# Patient Record
Sex: MALE | Race: WHITE | NOT HISPANIC OR LATINO | Employment: OTHER | URBAN - METROPOLITAN AREA
[De-identification: names, ages, dates, MRNs, and addresses within clinical notes are randomized per-mention and may not be internally consistent; named-entity substitution may affect disease eponyms.]

---

## 2023-04-06 PROBLEM — M54.16 LUMBAR RADICULOPATHY: Status: ACTIVE | Noted: 2023-04-06

## 2023-04-06 PROBLEM — M86.9 OSTEOMYELITIS (MULTI): Status: ACTIVE | Noted: 2023-04-06

## 2023-04-06 PROBLEM — I10 HYPERTENSION: Status: ACTIVE | Noted: 2023-04-06

## 2023-04-06 PROBLEM — G89.29 ABDOMINAL PAIN, CHRONIC, RIGHT LOWER QUADRANT: Status: ACTIVE | Noted: 2023-04-06

## 2023-04-06 PROBLEM — H61.20 CERUMEN IMPACTION: Status: ACTIVE | Noted: 2023-04-06

## 2023-04-06 PROBLEM — N18.9 CKD (CHRONIC KIDNEY DISEASE): Status: ACTIVE | Noted: 2023-04-06

## 2023-04-06 PROBLEM — E11.9 DIABETES (MULTI): Status: ACTIVE | Noted: 2023-04-06

## 2023-04-06 PROBLEM — R10.31 ABDOMINAL PAIN, CHRONIC, RIGHT LOWER QUADRANT: Status: ACTIVE | Noted: 2023-04-06

## 2023-04-06 PROBLEM — G95.9 MYELOPATHY (MULTI): Status: ACTIVE | Noted: 2023-04-06

## 2023-04-06 PROBLEM — N28.9 RENAL INSUFFICIENCY: Status: ACTIVE | Noted: 2023-04-06

## 2023-04-06 PROBLEM — N28.89 RENAL MASS, RIGHT: Status: ACTIVE | Noted: 2023-04-06

## 2023-04-06 RX ORDER — CYCLOBENZAPRINE HCL 5 MG
5 TABLET ORAL EVERY 8 HOURS
COMMUNITY

## 2023-04-06 RX ORDER — AMLODIPINE BESYLATE 10 MG/1
1 TABLET ORAL DAILY
COMMUNITY
Start: 2020-04-16 | End: 2023-07-11 | Stop reason: SDUPTHER

## 2023-04-06 RX ORDER — LISINOPRIL 10 MG/1
1 TABLET ORAL DAILY
COMMUNITY
Start: 2022-07-12 | End: 2023-07-11 | Stop reason: SDUPTHER

## 2023-04-06 RX ORDER — BLOOD SUGAR DIAGNOSTIC
STRIP MISCELLANEOUS
COMMUNITY
Start: 2019-09-19

## 2023-04-06 RX ORDER — METFORMIN HYDROCHLORIDE 1000 MG/1
1 TABLET ORAL
COMMUNITY
Start: 2020-06-19 | End: 2023-07-11 | Stop reason: SDUPTHER

## 2023-04-06 RX ORDER — GLIMEPIRIDE 2 MG/1
1 TABLET ORAL 2 TIMES DAILY
COMMUNITY
Start: 2022-07-12 | End: 2023-07-11 | Stop reason: SDUPTHER

## 2023-04-07 ENCOUNTER — TELEPHONE (OUTPATIENT)
Dept: PRIMARY CARE | Facility: CLINIC | Age: 58
End: 2023-04-07

## 2023-04-07 ENCOUNTER — OFFICE VISIT (OUTPATIENT)
Dept: PRIMARY CARE | Facility: CLINIC | Age: 58
End: 2023-04-07
Payer: COMMERCIAL

## 2023-04-07 VITALS
HEART RATE: 70 BPM | OXYGEN SATURATION: 98 % | DIASTOLIC BLOOD PRESSURE: 66 MMHG | SYSTOLIC BLOOD PRESSURE: 130 MMHG | BODY MASS INDEX: 34.6 KG/M2 | TEMPERATURE: 96.8 F | HEIGHT: 69 IN | WEIGHT: 233.6 LBS

## 2023-04-07 DIAGNOSIS — M79.641 PAIN OF RIGHT HAND: Primary | ICD-10-CM

## 2023-04-07 PROCEDURE — 3078F DIAST BP <80 MM HG: CPT | Performed by: STUDENT IN AN ORGANIZED HEALTH CARE EDUCATION/TRAINING PROGRAM

## 2023-04-07 PROCEDURE — 1036F TOBACCO NON-USER: CPT | Performed by: STUDENT IN AN ORGANIZED HEALTH CARE EDUCATION/TRAINING PROGRAM

## 2023-04-07 PROCEDURE — 3075F SYST BP GE 130 - 139MM HG: CPT | Performed by: STUDENT IN AN ORGANIZED HEALTH CARE EDUCATION/TRAINING PROGRAM

## 2023-04-07 PROCEDURE — 99213 OFFICE O/P EST LOW 20 MIN: CPT | Performed by: STUDENT IN AN ORGANIZED HEALTH CARE EDUCATION/TRAINING PROGRAM

## 2023-04-07 PROCEDURE — 4010F ACE/ARB THERAPY RXD/TAKEN: CPT | Performed by: STUDENT IN AN ORGANIZED HEALTH CARE EDUCATION/TRAINING PROGRAM

## 2023-04-07 RX ORDER — AMOXICILLIN AND CLAVULANATE POTASSIUM 875; 125 MG/1; MG/1
875 TABLET, FILM COATED ORAL 2 TIMES DAILY
Qty: 20 TABLET | Refills: 0 | Status: SHIPPED | OUTPATIENT
Start: 2023-04-07 | End: 2023-04-10 | Stop reason: SDUPTHER

## 2023-04-07 RX ORDER — AMOXICILLIN AND CLAVULANATE POTASSIUM 875; 125 MG/1; MG/1
875 TABLET, FILM COATED ORAL 2 TIMES DAILY
Qty: 20 TABLET | Refills: 0 | Status: SHIPPED | OUTPATIENT
Start: 2023-04-07 | End: 2023-04-07 | Stop reason: SDUPTHER

## 2023-04-07 RX ORDER — METHYLPREDNISOLONE 4 MG/1
TABLET ORAL
Qty: 21 TABLET | Refills: 0 | Status: SHIPPED | OUTPATIENT
Start: 2023-04-07 | End: 2023-04-10 | Stop reason: SDUPTHER

## 2023-04-07 RX ORDER — AMOXICILLIN AND CLAVULANATE POTASSIUM 500; 125 MG/1; MG/1
500 TABLET, FILM COATED ORAL 2 TIMES DAILY
Qty: 20 TABLET | Refills: 10 | Status: SHIPPED | OUTPATIENT
Start: 2023-04-07

## 2023-04-07 ASSESSMENT — ENCOUNTER SYMPTOMS
EYE REDNESS: 0
CHEST TIGHTNESS: 0
ARTHRALGIAS: 1
MYALGIAS: 0
WEAKNESS: 0
PALPITATIONS: 0
COUGH: 0
WHEEZING: 0
NAUSEA: 0
SLEEP DISTURBANCE: 0
DYSURIA: 0
FATIGUE: 0
BLOOD IN STOOL: 0
JOINT SWELLING: 1
SINUS PAIN: 0
FEVER: 0
PHOTOPHOBIA: 0
ABDOMINAL PAIN: 0
EYE DISCHARGE: 0
SHORTNESS OF BREATH: 0
ABDOMINAL DISTENTION: 0
APPETITE CHANGE: 0
DIFFICULTY URINATING: 0
CONSTIPATION: 0
VOMITING: 0
CHILLS: 0
COLOR CHANGE: 0
DIARRHEA: 0
CONFUSION: 0

## 2023-04-07 NOTE — TELEPHONE ENCOUNTER
Patient's medication was sent to the wrong pharmacy.  It should have gone to the local [facility] listed.  Pharmacy will not fill this type of medication.

## 2023-04-07 NOTE — PROGRESS NOTES
Subjective   Patient ID: Nader Zazueta is a 58 y.o. male who presents for Vomiting (Had a recent stomach bug) and Hand Pain (Right hand swelling and pain x2 days).    HPI     Sick visit    Pt reported a GI bug from 4/1-5 in which he had stomach cramping, nausea w/ vomiting exacerbated by PO intake. Denies fevers, chills, loose stools, constipation, melena, stools, or hematochezia. He does note; however, he did had several days of non-bloody loose stools several weeks prior. Pt also reports on 4/4, he awoke with sudden onset right hand edema and discomfort, with mild erythema on his knuckles. Denies any trauma to the area, although speculates he may have hit it during his sleep. Pain is worst in his MCPs of his right middle and index fingers. He cannot completely close his fist. Has dogs and cats at home, denies any bites/scratches. He denies attempting any palliative measures such as taking NSAIDs.    Has an appointment for general surgery next week for hernia evaluation.      Review of Systems   Constitutional:  Negative for appetite change, chills, fatigue and fever.   HENT:  Negative for congestion and sinus pain.    Eyes:  Negative for photophobia, discharge and redness.   Respiratory:  Negative for cough, chest tightness, shortness of breath and wheezing.    Cardiovascular:  Negative for chest pain, palpitations and leg swelling.   Gastrointestinal:  Negative for abdominal distention, abdominal pain, blood in stool, constipation, diarrhea, nausea and vomiting.   Genitourinary:  Negative for difficulty urinating, dysuria and enuresis.   Musculoskeletal:  Positive for arthralgias and joint swelling. Negative for myalgias.   Skin:  Negative for color change.   Neurological:  Negative for syncope and weakness.   Psychiatric/Behavioral:  Negative for confusion and sleep disturbance.        Objective   /66 (BP Location: Right arm, Patient Position: Sitting)   Pulse 70   Temp 36 °C (96.8 °F) (Temporal)    "Ht 1.753 m (5' 9\")   Wt 106 kg (233 lb 9.6 oz)   SpO2 98%   BMI 34.50 kg/m²     Physical Exam  Constitutional:       General: He is not in acute distress.     Appearance: He is not toxic-appearing.   HENT:      Head: Normocephalic and atraumatic.   Eyes:      General: No scleral icterus.     Conjunctiva/sclera: Conjunctivae normal.      Pupils: Pupils are equal, round, and reactive to light.   Cardiovascular:      Rate and Rhythm: Normal rate and regular rhythm.      Pulses: Normal pulses.   Pulmonary:      Effort: Pulmonary effort is normal.      Breath sounds: Normal breath sounds.   Abdominal:      General: There is distension.      Tenderness: There is no abdominal tenderness. There is no rebound.      Comments: Abdominal hernia is reducible.    Musculoskeletal:         General: Swelling and tenderness present.      Cervical back: No rigidity.      Right lower leg: No edema.      Left lower leg: No edema.   Skin:     General: Skin is warm and dry.      Findings: No bruising.      Comments: Scattered redness on his right hands, appears to be chronic and non-cellulitic    Neurological:      General: No focal deficit present.      Mental Status: He is alert.   Psychiatric:         Mood and Affect: Mood normal.         Behavior: Behavior normal.         Assessment/Plan     Right hand edema: Sudden onset, denies trauma to the area, although patient performs a lot of repetitive hand movement. Some erythema is appreciated on exam, though low suspicion for cellulitis. Low suspicion for reactive arthritis given focal findings. Will obtain a medrol dose pack (pt to avoid NSAIDs d/t his solitary kidney) and Xray imaging.     Reducible abdominal hernia: Pt to follow-up with General Surgery next week. Easily reducible. No evidence of incarceration.    DM; Type 2 currently maintained on metformin, was started on glimepiride at his last visit. Last A1c was 7.9 . Microalbumin was elevated.  Patient gets yearly eye exams. " Patient does not take home BS. Average BS are mid 100s. Denies any lows BS.      HTN: denies headaches, blurry vision, or lightheadedness. He is currently on amlodipine and lisinopril.     HLD: stable, denies any muscle aches     CKD: solitary kidney 2/2 to RCC. CKD III baseline 1.5ish, avoids NSAIDs, does not follow with nephrology.     Follow-up in 3 months.        Agree with above residents note

## 2023-04-10 ENCOUNTER — APPOINTMENT (OUTPATIENT)
Dept: LAB | Facility: LAB | Age: 58
End: 2023-04-10
Payer: COMMERCIAL

## 2023-04-10 DIAGNOSIS — M79.641 PAIN OF RIGHT HAND: ICD-10-CM

## 2023-04-10 LAB
ALANINE AMINOTRANSFERASE (SGPT) (U/L) IN SER/PLAS: 47 U/L (ref 10–52)
ALBUMIN (G/DL) IN SER/PLAS: 4.3 G/DL (ref 3.4–5)
ALKALINE PHOSPHATASE (U/L) IN SER/PLAS: 45 U/L (ref 33–120)
ANION GAP IN SER/PLAS: 13 MMOL/L (ref 10–20)
ASPARTATE AMINOTRANSFERASE (SGOT) (U/L) IN SER/PLAS: 22 U/L (ref 9–39)
BASOPHILS (10*3/UL) IN BLOOD BY AUTOMATED COUNT: 0.06 X10E9/L (ref 0–0.1)
BASOPHILS/100 LEUKOCYTES IN BLOOD BY AUTOMATED COUNT: 0.7 % (ref 0–2)
BILIRUBIN TOTAL (MG/DL) IN SER/PLAS: 0.4 MG/DL (ref 0–1.2)
CALCIUM (MG/DL) IN SER/PLAS: 9.3 MG/DL (ref 8.6–10.3)
CARBON DIOXIDE, TOTAL (MMOL/L) IN SER/PLAS: 21 MMOL/L (ref 21–32)
CHLORIDE (MMOL/L) IN SER/PLAS: 105 MMOL/L (ref 98–107)
CREATININE (MG/DL) IN SER/PLAS: 1.36 MG/DL (ref 0.5–1.3)
EOSINOPHILS (10*3/UL) IN BLOOD BY AUTOMATED COUNT: 0.24 X10E9/L (ref 0–0.7)
EOSINOPHILS/100 LEUKOCYTES IN BLOOD BY AUTOMATED COUNT: 2.8 % (ref 0–6)
ERYTHROCYTE DISTRIBUTION WIDTH (RATIO) BY AUTOMATED COUNT: 12.6 % (ref 11.5–14.5)
ERYTHROCYTE MEAN CORPUSCULAR HEMOGLOBIN CONCENTRATION (G/DL) BY AUTOMATED: 33.4 G/DL (ref 32–36)
ERYTHROCYTE MEAN CORPUSCULAR VOLUME (FL) BY AUTOMATED COUNT: 92 FL (ref 80–100)
ERYTHROCYTES (10*6/UL) IN BLOOD BY AUTOMATED COUNT: 4.48 X10E12/L (ref 4.5–5.9)
GFR MALE: 61 ML/MIN/1.73M2
GLUCOSE (MG/DL) IN SER/PLAS: 188 MG/DL (ref 74–99)
HEMATOCRIT (%) IN BLOOD BY AUTOMATED COUNT: 41.3 % (ref 41–52)
HEMOGLOBIN (G/DL) IN BLOOD: 13.8 G/DL (ref 13.5–17.5)
IMMATURE GRANULOCYTES/100 LEUKOCYTES IN BLOOD BY AUTOMATED COUNT: 1.7 % (ref 0–0.9)
LEUKOCYTES (10*3/UL) IN BLOOD BY AUTOMATED COUNT: 8.6 X10E9/L (ref 4.4–11.3)
LYMPHOCYTES (10*3/UL) IN BLOOD BY AUTOMATED COUNT: 2.34 X10E9/L (ref 1.2–4.8)
LYMPHOCYTES/100 LEUKOCYTES IN BLOOD BY AUTOMATED COUNT: 27.1 % (ref 13–44)
MONOCYTES (10*3/UL) IN BLOOD BY AUTOMATED COUNT: 0.52 X10E9/L (ref 0.1–1)
MONOCYTES/100 LEUKOCYTES IN BLOOD BY AUTOMATED COUNT: 6 % (ref 2–10)
NEUTROPHILS (10*3/UL) IN BLOOD BY AUTOMATED COUNT: 5.31 X10E9/L (ref 1.2–7.7)
NEUTROPHILS/100 LEUKOCYTES IN BLOOD BY AUTOMATED COUNT: 61.7 % (ref 40–80)
NRBC (PER 100 WBCS) BY AUTOMATED COUNT: 0 /100 WBC (ref 0–0)
PLATELETS (10*3/UL) IN BLOOD AUTOMATED COUNT: 254 X10E9/L (ref 150–450)
POTASSIUM (MMOL/L) IN SER/PLAS: 4.6 MMOL/L (ref 3.5–5.3)
PROTEIN TOTAL: 7 G/DL (ref 6.4–8.2)
SODIUM (MMOL/L) IN SER/PLAS: 134 MMOL/L (ref 136–145)
URATE (MG/DL) IN SER/PLAS: 6.9 MG/DL (ref 4–7.5)
UREA NITROGEN (MG/DL) IN SER/PLAS: 24 MG/DL (ref 6–23)

## 2023-04-10 PROCEDURE — 85025 COMPLETE CBC W/AUTO DIFF WBC: CPT

## 2023-04-10 PROCEDURE — 80053 COMPREHEN METABOLIC PANEL: CPT

## 2023-04-10 PROCEDURE — 36415 COLL VENOUS BLD VENIPUNCTURE: CPT

## 2023-04-10 PROCEDURE — 84550 ASSAY OF BLOOD/URIC ACID: CPT

## 2023-04-11 RX ORDER — METHYLPREDNISOLONE 4 MG/1
TABLET ORAL
Qty: 21 TABLET | Refills: 0 | Status: SHIPPED | OUTPATIENT
Start: 2023-04-11 | End: 2023-04-17

## 2023-04-11 RX ORDER — AMOXICILLIN AND CLAVULANATE POTASSIUM 875; 125 MG/1; MG/1
875 TABLET, FILM COATED ORAL 2 TIMES DAILY
Qty: 20 TABLET | Refills: 0 | Status: SHIPPED | OUTPATIENT
Start: 2023-04-11 | End: 2023-04-21

## 2023-04-12 ENCOUNTER — TELEPHONE (OUTPATIENT)
Dept: PRIMARY CARE | Facility: CLINIC | Age: 58
End: 2023-04-12
Payer: COMMERCIAL

## 2023-04-12 NOTE — TELEPHONE ENCOUNTER
----- Message from Mustapha Singer DO sent at 4/12/2023  9:55 AM EDT -----  X rays shows mild arthritis, recommend voltaren cream

## 2023-07-11 DIAGNOSIS — I10 HYPERTENSION, UNSPECIFIED TYPE: Primary | ICD-10-CM

## 2023-07-11 DIAGNOSIS — E11.9 TYPE 2 DIABETES MELLITUS WITHOUT COMPLICATION, WITHOUT LONG-TERM CURRENT USE OF INSULIN (MULTI): ICD-10-CM

## 2023-07-11 RX ORDER — METFORMIN HYDROCHLORIDE 1000 MG/1
1000 TABLET ORAL
Qty: 30 TABLET | Refills: 3 | Status: SHIPPED | OUTPATIENT
Start: 2023-07-11 | End: 2023-09-07 | Stop reason: SDUPTHER

## 2023-07-11 RX ORDER — LISINOPRIL 10 MG/1
10 TABLET ORAL DAILY
Qty: 30 TABLET | Refills: 3 | Status: SHIPPED | OUTPATIENT
Start: 2023-07-11 | End: 2023-07-11

## 2023-07-11 RX ORDER — AMLODIPINE BESYLATE 10 MG/1
10 TABLET ORAL DAILY
Qty: 30 TABLET | Refills: 3 | Status: SHIPPED | OUTPATIENT
Start: 2023-07-11 | End: 2023-07-11

## 2023-07-11 RX ORDER — GLIMEPIRIDE 2 MG/1
2 TABLET ORAL 2 TIMES DAILY
Qty: 30 TABLET | Refills: 3 | Status: SHIPPED | OUTPATIENT
Start: 2023-07-11 | End: 2023-09-07 | Stop reason: SDUPTHER

## 2023-07-24 ENCOUNTER — OFFICE VISIT (OUTPATIENT)
Dept: PRIMARY CARE | Facility: CLINIC | Age: 58
End: 2023-07-24
Payer: COMMERCIAL

## 2023-07-24 VITALS
DIASTOLIC BLOOD PRESSURE: 82 MMHG | SYSTOLIC BLOOD PRESSURE: 122 MMHG | HEART RATE: 90 BPM | HEIGHT: 69 IN | TEMPERATURE: 96.8 F | OXYGEN SATURATION: 98 % | BODY MASS INDEX: 34.54 KG/M2 | WEIGHT: 233.2 LBS

## 2023-07-24 DIAGNOSIS — T63.441A BEE STING, ACCIDENTAL OR UNINTENTIONAL, INITIAL ENCOUNTER: Primary | ICD-10-CM

## 2023-07-24 PROCEDURE — 4010F ACE/ARB THERAPY RXD/TAKEN: CPT | Performed by: STUDENT IN AN ORGANIZED HEALTH CARE EDUCATION/TRAINING PROGRAM

## 2023-07-24 PROCEDURE — 99213 OFFICE O/P EST LOW 20 MIN: CPT | Performed by: STUDENT IN AN ORGANIZED HEALTH CARE EDUCATION/TRAINING PROGRAM

## 2023-07-24 PROCEDURE — 3079F DIAST BP 80-89 MM HG: CPT | Performed by: STUDENT IN AN ORGANIZED HEALTH CARE EDUCATION/TRAINING PROGRAM

## 2023-07-24 PROCEDURE — 1036F TOBACCO NON-USER: CPT | Performed by: STUDENT IN AN ORGANIZED HEALTH CARE EDUCATION/TRAINING PROGRAM

## 2023-07-24 PROCEDURE — 3074F SYST BP LT 130 MM HG: CPT | Performed by: STUDENT IN AN ORGANIZED HEALTH CARE EDUCATION/TRAINING PROGRAM

## 2023-07-24 RX ORDER — METHYLPREDNISOLONE 4 MG/1
TABLET ORAL
Qty: 21 TABLET | Refills: 0 | Status: SHIPPED | OUTPATIENT
Start: 2023-07-24 | End: 2023-07-24

## 2023-07-24 NOTE — PROGRESS NOTES
"Subjective   Patient ID: Nader Zazueta is a 58 y.o. male who presents for Follow-up (In the urgent care for infected cat scratch ) and Insect Bite (Multiple yellow jacket stings 2 days ago).    HPI     Patient is multiple injuries.  He states 2 years ago he was scratched by a cat.  He only infection in his left upper arm.  He is getting better from this.  States he has been intact biology axis, Q days.  He had multiple stings over his body.  He has been itching take meds which has helped.  No swelling or anaphylaxis noted.    Review of Systems   All other systems reviewed and are negative.      Objective   /82 (BP Location: Right arm, Patient Position: Sitting)   Pulse 90   Temp 36 °C (96.8 °F) (Temporal)   Ht 1.753 m (5' 9\")   Wt 106 kg (233 lb 3.2 oz)   SpO2 98%   BMI 34.44 kg/m²     Physical Exam  Constitutional:       Appearance: Normal appearance.   HENT:      Head: Normocephalic and atraumatic.      Right Ear: Tympanic membrane and ear canal normal.      Left Ear: Tympanic membrane and ear canal normal.      Mouth/Throat:      Mouth: Mucous membranes are moist.      Pharynx: Oropharynx is clear.   Eyes:      Extraocular Movements: Extraocular movements intact.      Conjunctiva/sclera: Conjunctivae normal.      Pupils: Pupils are equal, round, and reactive to light.   Cardiovascular:      Rate and Rhythm: Normal rate and regular rhythm.      Pulses: Normal pulses.      Heart sounds: Normal heart sounds.   Pulmonary:      Effort: Pulmonary effort is normal.      Breath sounds: Normal breath sounds.   Abdominal:      General: Abdomen is flat. Bowel sounds are normal.      Palpations: Abdomen is soft.   Musculoskeletal:         General: Normal range of motion.      Cervical back: Normal range of motion and neck supple.   Skin:     General: Skin is warm and dry.      Capillary Refill: Capillary refill takes 2 to 3 seconds.   Neurological:      General: No focal deficit present.      Mental Status: He " is alert and oriented to person, place, and time. Mental status is at baseline.   Psychiatric:         Mood and Affect: Mood normal.         Behavior: Behavior normal.         Thought Content: Thought content normal.         Judgment: Judgment normal.         Assessment/Plan   1. Bee sting, accidental or unintentional, initial encounter    - methylPREDNISolone (Medrol Dospak) 4 mg tablets; Take as directed on package.  Dispense: 21 tablet; Refill: 0

## 2023-08-07 ENCOUNTER — TELEPHONE (OUTPATIENT)
Dept: PRIMARY CARE | Facility: CLINIC | Age: 58
End: 2023-08-07
Payer: COMMERCIAL

## 2023-08-07 ENCOUNTER — LAB (OUTPATIENT)
Dept: LAB | Facility: LAB | Age: 58
End: 2023-08-07
Payer: COMMERCIAL

## 2023-08-07 DIAGNOSIS — M79.641 PAIN OF RIGHT HAND: ICD-10-CM

## 2023-08-07 DIAGNOSIS — R42 LIGHTHEADED: ICD-10-CM

## 2023-08-07 DIAGNOSIS — R42 LIGHTHEADED: Primary | ICD-10-CM

## 2023-08-07 LAB
ALANINE AMINOTRANSFERASE (SGPT) (U/L) IN SER/PLAS: 34 U/L (ref 10–52)
ALBUMIN (G/DL) IN SER/PLAS: 4.6 G/DL (ref 3.4–5)
ALKALINE PHOSPHATASE (U/L) IN SER/PLAS: 43 U/L (ref 33–120)
ANION GAP IN SER/PLAS: 11 MMOL/L (ref 10–20)
ASPARTATE AMINOTRANSFERASE (SGOT) (U/L) IN SER/PLAS: 18 U/L (ref 9–39)
BASOPHILS (10*3/UL) IN BLOOD BY AUTOMATED COUNT: 0.08 X10E9/L (ref 0–0.1)
BASOPHILS/100 LEUKOCYTES IN BLOOD BY AUTOMATED COUNT: 1.1 % (ref 0–2)
BILIRUBIN TOTAL (MG/DL) IN SER/PLAS: 0.3 MG/DL (ref 0–1.2)
CALCIUM (MG/DL) IN SER/PLAS: 9.7 MG/DL (ref 8.6–10.3)
CARBON DIOXIDE, TOTAL (MMOL/L) IN SER/PLAS: 28 MMOL/L (ref 21–32)
CHLORIDE (MMOL/L) IN SER/PLAS: 105 MMOL/L (ref 98–107)
CREATININE (MG/DL) IN SER/PLAS: 1.53 MG/DL (ref 0.5–1.3)
EOSINOPHILS (10*3/UL) IN BLOOD BY AUTOMATED COUNT: 0.31 X10E9/L (ref 0–0.7)
EOSINOPHILS/100 LEUKOCYTES IN BLOOD BY AUTOMATED COUNT: 4.1 % (ref 0–6)
ERYTHROCYTE DISTRIBUTION WIDTH (RATIO) BY AUTOMATED COUNT: 12.5 % (ref 11.5–14.5)
ERYTHROCYTE MEAN CORPUSCULAR HEMOGLOBIN CONCENTRATION (G/DL) BY AUTOMATED: 33.3 G/DL (ref 32–36)
ERYTHROCYTE MEAN CORPUSCULAR VOLUME (FL) BY AUTOMATED COUNT: 94 FL (ref 80–100)
ERYTHROCYTES (10*6/UL) IN BLOOD BY AUTOMATED COUNT: 4.47 X10E12/L (ref 4.5–5.9)
GFR MALE: 53 ML/MIN/1.73M2
GLUCOSE (MG/DL) IN SER/PLAS: 156 MG/DL (ref 74–99)
HEMATOCRIT (%) IN BLOOD BY AUTOMATED COUNT: 42 % (ref 41–52)
HEMOGLOBIN (G/DL) IN BLOOD: 14 G/DL (ref 13.5–17.5)
IMMATURE GRANULOCYTES/100 LEUKOCYTES IN BLOOD BY AUTOMATED COUNT: 0.7 % (ref 0–0.9)
LEUKOCYTES (10*3/UL) IN BLOOD BY AUTOMATED COUNT: 7.6 X10E9/L (ref 4.4–11.3)
LYMPHOCYTES (10*3/UL) IN BLOOD BY AUTOMATED COUNT: 2.16 X10E9/L (ref 1.2–4.8)
LYMPHOCYTES/100 LEUKOCYTES IN BLOOD BY AUTOMATED COUNT: 28.6 % (ref 13–44)
MONOCYTES (10*3/UL) IN BLOOD BY AUTOMATED COUNT: 0.51 X10E9/L (ref 0.1–1)
MONOCYTES/100 LEUKOCYTES IN BLOOD BY AUTOMATED COUNT: 6.7 % (ref 2–10)
NEUTROPHILS (10*3/UL) IN BLOOD BY AUTOMATED COUNT: 4.45 X10E9/L (ref 1.2–7.7)
NEUTROPHILS/100 LEUKOCYTES IN BLOOD BY AUTOMATED COUNT: 58.8 % (ref 40–80)
NRBC (PER 100 WBCS) BY AUTOMATED COUNT: 0 /100 WBC (ref 0–0)
PLATELETS (10*3/UL) IN BLOOD AUTOMATED COUNT: 235 X10E9/L (ref 150–450)
POTASSIUM (MMOL/L) IN SER/PLAS: 5.1 MMOL/L (ref 3.5–5.3)
PROTEIN TOTAL: 7.1 G/DL (ref 6.4–8.2)
SODIUM (MMOL/L) IN SER/PLAS: 139 MMOL/L (ref 136–145)
URATE (MG/DL) IN SER/PLAS: 7.2 MG/DL (ref 4–7.5)
UREA NITROGEN (MG/DL) IN SER/PLAS: 25 MG/DL (ref 6–23)

## 2023-08-07 PROCEDURE — 36415 COLL VENOUS BLD VENIPUNCTURE: CPT

## 2023-08-07 PROCEDURE — 80053 COMPREHEN METABOLIC PANEL: CPT

## 2023-08-07 PROCEDURE — 85025 COMPLETE CBC W/AUTO DIFF WBC: CPT

## 2023-08-07 PROCEDURE — 84550 ASSAY OF BLOOD/URIC ACID: CPT

## 2023-08-07 NOTE — TELEPHONE ENCOUNTER
Patient called he stated a year and a half ago  there was episode where he was lightheaded then a week later he passed out his white blood cell count was high and he was put on an iv antibiotic to put in his arm for 3 times a day for 6 weeks. He is now feeling the same is lightheaded and is worried it will happen again . What should he do and he also wants to know if you can place orders for blood work so he can go get that done right away

## 2023-08-07 NOTE — PROGRESS NOTES
Subjective   Reason for Visit: Nader Zazueta is an 58 y.o. male here for a Medicare Wellness visit.               HPI    Patient Care Team:  Mustapha Singer DO as PCP - General  Mustapha Singer DO as PCP - Employee ACO PCP     Review of Systems    Objective   Vitals:  There were no vitals taken for this visit.      Physical Exam    Assessment/Plan   Problem List Items Addressed This Visit    None

## 2023-09-07 DIAGNOSIS — E11.9 TYPE 2 DIABETES MELLITUS WITHOUT COMPLICATION, WITHOUT LONG-TERM CURRENT USE OF INSULIN (MULTI): ICD-10-CM

## 2023-09-07 RX ORDER — GLIMEPIRIDE 2 MG/1
2 TABLET ORAL 2 TIMES DAILY
Qty: 180 TABLET | Refills: 1 | Status: SHIPPED | OUTPATIENT
Start: 2023-09-07 | End: 2023-09-07

## 2023-09-07 RX ORDER — METFORMIN HYDROCHLORIDE 1000 MG/1
1000 TABLET ORAL
Qty: 180 TABLET | Refills: 1 | Status: SHIPPED | OUTPATIENT
Start: 2023-09-07 | End: 2023-09-07

## 2023-10-07 ENCOUNTER — OFFICE VISIT (OUTPATIENT)
Dept: FAMILY MEDICINE CLINIC | Age: 58
End: 2023-10-07

## 2023-10-07 VITALS
WEIGHT: 227.8 LBS | HEIGHT: 69 IN | BODY MASS INDEX: 33.74 KG/M2 | OXYGEN SATURATION: 99 % | HEART RATE: 75 BPM | TEMPERATURE: 97.7 F | RESPIRATION RATE: 16 BRPM | SYSTOLIC BLOOD PRESSURE: 120 MMHG | DIASTOLIC BLOOD PRESSURE: 76 MMHG

## 2023-10-07 DIAGNOSIS — L03.114 CELLULITIS OF LEFT ELBOW: Primary | ICD-10-CM

## 2023-10-07 PROCEDURE — 99212 OFFICE O/P EST SF 10 MIN: CPT | Performed by: PHYSICIAN ASSISTANT

## 2023-10-07 RX ORDER — LISINOPRIL 10 MG/1
TABLET ORAL
COMMUNITY
Start: 2023-07-11

## 2023-10-07 RX ORDER — GLIMEPIRIDE 2 MG/1
TABLET ORAL
COMMUNITY
Start: 2023-09-07

## 2023-10-07 RX ORDER — CEPHALEXIN 500 MG/1
500 CAPSULE ORAL 4 TIMES DAILY
Qty: 40 CAPSULE | Refills: 0 | Status: SHIPPED | OUTPATIENT
Start: 2023-10-07 | End: 2023-10-17

## 2023-10-07 RX ORDER — SULFAMETHOXAZOLE AND TRIMETHOPRIM 800; 160 MG/1; MG/1
1 TABLET ORAL 2 TIMES DAILY
Qty: 20 TABLET | Refills: 0 | Status: SHIPPED | OUTPATIENT
Start: 2023-10-07 | End: 2023-10-17

## 2023-10-07 RX ORDER — AMLODIPINE BESYLATE 10 MG/1
TABLET ORAL
COMMUNITY
Start: 2023-07-11

## 2023-10-07 SDOH — ECONOMIC STABILITY: HOUSING INSECURITY
IN THE LAST 12 MONTHS, WAS THERE A TIME WHEN YOU DID NOT HAVE A STEADY PLACE TO SLEEP OR SLEPT IN A SHELTER (INCLUDING NOW)?: NO

## 2023-10-07 SDOH — ECONOMIC STABILITY: FOOD INSECURITY: WITHIN THE PAST 12 MONTHS, YOU WORRIED THAT YOUR FOOD WOULD RUN OUT BEFORE YOU GOT MONEY TO BUY MORE.: NEVER TRUE

## 2023-10-07 SDOH — ECONOMIC STABILITY: FOOD INSECURITY: WITHIN THE PAST 12 MONTHS, THE FOOD YOU BOUGHT JUST DIDN'T LAST AND YOU DIDN'T HAVE MONEY TO GET MORE.: NEVER TRUE

## 2023-10-07 SDOH — ECONOMIC STABILITY: INCOME INSECURITY: HOW HARD IS IT FOR YOU TO PAY FOR THE VERY BASICS LIKE FOOD, HOUSING, MEDICAL CARE, AND HEATING?: NOT HARD AT ALL

## 2023-10-07 ASSESSMENT — ENCOUNTER SYMPTOMS
COUGH: 0
SHORTNESS OF BREATH: 0
TROUBLE SWALLOWING: 0
VOMITING: 0

## 2023-10-07 ASSESSMENT — PATIENT HEALTH QUESTIONNAIRE - PHQ9
SUM OF ALL RESPONSES TO PHQ QUESTIONS 1-9: 0
SUM OF ALL RESPONSES TO PHQ9 QUESTIONS 1 & 2: 0
SUM OF ALL RESPONSES TO PHQ QUESTIONS 1-9: 0
SUM OF ALL RESPONSES TO PHQ QUESTIONS 1-9: 0
2. FEELING DOWN, DEPRESSED OR HOPELESS: 0
1. LITTLE INTEREST OR PLEASURE IN DOING THINGS: 0
SUM OF ALL RESPONSES TO PHQ QUESTIONS 1-9: 0

## 2023-10-07 NOTE — PROGRESS NOTES
movements intact. Pupils: Pupils are equal, round, and reactive to light. Musculoskeletal:         General: Normal range of motion. Comments: 3 cm area of erythema with a central scab at left elbow olecranon region. No discharge or fluctuance currently. No lymphangitis. Full range of motion 2+ distal pulses neurovascular distally intact   Skin:     General: Skin is warm and dry. Capillary Refill: Capillary refill takes less than 2 seconds. Neurological:      General: No focal deficit present. Mental Status: He is alert and oriented to person, place, and time. Psychiatric:         Mood and Affect: Mood normal.         Behavior: Behavior normal.       READY CARE COURSE   Labs:  No results found for this visit on 10/07/23. IMAGING:  No orders to display     Scheduled Meds:  Continuous Infusions:  PRN Meds:. PROCEDURES:  FINAL IMPRESSION      1. Cellulitis of left elbow      DISPOSITION/PLAN   MDM  71-year-old male with 4-day history of reddened area at left elbow thinks he was bit by something as he was working under a deck. At this time has cellulitis but no abscess to I and D. He did have some white drainage yesterday. Treated with 2 antibiotics 6 weeks ago for cellulitis of the left forearm that resolved. May have been Bactrim and Keflex. He also has had right kidney removed secondary to infection. States normal kidney functions. He is diabetic. I advised him that this will need very close follow-up. Especially given his history. Also he is to watch for any type of necrosis, I explained potential recluse bite symptoms to him as well. Otherwise he is well-appearing and afebrile. He will be covered with Bactrim and Keflex. Recheck with PCP early this coming week. Red flag symptoms discussed which would prompt ER evaluation. PATIENT REFERRED TO:  Return in about 5 days (around 10/12/2023).     DISCHARGE MEDICATIONS:  New Prescriptions    CEPHALEXIN (KEFLEX) 500 MG CAPSULE

## 2023-10-12 ENCOUNTER — OFFICE VISIT (OUTPATIENT)
Dept: PRIMARY CARE | Facility: CLINIC | Age: 58
End: 2023-10-12
Payer: COMMERCIAL

## 2023-10-12 VITALS
WEIGHT: 226.6 LBS | DIASTOLIC BLOOD PRESSURE: 62 MMHG | SYSTOLIC BLOOD PRESSURE: 124 MMHG | OXYGEN SATURATION: 98 % | BODY MASS INDEX: 33.56 KG/M2 | HEART RATE: 70 BPM | HEIGHT: 69 IN

## 2023-10-12 DIAGNOSIS — L03.90 CELLULITIS, UNSPECIFIED CELLULITIS SITE: Primary | ICD-10-CM

## 2023-10-12 PROCEDURE — 3074F SYST BP LT 130 MM HG: CPT | Performed by: STUDENT IN AN ORGANIZED HEALTH CARE EDUCATION/TRAINING PROGRAM

## 2023-10-12 PROCEDURE — 99213 OFFICE O/P EST LOW 20 MIN: CPT | Performed by: STUDENT IN AN ORGANIZED HEALTH CARE EDUCATION/TRAINING PROGRAM

## 2023-10-12 PROCEDURE — 4010F ACE/ARB THERAPY RXD/TAKEN: CPT | Performed by: STUDENT IN AN ORGANIZED HEALTH CARE EDUCATION/TRAINING PROGRAM

## 2023-10-12 PROCEDURE — 3078F DIAST BP <80 MM HG: CPT | Performed by: STUDENT IN AN ORGANIZED HEALTH CARE EDUCATION/TRAINING PROGRAM

## 2023-10-12 PROCEDURE — 1036F TOBACCO NON-USER: CPT | Performed by: STUDENT IN AN ORGANIZED HEALTH CARE EDUCATION/TRAINING PROGRAM

## 2023-10-12 RX ORDER — SULFAMETHOXAZOLE AND TRIMETHOPRIM 800; 160 MG/1; MG/1
1 TABLET ORAL 2 TIMES DAILY
COMMUNITY
Start: 2023-10-07 | End: 2023-10-17

## 2023-10-12 RX ORDER — CEPHALEXIN 500 MG/1
1 CAPSULE ORAL 4 TIMES DAILY
COMMUNITY
Start: 2023-10-07 | End: 2023-10-17

## 2023-10-12 NOTE — PROGRESS NOTES
"Subjective   Patient ID: Nader Zazueta is a 58 y.o. male who presents for Bite (Brown recluse spider bite) and Med Refill (Glimepiride).    HPI     Bitten by a brown recluse bite on her left elbow.  This was by a vehicle.  Was very red swollen and painful.  It is all up to her arm.  Went to the urgent care.  He was given Keflex and Bactrim.  Skin much better from this.    Review of Systems   All other systems reviewed and are negative.      Objective   /62 (BP Location: Right arm, Patient Position: Sitting)   Pulse 70   Ht 1.753 m (5' 9\")   Wt 103 kg (226 lb 9.6 oz)   SpO2 98%   BMI 33.46 kg/m²     Physical Exam  Constitutional:       Appearance: Normal appearance.   HENT:      Head: Normocephalic and atraumatic.      Right Ear: Tympanic membrane and ear canal normal.      Left Ear: Tympanic membrane and ear canal normal.      Mouth/Throat:      Mouth: Mucous membranes are moist.      Pharynx: Oropharynx is clear.   Eyes:      Extraocular Movements: Extraocular movements intact.      Conjunctiva/sclera: Conjunctivae normal.      Pupils: Pupils are equal, round, and reactive to light.   Cardiovascular:      Rate and Rhythm: Normal rate and regular rhythm.      Pulses: Normal pulses.      Heart sounds: Normal heart sounds.   Pulmonary:      Effort: Pulmonary effort is normal.      Breath sounds: Normal breath sounds.   Abdominal:      General: Abdomen is flat. Bowel sounds are normal.      Palpations: Abdomen is soft.   Musculoskeletal:         General: Normal range of motion.      Cervical back: Normal range of motion and neck supple.   Skin:     General: Skin is warm and dry.      Capillary Refill: Capillary refill takes 2 to 3 seconds.   Neurological:      General: No focal deficit present.      Mental Status: He is alert and oriented to person, place, and time. Mental status is at baseline.   Psychiatric:         Mood and Affect: Mood normal.         Behavior: Behavior normal.         Thought " SUBJECTIVE:  Mr. Carlos Roemro is here for his med refills for cholesterol BPH and blood pressure  Patient ID: Sweta Storm is a 81 y.o. male. HPI:   HPI   BPA Mr. Lia Everett was placed on 2 type of medications for enlarged prostate Flomax which she recently was taken twice a day but he is back down to once a day and also the Proscar he has no symptoms of bladder incontinence or difficulty urinating    HTN At home running ok running in the 120's no problems with high BP and not having dizziness or lightheadedness. Oxygen Mr. 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 and they are going to do an additional test to see if he still needs it at night the albuterol inhaler was causing some difficulty with his glaucoma and his heart rate he was in atrial fib for about 2 weeks which normally he states last for just a few minutes or an hour and then will settle back down again however there recommended that he only use that as an as-needed basis    He does follow-up with vascular over his carotid stenosis disease and cardiology they these are yearly visits  In the past he did have some blood in his urine he states that we rechecked it and it was clear but I do not have a record of that were on check it again today    Afib had a problem with elevated heart rate 130 His inhaler was causing the heart rate and affecting heart rhythm and glucoma     Carotids    Cervical stenosis no pain   Past Medical History:   Diagnosis Date    Asthma     [facility] ([facility]     COPD [facility]chronic obstructive pulmonary disease) ([facility] [address] [facility]     Hyperlipidemia     Hypertension     Osteoarthritis     Pneumonia 11/2016    in hospital x 1 month    Retention of urine     post op colonoscopy had to go to ER     Thyroid disease       Prior to Visit Medications    Medication Sig Taking?  Authorizing Provider   sotalol (BETAPACE) 80 MG Content: Thought content normal.         Judgment: Judgment normal.         Assessment/Plan   1. Cellulitis, unspecified cellulitis site          tablet Take 1 tablet by mouth 2 times daily Yes Betha Mon, APRN   losartan (COZAAR) 100 MG tablet TAKE ONE TABLET EVERY DAY Yes Betha Mon, APRN   simvastatin (ZOCOR) 20 MG tablet Take 1 tablet by mouth nightly Yes Betha Mon, APRN   amLODIPine (NORVASC) 5 MG tablet TAKE 1 TABLET BY MOUTH TWICE A DAY Yes Betha Mon, APRN   finasteride (PROSCAR) 5 MG tablet Take 1 tablet by mouth daily Yes Betha Mon, APRN   tamsulosin (FLOMAX) 0.4 MG capsule TAKE ONE CAPSULES BY MOUTH EVERY DAY Yes Betha Mon, APRN   Zinc 25 MG TABS Take by mouth Yes Historical Provider, MD   albuterol sulfate HFA (PROVENTIL HFA) 108 (90 Base) MCG/ACT inhaler Inhale 2 puffs into the lungs every 6 hours as needed for Wheezing Yes Ismael Mon, APRN   valACYclovir (VALTREX) 1 g tablet Take 2 tablets by mouth 2 times daily Yes Ismael Mon, APRN   azelastine (ASTELIN) 0.1 % nasal spray 2 sprays by Nasal route 2 times daily Use in each nostril as directed Yes Elizabeth Simmons MD   levocetirizine (XYZAL) 5 MG tablet Take 1 tablet by mouth nightly Yes Elizabeth Simmons MD   aspirin 81 MG EC tablet Take 81 mg by mouth daily Yes Historical Provider, MD   fluticasone (FLONASE) 50 MCG/ACT nasal spray 2 sprays by Nasal route daily Yes Elizabeth Simmons MD   XARELTO 20 MG TABS tablet Take 1 tablet by mouth daily Yes Historical Provider, MD   LOTEMAX 0.5 % ophthalmic suspension daily Yes Historical Provider, MD   furosemide (LASIX) 40 MG tablet Take 40 mg by mouth daily Yes Historical Provider, MD   ascorbic acid (VITAMIN C) 500 MG tablet Take 500 mg by mouth daily Yes Historical Provider, MD   latanoprost (XALATAN) 0.005 % ophthalmic solution Place 1 drop into both eyes daily  Yes Historical Provider, MD   timolol (TIMOPTIC) 0.5 % ophthalmic solution Place 1 drop into both eyes 2 times daily Yes Historical Provider, MD   Multiple Vitamins-Minerals (CENTRUM SILVER PO) Take 1 tablet by mouth daily.  Yes Historical Provider, MD   [facility] Natural Products (OSTEO BI-FLEX JOINT SHIELD PO) Take 1 tablet by mouth daily. Yes Historical Provider, MD     Allergies   Allergen Reactions    Pcn [Penicillins]     Benicar [Olmesartan] Other (See Comments)     Cough       Review of Systems   Constitutional: Positive for fatigue. Negative for unexpected weight change. HENT: Positive for hearing loss (hearing aids ). Negative for nosebleeds and trouble swallowing. Eyes: Negative for visual disturbance. Respiratory: Negative for cough and shortness of breath. Cardiovascular: Positive for palpitations. Negative for chest pain. Gastrointestinal: Negative for abdominal pain and blood in stool. Genitourinary: Negative for difficulty urinating and hematuria. Musculoskeletal: Negative for arthralgias and myalgias. Neurological: Negative for dizziness and headaches. Psychiatric/Behavioral: Negative for dysphoric mood and sleep disturbance. OBJECTIVE:    Physical Exam  Vitals reviewed. Constitutional:       Appearance: Normal appearance. He is well-developed. HENT:      Head: Normocephalic and atraumatic. Right Ear: Tympanic membrane, ear canal and external ear normal. There is no impacted cerumen. Left Ear: Tympanic membrane, ear canal and external ear normal. There is no impacted cerumen. Nose: Nose normal.      Mouth/Throat:      Lips: Pink. Mouth: Mucous membranes are moist.      Dentition: Normal dentition. Tongue: No lesions. Pharynx: Oropharynx is clear. Uvula midline. Tonsils: No tonsillar exudate or tonsillar abscesses. Eyes:      General: Lids are normal.         Right eye: No discharge. Left eye: No discharge. Extraocular Movements:      Right eye: Normal extraocular motion. Left eye: Normal extraocular motion. Conjunctiva/sclera: Conjunctivae normal.      Right eye: Right conjunctiva is not injected. Left eye: Left conjunctiva is not injected.       Pupils: Pupils are equal, round, and reactive to light. Neck:      Thyroid: No thyromegaly. Vascular: No carotid bruit or JVD. Cardiovascular:      Rate and Rhythm: Normal rate and regular rhythm. Pulses:           Carotid pulses are 2+ on the right side and 2+ on the left side. Radial pulses are 2+ on the right side and 2+ on the left side. Heart sounds: Normal heart sounds, S1 normal and S2 normal. No murmur heard. Pulmonary:      Effort: Pulmonary effort is normal. No accessory muscle usage. Breath sounds: Normal breath sounds. Abdominal:      General: Bowel sounds are normal. There is no distension or abdominal bruit. Palpations: Abdomen is soft. There is no mass. Tenderness: There is no abdominal tenderness. Hernia: No hernia is present. Musculoskeletal:         General: Normal range of motion. Cervical back: Normal range of motion and neck supple. Right lower leg: No edema. Left lower leg: No edema. Lymphadenopathy:      Cervical:      Right cervical: No superficial cervical adenopathy. Left cervical: No superficial cervical adenopathy. Skin:     General: Skin is warm and dry. Coloration: Skin is not jaundiced or pale. Findings: No lesion or rash. Nails: There is no clubbing. Neurological:      Mental Status: He is alert and oriented to person, place, and time. Cranial Nerves: No facial asymmetry. Motor: No weakness or tremor. Coordination: Coordination normal.      Gait: Gait normal.      Deep Tendon Reflexes: Reflexes are normal and symmetric. Psychiatric:         Attention and Perception: Attention normal.         Mood and Affect: Mood normal.         Speech: Speech normal.         Behavior: Behavior normal.         Thought Content:  Thought content normal.         Cognition and Memory: Memory normal.         Judgment: Judgment normal.        /64 (Site: Left Upper Arm, Position: Sitting, Cuff Size: Medium Adult) Pulse 68   Temp 96.9 °F (36.1 °C) (Temporal)   Resp 16   Ht 5' 8\" (1.727 m)   Wt 193 lb (87.5 kg)   SpO2 97%   BMI 29.35 kg/m²      ASSESSMENT:      ICD-10-CM    1. Hematuria, unspecified type  R31.9 Urinalysis Reflex to Culture   2. Essential hypertension  I10 sotalol (BETAPACE) 80 MG tablet     losartan (COZAAR) 100 MG tablet     amLODIPine (NORVASC) 5 MG tablet     CBC     Comprehensive Metabolic Panel   3. BPH without obstruction/lower urinary tract symptoms  N40.0 finasteride (PROSCAR) 5 MG tablet     tamsulosin (FLOMAX) 0.4 MG capsule   4. Paroxysmal atrial fibrillation (HCC)  I48.0    5. Mixed hyperlipidemia  E78.2 simvastatin (ZOCOR) 20 MG tablet     Lipid, Fasting       PLAN:    Mark Valencia Guess: Medication Refill (medication refills, no other concerns )  review labs  RTC in 6 months     Diagnosis and orders for thisvisit are above. All patient questions answered. Pt voiced understanding. Reviewedhealth maintenance. Instructed to continue current medications, diet and exercise. Patient agreed with treatment plan. Follow up as directed.

## 2023-10-20 ENCOUNTER — PHARMACY VISIT (OUTPATIENT)
Dept: PHARMACY | Facility: CLINIC | Age: 58
End: 2023-10-20
Payer: COMMERCIAL

## 2023-10-25 ENCOUNTER — PHARMACY VISIT (OUTPATIENT)
Dept: PHARMACY | Facility: CLINIC | Age: 58
End: 2023-10-25
Payer: COMMERCIAL

## 2023-10-25 PROCEDURE — RXMED WILLOW AMBULATORY MEDICATION CHARGE

## 2024-03-18 DIAGNOSIS — I10 HYPERTENSION, UNSPECIFIED TYPE: ICD-10-CM

## 2024-03-18 DIAGNOSIS — E11.9 TYPE 2 DIABETES MELLITUS WITHOUT COMPLICATION, WITHOUT LONG-TERM CURRENT USE OF INSULIN (MULTI): ICD-10-CM

## 2024-03-18 PROCEDURE — RXMED WILLOW AMBULATORY MEDICATION CHARGE

## 2024-03-18 RX ORDER — METFORMIN HYDROCHLORIDE 1000 MG/1
1000 TABLET ORAL
Qty: 180 TABLET | Refills: 1 | Status: SHIPPED | OUTPATIENT
Start: 2024-03-18 | End: 2025-03-18

## 2024-03-18 RX ORDER — GLIMEPIRIDE 2 MG/1
2 TABLET ORAL 2 TIMES DAILY
Qty: 180 TABLET | Refills: 1 | Status: SHIPPED | OUTPATIENT
Start: 2024-03-18 | End: 2025-03-18

## 2024-03-18 RX ORDER — AMLODIPINE BESYLATE 10 MG/1
10 TABLET ORAL
Qty: 30 TABLET | Refills: 3 | Status: SHIPPED | OUTPATIENT
Start: 2024-03-18 | End: 2025-03-18

## 2024-03-18 RX ORDER — LISINOPRIL 10 MG/1
10 TABLET ORAL
Qty: 30 TABLET | Refills: 3 | Status: SHIPPED | OUTPATIENT
Start: 2024-03-18 | End: 2025-03-18

## 2024-03-19 ENCOUNTER — TELEPHONE (OUTPATIENT)
Dept: PRIMARY CARE | Facility: CLINIC | Age: 59
End: 2024-03-19
Payer: COMMERCIAL

## 2024-03-19 ENCOUNTER — PHARMACY VISIT (OUTPATIENT)
Dept: PHARMACY | Facility: CLINIC | Age: 59
End: 2024-03-19
Payer: COMMERCIAL

## 2024-03-19 DIAGNOSIS — E11.9 TYPE 2 DIABETES MELLITUS WITHOUT COMPLICATION, WITHOUT LONG-TERM CURRENT USE OF INSULIN (MULTI): Primary | ICD-10-CM

## 2024-03-19 RX ORDER — BLOOD-GLUCOSE SENSOR
EACH MISCELLANEOUS
Qty: 1 EACH | Refills: 3 | Status: SHIPPED | OUTPATIENT
Start: 2024-03-19 | End: 2024-03-20 | Stop reason: SDUPTHER

## 2024-03-19 RX ORDER — BLOOD-GLUCOSE,RECEIVER,CONT
EACH MISCELLANEOUS
Qty: 1 EACH | Refills: 0 | Status: SHIPPED | OUTPATIENT
Start: 2024-03-19 | End: 2024-03-20 | Stop reason: SDUPTHER

## 2024-03-19 NOTE — TELEPHONE ENCOUNTER
Lvm to schedule a DM follow up has not had one of those  for a while     Pended dexcom supplies-not sure how many times he tests a day

## 2024-03-20 DIAGNOSIS — E11.9 TYPE 2 DIABETES MELLITUS WITHOUT COMPLICATION, WITHOUT LONG-TERM CURRENT USE OF INSULIN (MULTI): ICD-10-CM

## 2024-03-20 RX ORDER — BLOOD-GLUCOSE,RECEIVER,CONT
EACH MISCELLANEOUS
Qty: 1 EACH | Refills: 0 | Status: SHIPPED | OUTPATIENT
Start: 2024-03-20

## 2024-03-20 RX ORDER — BLOOD-GLUCOSE SENSOR
EACH MISCELLANEOUS
Qty: 5 EACH | Refills: 3 | Status: SHIPPED | OUTPATIENT
Start: 2024-03-20

## 2024-03-20 RX ORDER — BLOOD-GLUCOSE SENSOR
EACH MISCELLANEOUS
Qty: 3 EACH | Refills: 3 | Status: CANCELLED | OUTPATIENT
Start: 2024-03-20

## 2024-03-20 NOTE — PROGRESS NOTES
Subjective   Reason for Visit: Nader Zazueta is an 59 y.o. male here for a Medicare Wellness visit.               HPI    Patient Care Team:  Mustapha Singer DO as PCP - General  Mustapha Singer DO as PCP - Employee ACO PCP     Review of Systems    Objective   Vitals:  There were no vitals taken for this visit.      Physical Exam    Assessment/Plan   Problem List Items Addressed This Visit    None

## 2024-03-22 ENCOUNTER — TELEPHONE (OUTPATIENT)
Dept: PRIMARY CARE | Facility: CLINIC | Age: 59
End: 2024-03-22
Payer: COMMERCIAL

## 2024-05-23 PROCEDURE — RXMED WILLOW AMBULATORY MEDICATION CHARGE

## 2024-05-24 ENCOUNTER — PHARMACY VISIT (OUTPATIENT)
Dept: PHARMACY | Facility: CLINIC | Age: 59
End: 2024-05-24
Payer: COMMERCIAL

## 2024-06-10 PROCEDURE — RXMED WILLOW AMBULATORY MEDICATION CHARGE

## 2024-06-27 ENCOUNTER — PHARMACY VISIT (OUTPATIENT)
Dept: PHARMACY | Facility: CLINIC | Age: 59
End: 2024-06-27
Payer: COMMERCIAL

## 2024-08-15 DIAGNOSIS — I10 HYPERTENSION, UNSPECIFIED TYPE: ICD-10-CM

## 2024-08-15 PROCEDURE — RXMED WILLOW AMBULATORY MEDICATION CHARGE

## 2024-08-15 RX ORDER — AMLODIPINE BESYLATE 10 MG/1
10 TABLET ORAL
Qty: 30 TABLET | Refills: 3 | Status: SHIPPED | OUTPATIENT
Start: 2024-08-15 | End: 2025-08-15

## 2024-08-15 RX ORDER — LISINOPRIL 10 MG/1
10 TABLET ORAL
Qty: 30 TABLET | Refills: 3 | Status: SHIPPED | OUTPATIENT
Start: 2024-08-15 | End: 2025-08-15

## 2024-08-19 ENCOUNTER — PHARMACY VISIT (OUTPATIENT)
Dept: PHARMACY | Facility: CLINIC | Age: 59
End: 2024-08-19
Payer: COMMERCIAL

## 2024-09-16 PROCEDURE — RXMED WILLOW AMBULATORY MEDICATION CHARGE

## 2024-09-18 ENCOUNTER — PHARMACY VISIT (OUTPATIENT)
Dept: PHARMACY | Facility: CLINIC | Age: 59
End: 2024-09-18
Payer: COMMERCIAL

## 2024-09-18 DIAGNOSIS — E11.9 TYPE 2 DIABETES MELLITUS WITHOUT COMPLICATION, WITHOUT LONG-TERM CURRENT USE OF INSULIN (MULTI): ICD-10-CM

## 2024-09-18 PROCEDURE — RXMED WILLOW AMBULATORY MEDICATION CHARGE

## 2024-09-18 RX ORDER — GLIMEPIRIDE 2 MG/1
2 TABLET ORAL 2 TIMES DAILY
Qty: 180 TABLET | Refills: 1 | Status: SHIPPED | OUTPATIENT
Start: 2024-09-18 | End: 2025-09-18

## 2024-09-18 RX ORDER — METFORMIN HYDROCHLORIDE 1000 MG/1
1000 TABLET ORAL
Qty: 180 TABLET | Refills: 1 | Status: SHIPPED | OUTPATIENT
Start: 2024-09-18 | End: 2025-09-18

## 2024-09-26 ENCOUNTER — PHARMACY VISIT (OUTPATIENT)
Dept: PHARMACY | Facility: CLINIC | Age: 59
End: 2024-09-26
Payer: COMMERCIAL

## 2024-12-10 ENCOUNTER — APPOINTMENT (OUTPATIENT)
Dept: PRIMARY CARE | Facility: CLINIC | Age: 59
End: 2024-12-10
Payer: COMMERCIAL

## 2024-12-12 ENCOUNTER — APPOINTMENT (OUTPATIENT)
Dept: CARDIOLOGY | Facility: HOSPITAL | Age: 59
DRG: 233 | End: 2024-12-12
Payer: COMMERCIAL

## 2024-12-12 ENCOUNTER — APPOINTMENT (OUTPATIENT)
Dept: RADIOLOGY | Facility: HOSPITAL | Age: 59
DRG: 233 | End: 2024-12-12
Payer: COMMERCIAL

## 2024-12-12 ENCOUNTER — HOSPITAL ENCOUNTER (INPATIENT)
Facility: HOSPITAL | Age: 59
DRG: 233 | End: 2024-12-12
Attending: EMERGENCY MEDICINE | Admitting: STUDENT IN AN ORGANIZED HEALTH CARE EDUCATION/TRAINING PROGRAM
Payer: COMMERCIAL

## 2024-12-12 DIAGNOSIS — Z95.1 S/P CABG X 4: ICD-10-CM

## 2024-12-12 DIAGNOSIS — I25.85 CHRONIC CORONARY MICROVASCULAR DYSFUNCTION: ICD-10-CM

## 2024-12-12 DIAGNOSIS — N28.89 RENAL MASS, RIGHT: ICD-10-CM

## 2024-12-12 DIAGNOSIS — I15.9 SECONDARY HYPERTENSION: ICD-10-CM

## 2024-12-12 DIAGNOSIS — I21.4 NSTEMI (NON-ST ELEVATED MYOCARDIAL INFARCTION) (MULTI): ICD-10-CM

## 2024-12-12 DIAGNOSIS — I25.700 CORONARY ARTERY DISEASE INVOLVING CORONARY BYPASS GRAFT OF NATIVE HEART WITH UNSTABLE ANGINA PECTORIS: Primary | ICD-10-CM

## 2024-12-12 DIAGNOSIS — I10 HYPERTENSION, UNSPECIFIED TYPE: ICD-10-CM

## 2024-12-12 DIAGNOSIS — R10.31 ABDOMINAL PAIN, CHRONIC, RIGHT LOWER QUADRANT: ICD-10-CM

## 2024-12-12 DIAGNOSIS — I74.2 EMBOLISM AND THROMBOSIS OF ARTERIES OF THE UPPER EXTREMITIES (MULTI): ICD-10-CM

## 2024-12-12 DIAGNOSIS — N18.9 CHRONIC KIDNEY DISEASE, UNSPECIFIED CKD STAGE: ICD-10-CM

## 2024-12-12 DIAGNOSIS — N28.9 RENAL INSUFFICIENCY: ICD-10-CM

## 2024-12-12 DIAGNOSIS — G89.29 ABDOMINAL PAIN, CHRONIC, RIGHT LOWER QUADRANT: ICD-10-CM

## 2024-12-12 DIAGNOSIS — M54.16 LUMBAR RADICULOPATHY: ICD-10-CM

## 2024-12-12 DIAGNOSIS — G95.9 MYELOPATHY (MULTI): ICD-10-CM

## 2024-12-12 DIAGNOSIS — E11.9 TYPE 2 DIABETES MELLITUS WITHOUT COMPLICATION, WITH LONG-TERM CURRENT USE OF INSULIN (MULTI): ICD-10-CM

## 2024-12-12 DIAGNOSIS — R09.89 OTHER SPECIFIED SYMPTOMS AND SIGNS INVOLVING THE CIRCULATORY AND RESPIRATORY SYSTEMS: ICD-10-CM

## 2024-12-12 DIAGNOSIS — Z79.4 TYPE 2 DIABETES MELLITUS WITHOUT COMPLICATION, WITH LONG-TERM CURRENT USE OF INSULIN (MULTI): ICD-10-CM

## 2024-12-12 DIAGNOSIS — Z01.810 ENCOUNTER FOR PREPROCEDURAL CARDIOVASCULAR EXAMINATION: ICD-10-CM

## 2024-12-12 LAB
ALBUMIN SERPL BCP-MCNC: 4.5 G/DL (ref 3.4–5)
ALP SERPL-CCNC: 40 U/L (ref 33–120)
ALT SERPL W P-5'-P-CCNC: 38 U/L (ref 10–52)
ANION GAP SERPL CALC-SCNC: 16 MMOL/L (ref 10–20)
AORTIC VALVE MEAN GRADIENT: 4 MMHG
AORTIC VALVE PEAK VELOCITY: 1.67 M/S
APPEARANCE UR: CLEAR
AST SERPL W P-5'-P-CCNC: 22 U/L (ref 9–39)
AV PEAK GRADIENT: 11 MMHG
AVA (PEAK VEL): 3.08 CM2
AVA (VTI): 3.54 CM2
BASOPHILS # BLD AUTO: 0.08 X10*3/UL (ref 0–0.1)
BASOPHILS NFR BLD AUTO: 0.9 %
BILIRUB SERPL-MCNC: 0.6 MG/DL (ref 0–1.2)
BILIRUB UR STRIP.AUTO-MCNC: NEGATIVE MG/DL
BUN SERPL-MCNC: 25 MG/DL (ref 6–23)
CALCIUM SERPL-MCNC: 9.7 MG/DL (ref 8.6–10.3)
CARDIAC TROPONIN I PNL SERPL HS: 27 NG/L (ref 0–20)
CARDIAC TROPONIN I PNL SERPL HS: 4446 NG/L (ref 0–20)
CARDIAC TROPONIN I PNL SERPL HS: 77 NG/L (ref 0–20)
CHLORIDE SERPL-SCNC: 103 MMOL/L (ref 98–107)
CHOLEST SERPL-MCNC: 192 MG/DL (ref 0–199)
CHOLESTEROL/HDL RATIO: 5.1
CO2 SERPL-SCNC: 20 MMOL/L (ref 21–32)
COLOR UR: ABNORMAL
CREAT SERPL-MCNC: 1.41 MG/DL (ref 0.5–1.3)
EGFRCR SERPLBLD CKD-EPI 2021: 58 ML/MIN/1.73M*2
EJECTION FRACTION APICAL 4 CHAMBER: 59.9
EJECTION FRACTION: 53 %
EOSINOPHIL # BLD AUTO: 0.3 X10*3/UL (ref 0–0.7)
EOSINOPHIL NFR BLD AUTO: 3.4 %
ERYTHROCYTE [DISTWIDTH] IN BLOOD BY AUTOMATED COUNT: 12.2 % (ref 11.5–14.5)
FLUAV RNA RESP QL NAA+PROBE: NOT DETECTED
FLUBV RNA RESP QL NAA+PROBE: NOT DETECTED
GLUCOSE BLD MANUAL STRIP-MCNC: 169 MG/DL (ref 74–99)
GLUCOSE BLD MANUAL STRIP-MCNC: 189 MG/DL (ref 74–99)
GLUCOSE SERPL-MCNC: 210 MG/DL (ref 74–99)
GLUCOSE UR STRIP.AUTO-MCNC: ABNORMAL MG/DL
HCT VFR BLD AUTO: 42.2 % (ref 41–52)
HDLC SERPL-MCNC: 37.5 MG/DL
HGB BLD-MCNC: 14.9 G/DL (ref 13.5–17.5)
IMM GRANULOCYTES # BLD AUTO: 0.06 X10*3/UL (ref 0–0.7)
IMM GRANULOCYTES NFR BLD AUTO: 0.7 % (ref 0–0.9)
KETONES UR STRIP.AUTO-MCNC: NEGATIVE MG/DL
LDLC SERPL CALC-MCNC: 117 MG/DL
LEFT ATRIUM VOLUME AREA LENGTH INDEX BSA: 16.1 ML/M2
LEFT VENTRICLE INTERNAL DIMENSION DIASTOLE: 4.1 CM (ref 3.5–6)
LEFT VENTRICULAR OUTFLOW TRACT DIAMETER: 2.3 CM
LEUKOCYTE ESTERASE UR QL STRIP.AUTO: NEGATIVE
LYMPHOCYTES # BLD AUTO: 2.56 X10*3/UL (ref 1.2–4.8)
LYMPHOCYTES NFR BLD AUTO: 29.2 %
MAGNESIUM SERPL-MCNC: 1.73 MG/DL (ref 1.6–2.4)
MCH RBC QN AUTO: 31.8 PG (ref 26–34)
MCHC RBC AUTO-ENTMCNC: 35.3 G/DL (ref 32–36)
MCV RBC AUTO: 90 FL (ref 80–100)
MITRAL VALVE E/A RATIO: 0.85
MONOCYTES # BLD AUTO: 0.64 X10*3/UL (ref 0.1–1)
MONOCYTES NFR BLD AUTO: 7.3 %
NEUTROPHILS # BLD AUTO: 5.12 X10*3/UL (ref 1.2–7.7)
NEUTROPHILS NFR BLD AUTO: 58.5 %
NITRITE UR QL STRIP.AUTO: NEGATIVE
NON HDL CHOLESTEROL: 155 MG/DL (ref 0–149)
NRBC BLD-RTO: 0 /100 WBCS (ref 0–0)
PH UR STRIP.AUTO: 7 [PH]
PLATELET # BLD AUTO: 209 X10*3/UL (ref 150–450)
POTASSIUM SERPL-SCNC: 4.1 MMOL/L (ref 3.5–5.3)
PROT SERPL-MCNC: 7.4 G/DL (ref 6.4–8.2)
PROT UR STRIP.AUTO-MCNC: ABNORMAL MG/DL
RBC # BLD AUTO: 4.68 X10*6/UL (ref 4.5–5.9)
RBC # UR STRIP.AUTO: NEGATIVE /UL
RBC #/AREA URNS AUTO: NORMAL /HPF
RIGHT VENTRICLE FREE WALL PEAK S': 16.5 CM/S
SARS-COV-2 RNA RESP QL NAA+PROBE: NOT DETECTED
SODIUM SERPL-SCNC: 135 MMOL/L (ref 136–145)
SP GR UR STRIP.AUTO: 1.02
TRICUSPID ANNULAR PLANE SYSTOLIC EXCURSION: 3 CM
TRIGL SERPL-MCNC: 188 MG/DL (ref 0–149)
TSH SERPL-ACNC: 1.43 MIU/L (ref 0.44–3.98)
UFH PPP CHRO-ACNC: 0.1 IU/ML
UFH PPP CHRO-ACNC: 0.2 IU/ML
UROBILINOGEN UR STRIP.AUTO-MCNC: NORMAL MG/DL
VLDL: 38 MG/DL (ref 0–40)
WBC # BLD AUTO: 8.8 X10*3/UL (ref 4.4–11.3)
WBC #/AREA URNS AUTO: NORMAL /HPF

## 2024-12-12 PROCEDURE — 83735 ASSAY OF MAGNESIUM: CPT | Performed by: EMERGENCY MEDICINE

## 2024-12-12 PROCEDURE — 80053 COMPREHEN METABOLIC PANEL: CPT | Performed by: EMERGENCY MEDICINE

## 2024-12-12 PROCEDURE — 87636 SARSCOV2 & INF A&B AMP PRB: CPT | Performed by: EMERGENCY MEDICINE

## 2024-12-12 PROCEDURE — 2500000002 HC RX 250 W HCPCS SELF ADMINISTERED DRUGS (ALT 637 FOR MEDICARE OP, ALT 636 FOR OP/ED): Performed by: NURSE PRACTITIONER

## 2024-12-12 PROCEDURE — 87081 CULTURE SCREEN ONLY: CPT | Mod: PARLAB | Performed by: NURSE PRACTITIONER

## 2024-12-12 PROCEDURE — C1894 INTRO/SHEATH, NON-LASER: HCPCS | Performed by: INTERNAL MEDICINE

## 2024-12-12 PROCEDURE — 85025 COMPLETE CBC W/AUTO DIFF WBC: CPT | Performed by: EMERGENCY MEDICINE

## 2024-12-12 PROCEDURE — 2500000001 HC RX 250 WO HCPCS SELF ADMINISTERED DRUGS (ALT 637 FOR MEDICARE OP): Performed by: EMERGENCY MEDICINE

## 2024-12-12 PROCEDURE — 93005 ELECTROCARDIOGRAM TRACING: CPT

## 2024-12-12 PROCEDURE — 84484 ASSAY OF TROPONIN QUANT: CPT | Performed by: NURSE PRACTITIONER

## 2024-12-12 PROCEDURE — 2500000001 HC RX 250 WO HCPCS SELF ADMINISTERED DRUGS (ALT 637 FOR MEDICARE OP)

## 2024-12-12 PROCEDURE — 99153 MOD SED SAME PHYS/QHP EA: CPT | Performed by: INTERNAL MEDICINE

## 2024-12-12 PROCEDURE — 93306 TTE W/DOPPLER COMPLETE: CPT

## 2024-12-12 PROCEDURE — 2500000004 HC RX 250 GENERAL PHARMACY W/ HCPCS (ALT 636 FOR OP/ED): Performed by: EMERGENCY MEDICINE

## 2024-12-12 PROCEDURE — 84484 ASSAY OF TROPONIN QUANT: CPT | Performed by: EMERGENCY MEDICINE

## 2024-12-12 PROCEDURE — 2500000004 HC RX 250 GENERAL PHARMACY W/ HCPCS (ALT 636 FOR OP/ED): Performed by: NURSE PRACTITIONER

## 2024-12-12 PROCEDURE — 82947 ASSAY GLUCOSE BLOOD QUANT: CPT

## 2024-12-12 PROCEDURE — 93458 L HRT ARTERY/VENTRICLE ANGIO: CPT | Performed by: INTERNAL MEDICINE

## 2024-12-12 PROCEDURE — 36415 COLL VENOUS BLD VENIPUNCTURE: CPT | Performed by: EMERGENCY MEDICINE

## 2024-12-12 PROCEDURE — 96366 THER/PROPH/DIAG IV INF ADDON: CPT | Mod: 59

## 2024-12-12 PROCEDURE — 99254 IP/OBS CNSLTJ NEW/EST MOD 60: CPT | Performed by: NURSE PRACTITIONER

## 2024-12-12 PROCEDURE — 4A023N7 MEASUREMENT OF CARDIAC SAMPLING AND PRESSURE, LEFT HEART, PERCUTANEOUS APPROACH: ICD-10-PCS | Performed by: INTERNAL MEDICINE

## 2024-12-12 PROCEDURE — 85520 HEPARIN ASSAY: CPT | Performed by: NURSE PRACTITIONER

## 2024-12-12 PROCEDURE — 81001 URINALYSIS AUTO W/SCOPE: CPT | Performed by: NURSE PRACTITIONER

## 2024-12-12 PROCEDURE — 2500000005 HC RX 250 GENERAL PHARMACY W/O HCPCS: Performed by: NURSE PRACTITIONER

## 2024-12-12 PROCEDURE — 96365 THER/PROPH/DIAG IV INF INIT: CPT | Mod: 59

## 2024-12-12 PROCEDURE — 71045 X-RAY EXAM CHEST 1 VIEW: CPT | Performed by: RADIOLOGY

## 2024-12-12 PROCEDURE — 2500000001 HC RX 250 WO HCPCS SELF ADMINISTERED DRUGS (ALT 637 FOR MEDICARE OP): Performed by: NURSE PRACTITIONER

## 2024-12-12 PROCEDURE — 2500000005 HC RX 250 GENERAL PHARMACY W/O HCPCS: Performed by: EMERGENCY MEDICINE

## 2024-12-12 PROCEDURE — C1760 CLOSURE DEV, VASC: HCPCS | Performed by: INTERNAL MEDICINE

## 2024-12-12 PROCEDURE — 2720000007 HC OR 272 NO HCPCS: Performed by: INTERNAL MEDICINE

## 2024-12-12 PROCEDURE — 99285 EMERGENCY DEPT VISIT HI MDM: CPT | Mod: 25 | Performed by: EMERGENCY MEDICINE

## 2024-12-12 PROCEDURE — 99152 MOD SED SAME PHYS/QHP 5/>YRS: CPT | Performed by: INTERNAL MEDICINE

## 2024-12-12 PROCEDURE — 83036 HEMOGLOBIN GLYCOSYLATED A1C: CPT | Performed by: NURSE PRACTITIONER

## 2024-12-12 PROCEDURE — 2500000004 HC RX 250 GENERAL PHARMACY W/ HCPCS (ALT 636 FOR OP/ED): Performed by: INTERNAL MEDICINE

## 2024-12-12 PROCEDURE — 71045 X-RAY EXAM CHEST 1 VIEW: CPT

## 2024-12-12 PROCEDURE — 84443 ASSAY THYROID STIM HORMONE: CPT

## 2024-12-12 PROCEDURE — 7100000010 HC PHASE TWO TIME - EACH INCREMENTAL 1 MINUTE: Performed by: INTERNAL MEDICINE

## 2024-12-12 PROCEDURE — 2500000005 HC RX 250 GENERAL PHARMACY W/O HCPCS: Performed by: INTERNAL MEDICINE

## 2024-12-12 PROCEDURE — 80061 LIPID PANEL: CPT | Performed by: NURSE PRACTITIONER

## 2024-12-12 PROCEDURE — B2151ZZ FLUOROSCOPY OF LEFT HEART USING LOW OSMOLAR CONTRAST: ICD-10-PCS | Performed by: INTERNAL MEDICINE

## 2024-12-12 PROCEDURE — 2020000001 HC ICU ROOM DAILY

## 2024-12-12 PROCEDURE — 2550000001 HC RX 255 CONTRASTS: Performed by: INTERNAL MEDICINE

## 2024-12-12 PROCEDURE — 7100000009 HC PHASE TWO TIME - INITIAL BASE CHARGE: Performed by: INTERNAL MEDICINE

## 2024-12-12 PROCEDURE — 99223 1ST HOSP IP/OBS HIGH 75: CPT | Performed by: STUDENT IN AN ORGANIZED HEALTH CARE EDUCATION/TRAINING PROGRAM

## 2024-12-12 RX ORDER — HEPARIN SODIUM 1000 [USP'U]/ML
INJECTION, SOLUTION INTRAVENOUS; SUBCUTANEOUS AS NEEDED
Status: DISCONTINUED | OUTPATIENT
Start: 2024-12-12 | End: 2024-12-12 | Stop reason: HOSPADM

## 2024-12-12 RX ORDER — DEXTROSE 50 % IN WATER (D50W) INTRAVENOUS SYRINGE
12.5
Status: DISCONTINUED | OUTPATIENT
Start: 2024-12-12 | End: 2024-12-16

## 2024-12-12 RX ORDER — HEPARIN SODIUM 5000 [USP'U]/ML
2000-4000 INJECTION, SOLUTION INTRAVENOUS; SUBCUTANEOUS EVERY 4 HOURS PRN
Status: DISCONTINUED | OUTPATIENT
Start: 2024-12-12 | End: 2024-12-12

## 2024-12-12 RX ORDER — SODIUM CHLORIDE 9 MG/ML
100 INJECTION, SOLUTION INTRAVENOUS CONTINUOUS
OUTPATIENT
Start: 2024-12-12 | End: 2024-12-22

## 2024-12-12 RX ORDER — NITROGLYCERIN 0.4 MG/1
0.4 TABLET SUBLINGUAL ONCE
Status: COMPLETED | OUTPATIENT
Start: 2024-12-12 | End: 2024-12-12

## 2024-12-12 RX ORDER — LISINOPRIL 10 MG/1
10 TABLET ORAL DAILY
Status: DISCONTINUED | OUTPATIENT
Start: 2024-12-12 | End: 2024-12-12

## 2024-12-12 RX ORDER — INSULIN LISPRO 100 [IU]/ML
0-10 INJECTION, SOLUTION INTRAVENOUS; SUBCUTANEOUS
Status: DISCONTINUED | OUTPATIENT
Start: 2024-12-12 | End: 2024-12-13

## 2024-12-12 RX ORDER — NITROGLYCERIN 20 MG/100ML
5-200 INJECTION INTRAVENOUS CONTINUOUS
Status: DISCONTINUED | OUTPATIENT
Start: 2024-12-12 | End: 2024-12-13

## 2024-12-12 RX ORDER — ONDANSETRON HYDROCHLORIDE 2 MG/ML
4 INJECTION, SOLUTION INTRAVENOUS EVERY 6 HOURS PRN
Status: DISCONTINUED | OUTPATIENT
Start: 2024-12-12 | End: 2024-12-12

## 2024-12-12 RX ORDER — NITROGLYCERIN 40 MG/100ML
INJECTION INTRAVENOUS AS NEEDED
Status: DISCONTINUED | OUTPATIENT
Start: 2024-12-12 | End: 2024-12-12 | Stop reason: HOSPADM

## 2024-12-12 RX ORDER — VERAPAMIL HYDROCHLORIDE 2.5 MG/ML
INJECTION, SOLUTION INTRAVENOUS AS NEEDED
Status: DISCONTINUED | OUTPATIENT
Start: 2024-12-12 | End: 2024-12-12 | Stop reason: HOSPADM

## 2024-12-12 RX ORDER — AMLODIPINE BESYLATE 5 MG/1
5 TABLET ORAL DAILY
Status: DISCONTINUED | OUTPATIENT
Start: 2024-12-12 | End: 2024-12-13

## 2024-12-12 RX ORDER — ACETAMINOPHEN 160 MG/5ML
650 SOLUTION ORAL EVERY 4 HOURS PRN
Status: DISCONTINUED | OUTPATIENT
Start: 2024-12-12 | End: 2024-12-16

## 2024-12-12 RX ORDER — POLYETHYLENE GLYCOL 3350 17 G/17G
17 POWDER, FOR SOLUTION ORAL DAILY PRN
Status: DISCONTINUED | OUTPATIENT
Start: 2024-12-12 | End: 2024-12-16

## 2024-12-12 RX ORDER — HEPARIN SODIUM 10000 [USP'U]/100ML
0-4000 INJECTION, SOLUTION INTRAVENOUS CONTINUOUS
Status: DISCONTINUED | OUTPATIENT
Start: 2024-12-12 | End: 2024-12-16

## 2024-12-12 RX ORDER — ATORVASTATIN CALCIUM 40 MG/1
40 TABLET, FILM COATED ORAL NIGHTLY
Status: DISCONTINUED | OUTPATIENT
Start: 2024-12-12 | End: 2024-12-16

## 2024-12-12 RX ORDER — ONDANSETRON HYDROCHLORIDE 2 MG/ML
4 INJECTION, SOLUTION INTRAVENOUS EVERY 6 HOURS PRN
Status: DISCONTINUED | OUTPATIENT
Start: 2024-12-12 | End: 2024-12-16

## 2024-12-12 RX ORDER — MIDAZOLAM HYDROCHLORIDE 5 MG/ML
INJECTION, SOLUTION INTRAMUSCULAR; INTRAVENOUS AS NEEDED
Status: DISCONTINUED | OUTPATIENT
Start: 2024-12-12 | End: 2024-12-12 | Stop reason: HOSPADM

## 2024-12-12 RX ORDER — LIDOCAINE HYDROCHLORIDE 20 MG/ML
INJECTION, SOLUTION INFILTRATION; PERINEURAL AS NEEDED
Status: DISCONTINUED | OUTPATIENT
Start: 2024-12-12 | End: 2024-12-12 | Stop reason: HOSPADM

## 2024-12-12 RX ORDER — METOPROLOL SUCCINATE 25 MG/1
25 TABLET, EXTENDED RELEASE ORAL DAILY
Status: DISCONTINUED | OUTPATIENT
Start: 2024-12-12 | End: 2024-12-16

## 2024-12-12 RX ORDER — ONDANSETRON 4 MG/1
4 TABLET, FILM COATED ORAL EVERY 6 HOURS PRN
Status: DISCONTINUED | OUTPATIENT
Start: 2024-12-12 | End: 2024-12-16

## 2024-12-12 RX ORDER — DEXTROSE 50 % IN WATER (D50W) INTRAVENOUS SYRINGE
25
Status: DISCONTINUED | OUTPATIENT
Start: 2024-12-12 | End: 2024-12-16

## 2024-12-12 RX ORDER — IODIXANOL 320 MG/ML
INJECTION, SOLUTION INTRAVASCULAR AS NEEDED
Status: DISCONTINUED | OUTPATIENT
Start: 2024-12-12 | End: 2024-12-12 | Stop reason: HOSPADM

## 2024-12-12 RX ORDER — ACETAMINOPHEN 325 MG/1
650 TABLET ORAL EVERY 4 HOURS PRN
Status: DISCONTINUED | OUTPATIENT
Start: 2024-12-12 | End: 2024-12-16

## 2024-12-12 RX ORDER — ONDANSETRON 4 MG/1
4 TABLET, FILM COATED ORAL EVERY 8 HOURS PRN
Status: DISCONTINUED | OUTPATIENT
Start: 2024-12-12 | End: 2024-12-12

## 2024-12-12 RX ORDER — HYDRALAZINE HYDROCHLORIDE 20 MG/ML
10 INJECTION INTRAMUSCULAR; INTRAVENOUS ONCE
Status: COMPLETED | OUTPATIENT
Start: 2024-12-12 | End: 2024-12-12

## 2024-12-12 RX ORDER — ACETAMINOPHEN 650 MG/1
650 SUPPOSITORY RECTAL EVERY 4 HOURS PRN
Status: DISCONTINUED | OUTPATIENT
Start: 2024-12-12 | End: 2024-12-16

## 2024-12-12 RX ORDER — FENTANYL CITRATE 50 UG/ML
INJECTION, SOLUTION INTRAMUSCULAR; INTRAVENOUS AS NEEDED
Status: DISCONTINUED | OUTPATIENT
Start: 2024-12-12 | End: 2024-12-12 | Stop reason: HOSPADM

## 2024-12-12 RX ORDER — NAPROXEN SODIUM 220 MG/1
81 TABLET, FILM COATED ORAL DAILY
Status: DISCONTINUED | OUTPATIENT
Start: 2024-12-13 | End: 2024-12-20 | Stop reason: HOSPADM

## 2024-12-12 RX ORDER — METHOCARBAMOL 500 MG/1
500 TABLET, FILM COATED ORAL EVERY 6 HOURS PRN
Status: DISCONTINUED | OUTPATIENT
Start: 2024-12-12 | End: 2024-12-16

## 2024-12-12 RX ORDER — HEPARIN SODIUM 5000 [USP'U]/ML
4000 INJECTION, SOLUTION INTRAVENOUS; SUBCUTANEOUS ONCE
Status: COMPLETED | OUTPATIENT
Start: 2024-12-12 | End: 2024-12-12

## 2024-12-12 RX ORDER — HEPARIN SODIUM 10000 [USP'U]/100ML
0-4000 INJECTION, SOLUTION INTRAVENOUS CONTINUOUS
Status: DISCONTINUED | OUTPATIENT
Start: 2024-12-12 | End: 2024-12-12

## 2024-12-12 RX ORDER — HEPARIN SODIUM 5000 [USP'U]/ML
2000-4000 INJECTION, SOLUTION INTRAVENOUS; SUBCUTANEOUS EVERY 4 HOURS PRN
Status: DISCONTINUED | OUTPATIENT
Start: 2024-12-12 | End: 2024-12-16

## 2024-12-12 SDOH — ECONOMIC STABILITY: FOOD INSECURITY: WITHIN THE PAST 12 MONTHS, YOU WORRIED THAT YOUR FOOD WOULD RUN OUT BEFORE YOU GOT THE MONEY TO BUY MORE.: NEVER TRUE

## 2024-12-12 SDOH — ECONOMIC STABILITY: FOOD INSECURITY: WITHIN THE PAST 12 MONTHS, THE FOOD YOU BOUGHT JUST DIDN'T LAST AND YOU DIDN'T HAVE MONEY TO GET MORE.: NEVER TRUE

## 2024-12-12 SDOH — SOCIAL STABILITY: SOCIAL INSECURITY: WITHIN THE LAST YEAR, HAVE YOU BEEN HUMILIATED OR EMOTIONALLY ABUSED IN OTHER WAYS BY YOUR PARTNER OR EX-PARTNER?: NO

## 2024-12-12 SDOH — SOCIAL STABILITY: SOCIAL INSECURITY: WITHIN THE LAST YEAR, HAVE YOU BEEN AFRAID OF YOUR PARTNER OR EX-PARTNER?: NO

## 2024-12-12 SDOH — SOCIAL STABILITY: SOCIAL INSECURITY
WITHIN THE LAST YEAR, HAVE YOU BEEN KICKED, HIT, SLAPPED, OR OTHERWISE PHYSICALLY HURT BY YOUR PARTNER OR EX-PARTNER?: NO

## 2024-12-12 SDOH — SOCIAL STABILITY: SOCIAL INSECURITY: ABUSE: ADULT

## 2024-12-12 SDOH — SOCIAL STABILITY: SOCIAL INSECURITY: HAVE YOU HAD ANY THOUGHTS OF HARMING ANYONE ELSE?: NO

## 2024-12-12 SDOH — SOCIAL STABILITY: SOCIAL INSECURITY: DOES ANYONE TRY TO KEEP YOU FROM HAVING/CONTACTING OTHER FRIENDS OR DOING THINGS OUTSIDE YOUR HOME?: NO

## 2024-12-12 SDOH — SOCIAL STABILITY: SOCIAL INSECURITY: ARE THERE ANY APPARENT SIGNS OF INJURIES/BEHAVIORS THAT COULD BE RELATED TO ABUSE/NEGLECT?: NO

## 2024-12-12 SDOH — SOCIAL STABILITY: SOCIAL INSECURITY
WITHIN THE LAST YEAR, HAVE YOU BEEN RAPED OR FORCED TO HAVE ANY KIND OF SEXUAL ACTIVITY BY YOUR PARTNER OR EX-PARTNER?: NO

## 2024-12-12 SDOH — ECONOMIC STABILITY: INCOME INSECURITY: IN THE PAST 12 MONTHS HAS THE ELECTRIC, GAS, OIL, OR WATER COMPANY THREATENED TO SHUT OFF SERVICES IN YOUR HOME?: NO

## 2024-12-12 SDOH — SOCIAL STABILITY: SOCIAL INSECURITY: DO YOU FEEL ANYONE HAS EXPLOITED OR TAKEN ADVANTAGE OF YOU FINANCIALLY OR OF YOUR PERSONAL PROPERTY?: NO

## 2024-12-12 SDOH — SOCIAL STABILITY: SOCIAL INSECURITY: WERE YOU ABLE TO COMPLETE ALL THE BEHAVIORAL HEALTH SCREENINGS?: YES

## 2024-12-12 SDOH — SOCIAL STABILITY: SOCIAL INSECURITY: ARE YOU OR HAVE YOU BEEN THREATENED OR ABUSED PHYSICALLY, EMOTIONALLY, OR SEXUALLY BY ANYONE?: NO

## 2024-12-12 SDOH — SOCIAL STABILITY: SOCIAL INSECURITY: DO YOU FEEL UNSAFE GOING BACK TO THE PLACE WHERE YOU ARE LIVING?: NO

## 2024-12-12 SDOH — SOCIAL STABILITY: SOCIAL INSECURITY: HAVE YOU HAD THOUGHTS OF HARMING ANYONE ELSE?: NO

## 2024-12-12 SDOH — SOCIAL STABILITY: SOCIAL INSECURITY: HAS ANYONE EVER THREATENED TO HURT YOUR FAMILY OR YOUR PETS?: NO

## 2024-12-12 ASSESSMENT — COLUMBIA-SUICIDE SEVERITY RATING SCALE - C-SSRS
1. IN THE PAST MONTH, HAVE YOU WISHED YOU WERE DEAD OR WISHED YOU COULD GO TO SLEEP AND NOT WAKE UP?: NO
6. HAVE YOU EVER DONE ANYTHING, STARTED TO DO ANYTHING, OR PREPARED TO DO ANYTHING TO END YOUR LIFE?: NO
2. HAVE YOU ACTUALLY HAD ANY THOUGHTS OF KILLING YOURSELF?: NO

## 2024-12-12 ASSESSMENT — COGNITIVE AND FUNCTIONAL STATUS - GENERAL
PATIENT BASELINE BEDBOUND: NO
DRESSING REGULAR LOWER BODY CLOTHING: A LITTLE
DRESSING REGULAR UPPER BODY CLOTHING: A LITTLE
CLIMB 3 TO 5 STEPS WITH RAILING: A LITTLE
WALKING IN HOSPITAL ROOM: A LITTLE
DAILY ACTIVITIY SCORE: 21
HELP NEEDED FOR BATHING: A LITTLE
MOBILITY SCORE: 22

## 2024-12-12 ASSESSMENT — PAIN - FUNCTIONAL ASSESSMENT
PAIN_FUNCTIONAL_ASSESSMENT: 0-10

## 2024-12-12 ASSESSMENT — PAIN SCALES - GENERAL
PAINLEVEL_OUTOF10: 4
PAINLEVEL_OUTOF10: 3
PAINLEVEL_OUTOF10: 8
PAINLEVEL_OUTOF10: 0 - NO PAIN
PAINLEVEL_OUTOF10: 5 - MODERATE PAIN
PAINLEVEL_OUTOF10: 0 - NO PAIN
PAINLEVEL_OUTOF10: 7
PAINLEVEL_OUTOF10: 5 - MODERATE PAIN
PAINLEVEL_OUTOF10: 3

## 2024-12-12 ASSESSMENT — ENCOUNTER SYMPTOMS
COUGH: 0
MYALGIAS: 0
STIFFNESS: 0
BLOATING: 0
ALTERED MENTAL STATUS: 0
FOCAL WEAKNESS: 0
SHORTNESS OF BREATH: 0
DIZZINESS: 0
DYSPNEA ON EXERTION: 0
DOUBLE VISION: 0
ORTHOPNEA: 0
PALPITATIONS: 0
LIGHT-HEADEDNESS: 0
WEAKNESS: 0
MUSCLE CRAMPS: 0
NAUSEA: 0
DECREASED APPETITE: 0
POOR WOUND HEALING: 0
VOMITING: 0
FREQUENCY: 0
IRREGULAR HEARTBEAT: 0
NAIL CHANGES: 0
BLURRED VISION: 0

## 2024-12-12 ASSESSMENT — ACTIVITIES OF DAILY LIVING (ADL)
DRESSING YOURSELF: INDEPENDENT
WALKS IN HOME: INDEPENDENT
GROOMING: INDEPENDENT
FEEDING YOURSELF: INDEPENDENT
ADEQUATE_TO_COMPLETE_ADL: YES
BATHING: INDEPENDENT
PATIENT'S MEMORY ADEQUATE TO SAFELY COMPLETE DAILY ACTIVITIES?: YES
HEARING - LEFT EAR: FUNCTIONAL
TOILETING: INDEPENDENT
HEARING - RIGHT EAR: FUNCTIONAL
JUDGMENT_ADEQUATE_SAFELY_COMPLETE_DAILY_ACTIVITIES: YES
LACK_OF_TRANSPORTATION: NO

## 2024-12-12 ASSESSMENT — PAIN DESCRIPTION - DESCRIPTORS
DESCRIPTORS: ACHING
DESCRIPTORS: PRESSURE
DESCRIPTORS: ACHING

## 2024-12-12 ASSESSMENT — PATIENT HEALTH QUESTIONNAIRE - PHQ9
2. FEELING DOWN, DEPRESSED OR HOPELESS: NOT AT ALL
SUM OF ALL RESPONSES TO PHQ9 QUESTIONS 1 & 2: 0
1. LITTLE INTEREST OR PLEASURE IN DOING THINGS: NOT AT ALL

## 2024-12-12 ASSESSMENT — LIFESTYLE VARIABLES
AUDIT-C TOTAL SCORE: 1
HOW OFTEN DO YOU HAVE A DRINK CONTAINING ALCOHOL: MONTHLY OR LESS
AUDIT-C TOTAL SCORE: 1
HOW MANY STANDARD DRINKS CONTAINING ALCOHOL DO YOU HAVE ON A TYPICAL DAY: 1 OR 2
HOW OFTEN DO YOU HAVE 6 OR MORE DRINKS ON ONE OCCASION: NEVER
SKIP TO QUESTIONS 9-10: 1

## 2024-12-12 ASSESSMENT — HEART SCORE
RISK FACTORS: 1-2 RISK FACTORS
ECG: NORMAL
AGE: 45-64
HEART SCORE: 4
HISTORY: HIGHLY SUSPICIOUS
TROPONIN: LESS THAN OR EQUAL TO NORMAL LIMIT

## 2024-12-12 ASSESSMENT — PAIN DESCRIPTION - LOCATION
LOCATION: HEAD
LOCATION: HEAD

## 2024-12-12 NOTE — NURSING NOTE
1050: pt bp 212/112. NP Jacey notified and 10mg of hydralazine ordered and given. Pt also complaining of headache. Tylenol given, see MAR.     1115: report called to Jason NICHOLE in CICU. Right radial site stable with no signs of bleeding or hematoma and no complaints of new pain. Jason notified to let air out of TR band once bp is lowered/controlled for lower risk of bleeding.       1120: pt transferred to CICU in stable condition by cath lab rn with all belongings and patient chart.

## 2024-12-12 NOTE — CONSULTS
Reason For Consult  RLQ incisional hernia    Pt currently admitted with NSTEMI, underwent Lt heart cath today with recommendation for CABG. Not appropriate for hernia evaluation at this time. Pt has had hernia repaired by Dr. Covarrubias in the past. Pt should follow-up outpatient with Dr. Covarrubias once he is recovered from this acute episode and has clearance form his cardiologist. I discussed this with the patient and he acknowledged.     General surgery will sign off.    Joselyn Pham PA-C     Modified Advancement Flap Text: The defect edges were debeveled with a #15 scalpel blade.  Given the location of the defect, shape of the defect and the proximity to free margins a modified advancement flap was deemed most appropriate.  Using a sterile surgical marker, an appropriate advancement flap was drawn incorporating the defect and placing the expected incisions within the relaxed skin tension lines where possible.    The area thus outlined was incised deep to adipose tissue with a #15 scalpel blade.  The skin margins were undermined to an appropriate distance in all directions utilizing iris scissors.

## 2024-12-12 NOTE — CONSULTS
Cardiology Consult    Impression:  Non-STEMI  Hypertension  Hyperlipidemia  Diabetes  CKD.  Status post nephrectomy for renal cell carcinoma.  Plan:  Aspirin, statin, beta-blocker.  Echo for EF  Cardiac cath.  Discussed the procedure at length.  We reviewed risk, benefits and alternatives.  All questions answered.  Heart cath will be scheduled for later today.  CC  Chest pain  HPI:  59-year-old man presenting to ER for evaluation of discomfort in his chest.  He woke up very early this morning with pressure in the center of his chest rating to his neck.  Symptoms persisted so he came to ER.  First EKG showed sinus rhythm with a incomplete right bundle branch block.  He received nitrates, morphine and IV heparin.  Chest discomfort resolved.  First troponin 27.  Second troponin 77.  No prior history of similar chest discomfort.  Breathing okay.  No palpitations, presyncope or syncope.  At baseline he is quite active.  He works construction.  Meds:  Scheduled medications  [START ON 12/13/2024] aspirin, 81 mg, oral, Daily  atorvastatin, 40 mg, oral, Nightly  insulin lispro, 0-10 Units, subcutaneous, TID AC  lisinopril, 10 mg, oral, Daily  metoprolol succinate XL, 25 mg, oral, Daily      Continuous medications     PRN medications  PRN medications: acetaminophen **OR** acetaminophen **OR** acetaminophen, dextrose, dextrose, glucagon, glucagon, oxygen, polyethylene glycol    PMHx:  Hypertension  Hyperlipidemia  Diabetes  CKD  Social history:  Works construction.  Lives with his wife.  No cigarettes.  Rare alcohol.  Family history:  Noncontributory  Review of systems:  See HPI  Physical exam:  Vitals:    12/12/24 0800   BP: (!) 179/92   Pulse: 63   Resp: 16   Temp:    SpO2: 97%      JVP not elevated. Carotid upstroke normal. No carotid bruits. Heart sounds normal. No extra sounds or murmurs. Chest clear. Abdomen soft, nontender. No masses. Peripheral pulses palpable. No edema.  EKG:  Sinus rhythm, incomplete right bundle  branch block.  Echo:  No results found for this or any previous visit.   Labs:  Lab Results   Component Value Date    WBC 8.8 12/12/2024    HGB 14.9 12/12/2024    HCT 42.2 12/12/2024     12/12/2024    CHOL 193 07/12/2022    TRIG 359 (H) 07/12/2022    HDL 36.5 (A) 07/12/2022    ALT 38 12/12/2024    AST 22 12/12/2024     (L) 12/12/2024    K 4.1 12/12/2024     12/12/2024    CREATININE 1.41 (H) 12/12/2024    BUN 25 (H) 12/12/2024    CO2 20 (L) 12/12/2024    TSH 1.12 09/01/2022    PSA 0.30 07/12/2022    INR 1.3 (H) 06/14/2021    HGBA1C 7.9 (H) 07/12/2022     par

## 2024-12-12 NOTE — ED TRIAGE NOTES
Pt BIBA for c/o chest pain that is generalized with radiation to back and whole jaw. Pt was awoken at approx 0300 with sudden onset; has never experienced this before. Pt given 324 ASA by EMS in route without symptom relief. Pt is alert and oriented x4; denies SOB.

## 2024-12-12 NOTE — ED PROVIDER NOTES
HPI   Chief Complaint   Patient presents with    Chest Pain       This is a 59 years old male patient presented to the emergency department with a chief complaint of midsternal chest pain.  Stated that the pain woke him up from sleep around 3 AM.  Describes the pain as heaviness/pressure/weights in the middle of his chest radiating to the neck and to the jaw.  Associated with vomiting x 1 and sweating.  Received aspirin and route to the hospital by squad with no relief of pain.  Denies headache, lightheadedness, dizziness, shortness of breath, abdominal pain, diarrhea, constipation, weakness, focal numbness.  Stated that his daughter was sick with some sort of viral illness recently.  Patient has history of diabetes mellitus and hypertension.    Review of system: As above in the HPI section.            Patient History   Past Medical History:   Diagnosis Date    Incisional hernia without obstruction or gangrene 06/10/2020    Incisional hernia    Other specified disorders of kidney and ureter 01/13/2020    Renal mass, right    Personal history of other diseases of the circulatory system     History of hypertension    Personal history of other diseases of the musculoskeletal system and connective tissue     History of osteomyelitis    Personal history of other endocrine, nutritional and metabolic disease     History of diabetes mellitus    Personal history of other specified conditions     History of abdominal pain    Personal history of other specified conditions 05/15/2020    History of postoperative nausea and vomiting    Right lower quadrant pain 02/07/2020    RLQ cramping     Past Surgical History:   Procedure Laterality Date    CT GUIDED ABSCESS FLUID COLLECTION DRAINAGE  7/21/2021    CT GUIDED ABSCESS FLUID COLLECTION DRAINAGE 7/21/2021 [facility]    CT GUIDED PERCUTANEOUS BIOPSY MEDIASTINUM  8/16/2019    CT GUIDED PERCUTANEOUS BIOPSY MEDIASTINUM 8/16/2019 [facility]    OTHER SURGICAL HISTORY   08/22/2019    Back surgery     Family History   Problem Relation Name Age of Onset    Diabetes Mother      COPD Father       Social History     Tobacco Use    Smoking status: Never    Smokeless tobacco: Never   Vaping Use    Vaping status: Never Used   Substance Use Topics    Alcohol use: Not Currently    Drug use: Never       Physical Exam   ED Triage Vitals [12/12/24 0430]   Temperature Heart Rate Respirations BP   36.1 °C (97 °F) 55 18 (!) 184/102      Pulse Ox Temp Source Heart Rate Source Patient Position   98 % Temporal -- Sitting      BP Location FiO2 (%)     Right arm --       Physical Exam  Vitals and nursing note reviewed.   Constitutional:       General: He is not in acute distress.     Appearance: He is well-developed.   HENT:      Head: Normocephalic and atraumatic.   Eyes:      Conjunctiva/sclera: Conjunctivae normal.   Cardiovascular:      Rate and Rhythm: Normal rate and regular rhythm.      Heart sounds: No murmur heard.  Pulmonary:      Effort: Pulmonary effort is normal. No respiratory distress.      Breath sounds: Normal breath sounds.   Abdominal:      Palpations: Abdomen is soft.      Tenderness: There is no abdominal tenderness.   Musculoskeletal:         General: No swelling.      Cervical back: Neck supple.   Skin:     General: Skin is warm and dry.      Capillary Refill: Capillary refill takes less than 2 seconds.   Neurological:      Mental Status: He is alert.   Psychiatric:         Mood and Affect: Mood normal.         ED Course & MDM   ED Course as of 12/12/24 2109   Thu Dec 12, 2024   0724 Sinus bradycardia with a rate of 55, slightly prolonged NE interval otherwise normal ST segments and T waves throughout, no T wave inversions [RG]      ED Course User Index  [RG] Christopher Navas MD         Diagnoses as of 12/12/24 2109   NSTEMI (non-ST elevated myocardial infarction) (Multi)                 No data recorded     [place] Coma Scale Score: 15 (12/12/24 0433 : Krystle Timmons RN)                            Medical Decision Making  Patient seen and examined.    EKG Interpreted by me and revealed sinus bradycardia with a rate of 59 bpm, normal NC, QRS, normal QTc duration.  No ST segment or T wave pathology.  Good R wave progression throughout the precordial leads.    Will obtain cardiac workup.Will administer nitroglycerin.  Patient require admission given his risk factors, presentation and the onset of the pain.    Patient has a significantly elevated delta troponin, stated that his chest pain improved, will start the patient on heparin for a concern of NSTEMI.  We will apply Nitropaste.  Will admit the patient to medicine team for NSTEMI.  Chest x-ray was unremarkable.  Initial EKG was unremarkable.  At this point will signout the patient care to the morning staff pending admission to medicine team given the concern of the chest pain and NSTEMI.    I  spoke to Dr. Singer who accepted the admission.    I spent 35 minutes of critical care time not including billable procedures given the patient NSTEMI.  This patient is a great risk of cardiopulmonary decompensation.        Procedure  Procedures     Rick Cottrell, DO  12/12/24 2110       Rick Cottrell,   01/06/25 7503

## 2024-12-12 NOTE — POST-PROCEDURE NOTE
Physician Transition of Care Summary  Invasive Cardiovascular Lab    Procedure Date: 12/12/2024  Attending:    * Thor Keyes - Primary  Resident/Fellow/Other Assistant: Surgeons and Role:  * No surgeons found with a matching role *    Indications:   Pre-op Diagnosis      * NSTEMI (non-ST elevated myocardial infarction) (Multi) [I21.4]    Post-procedure diagnosis:   Post-op Diagnosis     * NSTEMI (non-ST elevated myocardial infarction) (Multi) [I21.4]    Procedure(s):     * Left Heart Cath, With LV      Procedure Findings:   Left main: No significant disease.  LAD: Mid LAD .  Fills via left to left collaterals.  Large first diagonal, proximal 75% stenosis.  Large second diagonal long 80% stenosis.  LCx: Mid 99% stenosis.  RCA: Moderate diffuse disease.  LVEDP 15.  EF 45 to 50%.    Description of the Procedure:   6 Greek, JL 3.5, JR4.    Complications:   None    Stents/Implants:   Implants       No implant documentation for this case.            Anticoagulation/Antiplatelet Plan:   Aspirin    Estimated Blood Loss:   10 mL    Anesthesia: Moderate Sedation Anesthesia Staff: No anesthesia staff entered.    Any Specimen(s) Removed:   No specimens collected during this procedure.    Disposition:   Referral for CABG.      Electronically signed by: Thor Keyes MD, 12/12/2024 9:43 AM

## 2024-12-12 NOTE — CONSULTS
Inpatient consult to Cardiothoracic Surgery  Consult performed by: LILLIAN Estrada-CNP  Consult ordered by: ZO Mata  Reason for consult: Multivessel CAD    History Of Present Illness  Nader Zazueta is a 59 y.o. 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    ED course: On arrival, patient's /102 (now 158/86), heart rate 55, respirations 18, afebrile, saturating 98% on room air. Labs and imaging form, revealing glucose 210, sodium 135, Cr 1.41 and BUN 25 (1.53 and 25 on 8/7/23), initial troponin 27, delta troponin 77. No white count or anemia. Platelets WNL. Flu A/B/COVID-negative. Chest x-ray shows no acute cardiopulmonary process. EKG, per ED physician, sinus bradycardia with a rate of 59 bpm, normal NJ, QRS, normal QTc duration. No ST segment or T wave pathology. Good R wave progression throughout the precordial leads. Patient was administered 324 mg aspirin in the ambulance. Patient started on heparin drip, given Nitropaste and nitro tablet in the ED.     He was seen in consult by cardiologist, Dr. Thor Keyes and went for a [facility]. His coronary anatomy revealed diffuse MV-CAD with a  of the mid LAD (with L to L collateralizations). Cardiac surgery was consulted for bypass grafting evaluation.      Subjective   Past Medical History  He has a past medical history of Incisional hernia without obstruction or gangrene (06/10/2020), Other specified disorders of kidney and ureter (01/13/2020), Personal history of other diseases of the circulatory system, Personal history of other diseases of the musculoskeletal system and connective tissue, Personal history of other endocrine, nutritional and metabolic disease, Personal history of other specified conditions, Personal history of other specified conditions (05/15/2020), and Right  lower quadrant pain (02/07/2020).    Surgical History  He has a past surgical history that includes Other surgical history (08/22/2019); CT guided percutaneous biopsy mediastinum (8/16/2019); and CT guided abscess fluid collection drainage visceral Perq (7/21/2021).     Social History  He reports that he has never smoked. He has never used smokeless tobacco. He reports that he does not currently use alcohol. He reports that he does not use drugs.    Family History  Family History   Problem Relation Name Age of Onset    Diabetes Mother      COPD Father          Allergies  Patient has no known allergies.    Review of Systems  Review of Systems   Constitutional: Negative for decreased appetite and malaise/fatigue.   HENT:  Negative for congestion.    Eyes:  Negative for blurred vision and double vision.   Cardiovascular:  Negative for chest pain, dyspnea on exertion, irregular heartbeat, leg swelling, orthopnea and palpitations.   Respiratory:  Negative for cough and shortness of breath.    Endocrine: Negative for cold intolerance and heat intolerance.   Skin:  Negative for nail changes, poor wound healing and rash.   Musculoskeletal:  Negative for arthritis, muscle cramps, muscle weakness, myalgias and stiffness.   Gastrointestinal:  Negative for bloating, nausea and vomiting.   Genitourinary:  Negative for frequency, hesitancy and urgency.   Neurological:  Negative for dizziness, focal weakness, light-headedness and weakness.   Psychiatric/Behavioral:  Negative for altered mental status.    Allergic/Immunologic: Negative for environmental allergies.           Objective   Physical Exam  Physical Exam  Vitals and nursing note reviewed.   Constitutional:       General: He is not in acute distress.     Appearance: Normal appearance. He is not ill-appearing.   HENT:      Head: Normocephalic and atraumatic.      Nose: Nose normal.      Mouth/Throat:      Mouth: Mucous membranes are moist.      Pharynx: Oropharynx is clear.    Eyes:      Extraocular Movements: Extraocular movements intact.      Conjunctiva/sclera: Conjunctivae normal.      Pupils: Pupils are equal, round, and reactive to light.   Cardiovascular:      Rate and Rhythm: Normal rate and regular rhythm.      Pulses: Normal pulses.      Heart sounds: Normal heart sounds, S1 normal and S2 normal. No murmur heard.     No systolic murmur is present.      No friction rub. No gallop.   Pulmonary:      Effort: Pulmonary effort is normal.      Breath sounds: Normal breath sounds.   Abdominal:      General: Abdomen is flat. Bowel sounds are normal. There is no distension.      Palpations: Abdomen is soft.   Musculoskeletal:         General: Normal range of motion.      Cervical back: Normal range of motion and neck supple.      Right lower leg: No edema.      Left lower leg: No edema.   Skin:     General: Skin is warm and dry.      Capillary Refill: Capillary refill takes less than 2 seconds.      Findings: No lesion.      Nails: There is no clubbing.   Neurological:      General: No focal deficit present.      Mental Status: He is alert and oriented to person, place, and time. Mental status is at baseline.      Cranial Nerves: Cranial nerves 2-12 are intact.      Sensory: Sensation is intact.      Motor: Motor function is intact.   Psychiatric:         Attention and Perception: Attention and perception normal.         Mood and Affect: Mood normal.         Speech: Speech normal.         Behavior: Behavior normal.         Thought Content: Thought content normal.         Cognition and Memory: Cognition and memory normal.         Judgment: Judgment normal.        Last Recorded Vitals  BP (!) 179/92   Pulse 63   Temp 36.1 °C (97 °F) (Temporal)   Resp 16   Wt 104 kg (230 lb)   SpO2 97%     Relevant Results  Last Labs:  CBC - 12/12/2024:  4:53 AM  8.8 14.9 209    42.2      CMP - 12/12/2024:  4:53 AM  9.7 7.4 22 --- 0.6   _ 4.5 38 40      PTT - No results in last year.  _   _ _      Troponin I, High Sensitivity   Date/Time Value Ref Range Status   12/12/2024 05:29 AM 77 (HH) 0 - 20 ng/L Final   12/12/2024 04:53 AM 27 (H) 0 - 20 ng/L Final     TR HGBA1C (Data Conversion)   Date/Time Value Ref Range Status   07/12/2022 08:25 AM 7.9 (H) 4.2 - 6.5 % Final     Hemoglobin A1C   Date/Time Value Ref Range Status   08/19/2019 06:25 AM 9.4 % Final     Comment:          Diagnosis of Diabetes-Adults   Non-Diabetic: < or = 5.6%   Increased risk for developing diabetes: 5.7-6.4%   Diagnostic of diabetes: > or = 6.5%  .       Monitoring of Diabetes                Age (y)     Therapeutic Goal (%)   Adults:          >18           <7.0   Pediatrics:    13-18           <7.5                   7-12           <8.0                   0- 6            7.5-8.5   American Diabetes Association. Diabetes Care 33(S1), Jan 2010.       VLDL   Date/Time Value Ref Range Status   07/12/2022 01:09 PM 72 (H) 0 - 40 mg/dL Final   03/09/2022 07:42 AM 36 0 - 40 mg/dL Final   02/06/2020 07:48 AM 25 0 - 40 mg/dL Final      Last I/O:  No intake/output data recorded.    Cardiology Tests    Echo:  Transthoracic echo (TTE) complete 12/12/2024  PHYSICIAN INTERPRETATION:  Left Ventricle: Left ventricular ejection fraction is low normal, by visual estimate at 50-55%. There are no regional left ventricular wall motion abnormalities. The left ventricular cavity size is normal. There is mildly increased septal and normal posterior left ventricular wall thickness. There is left ventricular concentric remodeling. Spectral Doppler shows a normal pattern of left ventricular diastolic filling.  Left Atrium: The left atrium is normal in size.  Right Ventricle: The right ventricle is normal in size. There is normal right ventricular global systolic function.  Right Atrium: The right atrium is normal in size.  Aortic Valve: The aortic valve is trileaflet. There is mild aortic valve cusp calcification. The aortic valve dimensionless index is 0.85.  There is no evidence of aortic valve regurgitation. The peak instantaneous gradient of the aortic valve is 11 mmHg. The mean gradient of the aortic valve is 4 mmHg.  Mitral Valve: The mitral valve is normal in structure. There is trace mitral valve regurgitation.  Tricuspid Valve: The tricuspid valve is structurally normal. There is trace tricuspid regurgitation. The right ventricular systolic pressure is unable to be estimated.  Pulmonic Valve: The pulmonic valve is not well visualized. The pulmonic valve regurgitation was not well visualized.  Pericardium: There is no pericardial effusion noted.  Aorta: The aortic root is normal.        CONCLUSIONS:   1. Left ventricular ejection fraction is low normal, by visual estimate at 50-55%.    Ejection Fractions:  EF   Date/Time Value Ref Range Status   12/12/2024 11:07 AM 53 %      Cath:  Cardiac Catheterization Procedure 12/12/2024  Coronary Angiography:  The coronary circulation is right dominant.     Coronary Angiography Comments:  60-year-old man with history of hypertension, hyperlipidemia, diabetes presents with non-STEMI. Urgent heart catheter recommended.     Fluoroscopy demonstrated mild calcification of the coronary tree.     Left main: Large vessel. No significant disease.     LAD: Moderate-sized vessel. LAD is occluded mid vessel. The distal half of the LAD fills via left to left collaterals. There is a large first diagonal which has a proximal 75% stenosis. There is a large bifurcating second diagonal which is a 80% stenosis in it.     LCx: Moderate-sized vessel. Mid vessel 95% stenosis. The circumflex then gives rise to a moderate-sized first obtuse marginal which has mild disease. The ongoing circumflex is a very small, rudimentary vessel.     RCA: Large dominant vessel. Mild to moderate diffuse disease. Mid RCA there is a focal 50% stenosis.     Left heart catheterization demonstrates an LVEDP of 15 mmHg. Left ventricular angiography shows hypokinesis of  the anterior wall. EF 45 to 50%. No mitral regurgitation. No gradient across aortic valve upon pullback.    CONCLUSIONS:   1. Critical three-vessel disease including LAD  with left to left collaterals, severe disease in D1 and D2, subtotal occlusion of moderate-sized circumflex.   2. Mild LV dysfunction, EF estimated 45 to 50%.   3. Patient will be referred for surgical revascularization.         Assessment & Plan       MV-CAD  - Patient [facility] on 12[facility]12/24 with anginal complaints   --> HS Troponin I: 27 -> 77 -> 4446  - [facility] on 12/12 revealed diffuse L system CAD with coronary anatomy favoring surgical revascularization   - Plan to proceed with pre-op workup and staging for inpatient revascularization   - Continue on Heparin & nitroglycerin gtt  - Operative date pending        Essential HTN  - Goal for SBP < 140mmHg   - Continue on metoprolol succinate 25mg every day  - Addition of amlodipine 5mg today     Above patient and plan discussed with cardiac surgeon, Dr. Kb Fermin & Dr. Mendenhall. Plan as above. Will proceed with workup for inpatient staging.     Jovanni Arriaga, APRN-CNP

## 2024-12-12 NOTE — H&P
History and Physical   Pre Surgical Review (< 30 days)      History & Physical Reviewed    I have reviewed the History and Physical dated:  12/12/24   History and Physical reviewed and relevant findings noted. Patient examined to review pertinent physical  findings.: No significant changes   Home Medications Reviewed: see EMR   Allergies Reviewed: no changes noted      Home Medications  Current Outpatient Medications   Medication Instructions    amLODIPine (Norvasc) 10 mg tablet TAKE 1 TABLET (10 MG) BY MOUTH ONCE DAILY.    amoxicillin-pot clavulanate (Augmentin) 500-125 mg tablet 500 mg, oral, 2 times daily    blood sugar diagnostic (Accu-Chek Yamini Plus test strp) strip TEST bid AND PRN-    cyclobenzaprine (FLEXERIL) 5 mg, oral, Every 8 hours    Dexcom G7  misc Use as instructed    Dexcom G7 Sensor device Place on arm replace every 10 days    glimepiride (Amaryl) 2 mg tablet TAKE 1 TABLET BY MOUTH TWO TIMES A DAY    lisinopril 10 mg tablet TAKE 1 TABLET (10 MG) BY MOUTH ONCE DAILY.    metFORMIN (Glucophage) 1,000 mg tablet TAKE 1 TABLET BY MOUTH TWO TIMES A DAY WITH MEALS        Allergies  No Known Allergies    Physical Exam  Physical Exam  Constitutional:       General: He is not in acute distress.  Cardiovascular:      Rate and Rhythm: Normal rate and regular rhythm.      Comments: + pulses all extremities.  Pulmonary:      Effort: Pulmonary effort is normal. No respiratory distress.      Comments: Clear bilaterally.  Abdominal:      General: Bowel sounds are normal.   Skin:     General: Skin is warm and dry.      Comments: Farhan complexion.   Neurological:      General: No focal deficit present.      Mental Status: He is alert and oriented to person, place, and time.   Psychiatric:         Mood and Affect: Mood normal.         Behavior: Behavior normal.        Airway/Sedation Assessment     Assessment by anesthesia N/A     ·  Mouth Opening OK yes      Neck Flexibility OK yes      Loose Teeth  No   ·  Oropharyngeal Classification Grade III   ·  ASA PS Classification ASA III - Patient with severe systemic disease       Sedation Plan Moderate     Risks, benefits, and alternatives discussed with patient.     ERAS (Enhanced Recovery After Surgery):  ·  ERAS Patient: No      Consent:   COVID-19 Consent:  ·  COVID-19 Risk Consent Surgeon has reviewed key risks related to the risk of nakia COVID-19 and if they contract COVID-19 what the risks are.     Jacey Prieto, APRN-CNP

## 2024-12-12 NOTE — CARE PLAN
Problem: Pain - Adult  Goal: Verbalizes/displays adequate comfort level or baseline comfort level  Outcome: Progressing     Problem: Safety - Adult  Goal: Free from fall injury  Outcome: Progressing     Problem: Discharge Planning  Goal: Discharge to home or other facility with appropriate resources  Outcome: Progressing     Problem: Chronic Conditions and Co-morbidities  Goal: Patient's chronic conditions and co-morbidity symptoms are monitored and maintained or improved  Outcome: Progressing     Problem: Fall/Injury  Goal: Not fall by end of shift  Outcome: Progressing  Goal: Be free from injury by end of the shift  Outcome: Progressing  Goal: Verbalize understanding of personal risk factors for fall in the hospital  Outcome: Progressing  Goal: Verbalize understanding of risk factor reduction measures to prevent injury from fall in the home  Outcome: Progressing  Goal: Pace activities to prevent fatigue by end of the shift  Outcome: Progressing     Problem: Diabetes  Goal: Achieve decreasing blood glucose levels by end of shift  Outcome: Progressing  Goal: Increase stability of blood glucose readings by end of shift  Outcome: Progressing  Goal: Decrease in ketones present in urine by end of shift  Outcome: Progressing  Goal: Maintain electrolyte levels within acceptable range throughout shift  Outcome: Progressing  Goal: Maintain glucose levels >70mg/dl to <250mg/dl throughout shift  Outcome: Progressing  Goal: No changes in neurological exam by end of shift  Outcome: Progressing  Goal: Learn about and adhere to nutrition recommendations by end of shift  Outcome: Progressing  Goal: Vital signs within normal range for age by end of shift  Outcome: Progressing  Goal: Increase self care and/or family involovement by end of shift  Outcome: Progressing  Goal: Receive DSME education by end of shift  Outcome: Progressing     Problem: Pain  Goal: Takes deep breaths with improved pain control throughout the  shift  Outcome: Progressing  Goal: Turns in bed with improved pain control throughout the shift  Outcome: Progressing  Goal: Walks with improved pain control throughout the shift  Outcome: Progressing  Goal: Performs ADL's with improved pain control throughout shift  Outcome: Progressing  Goal: Participates in PT with improved pain control throughout the shift  Outcome: Progressing  Goal: Free from opioid side effects throughout the shift  Outcome: Progressing  Goal: Free from acute confusion related to pain meds throughout the shift  Outcome: Progressing     Problem: Nutrition  Goal: Less than 5 days NPO/clear liquids  Outcome: Progressing  Goal: Oral intake greater than 50%  Outcome: Progressing  Goal: Oral intake greater 75%  Outcome: Progressing  Goal: Consume prescribed supplement  Outcome: Progressing  Goal: Adequate PO fluid intake  Outcome: Progressing  Goal: Nutrition support goals are met within 48 hrs  Outcome: Progressing  Goal: Nutrition support is meeting 75% of nutrient needs  Outcome: Progressing  Goal: BG  mg/dL  Outcome: Progressing  Goal: Lab values WNL  Outcome: Progressing  Goal: Electrolytes WNL  Outcome: Progressing  Goal: Promote healing  Outcome: Progressing  Goal: Maintain stable weight  Outcome: Progressing     Problem: ACS/CP/NSTEMI/STEMI  Goal: Chest pain managed (free from pain or at acceptable level)  Outcome: Progressing  Goal: Lab values return to normal range  Outcome: Progressing  Goal: Promote self management  Outcome: Progressing  Goal: Serial ECG will return to baseline  Outcome: Progressing  Goal: Verbalize understanding of procedures/devices  Outcome: Progressing     Problem: Cardiac catheterization  Goal: Free from dysrhythmias  Outcome: Progressing  Goal: Free from pain  Outcome: Progressing  Goal: No evidence of post procedure complications  Outcome: Progressing  Goal: Promote self management  Outcome: Progressing  Goal: Verbalize understanding of procedure  Outcome:  Progressing   The patient's goals for the shift include      The clinical goals for the shift include pt to remain hemodynamically stable

## 2024-12-12 NOTE — H&P
History Of Present Illness  Nader Zazueta is a 59 y.o. male with a past medical history of hypertension, hyperlipidemia type 2 diabetes mellitus, and CKD (solitary kidney 2/2 RCC) who presents to the emergency department by ambulance today with complaint of midsternal chest pain.  The patient states that the pain woke him up from sleep around 3 AM, and he describes it as a pressure sensation.  He states the pain spreads all across his chest, and radiates into his back and jaw.  He denies a tearing/ripping sensation.  He states the pain lasted until he got to the emergency department and got better with administration of nitro.  He states he has never experienced this pain before, and rates it an 8 out of 10 pain at its peak.  He states he is at a 0 out of 10 pain currently.  He is also endorsing chills and shortness of breath with the episode, and does state he vomited twice this morning (nonbloody, nonbilious).  He also states he has had a cough and runny nose for about 1 week.  He states he has a 7-year-old daughter who brings home germs all the time.  He otherwise denies headache, dizziness, syncope, palpitations, leg swelling, abdominal pain, diarrhea, numbness/tingling, urinary symptoms.  Patient also states he had a hernia repair with Dr. Covarrubias in 2020, and would like it again evaluated by him.    ED course: On arrival, patient's /102 (now 158/86), heart rate 55, respirations 18, afebrile, saturating 98% on room air.  Labs and imaging form, revealing glucose 210, sodium 135, Cr 1.41 and BUN 25 (1.53 and 25 on 8/7/23), initial troponin 27, delta troponin 77.  No white count or anemia.  Platelets WNL.  Flu A/B/COVID-negative.  Chest x-ray shows no acute cardiopulmonary process.  EKG, per ED physician, sinus bradycardia with a rate of 59 bpm, normal KS, QRS, normal QTc duration.  No ST segment or T wave pathology.  Good R wave progression throughout the precordial leads.  Patient was administered 324 mg  aspirin in the ambulance.  Patient started on heparin drip, given Nitropaste and nitro tablet in the ED.  Patient to be admitted inpatient under Dr. Singer.     Past Medical History  Past Medical History:   Diagnosis Date    Incisional hernia without obstruction or gangrene 06/10/2020    Incisional hernia    Other specified disorders of kidney and ureter 01/13/2020    Renal mass, right    Personal history of other diseases of the circulatory system     History of hypertension    Personal history of other diseases of the musculoskeletal system and connective tissue     History of osteomyelitis    Personal history of other endocrine, nutritional and metabolic disease     History of diabetes mellitus    Personal history of other specified conditions     History of abdominal pain    Personal history of other specified conditions 05/15/2020    History of postoperative nausea and vomiting    Right lower quadrant pain 02/07/2020    RLQ cramping       Surgical History  Past Surgical History:   Procedure Laterality Date    CT GUIDED ABSCESS FLUID COLLECTION DRAINAGE  7/21/2021    CT GUIDED ABSCESS FLUID COLLECTION DRAINAGE 7/21/2021 [facility]    CT GUIDED PERCUTANEOUS BIOPSY MEDIASTINUM  8/16/2019    CT GUIDED PERCUTANEOUS BIOPSY MEDIASTINUM 8/16/2019 [facility]    OTHER SURGICAL HISTORY  08/22/2019    Back surgery        Social History  He reports that he has never smoked. He has never used smokeless tobacco. He reports that he does not currently use alcohol. He reports that he does not use drugs.    Family History  Family History   Problem Relation Name Age of Onset    Diabetes Mother      COPD Father          Allergies  Patient has no known allergies.    Review of Systems  Negative except as stated above in HPI.     Physical Exam  Constitutional:       Appearance: Normal appearance. He is not ill-appearing, toxic-appearing or diaphoretic.      Comments: Patient alert and oriented x 3, pleasant and conversant.   "Does not appear to be in any apparent distress.   HENT:      Head: Normocephalic and atraumatic.      Nose: Nose normal.      Mouth/Throat:      Mouth: Mucous membranes are moist.      Pharynx: Oropharynx is clear.   Eyes:      Extraocular Movements: Extraocular movements intact.      Conjunctiva/sclera: Conjunctivae normal.      Pupils: Pupils are equal, round, and reactive to light.   Cardiovascular:      Rate and Rhythm: Normal rate and regular rhythm.      Pulses: Normal pulses.   Pulmonary:      Effort: Pulmonary effort is normal.      Breath sounds: Normal breath sounds. No wheezing or rales.   Abdominal:      General: Bowel sounds are normal.      Palpations: Abdomen is soft.      Tenderness: There is abdominal tenderness (Mild tenderness over right lower quadrant, patient states this is where his hernia surgery was).      Hernia: No hernia is present.   Musculoskeletal:         General: No tenderness. Normal range of motion.      Cervical back: Normal range of motion.      Right lower leg: No edema.      Left lower leg: No edema.   Skin:     General: Skin is warm and dry.   Neurological:      General: No focal deficit present.      Mental Status: He is alert and oriented to person, place, and time.   Psychiatric:         Mood and Affect: Mood normal.         Behavior: Behavior normal.         Thought Content: Thought content normal.          Last Recorded Vitals  Blood pressure (!) 179/92, pulse 63, temperature 36.1 °C (97 °F), temperature source Temporal, resp. rate 16, height 1.753 m (5' 9\"), weight 104 kg (230 lb), SpO2 97%.    Relevant Results    Scheduled medications  [START ON 12/13/2024] aspirin, 81 mg, oral, Daily  atorvastatin, 40 mg, oral, Nightly  insulin lispro, 0-10 Units, subcutaneous, TID AC  metoprolol succinate XL, 25 mg, oral, Daily      Continuous medications       PRN medications  PRN medications: acetaminophen **OR** acetaminophen **OR** acetaminophen, dextrose, dextrose, glucagon, " glucagon, oxygen, oxygen, polyethylene glycol    Results for orders placed or performed during the hospital encounter of 12/12/24 (from the past 24 hours)   CBC and Auto Differential   Result Value Ref Range    WBC 8.8 4.4 - 11.3 x10*3/uL    nRBC 0.0 0.0 - 0.0 /100 WBCs    RBC 4.68 4.50 - 5.90 x10*6/uL    Hemoglobin 14.9 13.5 - 17.5 g/dL    Hematocrit 42.2 41.0 - 52.0 %    MCV 90 80 - 100 fL    MCH 31.8 26.0 - 34.0 pg    MCHC 35.3 32.0 - 36.0 g/dL    RDW 12.2 11.5 - 14.5 %    Platelets 209 150 - 450 x10*3/uL    Neutrophils % 58.5 40.0 - 80.0 %    Immature Granulocytes %, Automated 0.7 0.0 - 0.9 %    Lymphocytes % 29.2 13.0 - 44.0 %    Monocytes % 7.3 2.0 - 10.0 %    Eosinophils % 3.4 0.0 - 6.0 %    Basophils % 0.9 0.0 - 2.0 %    Neutrophils Absolute 5.12 1.20 - 7.70 x10*3/uL    Immature Granulocytes Absolute, Automated 0.06 0.00 - 0.70 x10*3/uL    Lymphocytes Absolute 2.56 1.20 - 4.80 x10*3/uL    Monocytes Absolute 0.64 0.10 - 1.00 x10*3/uL    Eosinophils Absolute 0.30 0.00 - 0.70 x10*3/uL    Basophils Absolute 0.08 0.00 - 0.10 x10*3/uL   Magnesium   Result Value Ref Range    Magnesium 1.73 1.60 - 2.40 mg/dL   Comprehensive metabolic panel   Result Value Ref Range    Glucose 210 (H) 74 - 99 mg/dL    Sodium 135 (L) 136 - 145 mmol/L    Potassium 4.1 3.5 - 5.3 mmol/L    Chloride 103 98 - 107 mmol/L    Bicarbonate 20 (L) 21 - 32 mmol/L    Anion Gap 16 10 - 20 mmol/L    Urea Nitrogen 25 (H) 6 - 23 mg/dL    Creatinine 1.41 (H) 0.50 - 1.30 mg/dL    eGFR 58 (L) >60 mL/min/1.73m*2    Calcium 9.7 8.6 - 10.3 mg/dL    Albumin 4.5 3.4 - 5.0 g/dL    Alkaline Phosphatase 40 33 - 120 U/L    Total Protein 7.4 6.4 - 8.2 g/dL    AST 22 9 - 39 U/L    Bilirubin, Total 0.6 0.0 - 1.2 mg/dL    ALT 38 10 - 52 U/L   Influenza A, and B PCR   Result Value Ref Range    Flu A Result Not Detected Not Detected    Flu B Result Not Detected Not Detected   Sars-CoV-2 PCR   Result Value Ref Range    Coronavirus 2019, PCR Not Detected Not Detected    Troponin I, High Sensitivity, Initial   Result Value Ref Range    Troponin I, High Sensitivity 27 (H) 0 - 20 ng/L   Troponin, High Sensitivity, 1 Hour   Result Value Ref Range    Troponin I, High Sensitivity 77 (HH) 0 - 20 ng/L   TSH   Result Value Ref Range    Thyroid Stimulating Hormone 1.43 0.44 - 3.98 mIU/L     Cardiac Catheterization Procedure    Result Date: 12/12/2024   [facility], Cath Lab, [facility] [place], [place] [address] Cardiovascular Catheterization Report Patient Name:      AMBER PACK     Performing Physician:  [id number] Thor Keyes MD Study Date:        12/12/2024           Verifying Physician:   [id number] Keyes MD MRN/PID:           [id number]             Cardiologist/Co-Scrub: Accession#:        [id number]         Ordering Provider:     [id number] BRAIN PERRIN Date of Birth/Age: 1965 / 59 years Cardiologist: Gender:            M                    Fellow: Encounter#:        [id number]           Surgeon:  Study: Left Heart Cath  Indications: AMBER PACK is a 60 year old male who presents with diabetes, dyslipidemia, hypertension, obesity and a chest pain assessment of typical angina. Acute coronary syndrome - STEMI. Medical History: Stress test performed: No. CTA performed: No. [place]atston accessed: No. LVEF Assessed: Yes. LVEF = 45-50%. Cardiac arrest: No. Cardiac surgical consult: Completed - cardiac surgery recommended. Cardiovascular instability: No. Frailty status of patient entering lab: 3 = Managing well.  Procedure Description: After infiltration with 2% Lidocaine, the right radial artery was cannulated with a modified Seldinger technique. Subsequently a 6 Congolese sheath was placed retrograde in the right radial artery. Selective coronary catheterization was performed using a 6 Fr  catheter(s) exchanged over a guide wire to cannulate the coronary arteries. A JL 3.5 tip catheter was used for left coronary injections. A JR 4 tip catheter was used for right coronary injections. Multiple injections of contrast were made into the left and right coronary arteries with angiograms recorded in multiple projections. After completion of the procedure, the arterial sheath was pulled and a TR Band Radial Compression Device was utilized to obtain patent hemostasis.  Coronary Angiography: The coronary circulation is right dominant.  Coronary Angiography Comments: 60-year-old man with history of hypertension, hyperlipidemia, diabetes presents with non-STEMI. Urgent heart catheter recommended. Fluoroscopy demonstrated mild calcification of the coronary tree. Left main: Large vessel. No significant disease. LAD: Moderate-sized vessel. LAD is occluded mid vessel. The distal half of the LAD fills via left to left collaterals. There is a large first diagonal which has a proximal 75% stenosis. There is a large bifurcating second diagonal which is a 80% stenosis in it.  LCx: Moderate-sized vessel. Mid vessel 95% stenosis. The circumflex then gives rise to a moderate-sized first obtuse marginal which has mild disease. The ongoing circumflex is a very small, rudimentary vessel. RCA: Large dominant vessel. Mild to moderate diffuse disease. Mid RCA there is a focal 50% stenosis. Left heart catheterization demonstrates an LVEDP of 15 mmHg. Left ventricular angiography shows hypokinesis of the anterior wall. EF 45 to 50%. No mitral regurgitation. No gradient across aortic valve upon pullback.  Hemo Personnel: +---------------+---------+ Name           Duty      +---------------+---------+ Thor Keyes MD 1 +---------------+---------+  Hemodynamic Pressures:  +----+---------------+----------+-------------+--------------+-------+---------+ Site   Date Time   Phase NameSystolic mmHgDiastolic mmHgED  mmHgMean mmHg +----+---------------+----------+-------------+--------------+-------+---------+   AO     12/12/2024   O2 REST          154            93             118          9:29:04 AM                                                      +----+---------------+----------+-------------+--------------+-------+---------+   LV     12/12/2024   O2 REST          174            13     19                   9:35:04 AM                                                      +----+---------------+----------+-------------+--------------+-------+---------+   LV     12/12/2024   O2 REST          159            13     20                   9:35:11 AM                                                      +----+---------------+----------+-------------+--------------+-------+---------+  LVp     12/12/2024   O2 REST          162            12     20                   9:36:05 AM                                                      +----+---------------+----------+-------------+--------------+-------+---------+  AOp     12/12/2024   O2 REST          160            90             119          9:36:10 AM                                                      +----+---------------+----------+-------------+--------------+-------+---------+  Cardiac Cath Post Procedure Notes: Post Procedure Diagnosis: MI. Blood Loss:               Estimated blood loss during the procedure was 3 mls. Specimens Removed:        Number of specimen(s) removed: none. ____________________________________________________________________________________ CONCLUSIONS:  1. Critical three-vessel disease including LAD  with left to left collaterals, severe disease in D1 and D2, subtotal occlusion of moderate-sized circumflex.  2. Mild LV dysfunction, EF estimated 45 to 50%.  3. Patient will be referred for surgical revascularization. ICD 10 Codes: Non ST elevation (NSTEMI) myocardial  infarction-I21.4  CPT Codes: Angiography, Each 1st additional vessel studied after basic exam-73519  [id number] Thor Keyes MD Performing Physician Electronically signed by [id number] Thor Keyes MD on 12/12/2024 at 9:52:24 AM  ** Final **     XR chest 1 view    Result Date: 12/12/2024  Interpreted By:  Bebe Mckinney, STUDY: XR CHEST 1 VIEW;  12/12/2024 5:01 am   INDICATION: Signs/Symptoms:chest pain.   COMPARISON: 06/10/2021   ACCESSION NUMBER(S): [id number]   ORDERING CLINICIAN: ELYSSA ALFONSO   FINDINGS:     CARDIOMEDIASTINAL SILHOUETTE: Cardiomediastinal silhouette is normal in size and configuration.   LUNGS: No pulmonary consolidation, pleural effusion or pneumothorax.   ABDOMEN: No remarkable upper abdominal findings.   BONES: No acute osseous abnormality.       No acute cardiopulmonary process.   MACRO: None   Signed by: Bebe Mckinney 12/12/2024 5:06 AM Dictation workstation:   [id number]        Assessment/Plan   Assessment & Plan  NSTEMI (non-ST elevated myocardial infarction) (Multi)    NSTEMI  Hypertension  Hyperlipidemia  -Cardiology consult, recommendations appreciated  -EKG, per ED physician, sinus bradycardia with a rate of 59 bpm, normal NE, QRS, normal QTc duration.  No ST segment or T wave pathology.  Good R wave progression throughout the precordial leads  -Chest x-ray shows no acute cardiopulmonary process  -Patient initiated on heparin drip in the ED, continue  -Patient administered 324 mg aspirin by EMS, continue 81 mg aspirin daily  -Will keep patient n.p.o. for now until evaluated by cardio  -Hemoglobin A1c, lipid panel, TSH, echo added on  -Continue home medications when home med rec complete, n.p.o. for now    Hx of incisional hernia  -Patient tender to palpation over site of his incisional hernia repair (RLQ), he states he would like to be evaluated by Dr. Covarrubias  -CT A/P added on (w/out contrast due to pt's solitary kidney)    Type 2 diabetes mellitus  -Sliding scale insulin and diabetic  diet  -Hypoglycemic protocol    CKD  Hyponatremia, 135  -Creatinine 1.41 and BUN 25, 1.53 and 25 point 8/7/2023  -Monitor with BMP, avoid nephrotoxic medications    DVT prophylaxis  -Patient currently on heparin drip  -SCDs  \12/12: spoke with cardioloyg cath shows multivessel disease will need CABG, CT surgery consutled    I spent 60 minutes in the professional and overall care of this patient.      Mustapha Singer, DO

## 2024-12-12 NOTE — H&P
History Of Present Illness  Nader Zazueta is a 59 y.o. male with a past medical history of hypertension, hyperlipidemia type 2 diabetes mellitus, and CKD (solitary kidney 2/2 RCC) who presents to the emergency department by ambulance today with complaint of midsternal chest pain.  The patient states that the pain woke him up from sleep around 3 AM, and he describes it as a pressure sensation.  He states the pain spreads all across his chest, and radiates into his back and jaw.  He denies a tearing/ripping sensation.  He states the pain lasted until he got to the emergency department and got better with administration of nitro.  He states he has never experienced this pain before, and rates it an 8 out of 10 pain at its peak.  He states he is at a 0 out of 10 pain currently.  He is also endorsing chills and shortness of breath with the episode, and does state he vomited twice this morning (nonbloody, nonbilious).  He also states he has had a cough and runny nose for about 1 week.  He states he has a 7-year-old daughter who brings home germs all the time.  He otherwise denies headache, dizziness, syncope, palpitations, leg swelling, abdominal pain, diarrhea, numbness/tingling, urinary symptoms.  Patient also states he had a hernia repair with Dr. Covarrubias in 2020, and would like it again evaluated by him.    ED course: On arrival, patient's /102 (now 158/86), heart rate 55, respirations 18, afebrile, saturating 98% on room air.  Labs and imaging form, revealing glucose 210, sodium 135, Cr 1.41 and BUN 25 (1.53 and 25 on 8/7/23), initial troponin 27, delta troponin 77.  No white count or anemia.  Platelets WNL.  Flu A/B/COVID-negative.  Chest x-ray shows no acute cardiopulmonary process.  EKG, per ED physician, sinus bradycardia with a rate of 59 bpm, normal KS, QRS, normal QTc duration.  No ST segment or T wave pathology.  Good R wave progression throughout the precordial leads.  Patient was administered 324 mg  aspirin in the ambulance.  Patient started on heparin drip, given Nitropaste and nitro tablet in the ED.  Patient to be admitted inpatient under Dr. Singer.     Past Medical History  Past Medical History:   Diagnosis Date    Incisional hernia without obstruction or gangrene 06/10/2020    Incisional hernia    Other specified disorders of kidney and ureter 01/13/2020    Renal mass, right    Personal history of other diseases of the circulatory system     History of hypertension    Personal history of other diseases of the musculoskeletal system and connective tissue     History of osteomyelitis    Personal history of other endocrine, nutritional and metabolic disease     History of diabetes mellitus    Personal history of other specified conditions     History of abdominal pain    Personal history of other specified conditions 05/15/2020    History of postoperative nausea and vomiting    Right lower quadrant pain 02/07/2020    RLQ cramping       Surgical History  Past Surgical History:   Procedure Laterality Date    CT GUIDED ABSCESS FLUID COLLECTION DRAINAGE  7/21/2021    CT GUIDED ABSCESS FLUID COLLECTION DRAINAGE 7/21/2021 [facility]    CT GUIDED PERCUTANEOUS BIOPSY MEDIASTINUM  8/16/2019    CT GUIDED PERCUTANEOUS BIOPSY MEDIASTINUM 8/16/2019 [facility] [place] CLIN[facility]    OTHER SURGICAL HISTORY  08/22/2019    Back surgery        Social History  He reports that he has never smoked. He has never used smokeless tobacco. He reports that he does not currently use alcohol. He reports that he does not use drugs.    Family History  Family History   Problem Relation Name Age of Onset    Diabetes Mother      COPD Father          Allergies  Patient has no known allergies.    Review of Systems  Negative except as stated above in HPI.     Physical Exam  Constitutional:       Appearance: Normal appearance. He is not ill-appearing, toxic-appearing or diaphoretic.      Comments: Patient alert and oriented x 3, pleasant and conversant.   "Does not appear to be in any apparent distress.   HENT:      Head: Normocephalic and atraumatic.      Nose: Nose normal.      Mouth/Throat:      Mouth: Mucous membranes are moist.      Pharynx: Oropharynx is clear.   Eyes:      Extraocular Movements: Extraocular movements intact.      Conjunctiva/sclera: Conjunctivae normal.      Pupils: Pupils are equal, round, and reactive to light.   Cardiovascular:      Rate and Rhythm: Normal rate and regular rhythm.      Pulses: Normal pulses.   Pulmonary:      Effort: Pulmonary effort is normal.      Breath sounds: Normal breath sounds. No wheezing or rales.   Abdominal:      General: Bowel sounds are normal.      Palpations: Abdomen is soft.      Tenderness: There is abdominal tenderness (Mild tenderness over right lower quadrant, patient states this is where his hernia surgery was).      Hernia: No hernia is present.   Musculoskeletal:         General: No tenderness. Normal range of motion.      Cervical back: Normal range of motion.      Right lower leg: No edema.      Left lower leg: No edema.   Skin:     General: Skin is warm and dry.   Neurological:      General: No focal deficit present.      Mental Status: He is alert and oriented to person, place, and time.   Psychiatric:         Mood and Affect: Mood normal.         Behavior: Behavior normal.         Thought Content: Thought content normal.          Last Recorded Vitals  Blood pressure (!) 179/92, pulse 63, temperature 36.1 °C (97 °F), temperature source Temporal, resp. rate 16, height 1.753 m (5' 9\"), weight 104 kg (230 lb), SpO2 97%.    Relevant Results    Scheduled medications  [START ON 12/13/2024] aspirin, 81 mg, oral, Daily  insulin lispro, 0-10 Units, subcutaneous, TID AC      Continuous medications  heparin, 0-4,000 Units/hr, Last Rate: 1,000 Units/hr (12/12/24 0646)      PRN medications  PRN medications: acetaminophen **OR** acetaminophen **OR** acetaminophen, dextrose, dextrose, glucagon, glucagon, " heparin, oxygen, polyethylene glycol    Results for orders placed or performed during the hospital encounter of 12/12/24 (from the past 24 hours)   CBC and Auto Differential   Result Value Ref Range    WBC 8.8 4.4 - 11.3 x10*3/uL    nRBC 0.0 0.0 - 0.0 /100 WBCs    RBC 4.68 4.50 - 5.90 x10*6/uL    Hemoglobin 14.9 13.5 - 17.5 g/dL    Hematocrit 42.2 41.0 - 52.0 %    MCV 90 80 - 100 fL    MCH 31.8 26.0 - 34.0 pg    MCHC 35.3 32.0 - 36.0 g/dL    RDW 12.2 11.5 - 14.5 %    Platelets 209 150 - 450 x10*3/uL    Neutrophils % 58.5 40.0 - 80.0 %    Immature Granulocytes %, Automated 0.7 0.0 - 0.9 %    Lymphocytes % 29.2 13.0 - 44.0 %    Monocytes % 7.3 2.0 - 10.0 %    Eosinophils % 3.4 0.0 - 6.0 %    Basophils % 0.9 0.0 - 2.0 %    Neutrophils Absolute 5.12 1.20 - 7.70 x10*3/uL    Immature Granulocytes Absolute, Automated 0.06 0.00 - 0.70 x10*3/uL    Lymphocytes Absolute 2.56 1.20 - 4.80 x10*3/uL    Monocytes Absolute 0.64 0.10 - 1.00 x10*3/uL    Eosinophils Absolute 0.30 0.00 - 0.70 x10*3/uL    Basophils Absolute 0.08 0.00 - 0.10 x10*3/uL   Magnesium   Result Value Ref Range    Magnesium 1.73 1.60 - 2.40 mg/dL   Comprehensive metabolic panel   Result Value Ref Range    Glucose 210 (H) 74 - 99 mg/dL    Sodium 135 (L) 136 - 145 mmol/L    Potassium 4.1 3.5 - 5.3 mmol/L    Chloride 103 98 - 107 mmol/L    Bicarbonate 20 (L) 21 - 32 mmol/L    Anion Gap 16 10 - 20 mmol/L    Urea Nitrogen 25 (H) 6 - 23 mg/dL    Creatinine 1.41 (H) 0.50 - 1.30 mg/dL    eGFR 58 (L) >60 mL/min/1.73m*2    Calcium 9.7 8.6 - 10.3 mg/dL    Albumin 4.5 3.4 - 5.0 g/dL    Alkaline Phosphatase 40 33 - 120 U/L    Total Protein 7.4 6.4 - 8.2 g/dL    AST 22 9 - 39 U/L    Bilirubin, Total 0.6 0.0 - 1.2 mg/dL    ALT 38 10 - 52 U/L   Influenza A, and B PCR   Result Value Ref Range    Flu A Result Not Detected Not Detected    Flu B Result Not Detected Not Detected   Sars-CoV-2 PCR   Result Value Ref Range    Coronavirus 2019, PCR Not Detected Not Detected   Troponin I,  High Sensitivity, Initial   Result Value Ref Range    Troponin I, High Sensitivity 27 (H) 0 - 20 ng/L   Troponin, High Sensitivity, 1 Hour   Result Value Ref Range    Troponin I, High Sensitivity 77 (HH) 0 - 20 ng/L     XR chest 1 view    Result Date: 12/12/2024  Interpreted By:  Bebe Mckinney, STUDY: XR CHEST 1 VIEW;  12/12/2024 5:01 am   INDICATION: Signs/Symptoms:chest pain.   COMPARISON: 06/10/2021   ACCESSION NUMBER(S): [id number]   ORDERING CLINICIAN: ELYSSA ALFONSO   FINDINGS:     CARDIOMEDIASTINAL SILHOUETTE: Cardiomediastinal silhouette is normal in size and configuration.   LUNGS: No pulmonary consolidation, pleural effusion or pneumothorax.   ABDOMEN: No remarkable upper abdominal findings.   BONES: No acute osseous abnormality.       No acute cardiopulmonary process.   MACRO: None   Signed by: Bebe Mckinney 12/12/2024 5:06 AM Dictation workstation:   [id number]        Assessment/Plan   Assessment & Plan  NSTEMI (non-ST elevated myocardial infarction) (Multi)    NSTEMI  Hypertension  Hyperlipidemia  -Cardiology consult, recommendations appreciated  -EKG, per ED physician, sinus bradycardia with a rate of 59 bpm, normal NJ, QRS, normal QTc duration.  No ST segment or T wave pathology.  Good R wave progression throughout the precordial leads  -Chest x-ray shows no acute cardiopulmonary process  -Patient initiated on heparin drip in the ED, continue  -Patient administered 324 mg aspirin by EMS, continue 81 mg aspirin daily  -Will keep patient n.p.o. for now until evaluated by cardio  -Hemoglobin A1c, lipid panel, TSH, echo added on  -Continue home medications when home med rec complete, n.p.o. for now    Hx of incisional hernia  -Patient tender to palpation over site of his incisional hernia repair (RLQ), he states he would like to be evaluated by Dr. Covarrubias  -CT A/P added on (w/out contrast due to pt's solitary kidney)    Type 2 diabetes mellitus  -Sliding scale insulin and diabetic diet  -Hypoglycemic  protocol    CKD  Hyponatremia, 135  -Creatinine 1.41 and BUN 25, 1.53 and 25 point 8/7/2023  -Monitor with BMP, avoid nephrotoxic medications    DVT prophylaxis  -Patient currently on heparin drip  -SCDs    I spent 60 minutes in the professional and overall care of this patient.      Margaret Perez PA-C

## 2024-12-13 ENCOUNTER — APPOINTMENT (OUTPATIENT)
Dept: VASCULAR MEDICINE | Facility: HOSPITAL | Age: 59
DRG: 233 | End: 2024-12-13
Payer: COMMERCIAL

## 2024-12-13 ENCOUNTER — APPOINTMENT (OUTPATIENT)
Dept: RADIOLOGY | Facility: HOSPITAL | Age: 59
DRG: 233 | End: 2024-12-13
Payer: COMMERCIAL

## 2024-12-13 PROBLEM — I25.85 CHRONIC CORONARY MICROVASCULAR DYSFUNCTION: Status: ACTIVE | Noted: 2024-12-12

## 2024-12-13 LAB
ANION GAP SERPL CALC-SCNC: 11 MMOL/L (ref 10–20)
APTT PPP: 52 SECONDS (ref 27–38)
ATRIAL RATE: 60 BPM
BUN SERPL-MCNC: 21 MG/DL (ref 6–23)
CALCIUM SERPL-MCNC: 9.3 MG/DL (ref 8.6–10.3)
CHLORIDE SERPL-SCNC: 102 MMOL/L (ref 98–107)
CHOLEST SERPL-MCNC: 174 MG/DL (ref 0–199)
CHOLESTEROL/HDL RATIO: 5.9
CO2 SERPL-SCNC: 23 MMOL/L (ref 21–32)
CREAT SERPL-MCNC: 1.48 MG/DL (ref 0.5–1.3)
EGFRCR SERPLBLD CKD-EPI 2021: 55 ML/MIN/1.73M*2
ERYTHROCYTE [DISTWIDTH] IN BLOOD BY AUTOMATED COUNT: 12.3 % (ref 11.5–14.5)
EST. AVERAGE GLUCOSE BLD GHB EST-MCNC: 203 MG/DL
FERRITIN SERPL-MCNC: 244 NG/ML (ref 20–300)
GLUCOSE BLD MANUAL STRIP-MCNC: 171 MG/DL (ref 74–99)
GLUCOSE BLD MANUAL STRIP-MCNC: 200 MG/DL (ref 74–99)
GLUCOSE BLD MANUAL STRIP-MCNC: 206 MG/DL (ref 74–99)
GLUCOSE BLD MANUAL STRIP-MCNC: 214 MG/DL (ref 74–99)
GLUCOSE SERPL-MCNC: 212 MG/DL (ref 74–99)
HBA1C MFR BLD: 8.7 %
HCT VFR BLD AUTO: 40.5 % (ref 41–52)
HDLC SERPL-MCNC: 29.3 MG/DL
HGB BLD-MCNC: 14 G/DL (ref 13.5–17.5)
HOLD SPECIMEN: NORMAL
HOLD SPECIMEN: NORMAL
INR PPP: 1 (ref 0.9–1.1)
LDLC SERPL CALC-MCNC: 106 MG/DL
MCH RBC QN AUTO: 31.5 PG (ref 26–34)
MCHC RBC AUTO-ENTMCNC: 34.6 G/DL (ref 32–36)
MCV RBC AUTO: 91 FL (ref 80–100)
NON HDL CHOLESTEROL: 145 MG/DL (ref 0–149)
NRBC BLD-RTO: 0 /100 WBCS (ref 0–0)
P AXIS: 81 DEGREES
PLATELET # BLD AUTO: 237 X10*3/UL (ref 150–450)
POTASSIUM SERPL-SCNC: 4.1 MMOL/L (ref 3.5–5.3)
PR INTERVAL: 206 MS
PROTHROMBIN TIME: 11.7 SECONDS (ref 9.8–12.8)
Q ONSET: 253 MS
QRS COUNT: 9 BEATS
QRS DURATION: 116 MS
QT INTERVAL: 428 MS
QTC CALCULATION(BAZETT): 424 MS
QTC FREDERICIA: 425 MS
R AXIS: 60 DEGREES
RBC # BLD AUTO: 4.44 X10*6/UL (ref 4.5–5.9)
SODIUM SERPL-SCNC: 132 MMOL/L (ref 136–145)
T AXIS: 55 DEGREES
T OFFSET: 467 MS
TRANSFERRIN SERPL-MCNC: 226 MG/DL (ref 200–360)
TRIGL SERPL-MCNC: 195 MG/DL (ref 0–149)
TSH SERPL-ACNC: 1.96 MIU/L (ref 0.44–3.98)
UFH PPP CHRO-ACNC: 0.2 IU/ML
UFH PPP CHRO-ACNC: 0.2 IU/ML
UFH PPP CHRO-ACNC: 0.3 IU/ML
UFH PPP CHRO-ACNC: 0.3 IU/ML
UFH PPP CHRO-ACNC: 0.4 IU/ML
VENTRICULAR RATE: 59 BPM
VLDL: 39 MG/DL (ref 0–40)
WBC # BLD AUTO: 13 X10*3/UL (ref 4.4–11.3)

## 2024-12-13 PROCEDURE — 85520 HEPARIN ASSAY: CPT | Performed by: NURSE PRACTITIONER

## 2024-12-13 PROCEDURE — 71250 CT THORAX DX C-: CPT

## 2024-12-13 PROCEDURE — 99232 SBSQ HOSP IP/OBS MODERATE 35: CPT | Performed by: STUDENT IN AN ORGANIZED HEALTH CARE EDUCATION/TRAINING PROGRAM

## 2024-12-13 PROCEDURE — 99233 SBSQ HOSP IP/OBS HIGH 50: CPT | Performed by: NURSE PRACTITIONER

## 2024-12-13 PROCEDURE — 80048 BASIC METABOLIC PNL TOTAL CA: CPT | Performed by: NURSE PRACTITIONER

## 2024-12-13 PROCEDURE — 2500000004 HC RX 250 GENERAL PHARMACY W/ HCPCS (ALT 636 FOR OP/ED): Performed by: NURSE PRACTITIONER

## 2024-12-13 PROCEDURE — 83718 ASSAY OF LIPOPROTEIN: CPT | Performed by: NURSE PRACTITIONER

## 2024-12-13 PROCEDURE — 93922 UPR/L XTREMITY ART 2 LEVELS: CPT | Performed by: INTERNAL MEDICINE

## 2024-12-13 PROCEDURE — 84443 ASSAY THYROID STIM HORMONE: CPT | Performed by: NURSE PRACTITIONER

## 2024-12-13 PROCEDURE — 2500000005 HC RX 250 GENERAL PHARMACY W/O HCPCS: Performed by: NURSE PRACTITIONER

## 2024-12-13 PROCEDURE — 85520 HEPARIN ASSAY: CPT | Performed by: THORACIC SURGERY (CARDIOTHORACIC VASCULAR SURGERY)

## 2024-12-13 PROCEDURE — 36415 COLL VENOUS BLD VENIPUNCTURE: CPT | Performed by: STUDENT IN AN ORGANIZED HEALTH CARE EDUCATION/TRAINING PROGRAM

## 2024-12-13 PROCEDURE — 82947 ASSAY GLUCOSE BLOOD QUANT: CPT

## 2024-12-13 PROCEDURE — 82728 ASSAY OF FERRITIN: CPT | Mod: PARLAB | Performed by: NURSE PRACTITIONER

## 2024-12-13 PROCEDURE — 93880 EXTRACRANIAL BILAT STUDY: CPT

## 2024-12-13 PROCEDURE — 93970 EXTREMITY STUDY: CPT

## 2024-12-13 PROCEDURE — 84466 ASSAY OF TRANSFERRIN: CPT | Mod: PARLAB | Performed by: NURSE PRACTITIONER

## 2024-12-13 PROCEDURE — 85520 HEPARIN ASSAY: CPT | Performed by: STUDENT IN AN ORGANIZED HEALTH CARE EDUCATION/TRAINING PROGRAM

## 2024-12-13 PROCEDURE — 2020000001 HC ICU ROOM DAILY

## 2024-12-13 PROCEDURE — 83036 HEMOGLOBIN GLYCOSYLATED A1C: CPT | Performed by: NURSE PRACTITIONER

## 2024-12-13 PROCEDURE — 2500000001 HC RX 250 WO HCPCS SELF ADMINISTERED DRUGS (ALT 637 FOR MEDICARE OP): Performed by: NURSE PRACTITIONER

## 2024-12-13 PROCEDURE — 36415 COLL VENOUS BLD VENIPUNCTURE: CPT | Performed by: NURSE PRACTITIONER

## 2024-12-13 PROCEDURE — 93922 UPR/L XTREMITY ART 2 LEVELS: CPT

## 2024-12-13 PROCEDURE — 93880 EXTRACRANIAL BILAT STUDY: CPT | Performed by: INTERNAL MEDICINE

## 2024-12-13 PROCEDURE — 93930 UPPER EXTREMITY STUDY: CPT

## 2024-12-13 PROCEDURE — 93970 EXTREMITY STUDY: CPT | Performed by: INTERNAL MEDICINE

## 2024-12-13 PROCEDURE — 85027 COMPLETE CBC AUTOMATED: CPT | Performed by: NURSE PRACTITIONER

## 2024-12-13 PROCEDURE — 93930 UPPER EXTREMITY STUDY: CPT | Performed by: INTERNAL MEDICINE

## 2024-12-13 PROCEDURE — 85610 PROTHROMBIN TIME: CPT | Performed by: NURSE PRACTITIONER

## 2024-12-13 PROCEDURE — 2500000002 HC RX 250 W HCPCS SELF ADMINISTERED DRUGS (ALT 637 FOR MEDICARE OP, ALT 636 FOR OP/ED): Performed by: NURSE PRACTITIONER

## 2024-12-13 PROCEDURE — 2500000001 HC RX 250 WO HCPCS SELF ADMINISTERED DRUGS (ALT 637 FOR MEDICARE OP): Performed by: THORACIC SURGERY (CARDIOTHORACIC VASCULAR SURGERY)

## 2024-12-13 RX ORDER — ISOSORBIDE MONONITRATE 30 MG/1
30 TABLET, EXTENDED RELEASE ORAL DAILY
Status: DISCONTINUED | OUTPATIENT
Start: 2024-12-13 | End: 2024-12-16

## 2024-12-13 RX ORDER — AMLODIPINE BESYLATE 10 MG/1
10 TABLET ORAL DAILY
Status: DISCONTINUED | OUTPATIENT
Start: 2024-12-14 | End: 2024-12-16

## 2024-12-13 RX ORDER — INSULIN LISPRO 100 [IU]/ML
0-10 INJECTION, SOLUTION INTRAVENOUS; SUBCUTANEOUS
Status: DISCONTINUED | OUTPATIENT
Start: 2024-12-13 | End: 2024-12-16

## 2024-12-13 RX ORDER — NITROGLYCERIN 20 MG/100ML
5-200 INJECTION INTRAVENOUS CONTINUOUS
Status: DISCONTINUED | OUTPATIENT
Start: 2024-12-13 | End: 2024-12-15

## 2024-12-13 ASSESSMENT — PAIN - FUNCTIONAL ASSESSMENT
PAIN_FUNCTIONAL_ASSESSMENT: 0-10

## 2024-12-13 ASSESSMENT — ENCOUNTER SYMPTOMS
MUSCLE CRAMPS: 0
DIZZINESS: 0
NAUSEA: 0
PALPITATIONS: 0
IRREGULAR HEARTBEAT: 0
DECREASED APPETITE: 0
NAIL CHANGES: 0
SHORTNESS OF BREATH: 0
DOUBLE VISION: 0
FOCAL WEAKNESS: 0
MYALGIAS: 0
DYSPNEA ON EXERTION: 0
BLOATING: 0
ALTERED MENTAL STATUS: 0
BLURRED VISION: 0
ORTHOPNEA: 0
COUGH: 0
WEAKNESS: 0
LIGHT-HEADEDNESS: 0
FREQUENCY: 0
VOMITING: 0
STIFFNESS: 0
POOR WOUND HEALING: 0

## 2024-12-13 ASSESSMENT — PAIN SCALES - GENERAL
PAINLEVEL_OUTOF10: 2
PAINLEVEL_OUTOF10: 3
PAINLEVEL_OUTOF10: 3
PAINLEVEL_OUTOF10: 2
PAINLEVEL_OUTOF10: 0 - NO PAIN
PAINLEVEL_OUTOF10: 5 - MODERATE PAIN
PAINLEVEL_OUTOF10: 2
PAINLEVEL_OUTOF10: 1
PAINLEVEL_OUTOF10: 3
PAINLEVEL_OUTOF10: 3

## 2024-12-13 ASSESSMENT — PAIN DESCRIPTION - DESCRIPTORS
DESCRIPTORS: ACHING

## 2024-12-13 NOTE — PROGRESS NOTES
"Cardiology Progress    Impression:  Non-STEMI  CAD: triple vessel disease MI LAD   ICM Ef 50%  Hypertension  Hyperlipidemia  Diabetes aic 7.9  CKD.  Status post nephrectomy for renal cell carcinoma.  Plan:  Cont asa statin. BB  CTS consult and eval     HPI:  No events. No chest pain on heparin gtt and nitro gtt  Meds:  Scheduled medications  amLODIPine, 5 mg, oral, Daily  aspirin, 81 mg, oral, Daily  atorvastatin, 40 mg, oral, Nightly  insulin lispro, 0-10 Units, subcutaneous, TID AC  metoprolol succinate XL, 25 mg, oral, Daily      Continuous medications  heparin, 0-4,000 Units/hr, Last Rate: 1,600 Units/hr (12/13/24 0508)  nitroglycerin in 5 % dextrose, 5-200 mcg/min, Last Rate: 100 mcg/min (12/13/24 0757)      PRN medications    Physical exam:  Vitals:    12/13/24 0500   BP: 147/67   Pulse: 65   Resp: 15   Temp:    SpO2: 94%      AAO NAD  RRR \"GBAT CTA Ssoft BS_ NTND  PP2+   No JVD  EKG:  NSr  Echo:  No results found for this or any previous visit.   Labs:  Lab Results   Component Value Date    WBC 13.0 (H) 12/13/2024    HGB 14.0 12/13/2024    HCT 40.5 (L) 12/13/2024     12/13/2024    CHOL 174 12/13/2024    TRIG 195 (H) 12/13/2024    HDL 29.3 12/13/2024    ALT 38 12/12/2024    AST 22 12/12/2024     (L) 12/13/2024    K 4.1 12/13/2024     12/13/2024    CREATININE 1.48 (H) 12/13/2024    BUN 21 12/13/2024    CO2 23 12/13/2024    TSH 1.96 12/13/2024    PSA 0.30 07/12/2022    INR 1.0 12/13/2024    HGBA1C 7.9 (H) 07/12/2022     par    "

## 2024-12-13 NOTE — PROGRESS NOTES
12/13/24 1150   Discharge Planning   Living Arrangements Spouse/significant other;Children   Support Systems Spouse/significant other   Type of Residence Private residence     TCC to room for care transitions assessment,  in room currently, TCC will return later.  1300pm: to pt room, pt sleeping, Per cardiology notes potentially a CABG on Monday. Care transitions will continue to follow.

## 2024-12-13 NOTE — CONSULTS
"Nutrition Initial Assessment:   Nutrition Assessment    Reason for Assessment: Admission nursing screening    Patient is a 59 y.o. male presenting with NSTEMI. Pt states he is to have CABG next Thursday.       Nutrition History:  Energy Intake: Fair 50-75 %  Food and Nutrient History: Pt reports decreased appetite and intake x ~8 months following a flu-like illness that caused N/V. After this illness resolved, pt feels he was still eating less than usual. Also does not eat at set times and mostly grazes/eats when hungry. Had N/V on admission. Reports normal BM. Denies issues with chewing/swallowing. Has been eating mostly carbohydrates like pancakes or saltines since admission. Does not drink any ONS at home. Does not follow a cardiac diet at home.  Vitamin/Herbal Supplement Use: none listed in home med list  Food Allergies/Intolerances:  None  GI Symptoms: Nausea and Vomiting  Oral Problems: None       Anthropometrics:  Height: 175.3 cm (5' 9\")   Weight: 99.6 kg (219 lb 9.6 oz) (standing)   BMI (Calculated): 32.41  IBW/kg (Dietitian Calculated): 72.7 kg          Weight History:   Wt Readings from Last 10 Encounters:   12/13/24 99.6 kg (219 lb 9.6 oz)   10/12/23 103 kg (226 lb 9.6 oz)   07/24/23 106 kg (233 lb 3.2 oz)   07/10/23 104 kg (230 lb)   04/07/23 106 kg (233 lb 9.6 oz)   02/09/23 107 kg (236 lb 9.6 oz)   09/08/22 99.8 kg (220 lb)   07/12/22 100 kg (221 lb)   12/07/21 98 kg (216 lb)   06/29/21 98.9 kg (218 lb)      Weight Change %:  Weight History / % Weight Change: Pt denies recent wt changes, states UBW is 220-230#. No evidence of significant wt loss at this time.  Significant Weight Loss: No    Nutrition Focused Physical Exam Findings:    Subcutaneous Fat Loss:   Orbital Fat Pads: Well nourished (slightly bulging fat pads)  Buccal Fat Pads: Well nourished (full, rounded cheeks)  Triceps: Well nourished (ample fat tissue)  Muscle Wasting:  Temporalis: Well nourished (well-defined muscle)  Pectoralis " (Clavicular Region): Well nourished (clavicle not visible)  Deltoid/Trapezius: Well nourished (rounded appearance at arm, shoulder, neck)  Interosseous: Well nourished (muscle bulges)  Edema:  Edema: none  Physical Findings:  Nails: Negative  Skin: Negative    Nutrition Significant Labs:    Reviewed   Nutrition Specific Medications:  Reviewed     I/O:   Last BM Date: 12/12/24;      Dietary Orders (From admission, onward)       Start     Ordered    12/13/24 1131  Oral nutritional supplements  Until discontinued        Question Answer Comment   Deliver with Lunch    Select supplement: Ensure High Protein        12/13/24 1130    12/12/24 1152  May Participate in Room Service  ( ROOM SERVICE MAY PARTICIPATE)  Once        Question:  .  Answer:  Yes    12/12/24 1151    12/12/24 0940  Adult diet Cardiac, Consistent Carb; CCD 75 gm/meal; 70 gm fat; 2 - 3 grams Sodium  Diet effective now        Question Answer Comment   Diet type Cardiac    Diet type Consistent Carb    Carb diet selection: CCD 75 gm/meal    Fat restriction: 70 gm fat    Sodium restriction: 2 - 3 grams Sodium        12/12/24 0942                     Estimated Needs:   Total Energy Estimated Needs (kCal): 1817 kCal  Method for Estimating Needs: 25 kcal/kg IBW  Total Protein Estimated Needs (g): 58 g  Method for Estimating Needs: 0.8 g/kg IBW  Total Fluid Estimated Needs (mL): 1817 mL  Method for Estimating Needs: 1 ml/kcal or per MD        Nutrition Diagnosis   Malnutrition Diagnosis  Patient has Malnutrition Diagnosis: No    Nutrition Diagnosis  Patient has Nutrition Diagnosis: Yes  Diagnosis Status (1): New  Nutrition Diagnosis 1: Inadequate oral intake  Related to (1): N/V, decreased appetite  As Evidenced by (1): reports of eating lightly, mostly carbohydrate foods during this admission       Nutrition Interventions/Recommendations         Nutrition Prescription:  Individualized Nutrition Prescription Provided for : 1817 kcal, 58 g protein, and 1817 ml  fluid via 75 g CHO/meal consistent carbohydrate, cardiac diet with ONS        Nutrition Interventions:   Interventions: Meals and snacks, Medical food supplement  Meals and Snacks: Carbohydrate-modified diet, Fat-modified diet, Mineral-modified diet  Goal: Consumes 3 meals per day  Medical Food Supplement: Commercial beverage  Goal: Ensure High Protein daily (160 kcal and 16 g protein per serving)         Nutrition Education:   Discussed role of nutrition in overall health, recovery from surgery, etc. Discussed use of ONS. Educated on Rik supplement typically ordered post-op and plan for future diet education on cardiac diet.        Nutrition Monitoring and Evaluation   Food/Nutrient Related History Monitoring  Monitoring and Evaluation Plan: Energy intake, Amount of food, Fluid intake  Energy Intake: Estimated energy intake  Criteria: Pt meets >75% of estimated energy needs  Fluid Intake: Estimated fluid intake  Criteria: Meets >75% of estimated fluid needs  Amount of Food: Estimated amout of food, Medical food intake  Criteria: Pt consumes >75% of meals and supplements    Body Composition/Growth/Weight History  Monitoring and Evaluation Plan: Weight  Weight: Measured weight  Criteria: Maintains stable weight    Biochemical Data, Medical Tests and Procedures  Monitoring and Evaluation Plan: Glucose/endocrine profile, Electrolyte/renal panel  Electrolyte and Renal Panel: Sodium, Potassium, Phosphorus  Criteria: Electrolytes WNL  Glucose/Endocrine Profile: Glucose, casual  Criteria: BG within desirable range    Nutrition Focused Physical Findings  Monitoring and Evaluation Plan: Digestive System  Digestive System: Nausea, Vomiting  Criteria: Improvement in GI function/symptoms         Time Spent (min): 60 minutes

## 2024-12-13 NOTE — PROGRESS NOTES
Cardiac Surgery  Progress Note     Nader Zazueta is a 59 y.o. male on day 1 of admission presenting with NSTEMI (non-ST elevated myocardial infarction) (Multi).    Subjective     Interval HPI: Seen and assessed patient this AM while they were laying in bed; endorsing headache, however angina seems to have subsided.     Review of Systems   Constitutional: Negative for decreased appetite and malaise/fatigue.   HENT:  Negative for congestion.    Eyes:  Negative for blurred vision and double vision.   Cardiovascular:  Negative for chest pain, dyspnea on exertion, irregular heartbeat, leg swelling, orthopnea and palpitations.   Respiratory:  Negative for cough and shortness of breath.    Endocrine: Negative for cold intolerance and heat intolerance.   Skin:  Negative for nail changes, poor wound healing and rash.   Musculoskeletal:  Negative for arthritis, muscle cramps, muscle weakness, myalgias and stiffness.   Gastrointestinal:  Negative for bloating, nausea and vomiting.   Genitourinary:  Negative for frequency, hesitancy and urgency.   Neurological:  Negative for dizziness, focal weakness, light-headedness and weakness.   Psychiatric/Behavioral:  Negative for altered mental status.    Allergic/Immunologic: Negative for environmental allergies.         Objective   Physical Exam  Physical Exam  Vitals and nursing note reviewed.   Constitutional:       General: He is not in acute distress.     Appearance: Normal appearance. He is not ill-appearing.   HENT:      Head: Normocephalic and atraumatic.      Nose: Nose normal.      Mouth/Throat:      Mouth: Mucous membranes are moist.      Pharynx: Oropharynx is clear.   Eyes:      Extraocular Movements: Extraocular movements intact.      Conjunctiva/sclera: Conjunctivae normal.      Pupils: Pupils are equal, round, and reactive to light.   Cardiovascular:      Rate and Rhythm: Normal rate and regular rhythm.      Pulses: Normal pulses.      Heart sounds: Normal heart  sounds, S1 normal and S2 normal. No murmur heard.     No systolic murmur is present.      No friction rub. No gallop.   Pulmonary:      Effort: Pulmonary effort is normal.      Breath sounds: Normal breath sounds.   Abdominal:      General: Abdomen is flat. Bowel sounds are normal. There is no distension.      Palpations: Abdomen is soft.   Musculoskeletal:         General: Normal range of motion.      Cervical back: Normal range of motion and neck supple.      Right lower leg: No edema.      Left lower leg: No edema.   Skin:     General: Skin is warm and dry.      Capillary Refill: Capillary refill takes less than 2 seconds.      Findings: No lesion.      Nails: There is no clubbing.   Neurological:      General: No focal deficit present.      Mental Status: He is alert and oriented to person, place, and time. Mental status is at baseline.      Cranial Nerves: Cranial nerves 2-12 are intact.      Sensory: Sensation is intact.      Motor: Motor function is intact.   Psychiatric:         Attention and Perception: Attention and perception normal.         Mood and Affect: Mood normal.         Speech: Speech normal.         Behavior: Behavior normal.         Thought Content: Thought content normal.         Cognition and Memory: Cognition and memory normal.         Judgment: Judgment normal.         Last Recorded Vitals  Vitals:    12/13/24 1530 12/13/24 1545 12/13/24 1600 12/13/24 1615   BP: 151/76 150/80 138/77 142/80   Pulse: 63 63 59 59   Resp: 16 21 15 18   Temp:       TempSrc:       SpO2: 96% 96% 97% 95%   Weight:       Height:            Intake/Output last 3 Shifts:  I/O last 3 completed shifts:  In: 106.7 (1.1 mL/kg) [I.V.:106.7 (1.1 mL/kg)]  Out: 10 (0.1 mL/kg) [Blood:10]  Weight: 100.3 kg     Inpatient Medications  amLODIPine, 5 mg, oral, Daily  aspirin, 81 mg, oral, Daily  atorvastatin, 40 mg, oral, Nightly  insulin lispro, 0-10 Units, subcutaneous, TID AC  isosorbide mononitrate ER, 30 mg, oral,  Daily  metoprolol succinate XL, 25 mg, oral, Daily      Continuous medications  heparin, 0-4,000 Units/hr, Last Rate: 1,800 Units/hr (12/13/24 1447)  nitroglycerin in 5 % dextrose, 5-200 mcg/min, Last Rate: 100 mcg/min (12/13/24 4737)      PRN medications  PRN medications: acetaminophen **OR** acetaminophen **OR** acetaminophen, dextrose, dextrose, glucagon, glucagon, heparin, methocarbamol, ondansetron **OR** ondansetron, oxygen, oxygen, polyethylene glycol     LDA:       Relevant Results  Lab Review  Results from last 7 days   Lab Units 12/13/24  0349   WBC AUTO x10*3/uL 13.0*   HEMOGLOBIN g/dL 14.0   HEMATOCRIT % 40.5*   PLATELETS AUTO x10*3/uL 237     Results from last 7 days   Lab Units 12/13/24  0349 12/12/24  0453   SODIUM mmol/L 132* 135*   POTASSIUM mmol/L 4.1 4.1   CHLORIDE mmol/L 102 103   CO2 mmol/L 23 20*   BUN mg/dL 21 25*   CREATININE mg/dL 1.48* 1.41*   CALCIUM mg/dL 9.3 9.7   PROTEIN TOTAL g/dL  --  7.4   BILIRUBIN TOTAL mg/dL  --  0.6   ALK PHOS U/L  --  40   ALT U/L  --  38   AST U/L  --  22   GLUCOSE mg/dL 212* 210*     Results from last 7 days   Lab Units 12/12/24  0453   MAGNESIUM mg/dL 1.73             Cardiology Tests     Echo:  Transthoracic echo (TTE) complete 12/12/2024  PHYSICIAN INTERPRETATION:  Left Ventricle: Left ventricular ejection fraction is low normal, by visual estimate at 50-55%. There are no regional left ventricular wall motion abnormalities. The left ventricular cavity size is normal. There is mildly increased septal and normal posterior left ventricular wall thickness. There is left ventricular concentric remodeling. Spectral Doppler shows a normal pattern of left ventricular diastolic filling.  Left Atrium: The left atrium is normal in size.  Right Ventricle: The right ventricle is normal in size. There is normal right ventricular global systolic function.  Right Atrium: The right atrium is normal in size.  Aortic Valve: The aortic valve is trileaflet. There is mild aortic  valve cusp calcification. The aortic valve dimensionless index is 0.85. There is no evidence of aortic valve regurgitation. The peak instantaneous gradient of the aortic valve is 11 mmHg. The mean gradient of the aortic valve is 4 mmHg.  Mitral Valve: The mitral valve is normal in structure. There is trace mitral valve regurgitation.  Tricuspid Valve: The tricuspid valve is structurally normal. There is trace tricuspid regurgitation. The right ventricular systolic pressure is unable to be estimated.  Pulmonic Valve: The pulmonic valve is not well visualized. The pulmonic valve regurgitation was not well visualized.  Pericardium: There is no pericardial effusion noted.  Aorta: The aortic root is normal.        CONCLUSIONS:   1. Left ventricular ejection fraction is low normal, by visual estimate at 50-55%.     Ejection Fractions:        EF   Date/Time Value Ref Range Status   12/12/2024 11:07 AM 53 %        Cath:  Cardiac Catheterization Procedure 12/12/2024  Coronary Angiography:  The coronary circulation is right dominant.     Coronary Angiography Comments:  60-year-old man with history of hypertension, hyperlipidemia, diabetes presents with non-STEMI. Urgent heart catheter recommended.     Fluoroscopy demonstrated mild calcification of the coronary tree.     Left main: Large vessel. No significant disease.     LAD: Moderate-sized vessel. LAD is occluded mid vessel. The distal half of the LAD fills via left to left collaterals. There is a large first diagonal which has a proximal 75% stenosis. There is a large bifurcating second diagonal which is a 80% stenosis in it.     LCx: Moderate-sized vessel. Mid vessel 95% stenosis. The circumflex then gives rise to a moderate-sized first obtuse marginal which has mild disease. The ongoing circumflex is a very small, rudimentary vessel.     RCA: Large dominant vessel. Mild to moderate diffuse disease. Mid RCA there is a focal 50% stenosis.     Left heart catheterization  demonstrates an LVEDP of 15 mmHg. Left ventricular angiography shows hypokinesis of the anterior wall. EF 45 to 50%. No mitral regurgitation. No gradient across aortic valve upon pullback.     CONCLUSIONS:   1. Critical three-vessel disease including LAD  with left to left collaterals, severe disease in D1 and D2, subtotal occlusion of moderate-sized circumflex.   2. Mild LV dysfunction, EF estimated 45 to 50%.   3. Patient will be referred for surgical revascularization.         History of Present Illness: Nader Zazueta is a 59 y.o. male with a PMH significant for HTN, HLD, and CKD (solitary kidney secondary to RCC) who presents to [facility] on 12/12/2024 with complaints of sternal chest discomfort.     ED course: On arrival, patient's /102 (now 158/86), heart rate 55, respirations 18, afebrile, saturating 98% on room air. Labs and imaging form, revealing glucose 210, sodium 135, Cr 1.41 and BUN 25 (1.53 and 25 on 8/7/23), initial troponin 27, delta troponin 77. No white count or anemia. Platelets WNL. Flu A/B/COVID-negative. Chest x-ray shows no acute cardiopulmonary process. EKG, per ED physician, sinus bradycardia with a rate of 59 bpm, normal SC, QRS, normal QTc duration. No ST segment or T wave pathology. Good R wave progression throughout the precordial leads. Patient was administered 324 mg aspirin in the ambulance. Patient started on heparin drip, given Nitropaste and nitro tablet in the ED.      He was seen in consult by cardiologist, Dr. Thor Keyes and went for a [facility]. His coronary anatomy revealed diffuse MV-CAD with a  of the mid LAD (with L to L collateralizations). Cardiac surgery was consulted for bypass grafting evaluation.     Daily Events    12/13: No acute events overnight; patient continues on nitroglycerin gtt for HTN and angina, however endorsing headache so will try to wean down and use PO nitrate.     Assessment & Plan        MV-CAD  - Patient  admitted to [facility] on 12/12/24 with anginal complaints   --> HS Troponin I: 27 -> 77 -> 4446  - [facility] on 12/12 revealed diffuse L system CAD with coronary anatomy favoring surgical revascularization   - Plan to proceed with pre-op workup and staging for inpatient revascularization   - Continue on Heparin   - Operative date pending         Essential HTN  - Goal for SBP < 140mmHg   - Continue on metoprolol succinate 25mg every day  - Amlodipine increased to 10mg daily   - Addition of Isosorbide mononitrate 30mg      Above patient and plan discussed with cardiac surgeon, Dr. Kb Fermin & Dr. Mendenhall. Plan as above. Will proceed with workup for inpatient staging.       Jovanni Arriaga, APRN-CNP

## 2024-12-13 NOTE — CARE PLAN
The patient's goals for the shift include      The clinical goals for the shift include pt will remain hemodynamically stable      Problem: Pain - Adult  Goal: Verbalizes/displays adequate comfort level or baseline comfort level  12/13/2024 1020 by Gina Rosado RN  Outcome: Progressing  12/13/2024 1020 by Gina Rosado RN  Outcome: Progressing     Problem: Safety - Adult  Goal: Free from fall injury  12/13/2024 1020 by Gina Rosado RN  Outcome: Progressing  12/13/2024 1020 by Gina Rosado RN  Outcome: Progressing     Problem: Discharge Planning  Goal: Discharge to home or other facility with appropriate resources  12/13/2024 1020 by Gina Rosado RN  Outcome: Progressing  12/13/2024 1020 by Gina Rosado RN  Outcome: Progressing     Problem: Chronic Conditions and Co-morbidities  Goal: Patient's chronic conditions and co-morbidity symptoms are monitored and maintained or improved  12/13/2024 1020 by Gina Rosado RN  Outcome: Progressing  12/13/2024 1020 by Gina Rosado RN  Outcome: Progressing     Problem: Fall/Injury  Goal: Not fall by end of shift  12/13/2024 1020 by Gina Rosado RN  Outcome: Progressing  12/13/2024 1020 by Gina Rosado RN  Outcome: Progressing  Goal: Be free from injury by end of the shift  12/13/2024 1020 by Gina Rosado RN  Outcome: Progressing  12/13/2024 1020 by Gina Rosado RN  Outcome: Progressing  Goal: Verbalize understanding of personal risk factors for fall in the hospital  12/13/2024 1020 by Gina Rosado RN  Outcome: Progressing  12/13/2024 1020 by Gina Rosado RN  Outcome: Progressing  Goal: Verbalize understanding of risk factor reduction measures to prevent injury from fall in the home  12/13/2024 1020 by Gina Rosado RN  Outcome: Progressing  12/13/2024 1020 by Gina Rosado RN  Outcome: Progressing  Goal: Pace activities to prevent fatigue by end of the shift  12/13/2024 1020 by Gina Rosado RN  Outcome: Progressing  12/13/2024 1020 by Gina Rosado  RN  Outcome: Progressing     Problem: Diabetes  Goal: Achieve decreasing blood glucose levels by end of shift  12/13/2024 1020 by Gina Rosado RN  Outcome: Progressing  12/13/2024 1020 by Gina Rosado RN  Outcome: Progressing     Problem: Diabetes  Goal: Achieve decreasing blood glucose levels by end of shift  12/13/2024 1020 by Gina Rosado RN  Outcome: Progressing  12/13/2024 1020 by Gina Rosado RN  Outcome: Progressing  Goal: Increase stability of blood glucose readings by end of shift  12/13/2024 1020 by Gina Rosado RN  Outcome: Progressing  12/13/2024 1020 by Gina Rosado RN  Outcome: Progressing  Goal: Decrease in ketones present in urine by end of shift  12/13/2024 1020 by Gina Rosado RN  Outcome: Progressing  12/13/2024 1020 by Gina Rosado RN  Outcome: Progressing  Goal: Maintain electrolyte levels within acceptable range throughout shift  12/13/2024 1020 by Gina Rosado RN  Outcome: Progressing  12/13/2024 1020 by Gina Rosado RN  Outcome: Progressing  Goal: Maintain glucose levels >70mg/dl to <250mg/dl throughout shift  12/13/2024 1020 by Gina Rosado RN  Outcome: Progressing  12/13/2024 1020 by Gina Rosado RN  Outcome: Progressing  Goal: No changes in neurological exam by end of shift  12/13/2024 1020 by Gina Rosado RN  Outcome: Progressing  12/13/2024 1020 by Gina Rosado RN  Outcome: Progressing  Goal: Learn about and adhere to nutrition recommendations by end of shift  12/13/2024 1020 by Gina Rosado RN  Outcome: Progressing  12/13/2024 1020 by Gina Rosado RN  Outcome: Progressing  Goal: Vital signs within normal range for age by end of shift  12/13/2024 1020 by Gina Rosado RN  Outcome: Progressing  12/13/2024 1020 by Gina Rosado RN  Outcome: Progressing  Goal: Increase self care and/or family involovement by end of shift  12/13/2024 1020 by Gina Rosado RN  Outcome: Progressing  12/13/2024 1020 by Gina Rosado RN  Outcome: Progressing  Goal: Receive  DSME education by end of shift  12/13/2024 1020 by Gina Rosado RN  Outcome: Progressing  12/13/2024 1020 by Gina Rosado RN  Outcome: Progressing     Problem: Pain  Goal: Takes deep breaths with improved pain control throughout the shift  12/13/2024 1020 by Gina Rosado RN  Outcome: Progressing  12/13/2024 1020 by Gina Rosado RN  Outcome: Progressing  Goal: Turns in bed with improved pain control throughout the shift  12/13/2024 1020 by Gina Rosado RN  Outcome: Progressing  12/13/2024 1020 by Gina Rosado RN  Outcome: Progressing  Goal: Walks with improved pain control throughout the shift  12/13/2024 1020 by Gina Rosado RN  Outcome: Progressing  12/13/2024 1020 by Gina Rosado RN  Outcome: Progressing  Goal: Performs ADL's with improved pain control throughout shift  12/13/2024 1020 by Gina Rosado RN  Outcome: Progressing  12/13/2024 1020 by Gina Rosado RN  Outcome: Progressing  Goal: Participates in PT with improved pain control throughout the shift  12/13/2024 1020 by Gina Rosado RN  Outcome: Progressing  12/13/2024 1020 by Gina Rosado RN  Outcome: Progressing  Goal: Free from opioid side effects throughout the shift  12/13/2024 1020 by Gina Rosado RN  Outcome: Progressing  12/13/2024 1020 by Gina Rosado RN  Outcome: Progressing  Goal: Free from acute confusion related to pain meds throughout the shift  12/13/2024 1020 by Gina Rosado RN  Outcome: Progressing  12/13/2024 1020 by Gina Rosado RN  Outcome: Progressing     Problem: Nutrition  Goal: Less than 5 days NPO/clear liquids  12/13/2024 1020 by Gina Rosado RN  Outcome: Progressing  12/13/2024 1020 by Gina Rosado RN  Outcome: Progressing  Goal: Oral intake greater than 50%  12/13/2024 1020 by Gina Rosado RN  Outcome: Progressing  12/13/2024 1020 by Gina Rosado RN  Outcome: Progressing  Goal: Oral intake greater 75%  12/13/2024 1020 by Gina Rosado RN  Outcome: Progressing  12/13/2024 1020 by Gina  DAYANARA Rosado  Outcome: Progressing  Goal: Consume prescribed supplement  12/13/2024 1020 by Gina Rosado RN  Outcome: Progressing  12/13/2024 1020 by Gina Rosado RN  Outcome: Progressing  Goal: Adequate PO fluid intake  12/13/2024 1020 by Gina Rosado RN  Outcome: Progressing  12/13/2024 1020 by Gina Rosado RN  Outcome: Progressing  Goal: Nutrition support goals are met within 48 hrs  12/13/2024 1020 by Gina Rosado RN  Outcome: Progressing  12/13/2024 1020 by Gina Rosado RN  Outcome: Progressing  Goal: Nutrition support is meeting 75% of nutrient needs  12/13/2024 1020 by Gina Rosado RN  Outcome: Progressing  12/13/2024 1020 by Gina Rosado RN  Outcome: Progressing  Goal: BG  mg/dL  12/13/2024 1020 by Gina Rosado RN  Outcome: Progressing  12/13/2024 1020 by Gina Rosado RN  Outcome: Progressing  Goal: Lab values WNL  12/13/2024 1020 by Gina Rosado RN  Outcome: Progressing  12/13/2024 1020 by Gina Rosado RN  Outcome: Progressing  Goal: Electrolytes WNL  12/13/2024 1020 by Gina Rosado RN  Outcome: Progressing  12/13/2024 1020 by Gina Rosado RN  Outcome: Progressing  Goal: Promote healing  12/13/2024 1020 by Gina Rosado RN  Outcome: Progressing  12/13/2024 1020 by Gina Rosado RN  Outcome: Progressing  Goal: Maintain stable weight  12/13/2024 1020 by Gina Rosado RN  Outcome: Progressing  12/13/2024 1020 by Gina Rosado RN  Outcome: Progressing     Problem: ACS/CP/NSTEMI/STEMI  Goal: Chest pain managed (free from pain or at acceptable level)  12/13/2024 1020 by Gina Rosado RN  Outcome: Progressing  12/13/2024 1020 by Gina Rosado RN  Outcome: Progressing  Goal: Lab values return to normal range  12/13/2024 1020 by Gina Rosado RN  Outcome: Progressing  12/13/2024 1020 by Gina Rosado RN  Outcome: Progressing  Goal: Promote self management  12/13/2024 1020 by Gina Rosado RN  Outcome: Progressing  12/13/2024 1020 by Gina Rosado RN  Outcome:  Progressing  Goal: Serial ECG will return to baseline  12/13/2024 1020 by Gina Rosado RN  Outcome: Progressing  12/13/2024 1020 by Gina Rosado RN  Outcome: Progressing  Goal: Verbalize understanding of procedures/devices  12/13/2024 1020 by Gina Rosado RN  Outcome: Progressing  12/13/2024 1020 by Gina Rosado RN  Outcome: Progressing     Problem: Cardiac catheterization  Goal: Free from dysrhythmias  12/13/2024 1020 by Gina Rosado RN  Outcome: Progressing  12/13/2024 1020 by Gina Rosado RN  Outcome: Progressing  Goal: Free from pain  12/13/2024 1020 by Gina Rosado RN  Outcome: Progressing  12/13/2024 1020 by Gina Rosado RN  Outcome: Progressing  Goal: No evidence of post procedure complications  12/13/2024 1020 by Gina Rosado RN  Outcome: Progressing  12/13/2024 1020 by Gina Rosado RN  Outcome: Progressing  Goal: Promote self management  12/13/2024 1020 by Gina Rosado RN  Outcome: Progressing  12/13/2024 1020 by Gina Rosado RN  Outcome: Progressing  Goal: Verbalize understanding of procedure  12/13/2024 1020 by Gina Rosado RN  Outcome: Progressing  12/13/2024 1020 by Gina Rosado RN  Outcome: Progressing

## 2024-12-13 NOTE — PROGRESS NOTES
"Nader Zazueta is a 59 y.o. male on day 1 of admission presenting with NSTEMI (non-ST elevated myocardial infarction) (Multi).    Subjective      Doing well, a bit anxious, overall doing well    Objective     Physical Exam  Constitutional:       Appearance: Normal appearance.   HENT:      Head: Normocephalic and atraumatic.      Right Ear: Tympanic membrane and ear canal normal.      Left Ear: Tympanic membrane and ear canal normal.      Mouth/Throat:      Mouth: Mucous membranes are moist.      Pharynx: Oropharynx is clear.   Eyes:      Extraocular Movements: Extraocular movements intact.      Conjunctiva/sclera: Conjunctivae normal.      Pupils: Pupils are equal, round, and reactive to light.   Cardiovascular:      Rate and Rhythm: Normal rate and regular rhythm.      Pulses: Normal pulses.      Heart sounds: Normal heart sounds.   Pulmonary:      Effort: Pulmonary effort is normal.      Breath sounds: Normal breath sounds.   Abdominal:      General: Abdomen is flat. Bowel sounds are normal.      Palpations: Abdomen is soft.   Musculoskeletal:         General: Normal range of motion.      Cervical back: Normal range of motion and neck supple.   Skin:     General: Skin is warm and dry.      Capillary Refill: Capillary refill takes 2 to 3 seconds.   Neurological:      General: No focal deficit present.      Mental Status: He is alert and oriented to person, place, and time. Mental status is at baseline.   Psychiatric:         Mood and Affect: Mood normal.         Behavior: Behavior normal.         Thought Content: Thought content normal.         Judgment: Judgment normal.         Last Recorded Vitals  Blood pressure 147/67, pulse 65, temperature 36.4 °C (97.5 °F), temperature source Temporal, resp. rate 15, height 1.753 m (5' 9\"), weight 100 kg (221 lb 3.2 oz), SpO2 94%.  Intake/Output last 3 Shifts:  I/O last 3 completed shifts:  In: 106.7 (1.1 mL/kg) [I.V.:106.7 (1.1 mL/kg)]  Out: 10 (0.1 mL/kg) " [Blood:10]  Weight: 100.3 kg     Relevant Results            This patient currently has cardiac telemetry ordered; if you would like to modify or discontinue the telemetry order, click here to go to the orders activity to modify/discontinue the order.                 Assessment/Plan   Assessment & Plan  NSTEMI (non-ST elevated myocardial infarction) (Multi)    NSTEMI  Hypertension  Hyperlipidemia  -Cardiology consult, recommendations appreciated  -EKG, per ED physician, sinus bradycardia with a rate of 59 bpm, normal NH, QRS, normal QTc duration.  No ST segment or T wave pathology.  Good R wave progression throughout the precordial leads  -Chest x-ray shows no acute cardiopulmonary process  -Patient initiated on heparin drip in the ED, continue  -Patient administered 324 mg aspirin by EMS, continue 81 mg aspirin daily  -Will keep patient n.p.o. for now until evaluated by cardio  -Hemoglobin A1c, lipid panel, TSH, echo added on  -Continue home medications when home med rec complete, n.p.o. for now     Hx of incisional hernia  -Patient tender to palpation over site of his incisional hernia repair (RLQ), he states he would like to be evaluated by Dr. Covarrubias  -CT A/P added on (w/out contrast due to pt's solitary kidney)     Type 2 diabetes mellitus  -Sliding scale insulin and diabetic diet  -Hypoglycemic protocol     CKD  Hyponatremia, 135  -Creatinine 1.41 and BUN 25, 1.53 and 25 point 8/7/2023  -Monitor with BMP, avoid nephrotoxic medications     DVT prophylaxis  -Patient currently on heparin drip  -SCDs    12/12: spoke with cardioloyg cath shows multivessel disease will need CABG, CT surgery consulted    12/13: doing well, monitor labs, work up underway, tentative schedule cabg on monday       I spent 35 minutes in the professional and overall care of this patient.      Mustapha Singer, DO

## 2024-12-14 LAB
ERYTHROCYTE [DISTWIDTH] IN BLOOD BY AUTOMATED COUNT: 12.1 % (ref 11.5–14.5)
GLUCOSE BLD MANUAL STRIP-MCNC: 157 MG/DL (ref 74–99)
GLUCOSE BLD MANUAL STRIP-MCNC: 197 MG/DL (ref 74–99)
GLUCOSE BLD MANUAL STRIP-MCNC: 217 MG/DL (ref 74–99)
GLUCOSE BLD MANUAL STRIP-MCNC: 226 MG/DL (ref 74–99)
HCT VFR BLD AUTO: 40 % (ref 41–52)
HGB BLD-MCNC: 13.5 G/DL (ref 13.5–17.5)
HOLD SPECIMEN: NORMAL
MCH RBC QN AUTO: 31.3 PG (ref 26–34)
MCHC RBC AUTO-ENTMCNC: 33.8 G/DL (ref 32–36)
MCV RBC AUTO: 93 FL (ref 80–100)
NRBC BLD-RTO: 0 /100 WBCS (ref 0–0)
PLATELET # BLD AUTO: 212 X10*3/UL (ref 150–450)
RBC # BLD AUTO: 4.31 X10*6/UL (ref 4.5–5.9)
STAPHYLOCOCCUS SPEC CULT: ABNORMAL
WBC # BLD AUTO: 11.6 X10*3/UL (ref 4.4–11.3)

## 2024-12-14 PROCEDURE — 2500000002 HC RX 250 W HCPCS SELF ADMINISTERED DRUGS (ALT 637 FOR MEDICARE OP, ALT 636 FOR OP/ED): Performed by: NURSE PRACTITIONER

## 2024-12-14 PROCEDURE — 2500000001 HC RX 250 WO HCPCS SELF ADMINISTERED DRUGS (ALT 637 FOR MEDICARE OP): Performed by: THORACIC SURGERY (CARDIOTHORACIC VASCULAR SURGERY)

## 2024-12-14 PROCEDURE — 2500000001 HC RX 250 WO HCPCS SELF ADMINISTERED DRUGS (ALT 637 FOR MEDICARE OP): Performed by: NURSE PRACTITIONER

## 2024-12-14 PROCEDURE — 82947 ASSAY GLUCOSE BLOOD QUANT: CPT

## 2024-12-14 PROCEDURE — 99232 SBSQ HOSP IP/OBS MODERATE 35: CPT | Performed by: STUDENT IN AN ORGANIZED HEALTH CARE EDUCATION/TRAINING PROGRAM

## 2024-12-14 PROCEDURE — 85027 COMPLETE CBC AUTOMATED: CPT | Performed by: NURSE PRACTITIONER

## 2024-12-14 PROCEDURE — 2500000004 HC RX 250 GENERAL PHARMACY W/ HCPCS (ALT 636 FOR OP/ED): Performed by: NURSE PRACTITIONER

## 2024-12-14 PROCEDURE — 36415 COLL VENOUS BLD VENIPUNCTURE: CPT | Performed by: NURSE PRACTITIONER

## 2024-12-14 PROCEDURE — 2020000001 HC ICU ROOM DAILY

## 2024-12-14 ASSESSMENT — COGNITIVE AND FUNCTIONAL STATUS - GENERAL
MOBILITY SCORE: 24
DAILY ACTIVITIY SCORE: 24
CLIMB 3 TO 5 STEPS WITH RAILING: A LITTLE
DAILY ACTIVITIY SCORE: 24
MOBILITY SCORE: 23

## 2024-12-14 ASSESSMENT — PAIN DESCRIPTION - DESCRIPTORS
DESCRIPTORS: ACHING

## 2024-12-14 ASSESSMENT — PAIN - FUNCTIONAL ASSESSMENT
PAIN_FUNCTIONAL_ASSESSMENT: 0-10

## 2024-12-14 ASSESSMENT — PAIN SCALES - GENERAL
PAINLEVEL_OUTOF10: 0 - NO PAIN
PAINLEVEL_OUTOF10: 1
PAINLEVEL_OUTOF10: 2
PAINLEVEL_OUTOF10: 0 - NO PAIN
PAINLEVEL_OUTOF10: 3
PAINLEVEL_OUTOF10: 3

## 2024-12-14 NOTE — CARE PLAN
Problem: Pain - Adult  Goal: Verbalizes/displays adequate comfort level or baseline comfort level  Outcome: Progressing     Problem: Safety - Adult  Goal: Free from fall injury  Outcome: Progressing     Problem: Discharge Planning  Goal: Discharge to home or other facility with appropriate resources  Outcome: Progressing     Problem: Chronic Conditions and Co-morbidities  Goal: Patient's chronic conditions and co-morbidity symptoms are monitored and maintained or improved  Outcome: Progressing     Problem: Fall/Injury  Goal: Not fall by end of shift  Outcome: Progressing  Goal: Be free from injury by end of the shift  Outcome: Progressing  Goal: Verbalize understanding of personal risk factors for fall in the hospital  Outcome: Progressing  Goal: Verbalize understanding of risk factor reduction measures to prevent injury from fall in the home  Outcome: Progressing  Goal: Pace activities to prevent fatigue by end of the shift  Outcome: Progressing     Problem: Diabetes  Goal: Achieve decreasing blood glucose levels by end of shift  Outcome: Progressing  Goal: Increase stability of blood glucose readings by end of shift  Outcome: Progressing  Goal: Decrease in ketones present in urine by end of shift  Outcome: Progressing  Goal: Maintain electrolyte levels within acceptable range throughout shift  Outcome: Progressing  Goal: Maintain glucose levels >70mg/dl to <250mg/dl throughout shift  Outcome: Progressing  Goal: No changes in neurological exam by end of shift  Outcome: Progressing  Goal: Learn about and adhere to nutrition recommendations by end of shift  Outcome: Progressing  Goal: Vital signs within normal range for age by end of shift  Outcome: Progressing  Goal: Increase self care and/or family involovement by end of shift  Outcome: Progressing  Goal: Receive DSME education by end of shift  Outcome: Progressing     Problem: Pain  Goal: Takes deep breaths with improved pain control throughout the  shift  Outcome: Progressing  Goal: Turns in bed with improved pain control throughout the shift  Outcome: Progressing  Goal: Walks with improved pain control throughout the shift  Outcome: Progressing  Goal: Performs ADL's with improved pain control throughout shift  Outcome: Progressing  Goal: Participates in PT with improved pain control throughout the shift  Outcome: Progressing  Goal: Free from opioid side effects throughout the shift  Outcome: Progressing  Goal: Free from acute confusion related to pain meds throughout the shift  Outcome: Progressing     Problem: Nutrition  Goal: Oral intake greater than 50%  Outcome: Progressing  Goal: Oral intake greater 75%  Outcome: Progressing  Goal: Consume prescribed supplement  Outcome: Progressing  Goal: Adequate PO fluid intake  Outcome: Progressing  Goal: BG  mg/dL  Outcome: Progressing  Goal: Lab values WNL  Outcome: Progressing  Goal: Electrolytes WNL  Outcome: Progressing  Goal: Maintain stable weight  Outcome: Progressing     Problem: ACS/CP/NSTEMI/STEMI  Goal: Chest pain managed (free from pain or at acceptable level)  Outcome: Progressing  Goal: Lab values return to normal range  Outcome: Progressing  Goal: Promote self management  Outcome: Progressing  Goal: Serial ECG will return to baseline  Outcome: Progressing  Goal: Verbalize understanding of procedures/devices  Outcome: Progressing     Problem: Cardiac catheterization  Goal: Free from dysrhythmias  Outcome: Progressing  Goal: Free from pain  Outcome: Progressing  Goal: No evidence of post procedure complications  Outcome: Progressing  Goal: Promote self management  Outcome: Progressing  Goal: Verbalize understanding of procedure  Outcome: Progressing

## 2024-12-14 NOTE — PROGRESS NOTES
"Nader Zazueta is a 59 y.o. male on day 2 of admission presenting with NSTEMI (non-ST elevated myocardial infarction) (Multi).    Subjective     Better, no issues    Objective     Physical Exam  Constitutional:       Appearance: Normal appearance.   HENT:      Head: Normocephalic and atraumatic.      Right Ear: Tympanic membrane and ear canal normal.      Left Ear: Tympanic membrane and ear canal normal.      Mouth/Throat:      Mouth: Mucous membranes are moist.      Pharynx: Oropharynx is clear.   Eyes:      Extraocular Movements: Extraocular movements intact.      Conjunctiva/sclera: Conjunctivae normal.      Pupils: Pupils are equal, round, and reactive to light.   Cardiovascular:      Rate and Rhythm: Normal rate and regular rhythm.      Pulses: Normal pulses.      Heart sounds: Normal heart sounds.   Pulmonary:      Effort: Pulmonary effort is normal.      Breath sounds: Normal breath sounds.   Abdominal:      General: Abdomen is flat. Bowel sounds are normal.      Palpations: Abdomen is soft.   Musculoskeletal:         General: Normal range of motion.      Cervical back: Normal range of motion and neck supple.   Skin:     General: Skin is warm and dry.      Capillary Refill: Capillary refill takes 2 to 3 seconds.   Neurological:      General: No focal deficit present.      Mental Status: He is alert and oriented to person, place, and time. Mental status is at baseline.   Psychiatric:         Mood and Affect: Mood normal.         Behavior: Behavior normal.         Thought Content: Thought content normal.         Judgment: Judgment normal.         Last Recorded Vitals  Blood pressure 139/64, pulse 52, temperature 36.7 °C (98.1 °F), temperature source Temporal, resp. rate 14, height 1.753 m (5' 9\"), weight 99.6 kg (219 lb 9.6 oz), SpO2 93%.  Intake/Output last 3 Shifts:  No intake/output data recorded.    Relevant Results            This patient currently has cardiac telemetry ordered; if you would like to " modify or discontinue the telemetry order, click here to go to the orders activity to modify/discontinue the order.                 Assessment/Plan   Assessment & Plan  NSTEMI (non-ST elevated myocardial infarction) (Multi)    Chronic coronary microvascular dysfunction    NSTEMI  Hypertension  Hyperlipidemia  -Cardiology consult, recommendations appreciated  -EKG, per ED physician, sinus bradycardia with a rate of 59 bpm, normal DC, QRS, normal QTc duration.  No ST segment or T wave pathology.  Good R wave progression throughout the precordial leads  -Chest x-ray shows no acute cardiopulmonary process  -Patient initiated on heparin drip in the ED, continue  -Patient administered 324 mg aspirin by EMS, continue 81 mg aspirin daily  -Will keep patient n.p.o. for now until evaluated by cardio  -Hemoglobin A1c, lipid panel, TSH, echo added on  -Continue home medications when home med rec complete, n.p.o. for now     Hx of incisional hernia  -Patient tender to palpation over site of his incisional hernia repair (RLQ), he states he would like to be evaluated by Dr. Covarrubias  -CT A/P added on (w/out contrast due to pt's solitary kidney)     Type 2 diabetes mellitus  -Sliding scale insulin and diabetic diet  -Hypoglycemic protocol     CKD  Hyponatremia, 135  -Creatinine 1.41 and BUN 25, 1.53 and 25 point 8/7/2023  -Monitor with BMP, avoid nephrotoxic medications     DVT prophylaxis  -Patient currently on heparin drip  -SCDs    12/12: spoke with cardioloyg cath shows multivessel disease will need CABG, CT surgery consulted    12/13: doing well, monitor labs, work up underway, tentative schedule cabg on Monday 12/14: OR date is Thursday, no issues       I spent 35 minutes in the professional and overall care of this patient.      Mustapha Singer, DO

## 2024-12-15 ENCOUNTER — APPOINTMENT (OUTPATIENT)
Dept: CARDIOLOGY | Facility: HOSPITAL | Age: 59
DRG: 233 | End: 2024-12-15
Payer: COMMERCIAL

## 2024-12-15 LAB
ABO GROUP (TYPE) IN BLOOD: NORMAL
ANION GAP SERPL CALC-SCNC: 12 MMOL/L (ref 10–20)
ANTIBODY SCREEN: NORMAL
BLOOD EXPIRATION DATE: NORMAL
BUN SERPL-MCNC: 22 MG/DL (ref 6–23)
CALCIUM SERPL-MCNC: 9.6 MG/DL (ref 8.6–10.3)
CHLORIDE SERPL-SCNC: 101 MMOL/L (ref 98–107)
CO2 SERPL-SCNC: 25 MMOL/L (ref 21–32)
CREAT SERPL-MCNC: 1.43 MG/DL (ref 0.5–1.3)
DISPENSE STATUS: NORMAL
EGFRCR SERPLBLD CKD-EPI 2021: 57 ML/MIN/1.73M*2
EST. AVERAGE GLUCOSE BLD GHB EST-MCNC: 203 MG/DL
GLUCOSE BLD MANUAL STRIP-MCNC: 194 MG/DL (ref 74–99)
GLUCOSE BLD MANUAL STRIP-MCNC: 198 MG/DL (ref 74–99)
GLUCOSE BLD MANUAL STRIP-MCNC: 209 MG/DL (ref 74–99)
GLUCOSE BLD MANUAL STRIP-MCNC: 220 MG/DL (ref 74–99)
GLUCOSE SERPL-MCNC: 200 MG/DL (ref 74–99)
HBA1C MFR BLD: 8.7 %
POTASSIUM SERPL-SCNC: 4.1 MMOL/L (ref 3.5–5.3)
PRODUCT BLOOD TYPE: 6200
PRODUCT CODE: NORMAL
RH FACTOR (ANTIGEN D): NORMAL
SODIUM SERPL-SCNC: 134 MMOL/L (ref 136–145)
UNIT ABO: NORMAL
UNIT NUMBER: NORMAL
UNIT RH: NORMAL
UNIT VOLUME: 350
XM INTEP: NORMAL

## 2024-12-15 PROCEDURE — 86923 COMPATIBILITY TEST ELECTRIC: CPT

## 2024-12-15 PROCEDURE — 93005 ELECTROCARDIOGRAM TRACING: CPT

## 2024-12-15 PROCEDURE — 99232 SBSQ HOSP IP/OBS MODERATE 35: CPT | Performed by: STUDENT IN AN ORGANIZED HEALTH CARE EDUCATION/TRAINING PROGRAM

## 2024-12-15 PROCEDURE — 2500000004 HC RX 250 GENERAL PHARMACY W/ HCPCS (ALT 636 FOR OP/ED): Performed by: NURSE PRACTITIONER

## 2024-12-15 PROCEDURE — 82374 ASSAY BLOOD CARBON DIOXIDE: CPT | Performed by: NURSE PRACTITIONER

## 2024-12-15 PROCEDURE — 2500000004 HC RX 250 GENERAL PHARMACY W/ HCPCS (ALT 636 FOR OP/ED): Performed by: STUDENT IN AN ORGANIZED HEALTH CARE EDUCATION/TRAINING PROGRAM

## 2024-12-15 PROCEDURE — 36415 COLL VENOUS BLD VENIPUNCTURE: CPT | Performed by: NURSE PRACTITIONER

## 2024-12-15 PROCEDURE — 82947 ASSAY GLUCOSE BLOOD QUANT: CPT

## 2024-12-15 PROCEDURE — 2020000001 HC ICU ROOM DAILY

## 2024-12-15 PROCEDURE — 99291 CRITICAL CARE FIRST HOUR: CPT | Performed by: STUDENT IN AN ORGANIZED HEALTH CARE EDUCATION/TRAINING PROGRAM

## 2024-12-15 PROCEDURE — 86901 BLOOD TYPING SEROLOGIC RH(D): CPT | Performed by: NURSE PRACTITIONER

## 2024-12-15 PROCEDURE — 2500000005 HC RX 250 GENERAL PHARMACY W/O HCPCS: Performed by: NURSE PRACTITIONER

## 2024-12-15 PROCEDURE — 2500000001 HC RX 250 WO HCPCS SELF ADMINISTERED DRUGS (ALT 637 FOR MEDICARE OP): Performed by: THORACIC SURGERY (CARDIOTHORACIC VASCULAR SURGERY)

## 2024-12-15 PROCEDURE — 2500000002 HC RX 250 W HCPCS SELF ADMINISTERED DRUGS (ALT 637 FOR MEDICARE OP, ALT 636 FOR OP/ED): Performed by: NURSE PRACTITIONER

## 2024-12-15 PROCEDURE — 99233 SBSQ HOSP IP/OBS HIGH 50: CPT | Performed by: NURSE PRACTITIONER

## 2024-12-15 PROCEDURE — 2500000001 HC RX 250 WO HCPCS SELF ADMINISTERED DRUGS (ALT 637 FOR MEDICARE OP): Performed by: NURSE PRACTITIONER

## 2024-12-15 RX ORDER — NICARDIPINE HYDROCHLORIDE 0.2 MG/ML
2.5-15 INJECTION INTRAVENOUS CONTINUOUS
Status: DISCONTINUED | OUTPATIENT
Start: 2024-12-15 | End: 2024-12-16

## 2024-12-15 RX ORDER — MORPHINE SULFATE 2 MG/ML
2 INJECTION, SOLUTION INTRAMUSCULAR; INTRAVENOUS ONCE
Status: COMPLETED | OUTPATIENT
Start: 2024-12-15 | End: 2024-12-15

## 2024-12-15 RX ORDER — MORPHINE SULFATE 2 MG/ML
2 INJECTION, SOLUTION INTRAMUSCULAR; INTRAVENOUS
Status: DISCONTINUED | OUTPATIENT
Start: 2024-12-15 | End: 2024-12-16

## 2024-12-15 ASSESSMENT — PAIN SCALES - GENERAL
PAINLEVEL_OUTOF10: 8
PAINLEVEL_OUTOF10: 5 - MODERATE PAIN
PAINLEVEL_OUTOF10: 8
PAINLEVEL_OUTOF10: 6
PAINLEVEL_OUTOF10: 10 - WORST POSSIBLE PAIN
PAINLEVEL_OUTOF10: 0 - NO PAIN
PAINLEVEL_OUTOF10: 8
PAINLEVEL_OUTOF10: 6
PAINLEVEL_OUTOF10: 0 - NO PAIN
PAINLEVEL_OUTOF10: 4
PAINLEVEL_OUTOF10: 8
PAINLEVEL_OUTOF10: 10 - WORST POSSIBLE PAIN

## 2024-12-15 ASSESSMENT — PAIN DESCRIPTION - LOCATION
LOCATION: SHOULDER
LOCATION: SHOULDER

## 2024-12-15 ASSESSMENT — PAIN - FUNCTIONAL ASSESSMENT
PAIN_FUNCTIONAL_ASSESSMENT: 0-10

## 2024-12-15 ASSESSMENT — COGNITIVE AND FUNCTIONAL STATUS - GENERAL
MOBILITY SCORE: 24
DAILY ACTIVITIY SCORE: 24
MOBILITY SCORE: 24
DAILY ACTIVITIY SCORE: 24

## 2024-12-15 ASSESSMENT — ENCOUNTER SYMPTOMS
IRREGULAR HEARTBEAT: 0
PALPITATIONS: 0
ALTERED MENTAL STATUS: 0
POOR WOUND HEALING: 0
VOMITING: 0
FREQUENCY: 0
WEAKNESS: 0
LIGHT-HEADEDNESS: 1
DOUBLE VISION: 0
FOCAL WEAKNESS: 0
DIZZINESS: 0
STIFFNESS: 0
COUGH: 0
BLOATING: 0
DECREASED APPETITE: 0
MYALGIAS: 0
MUSCLE CRAMPS: 0
SHORTNESS OF BREATH: 0
DYSPNEA ON EXERTION: 0
ORTHOPNEA: 0
NAIL CHANGES: 0
NAUSEA: 0
BLURRED VISION: 0

## 2024-12-15 ASSESSMENT — PAIN DESCRIPTION - DESCRIPTORS
DESCRIPTORS: ACHING
DESCRIPTORS: ACHING;SHARP
DESCRIPTORS: ACHING;SHARP
DESCRIPTORS: ACHING

## 2024-12-15 ASSESSMENT — PAIN DESCRIPTION - ORIENTATION
ORIENTATION: LEFT
ORIENTATION: LEFT

## 2024-12-15 NOTE — CARE PLAN
Problem: Pain - Adult  Goal: Verbalizes/displays adequate comfort level or baseline comfort level  Outcome: Progressing     Problem: Safety - Adult  Goal: Free from fall injury  Outcome: Progressing     Problem: Discharge Planning  Goal: Discharge to home or other facility with appropriate resources  Outcome: Progressing     Problem: Chronic Conditions and Co-morbidities  Goal: Patient's chronic conditions and co-morbidity symptoms are monitored and maintained or improved  Outcome: Progressing     Problem: Fall/Injury  Goal: Not fall by end of shift  Outcome: Progressing  Goal: Be free from injury by end of the shift  Outcome: Progressing  Goal: Verbalize understanding of personal risk factors for fall in the hospital  Outcome: Progressing  Goal: Verbalize understanding of risk factor reduction measures to prevent injury from fall in the home  Outcome: Progressing  Goal: Pace activities to prevent fatigue by end of the shift  Outcome: Progressing     Problem: Diabetes  Goal: Achieve decreasing blood glucose levels by end of shift  Outcome: Progressing  Goal: Increase stability of blood glucose readings by end of shift  Outcome: Progressing  Goal: Decrease in ketones present in urine by end of shift  Outcome: Progressing  Goal: Maintain electrolyte levels within acceptable range throughout shift  Outcome: Progressing  Goal: Maintain glucose levels >70mg/dl to <250mg/dl throughout shift  Outcome: Progressing  Goal: No changes in neurological exam by end of shift  Outcome: Progressing  Goal: Learn about and adhere to nutrition recommendations by end of shift  Outcome: Progressing  Goal: Vital signs within normal range for age by end of shift  Outcome: Progressing  Goal: Increase self care and/or family involovement by end of shift  Outcome: Progressing  Goal: Receive DSME education by end of shift  Outcome: Progressing     Problem: Nutrition  Goal: Oral intake greater than 50%  Outcome: Progressing  Goal: Oral intake  greater 75%  Outcome: Progressing  Goal: Consume prescribed supplement  Outcome: Progressing  Goal: Adequate PO fluid intake  Outcome: Progressing  Goal: BG  mg/dL  Outcome: Progressing  Goal: Lab values WNL  Outcome: Progressing  Goal: Electrolytes WNL  Outcome: Progressing  Goal: Maintain stable weight  Outcome: Progressing     Problem: Cardiac catheterization  Goal: Free from dysrhythmias  Outcome: Progressing  Goal: Free from pain  Outcome: Progressing  Goal: No evidence of post procedure complications  Outcome: Progressing  Goal: Promote self management  Outcome: Progressing  Goal: Verbalize understanding of procedure  Outcome: Progressing     The clinical goals for the shift include pt will be hemodynamically stable

## 2024-12-15 NOTE — PROGRESS NOTES
"Nader Zazueta is a 59 y.o. male on day 3 of admission presenting with NSTEMI (non-ST elevated myocardial infarction) (Multi).    Subjective     Resting no issues    Objective     Physical Exam  Constitutional:       Appearance: Normal appearance.   HENT:      Head: Normocephalic and atraumatic.      Right Ear: Tympanic membrane and ear canal normal.      Left Ear: Tympanic membrane and ear canal normal.      Mouth/Throat:      Mouth: Mucous membranes are moist.      Pharynx: Oropharynx is clear.   Eyes:      Extraocular Movements: Extraocular movements intact.      Conjunctiva/sclera: Conjunctivae normal.      Pupils: Pupils are equal, round, and reactive to light.   Cardiovascular:      Rate and Rhythm: Normal rate and regular rhythm.      Pulses: Normal pulses.      Heart sounds: Normal heart sounds.   Pulmonary:      Effort: Pulmonary effort is normal.      Breath sounds: Normal breath sounds.   Abdominal:      General: Abdomen is flat. Bowel sounds are normal.      Palpations: Abdomen is soft.   Musculoskeletal:         General: Normal range of motion.      Cervical back: Normal range of motion and neck supple.   Skin:     General: Skin is warm and dry.      Capillary Refill: Capillary refill takes 2 to 3 seconds.   Neurological:      General: No focal deficit present.      Mental Status: He is alert and oriented to person, place, and time. Mental status is at baseline.   Psychiatric:         Mood and Affect: Mood normal.         Behavior: Behavior normal.         Thought Content: Thought content normal.         Judgment: Judgment normal.         Last Recorded Vitals  Blood pressure 166/70, pulse 66, temperature 36.2 °C (97.2 °F), temperature source Temporal, resp. rate 20, height 1.753 m (5' 9\"), weight 104 kg (229 lb 0.9 oz), SpO2 95%.  Intake/Output last 3 Shifts:  I/O last 3 completed shifts:  In: 240 (2.3 mL/kg) [P.O.:240]  Out: - (0 mL/kg)   Weight: 103.9 kg     Relevant Results            This patient " currently has cardiac telemetry ordered; if you would like to modify or discontinue the telemetry order, click here to go to the orders activity to modify/discontinue the order.                 Assessment/Plan   Assessment & Plan  NSTEMI (non-ST elevated myocardial infarction) (Multi)    Chronic coronary microvascular dysfunction    NSTEMI  Hypertension  Hyperlipidemia  -Cardiology consult, recommendations appreciated  -EKG, per ED physician, sinus bradycardia with a rate of 59 bpm, normal FL, QRS, normal QTc duration.  No ST segment or T wave pathology.  Good R wave progression throughout the precordial leads  -Chest x-ray shows no acute cardiopulmonary process  -Patient initiated on heparin drip in the ED, continue  -Patient administered 324 mg aspirin by EMS, continue 81 mg aspirin daily  -Will keep patient n.p.o. for now until evaluated by cardio  -Hemoglobin A1c, lipid panel, TSH, echo added on  -Continue home medications when home med rec complete, n.p.o. for now     Hx of incisional hernia  -Patient tender to palpation over site of his incisional hernia repair (RLQ), he states he would like to be evaluated by Dr. Covarrubias  -CT A/P added on (w/out contrast due to pt's solitary kidney)     Type 2 diabetes mellitus  -Sliding scale insulin and diabetic diet  -Hypoglycemic protocol     CKD  Hyponatremia, 135  -Creatinine 1.41 and BUN 25, 1.53 and 25 point 8/7/2023  -Monitor with BMP, avoid nephrotoxic medications     DVT prophylaxis  -Patient currently on heparin drip  -SCDs    12/12: spoke with cardioloyg cath shows multivessel disease will need CABG, CT surgery consulted    12/13: doing well, monitor labs, work up underway, tentative schedule cabg on Monday 12/14:no issues doing well       I spent 35 minutes in the professional and overall care of this patient.      Mustapha Singer, DO

## 2024-12-15 NOTE — PROGRESS NOTES
Cardiac Surgery  Progress Note     Nader Zazueta is a 59 y.o. male on day 3 of admission presenting with NSTEMI (non-ST elevated myocardial infarction) (Multi).    Subjective     Interval HPI: Seen and assessed patient this AM while they were laying in bed; endorsing headache overnight and nitroglycerine gtt was stopped. Patient endorsing L should pain that worsens with ambulation & or higher BP. This is likely related to the patient's CAD    Review of Systems   Constitutional: Positive for malaise/fatigue. Negative for decreased appetite.   HENT:  Negative for congestion.    Eyes:  Negative for blurred vision and double vision.   Cardiovascular:  Positive for chest pain (Exertional). Negative for dyspnea on exertion, irregular heartbeat, leg swelling, orthopnea and palpitations.   Respiratory:  Negative for cough and shortness of breath.    Endocrine: Negative for cold intolerance and heat intolerance.   Skin:  Negative for nail changes, poor wound healing and rash.   Musculoskeletal:  Negative for arthritis, muscle cramps, muscle weakness, myalgias and stiffness.   Gastrointestinal:  Negative for bloating, nausea and vomiting.   Genitourinary:  Negative for frequency, hesitancy and urgency.   Neurological:  Positive for light-headedness (With nitrates). Negative for dizziness, focal weakness and weakness.   Psychiatric/Behavioral:  Negative for altered mental status.    Allergic/Immunologic: Negative for environmental allergies.         Objective   Physical Exam  Physical Exam  Vitals and nursing note reviewed.   Constitutional:       General: He is not in acute distress.     Appearance: Normal appearance. He is not ill-appearing.   HENT:      Head: Normocephalic and atraumatic.      Nose: Nose normal.      Mouth/Throat:      Mouth: Mucous membranes are moist.      Pharynx: Oropharynx is clear.   Eyes:      Extraocular Movements: Extraocular movements intact.      Conjunctiva/sclera: Conjunctivae normal.       Pupils: Pupils are equal, round, and reactive to light.   Cardiovascular:      Rate and Rhythm: Normal rate and regular rhythm.      Pulses: Normal pulses.      Heart sounds: Normal heart sounds, S1 normal and S2 normal. No murmur heard.     No systolic murmur is present.      No friction rub. No gallop.   Pulmonary:      Effort: Pulmonary effort is normal.      Breath sounds: Normal breath sounds.   Abdominal:      General: Abdomen is flat. Bowel sounds are normal. There is no distension.      Palpations: Abdomen is soft.   Musculoskeletal:         General: Normal range of motion.      Cervical back: Normal range of motion and neck supple.      Right lower leg: No edema.      Left lower leg: No edema.   Skin:     General: Skin is warm and dry.      Capillary Refill: Capillary refill takes less than 2 seconds.      Findings: No lesion.      Nails: There is no clubbing.   Neurological:      General: No focal deficit present.      Mental Status: He is alert and oriented to person, place, and time. Mental status is at baseline.      Cranial Nerves: Cranial nerves 2-12 are intact.      Sensory: Sensation is intact.      Motor: Motor function is intact.   Psychiatric:         Attention and Perception: Attention and perception normal.         Mood and Affect: Mood normal.         Speech: Speech normal.         Behavior: Behavior normal.         Thought Content: Thought content normal.         Cognition and Memory: Cognition and memory normal.         Judgment: Judgment normal.         Last Recorded Vitals  Vitals:    12/15/24 1800 12/15/24 1815 12/15/24 1830 12/15/24 1845   BP: 131/72      BP Location:       Patient Position:       Pulse: 72 78 71 81   Resp: 22 (!) 33 21 22   Temp:       TempSrc:       SpO2: 94% 94% 92% 93%   Weight:       Height:            Intake/Output last 3 Shifts:  I/O last 3 completed shifts:  In: 240 (2.3 mL/kg) [P.O.:240]  Out: - (0 mL/kg)   Weight: 103.9 kg     Inpatient Medications  amLODIPine,  10 mg, oral, Daily  aspirin, 81 mg, oral, Daily  atorvastatin, 40 mg, oral, Nightly  insulin lispro, 0-10 Units, subcutaneous, Before meals & nightly  [Held by provider] isosorbide mononitrate ER, 30 mg, oral, Daily  metoprolol succinate XL, 25 mg, oral, Daily      Continuous medications  heparin, 0-4,000 Units/hr, Last Rate: 1,800 Units/hr (12/15/24 1315)  niCARdipine, 2.5-15 mg/hr, Last Rate: 10 mg/hr (12/15/24 1742)      PRN medications  PRN medications: acetaminophen **OR** acetaminophen **OR** acetaminophen, acetaminophen-caffeine, dextrose, dextrose, glucagon, glucagon, heparin, methocarbamol, morphine, ondansetron **OR** ondansetron, oxygen, oxygen, polyethylene glycol     LDA:       Relevant Results  Lab Review  Results from last 7 days   Lab Units 12/14/24  0426   WBC AUTO x10*3/uL 11.6*   HEMOGLOBIN g/dL 13.5   HEMATOCRIT % 40.0*   PLATELETS AUTO x10*3/uL 212     Results from last 7 days   Lab Units 12/15/24  0535 12/13/24  0349 12/12/24  0453   SODIUM mmol/L 134*   < > 135*   POTASSIUM mmol/L 4.1   < > 4.1   CHLORIDE mmol/L 101   < > 103   CO2 mmol/L 25   < > 20*   BUN mg/dL 22   < > 25*   CREATININE mg/dL 1.43*   < > 1.41*   CALCIUM mg/dL 9.6   < > 9.7   PROTEIN TOTAL g/dL  --   --  7.4   BILIRUBIN TOTAL mg/dL  --   --  0.6   ALK PHOS U/L  --   --  40   ALT U/L  --   --  38   AST U/L  --   --  22   GLUCOSE mg/dL 200*   < > 210*    < > = values in this interval not displayed.     Results from last 7 days   Lab Units 12/12/24  0453   MAGNESIUM mg/dL 1.73             Cardiology Tests     Echo:  Transthoracic echo (TTE) complete 12/12/2024  PHYSICIAN INTERPRETATION:  Left Ventricle: Left ventricular ejection fraction is low normal, by visual estimate at 50-55%. There are no regional left ventricular wall motion abnormalities. The left ventricular cavity size is normal. There is mildly increased septal and normal posterior left ventricular wall thickness. There is left ventricular concentric remodeling.  Spectral Doppler shows a normal pattern of left ventricular diastolic filling.  Left Atrium: The left atrium is normal in size.  Right Ventricle: The right ventricle is normal in size. There is normal right ventricular global systolic function.  Right Atrium: The right atrium is normal in size.  Aortic Valve: The aortic valve is trileaflet. There is mild aortic valve cusp calcification. The aortic valve dimensionless index is 0.85. There is no evidence of aortic valve regurgitation. The peak instantaneous gradient of the aortic valve is 11 mmHg. The mean gradient of the aortic valve is 4 mmHg.  Mitral Valve: The mitral valve is normal in structure. There is trace mitral valve regurgitation.  Tricuspid Valve: The tricuspid valve is structurally normal. There is trace tricuspid regurgitation. The right ventricular systolic pressure is unable to be estimated.  Pulmonic Valve: The pulmonic valve is not well visualized. The pulmonic valve regurgitation was not well visualized.  Pericardium: There is no pericardial effusion noted.  Aorta: The aortic root is normal.        CONCLUSIONS:   1. Left ventricular ejection fraction is low normal, by visual estimate at 50-55%.     Ejection Fractions:        EF   Date/Time Value Ref Range Status   12/12/2024 11:07 AM 53 %        Cath:  Cardiac Catheterization Procedure 12/12/2024  Coronary Angiography:  The coronary circulation is right dominant.     Coronary Angiography Comments:  60-year-old man with history of hypertension, hyperlipidemia, diabetes presents with non-STEMI. Urgent heart catheter recommended.     Fluoroscopy demonstrated mild calcification of the coronary tree.     Left main: Large vessel. No significant disease.     LAD: Moderate-sized vessel. LAD is occluded mid vessel. The distal half of the LAD fills via left to left collaterals. There is a large first diagonal which has a proximal 75% stenosis. There is a large bifurcating second diagonal which is a 80%  stenosis in it.     LCx: Moderate-sized vessel. Mid vessel 95% stenosis. The circumflex then gives rise to a moderate-sized first obtuse marginal which has mild disease. The ongoing circumflex is a very small, rudimentary vessel.     RCA: Large dominant vessel. Mild to moderate diffuse disease. Mid RCA there is a focal 50% stenosis.     Left heart catheterization demonstrates an LVEDP of 15 mmHg. Left ventricular angiography shows hypokinesis of the anterior wall. EF 45 to 50%. No mitral regurgitation. No gradient across aortic valve upon pullback.     CONCLUSIONS:   1. Critical three-vessel disease including LAD  with left to left collaterals, severe disease in D1 and D2, subtotal occlusion of moderate-sized circumflex.   2. Mild LV dysfunction, EF estimated 45 to 50%.   3. Patient will be referred for surgical revascularization.         History of Present Illness: Nader Zazueta is a 59 y.o. 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     ED course: On arrival, patient's /102 (now 158/86), heart rate 55, respirations 18, afebrile, saturating 98% on room air. Labs and imaging form, revealing glucose 210, sodium 135, Cr 1.41 and BUN 25 (1.53 and 25 on 8/7/23), initial troponin 27, delta troponin 77. No white count or anemia. Platelets WNL. Flu A/B/COVID-negative. Chest x-ray shows no acute cardiopulmonary process. EKG, per ED physician, sinus bradycardia with a rate of 59 bpm, normal LA, QRS, normal QTc duration. No ST segment or T wave pathology. Good R wave progression throughout the precordial leads. Patient was administered 324 mg aspirin in the ambulance. Patient started on heparin drip, given Nitropaste and nitro tablet in the ED.      He was seen in consult by cardiologist, Dr. Thor Keyes and went for a [facility]. His coronary anatomy revealed  diffuse MV-CAD with a  of the mid LAD (with L to L collateralizations). Cardiac surgery was consulted for bypass grafting evaluation.     Daily Events    12/13: No acute events overnight; patient continues on nitroglycerin gtt for HTN and angina, however endorsing headache so will try to wean down and use PO nitrate.     12/15: Patient with worsening headache overnight as we were utilizing IV nitroglycerine to treat his angina & BP. The patient was seen by cardiology, and his nitrates stopped and he was subsequently started on IV nicardipine for better BP control.     Throughout the day, the patient has continued with ongoing L shoulder pain and gets relief with rest and morphine. Suspect that this is related to his CAD vs musculoskeletal.     Discussed with cardiac surgeons and cardiology; will reprioritization patient as he needs a more urgent surgical revascularization.      Continue on IV nicardipine for tight BP control, PRN morphine, ECGs and heparin gtt.     Assessment & Plan        MV-CAD  - Patient admitted to [facility] on 12/12/24 with anginal complaints   --> HS Troponin I: 27 -> 77 -> 4446  - [facility] on 12/12 revealed diffuse L system CAD with coronary anatomy favoring surgical revascularization   - Patient is with continued angina, however stable ECGs  --> Unable to tolerate nitrates, plan for tighter BP control with CCB and utilizing morphine for   - OR Date: 12/16/24        Essential HTN  - Goal for SBP < 140mmHg   - Continue on metoprolol succinate 25mg every day  - Amlodipine increased to 10mg daily   - Addition of Isosorbide mononitrate 30mg        T2DM  - HbA1c: 8.7  - Continue on SSI AC/HS      Above patient and plan discussed with cardiac surgeon, Dr. Kb Fermin & Dr. Mendenhall. Plan as above. Will proceed with workup for inpatient staging in a more urgent fashion given patient's continued angina.       Jovanni Arriaga, APRN-CNP

## 2024-12-15 NOTE — PROGRESS NOTES
Cardiology Progress    Impression:  Non-STEMI  CAD: triple vessel disease MI LAD   ICM Ef 50%  Hypertension  Hyperlipidemia  Diabetes aic 7.9  CKD.  Status post nephrectomy for renal cell carcinoma.  Plan:  Cont asa statin. BB  CTS consult and eval     12/15/2024  I received call from the nurse around 3 AM that the patient is having continued chest pain, elevated blood pressure and headache and cannot tolerate nitroglycerin drip anymore.  We gave the patient IV morphine and started Cardene drip.  This morning his chest pain has improved as well as the blood pressure.  He is on heparin drip.  Will continue with aspirin and statin.  Will continue with beta-blocker and amlodipine.  I discussed with CT surgery team to expedient the patient's coronary artery bypass grafting before return of chest pain or hemodynamic compromise.  Will continue to monitor closely in heart center.  Discussed with nurse.  Dr. Keyes will resume seeing him for tomorrow.      ICU Attending Note    This critically ill patient continues to be at-risk for clinically significant deterioration / failure due to the above mentioned dysfunctional, unstable organ systems.  I have personally identified and managed all complex critical care issues to prevent aforementioned clinical deterioration.  Critical care time is spent at bedside and/or the immediate area and has included, but is not limited to, the review of diagnostic tests, labs, radiographs, serial assessments of hemodynamics, respiratory status, ventilatory management, and family updates. More than 50% of the time was spent for coordination of care/family education.    Critical care time: 45 minutes        Job Morocho MD, PhD, [facility], [facility]  Interventional Cardiology, [facility]  Associate Professor of Medicine, [facility]  Office: [phone]     Meds:  Scheduled medications  amLODIPine, 10 mg, oral, Daily  aspirin, 81 mg, oral,  "Daily  atorvastatin, 40 mg, oral, Nightly  insulin lispro, 0-10 Units, subcutaneous, Before meals & nightly  isosorbide mononitrate ER, 30 mg, oral, Daily  metoprolol succinate XL, 25 mg, oral, Daily      Continuous medications  heparin, 0-4,000 Units/hr, Last Rate: 1,800 Units/hr (12/14/24 2347)  niCARdipine, 2.5-15 mg/hr, Last Rate: 10 mg/hr (12/15/24 0907)  nitroglycerin in 5 % dextrose, 5-200 mcg/min, Last Rate: Stopped (12/15/24 0515)      PRN medications    Physical exam:  Vitals:    12/15/24 0915   BP:    Pulse: 67   Resp: 12   Temp:    SpO2: 95%      AAO NAD  RRR \"GBAT CTA Ssoft BS_ NTND  PP2+   No JVD  EKG:  NSr  Echo:  No results found for this or any previous visit.   Labs:  Lab Results   Component Value Date    WBC 11.6 (H) 12/14/2024    HGB 13.5 12/14/2024    HCT 40.0 (L) 12/14/2024     12/14/2024    CHOL 174 12/13/2024    TRIG 195 (H) 12/13/2024    HDL 29.3 12/13/2024    ALT 38 12/12/2024    AST 22 12/12/2024     (L) 12/15/2024    K 4.1 12/15/2024     12/15/2024    CREATININE 1.43 (H) 12/15/2024    BUN 22 12/15/2024    CO2 25 12/15/2024    TSH 1.96 12/13/2024    PSA 0.30 07/12/2022    INR 1.0 12/13/2024    HGBA1C 8.7 (H) 12/12/2024     par    "

## 2024-12-16 ENCOUNTER — ANESTHESIA EVENT (OUTPATIENT)
Dept: OPERATING ROOM | Facility: HOSPITAL | Age: 59
End: 2024-12-16
Payer: COMMERCIAL

## 2024-12-16 ENCOUNTER — APPOINTMENT (OUTPATIENT)
Dept: RADIOLOGY | Facility: HOSPITAL | Age: 59
DRG: 233 | End: 2024-12-16
Payer: COMMERCIAL

## 2024-12-16 ENCOUNTER — APPOINTMENT (OUTPATIENT)
Dept: CARDIOLOGY | Facility: HOSPITAL | Age: 59
DRG: 233 | End: 2024-12-16
Payer: COMMERCIAL

## 2024-12-16 ENCOUNTER — ANESTHESIA (OUTPATIENT)
Dept: OPERATING ROOM | Facility: HOSPITAL | Age: 59
End: 2024-12-16
Payer: COMMERCIAL

## 2024-12-16 VITALS
DIASTOLIC BLOOD PRESSURE: 58 MMHG | OXYGEN SATURATION: 90 % | RESPIRATION RATE: 12 BRPM | SYSTOLIC BLOOD PRESSURE: 107 MMHG | TEMPERATURE: 97.5 F | WEIGHT: 229.06 LBS | BODY MASS INDEX: 33.93 KG/M2 | HEART RATE: 60 BPM | HEIGHT: 69 IN

## 2024-12-16 PROBLEM — I25.700: Status: ACTIVE | Noted: 2024-12-16

## 2024-12-16 LAB
ACT BLD: 107 SEC (ref 82–174)
ACT BLD: 422 SEC (ref 82–174)
ACT BLD: 423 SEC (ref 82–174)
ACT BLD: 426 SEC (ref 82–174)
ACT BLD: 451 SEC (ref 82–174)
ACT BLD: 502 SEC (ref 82–174)
ACT BLD: 96 SEC (ref 82–174)
ALBUMIN SERPL BCP-MCNC: 3.8 G/DL (ref 3.4–5)
ANION GAP BLDA CALCULATED.4IONS-SCNC: 5 MMO/L (ref 10–25)
ANION GAP BLDA CALCULATED.4IONS-SCNC: 6 MMO/L (ref 10–25)
ANION GAP BLDA CALCULATED.4IONS-SCNC: 7 MMO/L (ref 10–25)
ANION GAP BLDA CALCULATED.4IONS-SCNC: 7 MMO/L (ref 10–25)
ANION GAP BLDA CALCULATED.4IONS-SCNC: 8 MMO/L (ref 10–25)
ANION GAP BLDA CALCULATED.4IONS-SCNC: 9 MMO/L (ref 10–25)
ANION GAP BLDA CALCULATED.4IONS-SCNC: 9 MMO/L (ref 10–25)
ANION GAP BLDV CALCULATED.4IONS-SCNC: 9 MMOL/L (ref 10–25)
ANION GAP SERPL CALC-SCNC: 13 MMOL/L (ref 10–20)
ANION GAP SERPL CALC-SCNC: 18 MMOL/L (ref 10–20)
APTT PPP: 31 SECONDS (ref 27–38)
APTT PPP: 32 SECONDS (ref 27–38)
ARTERIAL PATENCY WRIST A: POSITIVE
BASE EXCESS BLDA CALC-SCNC: -0.8 MMOL/L (ref -2–3)
BASE EXCESS BLDA CALC-SCNC: -1.8 MMOL/L (ref -2–3)
BASE EXCESS BLDA CALC-SCNC: -2.2 MMOL/L (ref -2–3)
BASE EXCESS BLDA CALC-SCNC: -2.9 MMOL/L (ref -2–3)
BASE EXCESS BLDA CALC-SCNC: -4 MMOL/L (ref -2–3)
BASE EXCESS BLDA CALC-SCNC: 0.3 MMOL/L (ref -2–3)
BASE EXCESS BLDA CALC-SCNC: 2.1 MMOL/L (ref -2–3)
BASE EXCESS BLDA CALC-SCNC: 2.2 MMOL/L (ref -2–3)
BASE EXCESS BLDA CALC-SCNC: 3.3 MMOL/L (ref -2–3)
BASE EXCESS BLDV CALC-SCNC: -3.3 MMOL/L (ref -2–3)
BODY TEMPERATURE: 37 DEGREES CELSIUS
BUN SERPL-MCNC: 28 MG/DL (ref 6–23)
BUN SERPL-MCNC: 30 MG/DL (ref 6–23)
CA-I BLD-SCNC: 1.18 MMOL/L (ref 1.1–1.33)
CA-I BLDA-SCNC: 1.05 MMOL/L (ref 1.1–1.33)
CA-I BLDA-SCNC: 1.08 MMOL/L (ref 1.1–1.33)
CA-I BLDA-SCNC: 1.09 MMOL/L (ref 1.1–1.33)
CA-I BLDA-SCNC: 1.15 MMOL/L (ref 1.1–1.33)
CA-I BLDA-SCNC: 1.16 MMOL/L (ref 1.1–1.33)
CA-I BLDA-SCNC: 1.23 MMOL/L (ref 1.1–1.33)
CA-I BLDA-SCNC: 1.24 MMOL/L (ref 1.1–1.33)
CA-I BLDA-SCNC: 1.27 MMOL/L (ref 1.1–1.33)
CA-I BLDA-SCNC: 1.31 MMOL/L (ref 1.1–1.33)
CA-I BLDV-SCNC: 1.18 MMOL/L (ref 1.1–1.33)
CALCIUM SERPL-MCNC: 10.2 MG/DL (ref 8.6–10.3)
CALCIUM SERPL-MCNC: 8.5 MG/DL (ref 8.6–10.3)
CARDIAC TROPONIN I PNL SERPL HS: 206 NG/L (ref 0–20)
CHLORIDE BLDA-SCNC: 103 MMOL/L (ref 98–107)
CHLORIDE BLDA-SCNC: 104 MMOL/L (ref 98–107)
CHLORIDE BLDA-SCNC: 105 MMOL/L (ref 98–107)
CHLORIDE BLDA-SCNC: 106 MMOL/L (ref 98–107)
CHLORIDE BLDA-SCNC: 107 MMOL/L (ref 98–107)
CHLORIDE BLDA-SCNC: 107 MMOL/L (ref 98–107)
CHLORIDE BLDA-SCNC: 110 MMOL/L (ref 98–107)
CHLORIDE BLDV-SCNC: 105 MMOL/L (ref 98–107)
CHLORIDE SERPL-SCNC: 101 MMOL/L (ref 98–107)
CHLORIDE SERPL-SCNC: 106 MMOL/L (ref 98–107)
CO2 SERPL-SCNC: 18 MMOL/L (ref 21–32)
CO2 SERPL-SCNC: 24 MMOL/L (ref 21–32)
COHGB MFR BLDA: 0.7 %
COHGB MFR BLDA: 0.7 %
COHGB MFR BLDA: 0.8 %
COHGB MFR BLDA: 0.9 %
COHGB MFR BLDA: 0.9 %
COHGB MFR BLDV: 1.5 %
CREAT SERPL-MCNC: 1.62 MG/DL (ref 0.5–1.3)
CREAT SERPL-MCNC: 1.68 MG/DL (ref 0.5–1.3)
CRITICAL CALL TIME: 1301
CRITICAL CALL TIME: 1301
CRITICAL CALL TIME: 1309
CRITICAL CALL TIME: 1309
CRITICAL CALLED BY: ABNORMAL
CRITICAL CALLED BY: NORMAL
CRITICAL CALLED TO: ABNORMAL
CRITICAL CALLED TO: NORMAL
CRITICAL NOTE: ABNORMAL
CRITICAL NOTE: ABNORMAL
CRITICAL READ BACK: ABNORMAL
CRITICAL READ BACK: NORMAL
DO-HGB MFR BLDA: 0.8 % (ref 0–5)
DO-HGB MFR BLDA: 1 % (ref 0–5)
DO-HGB MFR BLDA: 1 % (ref 0–5)
DO-HGB MFR BLDA: 2 % (ref 0–5)
DO-HGB MFR BLDA: 3.1 % (ref 0–5)
EGFRCR SERPLBLD CKD-EPI 2021: 47 ML/MIN/1.73M*2
EGFRCR SERPLBLD CKD-EPI 2021: 49 ML/MIN/1.73M*2
ERYTHROCYTE [DISTWIDTH] IN BLOOD BY AUTOMATED COUNT: 11.7 % (ref 11.5–14.5)
ERYTHROCYTE [DISTWIDTH] IN BLOOD BY AUTOMATED COUNT: 12 % (ref 11.5–14.5)
FIBRINOGEN PPP-MCNC: 233 MG/DL (ref 200–400)
FIBRINOGEN PPP-MCNC: 263 MG/DL (ref 200–400)
GLUCOSE BLD MANUAL STRIP-MCNC: 141 MG/DL (ref 74–99)
GLUCOSE BLD MANUAL STRIP-MCNC: 150 MG/DL (ref 74–99)
GLUCOSE BLD MANUAL STRIP-MCNC: 169 MG/DL (ref 74–99)
GLUCOSE BLD MANUAL STRIP-MCNC: 236 MG/DL (ref 74–99)
GLUCOSE BLD MANUAL STRIP-MCNC: 254 MG/DL (ref 74–99)
GLUCOSE BLD MANUAL STRIP-MCNC: 62 MG/DL (ref 74–99)
GLUCOSE BLDA-MCNC: 153 MG/DL (ref 74–99)
GLUCOSE BLDA-MCNC: 156 MG/DL (ref 74–99)
GLUCOSE BLDA-MCNC: 157 MG/DL (ref 74–99)
GLUCOSE BLDA-MCNC: 161 MG/DL (ref 74–99)
GLUCOSE BLDA-MCNC: 182 MG/DL (ref 74–99)
GLUCOSE BLDA-MCNC: 217 MG/DL (ref 74–99)
GLUCOSE BLDA-MCNC: 221 MG/DL (ref 74–99)
GLUCOSE BLDV-MCNC: 156 MG/DL (ref 74–99)
GLUCOSE SERPL-MCNC: 154 MG/DL (ref 74–99)
GLUCOSE SERPL-MCNC: 255 MG/DL (ref 74–99)
HCO3 BLDA-SCNC: 22.4 MMOL/L (ref 22–26)
HCO3 BLDA-SCNC: 22.6 MMOL/L (ref 22–26)
HCO3 BLDA-SCNC: 23.1 MMOL/L (ref 22–26)
HCO3 BLDA-SCNC: 24.9 MMOL/L (ref 22–26)
HCO3 BLDA-SCNC: 25 MMOL/L (ref 22–26)
HCO3 BLDA-SCNC: 25.5 MMOL/L (ref 22–26)
HCO3 BLDA-SCNC: 27.2 MMOL/L (ref 22–26)
HCO3 BLDA-SCNC: 27.3 MMOL/L (ref 22–26)
HCO3 BLDA-SCNC: 28.5 MMOL/L (ref 22–26)
HCO3 BLDV-SCNC: 25.1 MMOL/L (ref 22–26)
HCT VFR BLD AUTO: 33.5 % (ref 41–52)
HCT VFR BLD AUTO: 44.7 % (ref 41–52)
HCT VFR BLD EST: 32 % (ref 41–52)
HCT VFR BLD EST: 33 % (ref 41–52)
HCT VFR BLD EST: 34 % (ref 41–52)
HCT VFR BLD EST: 35 % (ref 41–52)
HCT VFR BLD EST: 36 % (ref 41–52)
HCT VFR BLD EST: 37 % (ref 41–52)
HCT VFR BLD EST: 37 % (ref 41–52)
HCT VFR BLD EST: 41 % (ref 41–52)
HCT VFR BLD EST: 42 % (ref 41–52)
HCT VFR BLD EST: 47 % (ref 41–52)
HGB BLD-MCNC: 11.9 G/DL (ref 13.5–17.5)
HGB BLD-MCNC: 16 G/DL (ref 13.5–17.5)
HGB BLDA-MCNC: 10.5 G/DL (ref 13.5–17.5)
HGB BLDA-MCNC: 10.5 G/DL (ref 13.5–17.5)
HGB BLDA-MCNC: 11 G/DL (ref 13.5–17.5)
HGB BLDA-MCNC: 11 G/DL (ref 13.5–17.5)
HGB BLDA-MCNC: 11.2 G/DL (ref 13.5–17.5)
HGB BLDA-MCNC: 11.2 G/DL (ref 13.5–17.5)
HGB BLDA-MCNC: 11.5 G/DL (ref 13.5–17.5)
HGB BLDA-MCNC: 12 G/DL (ref 13.5–17.5)
HGB BLDA-MCNC: 12 G/DL (ref 13.5–17.5)
HGB BLDA-MCNC: 12.3 G/DL (ref 13.5–17.5)
HGB BLDA-MCNC: 13.5 G/DL (ref 13.5–17.5)
HGB BLDA-MCNC: 13.5 G/DL (ref 13.5–17.5)
HGB BLDA-MCNC: 14.1 G/DL (ref 13.5–17.5)
HGB BLDA-MCNC: 15.5 G/DL (ref 13.5–17.5)
HGB BLDV-MCNC: 12.2 G/DL (ref 13.5–17.5)
INHALED O2 CONCENTRATION: 100 %
INHALED O2 CONCENTRATION: 21 %
INHALED O2 CONCENTRATION: 60 %
INHALED O2 CONCENTRATION: 60 %
INHALED O2 CONCENTRATION: 95 %
INHALED O2 CONCENTRATION: 95 %
INR PPP: 1.2 (ref 0.9–1.1)
INR PPP: 1.5 (ref 0.9–1.1)
LACTATE BLDA-SCNC: 0.6 MMOL/L (ref 0.4–2)
LACTATE BLDA-SCNC: 0.8 MMOL/L (ref 0.4–2)
LACTATE BLDA-SCNC: 1 MMOL/L (ref 0.4–2)
LACTATE BLDA-SCNC: 1.1 MMOL/L (ref 0.4–2)
LACTATE BLDA-SCNC: 1.2 MMOL/L (ref 0.4–2)
LACTATE BLDA-SCNC: 1.3 MMOL/L (ref 0.4–2)
LACTATE BLDA-SCNC: 1.3 MMOL/L (ref 0.4–2)
LACTATE BLDA-SCNC: 1.4 MMOL/L (ref 0.4–2)
LACTATE BLDA-SCNC: 1.8 MMOL/L (ref 0.4–2)
LACTATE BLDV-SCNC: 1 MMOL/L (ref 0.4–2)
MAGNESIUM SERPL-MCNC: 1.95 MG/DL (ref 1.6–2.4)
MAGNESIUM SERPL-MCNC: 2.2 MG/DL (ref 1.6–2.4)
MAGNESIUM SERPL-MCNC: 2.46 MG/DL (ref 1.6–2.4)
MCH RBC QN AUTO: 31.3 PG (ref 26–34)
MCH RBC QN AUTO: 32.2 PG (ref 26–34)
MCHC RBC AUTO-ENTMCNC: 35.5 G/DL (ref 32–36)
MCHC RBC AUTO-ENTMCNC: 35.8 G/DL (ref 32–36)
MCV RBC AUTO: 88 FL (ref 80–100)
MCV RBC AUTO: 91 FL (ref 80–100)
METHGB MFR BLDA: 0.7 % (ref 0–1.5)
METHGB MFR BLDA: 0.8 % (ref 0–1.5)
METHGB MFR BLDA: 0.9 % (ref 0–1.5)
METHGB MFR BLDA: 0.9 % (ref 0–1.5)
METHGB MFR BLDA: 1 % (ref 0–1.5)
METHGB MFR BLDV: 0.3 % (ref 0–1.5)
NRBC BLD-RTO: 0 /100 WBCS (ref 0–0)
NRBC BLD-RTO: 0 /100 WBCS (ref 0–0)
OXYHGB MFR BLDA: 95.1 % (ref 94–98)
OXYHGB MFR BLDA: 95.3 % (ref 94–98)
OXYHGB MFR BLDA: 95.3 % (ref 94–98)
OXYHGB MFR BLDA: 96 % (ref 94–98)
OXYHGB MFR BLDA: 96.3 % (ref 94–98)
OXYHGB MFR BLDA: 96.5 % (ref 94–98)
OXYHGB MFR BLDA: 96.5 % (ref 94–98)
OXYHGB MFR BLDA: 97.1 % (ref 94–98)
OXYHGB MFR BLDA: 97.1 % (ref 94–98)
OXYHGB MFR BLDA: 97.4 % (ref 94–98)
OXYHGB MFR BLDA: 97.4 % (ref 94–98)
OXYHGB MFR BLDA: 97.5 % (ref 94–98)
OXYHGB MFR BLDA: 97.5 % (ref 94–98)
OXYHGB MFR BLDA: 98 % (ref 94–98)
OXYHGB MFR BLDV: 80.6 % (ref 45–75)
PCO2 BLDA: 35 MM HG (ref 38–42)
PCO2 BLDA: 37 MM HG (ref 38–42)
PCO2 BLDA: 39 MM HG (ref 38–42)
PCO2 BLDA: 44 MM HG (ref 38–42)
PCO2 BLDA: 44 MM HG (ref 38–42)
PCO2 BLDA: 45 MM HG (ref 38–42)
PCO2 BLDA: 46 MM HG (ref 38–42)
PCO2 BLDA: 54 MM HG (ref 38–42)
PCO2 BLDA: 57 MM HG (ref 38–42)
PCO2 BLDV: 60 MM HG (ref 41–51)
PEEP CMH2O: 5 CM H2O
PEEP CMH2O: 5 CM H2O
PH BLDA: 7.25 PH (ref 7.38–7.42)
PH BLDA: 7.3 PH (ref 7.38–7.42)
PH BLDA: 7.31 PH (ref 7.38–7.42)
PH BLDA: 7.36 PH (ref 7.38–7.42)
PH BLDA: 7.38 PH (ref 7.38–7.42)
PH BLDA: 7.39 PH (ref 7.38–7.42)
PH BLDA: 7.4 PH (ref 7.38–7.42)
PH BLDA: 7.41 PH (ref 7.38–7.42)
PH BLDA: 7.47 PH (ref 7.38–7.42)
PH BLDV: 7.23 PH (ref 7.33–7.43)
PHOSPHATE SERPL-MCNC: 2 MG/DL (ref 2.5–4.9)
PHOSPHATE SERPL-MCNC: 4 MG/DL (ref 2.5–4.9)
PLATELET # BLD AUTO: 167 X10*3/UL (ref 150–450)
PLATELET # BLD AUTO: 199 X10*3/UL (ref 150–450)
PLATELET # BLD AUTO: 254 X10*3/UL (ref 150–450)
PO2 BLDA: 117 MM HG (ref 85–95)
PO2 BLDA: 130 MM HG (ref 85–95)
PO2 BLDA: 292 MM HG (ref 85–95)
PO2 BLDA: 336 MM HG (ref 85–95)
PO2 BLDA: 375 MM HG (ref 85–95)
PO2 BLDA: 417 MM HG (ref 85–95)
PO2 BLDA: 89 MM HG (ref 85–95)
PO2 BLDA: 92 MM HG (ref 85–95)
PO2 BLDA: 94 MM HG (ref 85–95)
PO2 BLDV: 55 MM HG (ref 35–45)
POTASSIUM BLDA-SCNC: 3.6 MMOL/L (ref 3.5–5.3)
POTASSIUM BLDA-SCNC: 3.8 MMOL/L (ref 3.5–5.3)
POTASSIUM BLDA-SCNC: 4.3 MMOL/L (ref 3.5–5.3)
POTASSIUM BLDA-SCNC: 4.4 MMOL/L (ref 3.5–5.3)
POTASSIUM BLDA-SCNC: 4.7 MMOL/L (ref 3.5–5.3)
POTASSIUM BLDA-SCNC: 4.8 MMOL/L (ref 3.5–5.3)
POTASSIUM BLDA-SCNC: 5.5 MMOL/L (ref 3.5–5.3)
POTASSIUM BLDA-SCNC: 5.9 MMOL/L (ref 3.5–5.3)
POTASSIUM BLDA-SCNC: 5.9 MMOL/L (ref 3.5–5.3)
POTASSIUM BLDV-SCNC: 5.6 MMOL/L (ref 3.5–5.3)
POTASSIUM SERPL-SCNC: 3.6 MMOL/L (ref 3.5–5.3)
POTASSIUM SERPL-SCNC: 4.4 MMOL/L (ref 3.5–5.3)
PRESSURE SUPPORT: 5 CM H2O
PROTHROMBIN TIME: 14 SECONDS (ref 9.8–12.8)
PROTHROMBIN TIME: 16.9 SECONDS (ref 9.8–12.8)
RBC # BLD AUTO: 3.69 X10*6/UL (ref 4.5–5.9)
RBC # BLD AUTO: 5.11 X10*6/UL (ref 4.5–5.9)
SAO2 % BLDA: 100 % (ref 94–100)
SAO2 % BLDA: 97 % (ref 94–100)
SAO2 % BLDA: 98 % (ref 94–100)
SAO2 % BLDA: 99 % (ref 94–100)
SAO2 % BLDV: 82 % (ref 45–75)
SODIUM BLDA-SCNC: 131 MMOL/L (ref 136–145)
SODIUM BLDA-SCNC: 132 MMOL/L (ref 136–145)
SODIUM BLDA-SCNC: 134 MMOL/L (ref 136–145)
SODIUM BLDA-SCNC: 135 MMOL/L (ref 136–145)
SODIUM BLDA-SCNC: 136 MMOL/L (ref 136–145)
SODIUM BLDA-SCNC: 138 MMOL/L (ref 136–145)
SODIUM BLDV-SCNC: 133 MMOL/L (ref 136–145)
SODIUM SERPL-SCNC: 133 MMOL/L (ref 136–145)
SODIUM SERPL-SCNC: 139 MMOL/L (ref 136–145)
SPECIMEN DRAWN FROM PATIENT: ABNORMAL
SPONTANEOUS TIDAL VOLUME: 620 ML
TIDAL VOLUME: 600 ML
TOTAL MINUTE VOLUME: 10 LITER
TOTAL MINUTE VOLUME: 9.9 LITER
VENTILATOR MODE: ABNORMAL
VENTILATOR MODE: ABNORMAL
VENTILATOR RATE: 14 BPM
WBC # BLD AUTO: 12.3 X10*3/UL (ref 4.4–11.3)
WBC # BLD AUTO: 30.2 X10*3/UL (ref 4.4–11.3)

## 2024-12-16 PROCEDURE — 83735 ASSAY OF MAGNESIUM: CPT | Performed by: NURSE PRACTITIONER

## 2024-12-16 PROCEDURE — 94002 VENT MGMT INPAT INIT DAY: CPT

## 2024-12-16 PROCEDURE — 85049 AUTOMATED PLATELET COUNT: CPT | Performed by: THORACIC SURGERY (CARDIOTHORACIC VASCULAR SURGERY)

## 2024-12-16 PROCEDURE — 2500000004 HC RX 250 GENERAL PHARMACY W/ HCPCS (ALT 636 FOR OP/ED): Performed by: STUDENT IN AN ORGANIZED HEALTH CARE EDUCATION/TRAINING PROGRAM

## 2024-12-16 PROCEDURE — 2500000005 HC RX 250 GENERAL PHARMACY W/O HCPCS

## 2024-12-16 PROCEDURE — 2500000005 HC RX 250 GENERAL PHARMACY W/O HCPCS: Performed by: THORACIC SURGERY (CARDIOTHORACIC VASCULAR SURGERY)

## 2024-12-16 PROCEDURE — 82435 ASSAY OF BLOOD CHLORIDE: CPT | Performed by: NURSE PRACTITIONER

## 2024-12-16 PROCEDURE — S0017 INJECTION, AMINOCAPROIC ACID: HCPCS | Performed by: ANESTHESIOLOGIST ASSISTANT

## 2024-12-16 PROCEDURE — A4649 SURGICAL SUPPLIES: HCPCS | Performed by: THORACIC SURGERY (CARDIOTHORACIC VASCULAR SURGERY)

## 2024-12-16 PROCEDURE — 87081 CULTURE SCREEN ONLY: CPT | Mod: PARLAB | Performed by: NURSE PRACTITIONER

## 2024-12-16 PROCEDURE — 2020000001 HC ICU ROOM DAILY

## 2024-12-16 PROCEDURE — 85384 FIBRINOGEN ACTIVITY: CPT | Performed by: THORACIC SURGERY (CARDIOTHORACIC VASCULAR SURGERY)

## 2024-12-16 PROCEDURE — P9045 ALBUMIN (HUMAN), 5%, 250 ML: HCPCS | Mod: JZ | Performed by: ANESTHESIOLOGIST ASSISTANT

## 2024-12-16 PROCEDURE — 0PH000Z INSERTION OF RIGID PLATE INTERNAL FIXATION DEVICE INTO STERNUM, OPEN APPROACH: ICD-10-PCS | Performed by: THORACIC SURGERY (CARDIOTHORACIC VASCULAR SURGERY)

## 2024-12-16 PROCEDURE — 2500000005 HC RX 250 GENERAL PHARMACY W/O HCPCS: Performed by: ANESTHESIOLOGIST ASSISTANT

## 2024-12-16 PROCEDURE — 2500000004 HC RX 250 GENERAL PHARMACY W/ HCPCS (ALT 636 FOR OP/ED): Performed by: ANESTHESIOLOGIST ASSISTANT

## 2024-12-16 PROCEDURE — 71045 X-RAY EXAM CHEST 1 VIEW: CPT | Performed by: RADIOLOGY

## 2024-12-16 PROCEDURE — 02100Z9 BYPASS CORONARY ARTERY, ONE ARTERY FROM LEFT INTERNAL MAMMARY, OPEN APPROACH: ICD-10-PCS | Performed by: THORACIC SURGERY (CARDIOTHORACIC VASCULAR SURGERY)

## 2024-12-16 PROCEDURE — 021109W BYPASS CORONARY ARTERY, TWO ARTERIES FROM AORTA WITH AUTOLOGOUS VENOUS TISSUE, OPEN APPROACH: ICD-10-PCS | Performed by: THORACIC SURGERY (CARDIOTHORACIC VASCULAR SURGERY)

## 2024-12-16 PROCEDURE — 82435 ASSAY OF BLOOD CHLORIDE: CPT

## 2024-12-16 PROCEDURE — 85730 THROMBOPLASTIN TIME PARTIAL: CPT | Performed by: THORACIC SURGERY (CARDIOTHORACIC VASCULAR SURGERY)

## 2024-12-16 PROCEDURE — 33534 CABG ARTERIAL TWO: CPT | Performed by: THORACIC SURGERY (CARDIOTHORACIC VASCULAR SURGERY)

## 2024-12-16 PROCEDURE — 85384 FIBRINOGEN ACTIVITY: CPT | Performed by: NURSE PRACTITIONER

## 2024-12-16 PROCEDURE — 5A1221Z PERFORMANCE OF CARDIAC OUTPUT, CONTINUOUS: ICD-10-PCS | Performed by: THORACIC SURGERY (CARDIOTHORACIC VASCULAR SURGERY)

## 2024-12-16 PROCEDURE — 33518 CABG ARTERY-VEIN TWO: CPT | Performed by: THORACIC SURGERY (CARDIOTHORACIC VASCULAR SURGERY)

## 2024-12-16 PROCEDURE — 2780000003 HC OR 278 NO HCPCS: Performed by: THORACIC SURGERY (CARDIOTHORACIC VASCULAR SURGERY)

## 2024-12-16 PROCEDURE — 37799 UNLISTED PX VASCULAR SURGERY: CPT | Performed by: NURSE PRACTITIONER

## 2024-12-16 PROCEDURE — 99291 CRITICAL CARE FIRST HOUR: CPT | Performed by: NURSE PRACTITIONER

## 2024-12-16 PROCEDURE — 82947 ASSAY GLUCOSE BLOOD QUANT: CPT

## 2024-12-16 PROCEDURE — 83735 ASSAY OF MAGNESIUM: CPT | Performed by: THORACIC SURGERY (CARDIOTHORACIC VASCULAR SURGERY)

## 2024-12-16 PROCEDURE — 3600000018 HC OR TIME - INITIAL BASE CHARGE - PROCEDURE LEVEL SIX: Performed by: THORACIC SURGERY (CARDIOTHORACIC VASCULAR SURGERY)

## 2024-12-16 PROCEDURE — 2500000002 HC RX 250 W HCPCS SELF ADMINISTERED DRUGS (ALT 637 FOR MEDICARE OP, ALT 636 FOR OP/ED): Performed by: NURSE PRACTITIONER

## 2024-12-16 PROCEDURE — 82375 ASSAY CARBOXYHB QUANT: CPT

## 2024-12-16 PROCEDURE — 3600000011 HC PERFUSION TIME - INITIAL BASE CHARGE: Performed by: THORACIC SURGERY (CARDIOTHORACIC VASCULAR SURGERY)

## 2024-12-16 PROCEDURE — 2500000004 HC RX 250 GENERAL PHARMACY W/ HCPCS (ALT 636 FOR OP/ED): Performed by: THORACIC SURGERY (CARDIOTHORACIC VASCULAR SURGERY)

## 2024-12-16 PROCEDURE — 2500000005 HC RX 250 GENERAL PHARMACY W/O HCPCS: Performed by: NURSE PRACTITIONER

## 2024-12-16 PROCEDURE — 85610 PROTHROMBIN TIME: CPT | Performed by: NURSE PRACTITIONER

## 2024-12-16 PROCEDURE — 36600 WITHDRAWAL OF ARTERIAL BLOOD: CPT

## 2024-12-16 PROCEDURE — 99291 CRITICAL CARE FIRST HOUR: CPT

## 2024-12-16 PROCEDURE — 71045 X-RAY EXAM CHEST 1 VIEW: CPT

## 2024-12-16 PROCEDURE — 2500000002 HC RX 250 W HCPCS SELF ADMINISTERED DRUGS (ALT 637 FOR MEDICARE OP, ALT 636 FOR OP/ED): Performed by: ANESTHESIOLOGIST ASSISTANT

## 2024-12-16 PROCEDURE — P9047 ALBUMIN (HUMAN), 25%, 50ML: HCPCS | Mod: JZ | Performed by: THORACIC SURGERY (CARDIOTHORACIC VASCULAR SURGERY)

## 2024-12-16 PROCEDURE — 2500000004 HC RX 250 GENERAL PHARMACY W/ HCPCS (ALT 636 FOR OP/ED): Performed by: NURSE PRACTITIONER

## 2024-12-16 PROCEDURE — 3600000017 HC OR TIME - EACH INCREMENTAL 1 MINUTE - PROCEDURE LEVEL SIX: Performed by: THORACIC SURGERY (CARDIOTHORACIC VASCULAR SURGERY)

## 2024-12-16 PROCEDURE — 84484 ASSAY OF TROPONIN QUANT: CPT | Performed by: STUDENT IN AN ORGANIZED HEALTH CARE EDUCATION/TRAINING PROGRAM

## 2024-12-16 PROCEDURE — 2720000007 HC OR 272 NO HCPCS: Performed by: THORACIC SURGERY (CARDIOTHORACIC VASCULAR SURGERY)

## 2024-12-16 PROCEDURE — 3700000002 HC GENERAL ANESTHESIA TIME - EACH INCREMENTAL 1 MINUTE: Performed by: THORACIC SURGERY (CARDIOTHORACIC VASCULAR SURGERY)

## 2024-12-16 PROCEDURE — C1751 CATH, INF, PER/CENT/MIDLINE: HCPCS | Performed by: THORACIC SURGERY (CARDIOTHORACIC VASCULAR SURGERY)

## 2024-12-16 PROCEDURE — 93005 ELECTROCARDIOGRAM TRACING: CPT

## 2024-12-16 PROCEDURE — 2500000004 HC RX 250 GENERAL PHARMACY W/ HCPCS (ALT 636 FOR OP/ED)

## 2024-12-16 PROCEDURE — 85347 COAGULATION TIME ACTIVATED: CPT

## 2024-12-16 PROCEDURE — 2500000004 HC RX 250 GENERAL PHARMACY W/ HCPCS (ALT 636 FOR OP/ED): Performed by: ANESTHESIOLOGY

## 2024-12-16 PROCEDURE — 37799 UNLISTED PX VASCULAR SURGERY: CPT | Performed by: THORACIC SURGERY (CARDIOTHORACIC VASCULAR SURGERY)

## 2024-12-16 PROCEDURE — 3700000001 HC GENERAL ANESTHESIA TIME - INITIAL BASE CHARGE: Performed by: THORACIC SURGERY (CARDIOTHORACIC VASCULAR SURGERY)

## 2024-12-16 PROCEDURE — C1713 ANCHOR/SCREW BN/BN,TIS/BN: HCPCS | Performed by: THORACIC SURGERY (CARDIOTHORACIC VASCULAR SURGERY)

## 2024-12-16 PROCEDURE — P9047 ALBUMIN (HUMAN), 25%, 50ML: HCPCS

## 2024-12-16 PROCEDURE — 85027 COMPLETE CBC AUTOMATED: CPT | Performed by: NURSE PRACTITIONER

## 2024-12-16 PROCEDURE — 36415 COLL VENOUS BLD VENIPUNCTURE: CPT | Performed by: NURSE PRACTITIONER

## 2024-12-16 PROCEDURE — 99233 SBSQ HOSP IP/OBS HIGH 50: CPT | Performed by: NURSE PRACTITIONER

## 2024-12-16 PROCEDURE — 82330 ASSAY OF CALCIUM: CPT | Performed by: NURSE PRACTITIONER

## 2024-12-16 PROCEDURE — 99232 SBSQ HOSP IP/OBS MODERATE 35: CPT | Performed by: STUDENT IN AN ORGANIZED HEALTH CARE EDUCATION/TRAINING PROGRAM

## 2024-12-16 PROCEDURE — 2500000001 HC RX 250 WO HCPCS SELF ADMINISTERED DRUGS (ALT 637 FOR MEDICARE OP): Performed by: NURSE PRACTITIONER

## 2024-12-16 PROCEDURE — 84100 ASSAY OF PHOSPHORUS: CPT | Performed by: NURSE PRACTITIONER

## 2024-12-16 PROCEDURE — 33508 ENDOSCOPIC VEIN HARVEST: CPT | Performed by: THORACIC SURGERY (CARDIOTHORACIC VASCULAR SURGERY)

## 2024-12-16 PROCEDURE — A4312 CATH W/O BAG 2-WAY SILICONE: HCPCS | Performed by: THORACIC SURGERY (CARDIOTHORACIC VASCULAR SURGERY)

## 2024-12-16 PROCEDURE — 85610 PROTHROMBIN TIME: CPT | Performed by: THORACIC SURGERY (CARDIOTHORACIC VASCULAR SURGERY)

## 2024-12-16 PROCEDURE — 06BP3ZZ EXCISION OF RIGHT SAPHENOUS VEIN, PERCUTANEOUS APPROACH: ICD-10-PCS | Performed by: THORACIC SURGERY (CARDIOTHORACIC VASCULAR SURGERY)

## 2024-12-16 PROCEDURE — 3600000012 HC PERFUSION TIME - EACH INCREMENTAL 1 MINUTE: Performed by: THORACIC SURGERY (CARDIOTHORACIC VASCULAR SURGERY)

## 2024-12-16 PROCEDURE — 02100Z8 BYPASS CORONARY ARTERY, ONE ARTERY FROM RIGHT INTERNAL MAMMARY, OPEN APPROACH: ICD-10-PCS | Performed by: THORACIC SURGERY (CARDIOTHORACIC VASCULAR SURGERY)

## 2024-12-16 DEVICE — SCREW, SD LOCKING, 2.3 X 18MM: Type: IMPLANTABLE DEVICE | Site: STERNUM | Status: FUNCTIONAL

## 2024-12-16 DEVICE — PLATE, LP MANUBRIUM: Type: IMPLANTABLE DEVICE | Site: STERNUM | Status: FUNCTIONAL

## 2024-12-16 DEVICE — SCREW, SD LOCKING, 2.3 X 14MM: Type: IMPLANTABLE DEVICE | Site: STERNUM | Status: FUNCTIONAL

## 2024-12-16 DEVICE — SCREW, SD LOCKING, 2.3 X 12MM: Type: IMPLANTABLE DEVICE | Site: STERNUM | Status: FUNCTIONAL

## 2024-12-16 DEVICE — X-PLATE, LOW PROFILE: Type: IMPLANTABLE DEVICE | Site: STERNUM | Status: FUNCTIONAL

## 2024-12-16 DEVICE — PLATE, LP BOX: Type: IMPLANTABLE DEVICE | Site: STERNUM | Status: FUNCTIONAL

## 2024-12-16 RX ORDER — FENTANYL CITRATE 50 UG/ML
25 INJECTION, SOLUTION INTRAMUSCULAR; INTRAVENOUS
Status: DISCONTINUED | OUTPATIENT
Start: 2024-12-16 | End: 2024-12-20 | Stop reason: HOSPADM

## 2024-12-16 RX ORDER — MIDAZOLAM HYDROCHLORIDE 1 MG/ML
INJECTION, SOLUTION INTRAMUSCULAR; INTRAVENOUS AS NEEDED
Status: DISCONTINUED | OUTPATIENT
Start: 2024-12-16 | End: 2024-12-16

## 2024-12-16 RX ORDER — POTASSIUM CHLORIDE 29.8 MG/ML
40 INJECTION INTRAVENOUS EVERY 2 HOUR PRN
Status: DISCONTINUED | OUTPATIENT
Start: 2024-12-16 | End: 2024-12-20 | Stop reason: HOSPADM

## 2024-12-16 RX ORDER — FENTANYL CITRATE 50 UG/ML
INJECTION, SOLUTION INTRAMUSCULAR; INTRAVENOUS AS NEEDED
Status: DISCONTINUED | OUTPATIENT
Start: 2024-12-16 | End: 2024-12-16

## 2024-12-16 RX ORDER — OXYCODONE HYDROCHLORIDE 5 MG/1
5 TABLET ORAL EVERY 4 HOURS PRN
Status: DISCONTINUED | OUTPATIENT
Start: 2024-12-16 | End: 2024-12-20 | Stop reason: HOSPADM

## 2024-12-16 RX ORDER — NITROGLYCERIN 20 MG/100ML
INJECTION INTRAVENOUS CONTINUOUS PRN
Status: DISCONTINUED | OUTPATIENT
Start: 2024-12-16 | End: 2024-12-16

## 2024-12-16 RX ORDER — SODIUM CHLORIDE 0.9 G/100ML
IRRIGANT IRRIGATION AS NEEDED
Status: DISCONTINUED | OUTPATIENT
Start: 2024-12-16 | End: 2024-12-16 | Stop reason: HOSPADM

## 2024-12-16 RX ORDER — NOREPINEPHRINE BITARTRATE 0.03 MG/ML
.01-1 INJECTION, SOLUTION INTRAVENOUS CONTINUOUS
Status: DISCONTINUED | OUTPATIENT
Start: 2024-12-16 | End: 2024-12-17

## 2024-12-16 RX ORDER — ALBUMIN HUMAN 50 G/1000ML
12.5 SOLUTION INTRAVENOUS
Status: DISPENSED | OUTPATIENT
Start: 2024-12-16 | End: 2024-12-16

## 2024-12-16 RX ORDER — DEXTROSE 50 % IN WATER (D50W) INTRAVENOUS SYRINGE
Status: COMPLETED
Start: 2024-12-16 | End: 2024-12-16

## 2024-12-16 RX ORDER — NALOXONE HYDROCHLORIDE 1 MG/ML
0.2 INJECTION INTRAMUSCULAR; INTRAVENOUS; SUBCUTANEOUS EVERY 5 MIN PRN
Status: DISCONTINUED | OUTPATIENT
Start: 2024-12-16 | End: 2024-12-20 | Stop reason: HOSPADM

## 2024-12-16 RX ORDER — ROCURONIUM BROMIDE 10 MG/ML
INJECTION, SOLUTION INTRAVENOUS AS NEEDED
Status: DISCONTINUED | OUTPATIENT
Start: 2024-12-16 | End: 2024-12-16

## 2024-12-16 RX ORDER — CYCLOBENZAPRINE HCL 10 MG
5 TABLET ORAL EVERY 8 HOURS
Status: DISCONTINUED | OUTPATIENT
Start: 2024-12-16 | End: 2024-12-20 | Stop reason: HOSPADM

## 2024-12-16 RX ORDER — PANTOPRAZOLE SODIUM 40 MG/1
40 TABLET, DELAYED RELEASE ORAL
Status: DISCONTINUED | OUTPATIENT
Start: 2024-12-17 | End: 2024-12-17

## 2024-12-16 RX ORDER — PAPAVERINE HYDROCHLORIDE 30 MG/ML
INJECTION INTRAMUSCULAR; INTRAVENOUS AS NEEDED
Status: DISCONTINUED | OUTPATIENT
Start: 2024-12-16 | End: 2024-12-16 | Stop reason: HOSPADM

## 2024-12-16 RX ORDER — POTASSIUM CHLORIDE 1.5 G/1.58G
20 POWDER, FOR SOLUTION ORAL EVERY 6 HOURS PRN
Status: DISCONTINUED | OUTPATIENT
Start: 2024-12-16 | End: 2024-12-20 | Stop reason: HOSPADM

## 2024-12-16 RX ORDER — MAGNESIUM SULFATE HEPTAHYDRATE 40 MG/ML
4 INJECTION, SOLUTION INTRAVENOUS EVERY 6 HOURS PRN
Status: DISCONTINUED | OUTPATIENT
Start: 2024-12-16 | End: 2024-12-20 | Stop reason: HOSPADM

## 2024-12-16 RX ORDER — ACETAMINOPHEN 325 MG/1
650 TABLET ORAL EVERY 6 HOURS
Status: DISCONTINUED | OUTPATIENT
Start: 2024-12-16 | End: 2024-12-20 | Stop reason: HOSPADM

## 2024-12-16 RX ORDER — CALCIUM CHLORIDE INJECTION 100 MG/ML
INJECTION, SOLUTION INTRAVENOUS AS NEEDED
Status: DISCONTINUED | OUTPATIENT
Start: 2024-12-16 | End: 2024-12-16

## 2024-12-16 RX ORDER — HEPARIN SODIUM 10000 [USP'U]/ML
INJECTION, SOLUTION INTRAVENOUS; SUBCUTANEOUS AS NEEDED
Status: DISCONTINUED | OUTPATIENT
Start: 2024-12-16 | End: 2024-12-16

## 2024-12-16 RX ORDER — NORETHINDRONE AND ETHINYL ESTRADIOL 0.5-0.035
KIT ORAL CONTINUOUS PRN
Status: DISCONTINUED | OUTPATIENT
Start: 2024-12-16 | End: 2024-12-16

## 2024-12-16 RX ORDER — MIDAZOLAM HYDROCHLORIDE 1 MG/ML
INJECTION, SOLUTION INTRAMUSCULAR; INTRAVENOUS
Status: DISPENSED
Start: 2024-12-16 | End: 2024-12-16

## 2024-12-16 RX ORDER — POTASSIUM CHLORIDE 14.9 MG/ML
20 INJECTION INTRAVENOUS
Status: DISCONTINUED | OUTPATIENT
Start: 2024-12-16 | End: 2024-12-20 | Stop reason: HOSPADM

## 2024-12-16 RX ORDER — ALBUMIN HUMAN 50 G/1000ML
SOLUTION INTRAVENOUS AS NEEDED
Status: DISCONTINUED | OUTPATIENT
Start: 2024-12-16 | End: 2024-12-16

## 2024-12-16 RX ORDER — MAGNESIUM SULFATE HEPTAHYDRATE 40 MG/ML
2 INJECTION, SOLUTION INTRAVENOUS EVERY 6 HOURS PRN
Status: DISCONTINUED | OUTPATIENT
Start: 2024-12-16 | End: 2024-12-20 | Stop reason: HOSPADM

## 2024-12-16 RX ORDER — NITROGLYCERIN 40 MG/100ML
INJECTION INTRAVENOUS AS NEEDED
Status: DISCONTINUED | OUTPATIENT
Start: 2024-12-16 | End: 2024-12-16

## 2024-12-16 RX ORDER — ENOXAPARIN SODIUM 100 MG/ML
40 INJECTION SUBCUTANEOUS DAILY
Status: DISCONTINUED | OUTPATIENT
Start: 2024-12-17 | End: 2024-12-20 | Stop reason: HOSPADM

## 2024-12-16 RX ORDER — FENTANYL CITRATE 50 UG/ML
50 INJECTION, SOLUTION INTRAMUSCULAR; INTRAVENOUS
Status: DISCONTINUED | OUTPATIENT
Start: 2024-12-16 | End: 2024-12-20 | Stop reason: HOSPADM

## 2024-12-16 RX ORDER — GABAPENTIN 100 MG/1
100 CAPSULE ORAL 3 TIMES DAILY
Status: DISCONTINUED | OUTPATIENT
Start: 2024-12-16 | End: 2024-12-18

## 2024-12-16 RX ORDER — PROTAMINE SULFATE 10 MG/ML
INJECTION, SOLUTION INTRAVENOUS AS NEEDED
Status: DISCONTINUED | OUTPATIENT
Start: 2024-12-16 | End: 2024-12-16

## 2024-12-16 RX ORDER — AMINOCAPROIC ACID 250 MG/ML
INJECTION, SOLUTION INTRAVENOUS AS NEEDED
Status: DISCONTINUED | OUTPATIENT
Start: 2024-12-16 | End: 2024-12-16

## 2024-12-16 RX ORDER — LANOLIN ALCOHOL/MO/W.PET/CERES
400 CREAM (GRAM) TOPICAL DAILY
Status: DISCONTINUED | OUTPATIENT
Start: 2024-12-16 | End: 2024-12-20 | Stop reason: HOSPADM

## 2024-12-16 RX ORDER — PANTOPRAZOLE SODIUM 40 MG/10ML
40 INJECTION, POWDER, LYOPHILIZED, FOR SOLUTION INTRAVENOUS
Status: DISCONTINUED | OUTPATIENT
Start: 2024-12-17 | End: 2024-12-17

## 2024-12-16 RX ORDER — DEXMEDETOMIDINE HYDROCHLORIDE 4 UG/ML
INJECTION, SOLUTION INTRAVENOUS CONTINUOUS PRN
Status: DISCONTINUED | OUTPATIENT
Start: 2024-12-16 | End: 2024-12-16

## 2024-12-16 RX ORDER — EPINEPHRINE 1 MG/ML
10 INJECTION INTRAMUSCULAR; INTRAVENOUS; SUBCUTANEOUS CONTINUOUS
Status: DISCONTINUED | OUTPATIENT
Start: 2024-12-16 | End: 2024-12-17

## 2024-12-16 RX ORDER — INSULIN LISPRO 100 [IU]/ML
0-15 INJECTION, SOLUTION INTRAVENOUS; SUBCUTANEOUS EVERY 4 HOURS
Status: DISCONTINUED | OUTPATIENT
Start: 2024-12-16 | End: 2024-12-18

## 2024-12-16 RX ORDER — HEPARIN SODIUM 1000 [USP'U]/ML
INJECTION, SOLUTION INTRAVENOUS; SUBCUTANEOUS AS NEEDED
Status: DISCONTINUED | OUTPATIENT
Start: 2024-12-16 | End: 2024-12-16

## 2024-12-16 RX ORDER — ATORVASTATIN CALCIUM 80 MG/1
80 TABLET, FILM COATED ORAL NIGHTLY
Status: DISCONTINUED | OUTPATIENT
Start: 2024-12-16 | End: 2024-12-20 | Stop reason: HOSPADM

## 2024-12-16 RX ORDER — PHENYLEPHRINE 10 MG/250 ML(40 MCG/ML)IN 0.9 % SOD.CHLORIDE INTRAVENOUS
0.5 CONTINUOUS
Status: DISCONTINUED | OUTPATIENT
Start: 2024-12-16 | End: 2024-12-17

## 2024-12-16 RX ORDER — PHENYLEPHRINE HYDROCHLORIDE 10 MG/ML
INJECTION INTRAVENOUS AS NEEDED
Status: DISCONTINUED | OUTPATIENT
Start: 2024-12-16 | End: 2024-12-16

## 2024-12-16 RX ORDER — POTASSIUM CHLORIDE 20 MEQ/1
20 TABLET, EXTENDED RELEASE ORAL EVERY 6 HOURS PRN
Status: DISCONTINUED | OUTPATIENT
Start: 2024-12-16 | End: 2024-12-20 | Stop reason: HOSPADM

## 2024-12-16 RX ORDER — FENTANYL CITRATE 50 UG/ML
INJECTION, SOLUTION INTRAMUSCULAR; INTRAVENOUS
Status: DISPENSED
Start: 2024-12-16 | End: 2024-12-16

## 2024-12-16 RX ORDER — MUPIROCIN 20 MG/G
OINTMENT TOPICAL 3 TIMES DAILY
Status: DISCONTINUED | OUTPATIENT
Start: 2024-12-16 | End: 2024-12-18

## 2024-12-16 RX ORDER — POTASSIUM CHLORIDE 1.5 G/1.58G
40 POWDER, FOR SOLUTION ORAL EVERY 6 HOURS PRN
Status: DISCONTINUED | OUTPATIENT
Start: 2024-12-16 | End: 2024-12-20 | Stop reason: HOSPADM

## 2024-12-16 RX ORDER — OXYCODONE HYDROCHLORIDE 5 MG/1
10 TABLET ORAL EVERY 4 HOURS PRN
Status: DISCONTINUED | OUTPATIENT
Start: 2024-12-16 | End: 2024-12-20 | Stop reason: HOSPADM

## 2024-12-16 RX ORDER — ALBUMIN HUMAN 50 G/1000ML
12.5 SOLUTION INTRAVENOUS AS NEEDED
Status: DISCONTINUED | OUTPATIENT
Start: 2024-12-16 | End: 2024-12-20 | Stop reason: HOSPADM

## 2024-12-16 RX ORDER — CEFAZOLIN SODIUM 2 G/100ML
2 INJECTION, SOLUTION INTRAVENOUS EVERY 8 HOURS
Status: DISPENSED | OUTPATIENT
Start: 2024-12-16 | End: 2024-12-18

## 2024-12-16 RX ORDER — CEFAZOLIN SODIUM 2 G/100ML
2 INJECTION, SOLUTION INTRAVENOUS ONCE
Status: DISCONTINUED | OUTPATIENT
Start: 2024-12-16 | End: 2024-12-16

## 2024-12-16 RX ORDER — LIDOCAINE 560 MG/1
1 PATCH PERCUTANEOUS; TOPICAL; TRANSDERMAL EVERY 24 HOURS
Status: DISCONTINUED | OUTPATIENT
Start: 2024-12-16 | End: 2024-12-20 | Stop reason: HOSPADM

## 2024-12-16 RX ORDER — DEXMEDETOMIDINE HYDROCHLORIDE 4 UG/ML
0-1.5 INJECTION, SOLUTION INTRAVENOUS CONTINUOUS
Status: DISCONTINUED | OUTPATIENT
Start: 2024-12-16 | End: 2024-12-17

## 2024-12-16 RX ORDER — CEFAZOLIN 1 G/1
INJECTION, POWDER, FOR SOLUTION INTRAVENOUS AS NEEDED
Status: DISCONTINUED | OUTPATIENT
Start: 2024-12-16 | End: 2024-12-16

## 2024-12-16 RX ORDER — POTASSIUM CHLORIDE 20 MEQ/1
40 TABLET, EXTENDED RELEASE ORAL EVERY 6 HOURS PRN
Status: DISCONTINUED | OUTPATIENT
Start: 2024-12-16 | End: 2024-12-20 | Stop reason: HOSPADM

## 2024-12-16 RX ORDER — VANCOMYCIN HYDROCHLORIDE 1 G/20ML
INJECTION, POWDER, LYOPHILIZED, FOR SOLUTION INTRAVENOUS AS NEEDED
Status: DISCONTINUED | OUTPATIENT
Start: 2024-12-16 | End: 2024-12-16 | Stop reason: HOSPADM

## 2024-12-16 RX ORDER — AMOXICILLIN 250 MG
2 CAPSULE ORAL 2 TIMES DAILY
Status: DISCONTINUED | OUTPATIENT
Start: 2024-12-16 | End: 2024-12-20 | Stop reason: HOSPADM

## 2024-12-16 RX ORDER — ETOMIDATE 2 MG/ML
INJECTION INTRAVENOUS AS NEEDED
Status: DISCONTINUED | OUTPATIENT
Start: 2024-12-16 | End: 2024-12-16

## 2024-12-16 SDOH — HEALTH STABILITY: MENTAL HEALTH: CURRENT SMOKER: 0

## 2024-12-16 ASSESSMENT — ENCOUNTER SYMPTOMS
FREQUENCY: 0
NAIL CHANGES: 0
DYSPNEA ON EXERTION: 0
ORTHOPNEA: 0
ALTERED MENTAL STATUS: 0
COUGH: 0
MYALGIAS: 0
IRREGULAR HEARTBEAT: 0
BLURRED VISION: 0
LIGHT-HEADEDNESS: 1
NAUSEA: 0
BLOATING: 0
PALPITATIONS: 0
DOUBLE VISION: 0
POOR WOUND HEALING: 0
STIFFNESS: 0
FOCAL WEAKNESS: 0
SHORTNESS OF BREATH: 0
DECREASED APPETITE: 0
MUSCLE CRAMPS: 0
VOMITING: 0
DIZZINESS: 0
WEAKNESS: 0

## 2024-12-16 ASSESSMENT — PAIN SCALES - GENERAL
PAINLEVEL_OUTOF10: 0 - NO PAIN
PAINLEVEL_OUTOF10: 0 - NO PAIN
PAINLEVEL_OUTOF10: 3
PAINLEVEL_OUTOF10: 0 - NO PAIN
PAINLEVEL_OUTOF10: 6
PAINLEVEL_OUTOF10: 7
PAINLEVEL_OUTOF10: 0 - NO PAIN
PAIN_LEVEL: 0
PAINLEVEL_OUTOF10: 2
PAINLEVEL_OUTOF10: 7
PAINLEVEL_OUTOF10: 10 - WORST POSSIBLE PAIN
PAINLEVEL_OUTOF10: 0 - NO PAIN
PAINLEVEL_OUTOF10: 2
PAINLEVEL_OUTOF10: 4
PAINLEVEL_OUTOF10: 9

## 2024-12-16 ASSESSMENT — COGNITIVE AND FUNCTIONAL STATUS - GENERAL
DAILY ACTIVITIY SCORE: 21
DRESSING REGULAR UPPER BODY CLOTHING: A LITTLE
HELP NEEDED FOR BATHING: A LITTLE
WALKING IN HOSPITAL ROOM: A LOT
MOVING TO AND FROM BED TO CHAIR: A LOT
CLIMB 3 TO 5 STEPS WITH RAILING: TOTAL
MOVING FROM LYING ON BACK TO SITTING ON SIDE OF FLAT BED WITH BEDRAILS: A LITTLE
DRESSING REGULAR LOWER BODY CLOTHING: A LITTLE
TURNING FROM BACK TO SIDE WHILE IN FLAT BAD: A LITTLE
MOBILITY SCORE: 13
STANDING UP FROM CHAIR USING ARMS: A LOT

## 2024-12-16 ASSESSMENT — PAIN DESCRIPTION - DESCRIPTORS
DESCRIPTORS: ACHING

## 2024-12-16 ASSESSMENT — PAIN DESCRIPTION - LOCATION: LOCATION: SHOULDER

## 2024-12-16 NOTE — CARE PLAN
The clinical goals for the shift include pt. SBP < 140 and remain free from chest pain this shift      Problem: Pain - Adult  Goal: Verbalizes/displays adequate comfort level or baseline comfort level  12/15/2024 2046 by Gillian Masterson RN  Outcome: Progressing    Problem: Fall/Injury  Goal: Pace activities to prevent fatigue by end of the shift  12/15/2024 2046 by Gillian Masterson RN  Outcome: Progressing    Problem: Diabetes  Goal: Maintain glucose levels >70mg/dl to <250mg/dl throughout shift  12/15/2024 2046 by Gillian Masterson RN  Outcome: Progressing    Problem: ACS/CP/NSTEMI/STEMI  Goal: Chest pain managed (free from pain or at acceptable level)  12/15/2024 2046 by Gillian Masterson RN  Outcome: Progressing     Problem: Pain  Goal: Free from opioid side effects throughout the shift  12/15/2024 2046 by Gillian Masterson RN  Outcome: Progressing

## 2024-12-16 NOTE — ANESTHESIA PROCEDURE NOTES
Arterial Line:      Staffing  Performed: attending   Authorized by: Gallo Hernandez MD    Performed by: MONIQUE Stevens    An arterial line was placed.  for the following indication(s): continuous blood pressure monitoring and blood sampling needed.    A 20 gauge (size), 5 cm (length), Arrow (type) catheter was placed into the Right radial artery, secured by Tegaderm,   Seldinger technique used.  Events:  patient tolerated procedure well with no complications.

## 2024-12-16 NOTE — CONSULTS
PATIENT NAME: Nader Zazueta  MRN: [id number]    SERVICE DATE:  12/16/2024  SERVICE TIME:  4:45 PM    Nader Zazueta is a 59 y.o. male on day 4 of admission presenting with NSTEMI (non-ST elevated myocardial infarction) (Multi).      HPI  59-year-old male with past medical Hx HTN, HLD, CKD (s/p nephrectomy due to RCC) comes to [facility] from home on 12/12 for CP.  Patient underwent LHC same day and revealed triple-vessel disease   Last TTE 50-55% EF 12/12/2024  Patient underwent CABG x 4 (LIMA->LAD, free INDIGO from LIMA->DIAG 1, SVG->OM1->PDA) 12/16 by Dr. Mendenhall  ICU consulted for postop respiratory insufficiency.    Past Medical History:   #HTN/HLD  #T2DM  #Hx incisional hernia  #Hx CKD (solitary kidney 2/2 RCC) s/p nephrectomy    Surgical History:  Back surgery    Social History:  Non-smoker    Family History:  Diabetes-mother  COPD-father      OBJECTIVE    Vitals:    12/16/24 0936 12/16/24 0945 12/16/24 1000 12/16/24 1630   BP:       BP Location:       Patient Position:       Pulse:  72 83 81   Resp:  22 21    Temp:       TempSrc:       SpO2:  97% 97% 100%   Weight: 99.2 kg (218 lb 9.6 oz)      Height:          Results from last 7 days   Lab Units 12/16/24  1430 12/16/24  0733 12/13/24  0349 12/12/24  0453   WBC AUTO x10*3/uL  --  12.3*   < > 8.8   HEMOGLOBIN g/dL  --  16.0   < > 14.9   HEMATOCRIT %  --  44.7   < > 42.2   PLATELETS AUTO x10*3/uL 167 254   < > 209   NEUTROS PCT AUTO %  --   --   --  58.5   LYMPHS PCT AUTO %  --   --   --  29.2   MONOS PCT AUTO %  --   --   --  7.3   EOS PCT AUTO %  --   --   --  3.4    < > = values in this interval not displayed.     Results from last 7 days   Lab Units 12/16/24  0733 12/13/24  0349 12/12/24  0453   SODIUM mmol/L 133*   < > 135*   POTASSIUM mmol/L 4.4   < > 4.1   CHLORIDE mmol/L 101   < > 103   CO2 mmol/L 18*   < > 20*   BUN mg/dL 30*   < > 25*   CREATININE mg/dL 1.62*   < > 1.41*   CALCIUM mg/dL 10.2   < > 9.7   PROTEIN TOTAL g/dL  --   --  7.4   BILIRUBIN TOTAL  mg/dL  --   --  0.6   ALK PHOS U/L  --   --  40   ALT U/L  --   --  38   AST U/L  --   --  22   GLUCOSE mg/dL 255*   < > 210*    < > = values in this interval not displayed.       Scheduled Medications  acetaminophen, 650 mg, oral, q6h  aspirin, 81 mg, oral, Daily  atorvastatin, 80 mg, oral, Nightly  ceFAZolin, 2 g, intravenous, q8h  cyclobenzaprine, 5 mg, oral, q8h  dextrose, , ,   [START ON 12/17/2024] enoxaparin, 40 mg, subcutaneous, Daily  fentaNYL PF, , ,   gabapentin, 100 mg, oral, TID  lidocaine, 1 patch, transdermal, q24h  magnesium oxide, 400 mg, oral, Daily  midazolam, , ,   oxygen, , inhalation, Continuous - Inhalation  [START ON 12/17/2024] pantoprazole, 40 mg, oral, Daily before breakfast   Or  [START ON 12/17/2024] pantoprazole, 40 mg, intravenous, Daily before breakfast  psyllium, 1 packet, oral, Daily  sennosides-docusate sodium, 2 tablet, oral, BID           Physical Exam   General: Intubated laying in bed, appears comfortable  HEENT:  Normocephalic, atraumatic  Chest:  Clear to auscultation. Bilateral and appropriate chest rise  CV:  Regular rate and rhythm.    Extremities:  No lower extremity edema or cyanosis.   Skin:  Warm and dry.      Assessment & Plan  59-year-old male with past medical Hx HTN, HLD, CKD (s/p nephrectomy due to RCC) comes to [facility] from home on 12/12 for CP.  Patient underwent LHC same day and revealed triple-vessel disease   Last TTE 50-55% EF 12/12/2024  Patient underwent CABG x 4 (LIMA->LAD, free INDIGO from LIMA->DIAG 1, SVG->OM1->PDA) 12/16 by Dr. Mendenhall  ICU consulted for postop respiratory insufficiency.    Neurological System:  Postoperative pain  - Analgesia: PRN Tylenol, Oxycodone, Fentanyl per CTS  - No new neurological issues expected  - Maintain normal sleep/wake cycle  - Avoid oversedating patient    Cardiovascular System:  CAD s/p CABG 12/16  Hx essential HTN/HLD  Postop hypotension, as expected  - CABG x 4 (LIMA->LAD, free INDIGO from LIMA->DIAG 1, SVG->OM1->PDA)    - Last TTE 50-55% EF 12/12/2024  - Patient with postop hypotension after CABG surgery, expected  - Pressors wean as tolerated; levo, epi, phenylephrine.  Maintain MAP >70, wean as tolerated.  - On ASA and statin. Resume BB as tolerated.  Home metoprolol succinate 25 mg daily, amlodipine 10 mg daily  - Diuresis per CTS. Keep Mg>2, K>4.    Respiratory System:  Postop respiratory insufficiency, as expected  - Patient extubated on ###. Wean as tolerated, maintain O2 sats>92%.  - Chest tube management per CTS  - CXR shows post CABG changes, central lines, clips. Atelectasis.  - Encourage incentive spirometer use q1  - Daily CXR    Gastrointestinal System:  - Diet: NPO advance per CTS  - Prophylaxis: Protonix 40 mg daily  - No expected issues    Endocrine System:  Hx T2DM  - A1c 8.7%  - SSI, maintain BG between 140-180s    Renal System:  Hx CKD 3B (baseline 1.4-1.5)  Hx solitary kidney 2/2 RCC s/p nephrectomy  -Trend renal panel, replete electrolytes PRN    Hematological System:  Postop anemia, as expected  - Trend H&H    Infection Disease System:  Postop leukocytosis, reactive  - Prophylactic antibiotics per CTS    Skin/MSK:  No active issues.    Vent/O2: Ventilator  Lines/Devices: PIV's  Drips: Levo, epi, phenylephrine  ATBx: Cefazolin  Diet: N.p.o.  IVF: None  PPX: Protonix  Code: Full  Dispo: KATEU      Dr. Jason Stringer  Internal Medicine PGY-2

## 2024-12-16 NOTE — CARE PLAN
The patient's goals for the shift include      The clinical goals for the shift include pt will remain hemodynamically stable      Problem: Pain - Adult  Goal: Verbalizes/displays adequate comfort level or baseline comfort level  Outcome: Progressing     Problem: Safety - Adult  Goal: Free from fall injury  Outcome: Progressing     Problem: Discharge Planning  Goal: Discharge to home or other facility with appropriate resources  Outcome: Progressing     Problem: Chronic Conditions and Co-morbidities  Goal: Patient's chronic conditions and co-morbidity symptoms are monitored and maintained or improved  Outcome: Progressing     Problem: Fall/Injury  Goal: Not fall by end of shift  Outcome: Progressing  Goal: Be free from injury by end of the shift  Outcome: Progressing  Goal: Verbalize understanding of personal risk factors for fall in the hospital  Outcome: Progressing  Goal: Verbalize understanding of risk factor reduction measures to prevent injury from fall in the home  Outcome: Progressing  Goal: Pace activities to prevent fatigue by end of the shift  Outcome: Progressing  Goal: Use assistive devices by end of the shift  Reactivated     Problem: Diabetes  Goal: Increase stability of blood glucose readings by end of shift  Outcome: Progressing  Goal: Maintain glucose levels >70mg/dl to <250mg/dl throughout shift  Outcome: Progressing  Goal: Learn about and adhere to nutrition recommendations by end of shift  Outcome: Progressing  Goal: Increase self care and/or family involovement by end of shift  Outcome: Progressing     Problem: Pain  Goal: Takes deep breaths with improved pain control throughout the shift  Outcome: Progressing  Goal: Performs ADL's with improved pain control throughout shift  Outcome: Progressing  Goal: Participates in PT with improved pain control throughout the shift  Outcome: Progressing  Goal: Free from opioid side effects throughout the shift  Outcome: Progressing  Goal: Free from acute  confusion related to pain meds throughout the shift  Outcome: Progressing     Problem: Nutrition  Goal: Oral intake greater 75%  Outcome: Progressing  Goal: Adequate PO fluid intake  Outcome: Progressing  Goal: Electrolytes WNL  Outcome: Progressing     Problem: ACS/CP/NSTEMI/STEMI  Goal: Chest pain managed (free from pain or at acceptable level)  Outcome: Progressing  Goal: Serial ECG will return to baseline  Outcome: Progressing  Goal: Verbalize understanding of procedures/devices  Outcome: Progressing     Problem: Cardiac catheterization  Goal: Free from dysrhythmias  Outcome: Progressing  Goal: Free from pain  Outcome: Progressing  Goal: No evidence of post procedure complications  Outcome: Progressing  Goal: Promote self management  Outcome: Progressing  Goal: Verbalize understanding of procedure  Outcome: Progressing     Problem: Meds/Post-op Pain  Goal: Pain controlled to tolerate pain level  Reactivated  Goal: Tolerates prescribed medication  Reactivated     Problem: DVT/VTE Prevention/Activity  Goal: No decrease in circulation/sensation  Reactivated  Goal: Prevent skin breakdown  Reactivated  Goal: Return to preop oxygenation status  Reactivated  Goal: Tolerates optimal activity  Reactivated  Goal: Increase self care and/or family involvement in 24 hrs.  Reactivated     Problem: Wound care/infection prevention  Goal: No signs of infection in 24 hrs.  Reactivated  Goal: No unexpected bleeding from incision this shift  Reactivated     Problem: Diet/fluid balance  Goal: Adequate urinary output  Reactivated  Goal: Free from nausea/vomiting  Reactivated  Goal: Return in bowel function  Reactivated  Goal: Tolerates prescribed diet  Reactivated     Problem: Other goals  Goal: No change in neurological status  Reactivated  Goal: Stabilize vital signs (return to 10% of baseline)  Reactivated

## 2024-12-16 NOTE — ANESTHESIA PROCEDURE NOTES
Central Venous Line:    Date/Time: 12/16/2024 10:40 AM    A central venous line was placed in the OR for the following indication(s): central venous access and CVP monitoring.  Staffing  Performed: attending   Authorized by: Gallo Hernandez MD    Performed by: MONIQUE Stevens    Sterility preparation included the following: provider hand hygiene performed prior to central venous catheter insertion, all 5 sterile barriers used (gloves, gown, cap, mask, large sterile drape) during central venous catheter insertion, antiseptic used during central venous catheter insertion and skin prep agent completely dried prior to procedure.  The patient was placed in supine position.    Right internal jugular vein was prepped.    The site was prepped with Chlorhexidine.  Size: 7 Fr   Length: 15  Catheter type: introducer   Number of Lumens: triple lumen    This catheter was not an oximetric catheter.    During the procedure, the following specific steps were taken: target vein identified, needle advanced into vein and blood aspirated and guidewire advanced into vein.  Seldinger technique used.  Procedure performed using ultrasound guidance.  Sterile gel and probe cover used in ultrasound-guided central venous catheter insertion.    Intravenous verification was obtained by ultrasound and venous blood return.      Post insertion care included: all ports aspirated, all ports flushed easily, guidewire removed intact, Biopatch applied, line sutured in place and dressing applied.    During the procedure the patient experienced: patient tolerated procedure well with no complications.

## 2024-12-16 NOTE — BRIEF OP NOTE
Date: 2024 - 2024  OR Location: [facility]    Name: Nader Zazueta, : 1965, Age: 59 y.o., MRN: [id number], Sex: male    Diagnosis  Pre-op Diagnosis      * NSTEMI (non-ST elevated myocardial infarction) (Multi) [I21.4]     * Chronic coronary microvascular dysfunction [I25.85] Post-op Diagnosis     * NSTEMI (non-ST elevated myocardial infarction) (Multi) [I21.4]     * Chronic coronary microvascular dysfunction [I25.85]     Procedures  CORONARY ARTERY BYPASS GRAFT (CABG X4) LEFT INTERNAL MAMMARY ARTERY/ AND VEIN  29348 - MI CORONARY ARTERY BYP W/VEIN & ARTERY GRAFT 2 VEIN    Median sternotomy  Central cannulation  EVH right greater saphenous vein  CABG x 4 (LIMA->LAD, free INDIGO from LIMA->DIAG 1, SVG->OM1->PDA)  Sternal plating with Nehal plates    Chest Tubes: 3 (bilateral pleural, mediastinal x1)       Temporary Pacing Wires: V wires    Permanent pacer/ICD: No     Sternotomy performed by: Dr Mendenhall    Conduit Harvested by: Pesek CSA    Sternal Wires placed by: Pesek CSA    Closure performed by: right leg - Pesek CSA, chest closure - Pesek CSA & Brit RNFA    Cardio Pulmonary Bypass Time: 94min  Cross-clamp Time: 85min  Circulatory Arrest: No Time:     Surgeons      * Anjali Alba - Primary    Resident/Fellow/Other Assistant:  Surgeons and Role:  * No surgeons found with a matching role *  Brit RNFA  Pesek CSA  Swathi CSFA      Staff:   Circulator: Yoanna Foxub Person: Sharee  Surgical Assistant: Renaldo  Surgical Assistant: Tee Hammer Scrub: Savanah Hammer Circulator: Pascale  Surgical Assistant: Lynda Hammer Circulator: Aniya    Anesthesia Staff: Anesthesiologist: MD IFRAH Arthur-AA: MONIQUE Stevens  Perfusionist: Brandon Reno    Procedure Summary  Anesthesia: Anesthesia type not filed in the log.  ASA: ASA status not filed in the log.  Estimated Blood Loss: 250mL  Intra-op Medications: * Intraprocedure medication information is  unavailable because the case start and end events have not been set *           Anesthesia Record               Intraprocedure I/O Totals          Intake    Dexmedetomidine 0.00 mL    The total shown is the total volume documented since Anesthesia Start was filed.    NaCl 0.9 % bolus 1500.00 mL    Norepinephrine Drip 0.00 mL    The total shown is the total volume documented since Anesthesia Start was filed.    Insulin Drip 0.00 mL    The total shown is the total volume documented since Anesthesia Start was filed.    Nitroglycerin Drip 0.00 mL    The total shown is the total volume documented since Anesthesia Start was filed.    perfusion prime builder 660.00 mL    Cell Saver 461 mL    Total Intake 2621 mL       Output    Urine 350 mL    Est. Blood Loss 250 mL    Total Output 600 mL       Net    Net Volume 2021 mL          Specimen: No specimens collected               Findings: *    Complications:  None; patient tolerated the procedure well.     Disposition: ICU - intubated and hemodynamically stable.  Condition: stable  Specimens Collected: No specimens collected  Attending Attestation: I was present and scrubbed for the key portions of the procedure.    Anjali Alba  Phone Number: [phone]

## 2024-12-16 NOTE — PROGRESS NOTES
Cardiac Surgery  Progress Note     Nader Zazueta is a 59 y.o. male on day 4 of admission presenting with NSTEMI (non-ST elevated myocardial infarction) (Multi).    Subjective     Interval HPI: Seen and assessed patient this afternoon after arrival to the ICU; s/p CABG x4.     Review of Systems   Unable to perform ROS: Intubated         Objective   Physical Exam  Physical Exam  Vitals and nursing note reviewed.   Constitutional:       General: He is not in acute distress.     Appearance: He is ill-appearing. He is not diaphoretic.      Interventions: He is sedated and intubated.   HENT:      Head: Normocephalic and atraumatic.      Nose: Nose normal.      Mouth/Throat:      Mouth: Mucous membranes are dry.      Pharynx: No oropharyngeal exudate or posterior oropharyngeal erythema.   Eyes:      Extraocular Movements: Extraocular movements intact.      Pupils: Pupils are equal, round, and reactive to light.   Neck:      Comments: Right internal jugular triple lumen catheter    Cardiovascular:      Rate and Rhythm: Normal rate and regular rhythm.      Pulses: Normal pulses.      Heart sounds: Normal heart sounds, S1 normal and S2 normal. No murmur heard.     No friction rub. No gallop.      Comments: A-Ventricular Wires     Pulmonary:      Effort: Pulmonary effort is normal. No tachypnea or bradypnea. He is intubated.      Breath sounds: Examination of the right-lower field reveals decreased breath sounds. Examination of the left-lower field reveals decreased breath sounds. Decreased breath sounds present.   Chest:      Chest wall: Tenderness present.      Comments: Bilateral + Mediastinal Chest Tubes   Abdominal:      General: Bowel sounds are decreased. There is no distension.      Tenderness: There is no abdominal tenderness.   Genitourinary:     Comments: Beth Catheter     Musculoskeletal:      Cervical back: Normal range of motion and neck supple. No edema.   Skin:     General: Skin is cool and dry.       Capillary Refill: Capillary refill takes less than 2 seconds.      Comments: Mediastinal Incision w/ Post-op Dressing, C/D/I    Neurological:      GCS: GCS eye subscore is 1. GCS verbal subscore is 1. GCS motor subscore is 1.      Cranial Nerves: Cranial nerves 2-12 are intact.       Last Recorded Vitals  Vitals:    12/16/24 0930 12/16/24 0936 12/16/24 0945 12/16/24 1000   BP: 150/82      BP Location:       Patient Position:       Pulse: 75  72 83   Resp: 13  22 21   Temp:       TempSrc:       SpO2: 97%  97% 97%   Weight:  99.2 kg (218 lb 9.6 oz)     Height:            Intake/Output last 3 Shifts:  I/O last 3 completed shifts:  In: 1707.4 (17.2 mL/kg) [I.V.:1707.4 (17.2 mL/kg)]  Out: - (0 mL/kg)   Weight: 99.2 kg     Inpatient Medications  amLODIPine, 10 mg, oral, Daily  aspirin, 81 mg, oral, Daily  atorvastatin, 40 mg, oral, Nightly  ceFAZolin, 2 g, intravenous, Once  fentaNYL PF, , ,   insulin lispro, 0-10 Units, subcutaneous, Before meals & nightly  [Held by provider] isosorbide mononitrate ER, 30 mg, oral, Daily  metoprolol succinate XL, 25 mg, oral, Daily  midazolam, , ,       Continuous medications  EPINEPHrine, 10 mg  heparin, 0-4,000 Units/hr, Last Rate: Stopped (12/16/24 0500)  niCARdipine, 2.5-15 mg/hr, Last Rate: 10 mg/hr (12/16/24 1012)  norepinephrine in sodium chloride 0.9 %, 0.05-1 mcg/kg/min (Dosing Weight), Last Rate: 0.05 mcg/kg/min (12/16/24 1538)  phenylephrine in 0.9% NaCl, 0.5 mcg/kg/min (Dosing Weight)      PRN medications  PRN medications: acetaminophen **OR** acetaminophen **OR** acetaminophen, acetaminophen-caffeine, dextrose, dextrose, fentaNYL PF, glucagon, glucagon, heparin, methocarbamol, midazolam, morphine, ondansetron **OR** ondansetron, oxygen, papaverine, polyethylene glycol, sodium chloride, vancomycin     LDA:       Relevant Results  Lab Review  Results from last 7 days   Lab Units 12/16/24  1430 12/16/24  0733   WBC AUTO x10*3/uL  --  12.3*   HEMOGLOBIN g/dL  --  16.0    HEMATOCRIT %  --  44.7   PLATELETS AUTO x10*3/uL 167 254     Results from last 7 days   Lab Units 12/16/24  0733 12/13/24  0349 12/12/24  0453   SODIUM mmol/L 133*   < > 135*   POTASSIUM mmol/L 4.4   < > 4.1   CHLORIDE mmol/L 101   < > 103   CO2 mmol/L 18*   < > 20*   BUN mg/dL 30*   < > 25*   CREATININE mg/dL 1.62*   < > 1.41*   CALCIUM mg/dL 10.2   < > 9.7   PROTEIN TOTAL g/dL  --   --  7.4   BILIRUBIN TOTAL mg/dL  --   --  0.6   ALK PHOS U/L  --   --  40   ALT U/L  --   --  38   AST U/L  --   --  22   GLUCOSE mg/dL 255*   < > 210*    < > = values in this interval not displayed.     Results from last 7 days   Lab Units 12/16/24  1430   MAGNESIUM mg/dL 2.46*     Results from last 7 days   Lab Units 12/16/24  1453   POCT PH, ARTERIAL pH 7.31*   POCT PCO2, ARTERIAL mm Hg 54*   POCT PO2, ARTERIAL mm Hg 130*   POCT HCO3 CALCULATED, ARTERIAL mmol/L 27.2*   POCT BASE EXCESS, ARTERIAL mmol/L 0.3         Cardiology Tests     Echo:  Transthoracic echo (TTE) complete 12/12/2024  PHYSICIAN INTERPRETATION:  Left Ventricle: Left ventricular ejection fraction is low normal, by visual estimate at 50-55%. There are no regional left ventricular wall motion abnormalities. The left ventricular cavity size is normal. There is mildly increased septal and normal posterior left ventricular wall thickness. There is left ventricular concentric remodeling. Spectral Doppler shows a normal pattern of left ventricular diastolic filling.  Left Atrium: The left atrium is normal in size.  Right Ventricle: The right ventricle is normal in size. There is normal right ventricular global systolic function.  Right Atrium: The right atrium is normal in size.  Aortic Valve: The aortic valve is trileaflet. There is mild aortic valve cusp calcification. The aortic valve dimensionless index is 0.85. There is no evidence of aortic valve regurgitation. The peak instantaneous gradient of the aortic valve is 11 mmHg. The mean gradient of the aortic valve is  4 mmHg.  Mitral Valve: The mitral valve is normal in structure. There is trace mitral valve regurgitation.  Tricuspid Valve: The tricuspid valve is structurally normal. There is trace tricuspid regurgitation. The right ventricular systolic pressure is unable to be estimated.  Pulmonic Valve: The pulmonic valve is not well visualized. The pulmonic valve regurgitation was not well visualized.  Pericardium: There is no pericardial effusion noted.  Aorta: The aortic root is normal.        CONCLUSIONS:   1. Left ventricular ejection fraction is low normal, by visual estimate at 50-55%.     Ejection Fractions:        EF   Date/Time Value Ref Range Status   12/12/2024 11:07 AM 53 %        Cath:  Cardiac Catheterization Procedure 12/12/2024  Coronary Angiography:  The coronary circulation is right dominant.     Coronary Angiography Comments:  60-year-old man with history of hypertension, hyperlipidemia, diabetes presents with non-STEMI. Urgent heart catheter recommended.     Fluoroscopy demonstrated mild calcification of the coronary tree.     Left main: Large vessel. No significant disease.     LAD: Moderate-sized vessel. LAD is occluded mid vessel. The distal half of the LAD fills via left to left collaterals. There is a large first diagonal which has a proximal 75% stenosis. There is a large bifurcating second diagonal which is a 80% stenosis in it.     LCx: Moderate-sized vessel. Mid vessel 95% stenosis. The circumflex then gives rise to a moderate-sized first obtuse marginal which has mild disease. The ongoing circumflex is a very small, rudimentary vessel.     RCA: Large dominant vessel. Mild to moderate diffuse disease. Mid RCA there is a focal 50% stenosis.     Left heart catheterization demonstrates an LVEDP of 15 mmHg. Left ventricular angiography shows hypokinesis of the anterior wall. EF 45 to 50%. No mitral regurgitation. No gradient across aortic valve upon pullback.     CONCLUSIONS:   1. Critical three-vessel  disease including LAD  with left to left collaterals, severe disease in D1 and D2, subtotal occlusion of moderate-sized circumflex.   2. Mild LV dysfunction, EF estimated 45 to 50%.   3. Patient will be referred for surgical revascularization.         History of Present Illness: Nader Zazueta is a 59 y.o. male with a PMH significant for HTN, HLD, and CKD (solitary kidney secondary to RCC) who presents to [facility] on 12/12/2024 with complaints of sternal chest discomfort.     ED course: On arrival, patient's /102 (now 158/86), heart rate 55, respirations 18, afebrile, saturating 98% on room air. Labs and imaging form, revealing glucose 210, sodium 135, Cr 1.41 and BUN 25 (1.53 and 25 on 8/7/23), initial troponin 27, delta troponin 77. No white count or anemia. Platelets WNL. Flu A/B/COVID-negative. Chest x-ray shows no acute cardiopulmonary process. EKG, per ED physician, sinus bradycardia with a rate of 59 bpm, normal [place], QRS, normal QTc duration. No ST segment or T wave pathology. Good R wave progression throughout the precordial leads. Patient was administered 324 mg aspirin in the ambulance. Patient started on heparin drip, given Nitropaste and nitro tablet in the ED.      He was seen in consult by cardiologist, Dr. Thor Keyes and went for a [facility]. His coronary anatomy revealed diffuse MV-CAD with a  of the mid LAD (with L to L collateralizations). Cardiac surgery was consulted for bypass grafting evaluation.     Daily Events    12/13: No acute events overnight; patient continues on nitroglycerin gtt for HTN and angina, however endorsing headache so will try to wean down and use PO nitrate.     12/15: Patient with worsening headache overnight as we were utilizing IV nitroglycerine to treat his angina & BP. The patient was seen by cardiology, and his nitrates stopped and he was subsequently started on IV nicardipine for better BP control.     Throughout the day, the  patient has continued with ongoing L shoulder pain and gets relief with rest and morphine. Suspect that this is related to his CAD vs musculoskeletal.     Discussed with cardiac surgeons and cardiology; will reprioritization patient as he needs a more urgent surgical revascularization.      Continue on IV nicardipine for tight BP control, PRN morphine, ECGs and heparin gtt.     12/16: This morning, the patient experienced recurrent angina, however it was unrelieved by morphine and a normotensive BP; ECG was without ischemia. Given this, his surgical time was moved up from this afternoon to this morning with Dr. Mendenhall.     12/16 (Post-op): Patient arrived to the ICU in critically ill but stable condition; s/p CABG x4. Currently remains intubated with sedation; on norepinephrine 0.05mg/kg/min while being V paced at 80 BPM. However, after dialing back pacer allowing patient to be in his own rhythm (Sinus 70s), his BP improved allowing     - Vital Signs: HR 80, /64, SpO2 100%   - Hemodynamics: CVP 12, CO 4.9, CI 2.3, SVR 1050  - Ventilator Settings: PRVC-AC / FiO2 60% / PEEP 5 / RR 16     - Pump time: 94 Minutes     Intake & Output:   - Initial R Pleural Chest Tube: 10mL   - Initial L Pleural Chest Tube: 10mL   - Initial Mediastinal Chest Tube: 20mL       Assessment & Plan        MV-CAD  - Patient admitted to [facility] on 12/12/24 with anginal complaints   --> HS Troponin I: 27 -> 77 -> 4446  - [facility] on 12/12 revealed diffuse L system CAD with coronary anatomy favoring surgical revascularization   - S/p CABG x 4 on 12/16/24 with Dr. Anjali Mendenhall   --> LIMA-LAD, Free INDIGO - D1, SVG-OM1, SVG-rPDA  - Continue on ASA 81mg and Atorvastatin 80  - Holding beta-blocker in initial post op period  - Maintain MS/pl chest tubes to -20cm H20 continuous suction   - Daily CXR while chest tube intact        Mediastinal Incision  - Remove post-op dressing on POD #2  - Dressing C/D/I       Acute Post-Op Pain  - As expected   -  Multimodal pain control   --> Scheduled: Acetaminophen, magnesium oxide 400mg QD, gabapentin 100mg TID, methocarbamol 500mg q6h   --> PRN: oxycodone & fentanyl  - Bowel regimen while taking narcotics        Risk for Post-Op Arrhythmia  - Ventricular wires placed  - Discontinue V-wires prior to DC  - Telemetry until discharged       Acute Postoperative Respiratory Insufficiency   - As expected following CABG with post-op atelectasis  - Currently maintained on mechanical ventilator   - Coughing and deep breathing exercises with Incentive spirometry  - Out of bed, early and aggressive mobilization with assistance  - Oxygen as needed, wean to keep saturation above 92%  - ICU intensivist/pulmonologist consulted  - Albuterol nebulizers as needed       Risk for Fluid Volume Overload  - Pre-Op Weight: 99.2 kg   - Strict I& Os and daily weights    - Monitor CVP and hemodynamics        Acute Post-Op Blood-Loss Anemia  - Pre-Op H/H: 16/44.7  - Monitor h/h in the initial post op period  - Monitor chest tubes for post op hemorrhage   - Multivitamin/iron tablet   - Daily CBC while in hospital       Post-Op Leukocytosis  - Pre-Op WBC: 12.3  - Afebrile, consider elevations as reactive to surgery   - Post-op ATB Q12 for 48hrs  - Daily cbc while in hospital        Essential HTN  - Home Medications: Amlodipine 10mg, lisinopril 10mg  - Hold in the immediate post-op period        Type 2 DM  - Home Medications: Metformin 1g BID   - HbA1c: 8.7  - Goal for BG <150 in the post-operative period  - SSI       Dyslipidemia  - Home Medications: None  - Started on high intensity statin therapy this admission   - Continue on statin therapy as above     Risk for Electrolyte Disturbances  - Optimize electrolytes per [facility] protocol      Bowel Regimen  - Senna-S BID   - PRN suppositories and miralax    Prophylaxis  - GI: PPI  - DVT: Rolf-Hose & starting enoxaparin on POD #1  - MRSA: Positive (12/12)   - PT/OT     Disposition  - CVICU    Patient  seen and plan discussed with Dr. Anjali Mendenhall.      Jovanni Arriaga, APRN-CNP      Critical Care Billing Statement    I have reviewed and evaluated the most recent data and results, personally examined the patient, and formulated the plan of care as presented above. This patient was critically ill and required continued critical care treatment. Teaching and any separately billable procedures are not included in the time calculation.    Billing Provider Critical Care Time: 55 minutes

## 2024-12-16 NOTE — CONSULTS
Vancomycin Dosing by Pharmacy- INITIAL  Vancomycin Dosing by Pharmacy- POST-OPERATIVE PROPHYLAXIS    Nader Zazueta is a 59 y.o. year old male who Pharmacy has been consulted to give vancomycin for 48 hours as post-operative prophylaxis.       Visit Vitals  /64   Pulse 69   Temp 37 °C (98.6 °F) (Temporal)   Resp 12      Lab Results   Component Value Date    CREATININE 1.68 (H) 12/16/2024    CREATININE 1.62 (H) 12/16/2024    CREATININE 1.43 (H) 12/15/2024    CREATININE 1.48 (H) 12/13/2024      Wt Readings from Last 1 Encounters:   12/16/24 99.1 kg (218 lb 7.6 oz)        Assessment/Plan     Will initiate vancomycin 1500 mg (for weight  kg) q24h (for CrCl <50 mL/min) to start 12 hours from the start of the preoperative dose.   No levels will be ordered unless clinically indicated.   Pharmacy will continue to monitor renal function for the duration of vancomycin therapy.   Pharmacy will sign off after 48 hours. If vancomycin is to be continued beyond 48 hours, please re-consult Pharmacy.       Regla Hector, PharmD

## 2024-12-16 NOTE — ANESTHESIA POSTPROCEDURE EVALUATION
Patient: Nader Zazueta    Procedure Summary       Date: 12/16/24 Room / Location: [facility] OR 10 / Virtual PAR OR    Anesthesia Start: 1013 Anesthesia Stop: 1646    Procedure: CORONARY ARTERY BYPASS GRAFT (CABG X4) LEFT INTERNAL MAMMARY ARTERY/ AND VEIN (Chest) Diagnosis:       NSTEMI (non-ST elevated myocardial infarction) (Multi)      Chronic coronary microvascular dysfunction      (NSTEMI (non-ST elevated myocardial infarction) (Multi) [I21.4])      (Chronic coronary microvascular dysfunction [I25.85])    Surgeons: Anjali Alba MD Responsible Provider: Gallo Hernandez MD    Anesthesia Type: general ASA Status: 4 - Emergent            Anesthesia Type: general    Vitals Value Taken Time   /64 12/16/24 1645   Temp 37 °C (98.6 °F) 12/16/24 1645   Pulse 69 12/16/24 1707   Resp 15 12/16/24 1707   SpO2 98 % 12/16/24 1707   Vitals shown include unfiled device data.    Anesthesia Post Evaluation    Patient location during evaluation: ICU  Patient participation: complete - patient cannot participate  Level of consciousness: sedated  Pain score: 0  Pain management: adequate  Airway patency: patent  Cardiovascular status: hemodynamically stable  Respiratory status: ETT  Hydration status: acceptable  Postoperative Nausea and Vomiting: none        No notable events documented.

## 2024-12-16 NOTE — ANESTHESIA PREPROCEDURE EVALUATION
Patient: Nader Zazueta    Procedure Information       Date/Time: 12/16/24 1000    Procedures:       CORONARY ARTERY BYPASS GRAFT (CABG X3) LEFT INTERNAL MAMMARY ARTERY/ RADIAL / VEIN - Surgery start time of 0730.      **ERICA TRAC**    Location: [facility] OR  / [facility] PAR OR    Surgeons: Anjali Alba MD            Relevant Problems   Anesthesia (within normal limits)      Cardiac   (+) Chronic coronary microvascular dysfunction   (+) Coronary artery disease involving coronary bypass graft with unstable angina pectoris (Multi)   (+) Hypertension   (+) NSTEMI (non-ST elevated myocardial infarction) (Multi)      Neuro   (+) Lumbar radiculopathy      ID   (+) Osteomyelitis       Clinical information reviewed:   Tobacco  Allergies  Meds   Med Hx  Surg Hx   Fam Hx  Soc Hx        NPO Detail:  No data recorded     Physical Exam    Airway  Mallampati: II  TM distance: >3 FB  Neck ROM: full     Cardiovascular   Rhythm: regular     Dental - normal exam     Pulmonary - normal exam     Abdominal   (+) obese       Other findings: Pt. Hyperventilating, diaphoretic.  C/O arm pain.          Anesthesia Plan    History of general anesthesia?: yes  History of complications of general anesthesia?: no    ASA 4 - emergent     general     The patient is not a current smoker.  Patient was previously instructed to abstain from smoking on day of procedure.  Patient did not smoke on day of procedure.    intravenous induction   Postoperative administration of opioids is intended.  Anesthetic plan and risks discussed with patient.  Use of blood products discussed with patient who consented to blood products.    Plan discussed with CAA.

## 2024-12-16 NOTE — ANESTHESIA PROCEDURE NOTES
Airway  Date/Time: 12/16/2024 10:31 AM  Urgency: elective    Airway not difficult    Staffing  Performed: MONIQUE   Authorized by: Gallo Hernandez MD    Performed by: MONIQUE Stevens  Patient location during procedure: OR    Indications and Patient Condition  Indications for airway management: anesthesia  Spontaneous Ventilation: absent  Sedation level: deep  Preoxygenated: yes  Patient position: sniffing  MILS maintained throughout  Mask difficulty assessment: 1 - vent by mask  Planned trial extubation    Final Airway Details  Final airway type: endotracheal airway      Successful airway: ETT  Cuffed: yes   Successful intubation technique: video laryngoscopy  Facilitating devices/methods: intubating stylet  Endotracheal tube insertion site: oral  Blade: Cici  Blade size: #4  ETT size (mm): 8.0  Cormack-Lehane Classification: grade I - full view of glottis  Placement verified by: chest auscultation and capnometry   Cuff volume (mL): 10  Measured from: lips  ETT to lips (cm): 22  Number of attempts at approach: 1  Number of other approaches attempted: 0    Additional Comments  [facility]

## 2024-12-16 NOTE — PROGRESS NOTES
"Nader Zazueta is a 59 y.o. male on day 4 of admission presenting with NSTEMI (non-ST elevated myocardial infarction) (Multi).    Subjective     Chest pain started up again. Plans to move him up to OR today.    Objective     Physical Exam  Constitutional:       Appearance: Normal appearance.   HENT:      Head: Normocephalic and atraumatic.      Right Ear: Tympanic membrane and ear canal normal.      Left Ear: Tympanic membrane and ear canal normal.      Mouth/Throat:      Mouth: Mucous membranes are moist.      Pharynx: Oropharynx is clear.   Eyes:      Extraocular Movements: Extraocular movements intact.      Conjunctiva/sclera: Conjunctivae normal.      Pupils: Pupils are equal, round, and reactive to light.   Cardiovascular:      Rate and Rhythm: Normal rate and regular rhythm.      Pulses: Normal pulses.      Heart sounds: Normal heart sounds.   Pulmonary:      Effort: Pulmonary effort is normal.      Breath sounds: Normal breath sounds.   Abdominal:      General: Abdomen is flat. Bowel sounds are normal.      Palpations: Abdomen is soft.   Musculoskeletal:         General: Normal range of motion.      Cervical back: Normal range of motion and neck supple.   Skin:     General: Skin is warm and dry.      Capillary Refill: Capillary refill takes 2 to 3 seconds.   Neurological:      General: No focal deficit present.      Mental Status: He is alert and oriented to person, place, and time. Mental status is at baseline.   Psychiatric:         Mood and Affect: Mood normal.         Behavior: Behavior normal.         Thought Content: Thought content normal.         Judgment: Judgment normal.         Last Recorded Vitals  Blood pressure 150/82, pulse 75, temperature 36.5 °C (97.7 °F), temperature source Temporal, resp. rate 13, height 1.753 m (5' 9\"), weight 99.2 kg (218 lb 9.6 oz), SpO2 97%.  Intake/Output last 3 Shifts:  I/O last 3 completed shifts:  In: 1707.4 (17.2 mL/kg) [I.V.:1707.4 (17.2 mL/kg)]  Out: - (0 " mL/kg)   Weight: 99.2 kg     Relevant Results            This patient currently has cardiac telemetry ordered; if you would like to modify or discontinue the telemetry order, click here to go to the orders activity to modify/discontinue the order.                 Assessment/Plan   Assessment & Plan  NSTEMI (non-ST elevated myocardial infarction) (Multi)    Chronic coronary microvascular dysfunction    NSTEMI  Hypertension  Hyperlipidemia  -Cardiology consult, recommendations appreciated  -EKG, per ED physician, sinus bradycardia with a rate of 59 bpm, normal LA, QRS, normal QTc duration.  No ST segment or T wave pathology.  Good R wave progression throughout the precordial leads  -Chest x-ray shows no acute cardiopulmonary process  -Patient initiated on heparin drip in the ED, continue  -Patient administered 324 mg aspirin by EMS, continue 81 mg aspirin daily  -Will keep patient n.p.o. for now until evaluated by cardio  -Hemoglobin A1c, lipid panel, TSH, echo added on  -Continue home medications when home med rec complete, n.p.o. for now     Hx of incisional hernia  -Patient tender to palpation over site of his incisional hernia repair (RLQ), he states he would like to be evaluated by Dr. Covarrubias  -CT A/P added on (w/out contrast due to pt's solitary kidney)     Type 2 diabetes mellitus  -Sliding scale insulin and diabetic diet  -Hypoglycemic protocol     CKD  Hyponatremia, 135  -Creatinine 1.41 and BUN 25, 1.53 and 25 point 8/7/2023  -Monitor with BMP, avoid nephrotoxic medications     DVT prophylaxis  -Patient currently on heparin drip  -SCDs    12/12: spoke with cardioloyg cath shows multivessel disease will need CABG, CT surgery consulted    12/13: doing well, monitor labs, work up underway, tentative schedule cabg on Monday 12/14:no issues doing well    12/15: family at bedside, surgery today.        I spent 35 minutes in the professional and overall care of this patient.      Mustapha Singer, DO

## 2024-12-16 NOTE — PROGRESS NOTES
12/16/24 0902   Discharge Planning   Living Arrangements Spouse/significant other;Children   Support Systems Spouse/significant other;Family members;Friends/neighbors   Assistance Needed iadls, driving, no assistive devices used   Type of Residence Private residence   Number of Stairs to Enter Residence 3   Do you have animals or pets at home? Yes   Type of Animals or Pets dog, cats   Home or Post Acute Services In home services  (anticipate will need HHC post-CABG)   Type of Home Care Services Home OT;Home PT   Expected Discharge Disposition Home H   Does the patient need discharge transport arranged? No   Stroke Family Assessment   Stroke Family Assessment Needed No   Intensity of Service   Intensity of Service 0-30 min     Patient is planned to have CABG today.  This TCC met with patient and spouse at bedside, introduced self and explained role.  Demographic information and insurance verified.  Patient is from home with spouse and family, independent and driving PTA.  Denies SW needs at this time.  Patient plans to return home at discharge.  Disposition pending hospital course.  Anticipate patient will need HHC.  Await postop therapy evals.  Patient's spouse is planned to provide transportation home.  Care Transitions will continue to follow.

## 2024-12-16 NOTE — ANESTHESIA PROCEDURE NOTES
Peripheral Block    Patient location during procedure: OR  Start time: 12/16/2024 4:00 PM  End time: 12/16/2024 4:15 PM  Reason for block: at surgeon's request and post-op pain management  Staffing  Performed: attending   Authorized by: Gallo Hernandez MD    Performed by: Gallo Hernandez MD  Preanesthetic Checklist  Completed: patient identified, IV checked, site marked, risks and benefits discussed, surgical consent, monitors and equipment checked, pre-op evaluation and timeout performed   Timeout performed at: 12/16/2024 4:00 PM  Peripheral Block  Patient position: laying flat  Prep: ChloraPrep  Patient monitoring: heart rate, cardiac monitor and continuous pulse ox  Block type: serratus anterior  Laterality: B/L  Injection technique: single-shot  Local infiltration: bupivicaine  Infiltration strength: 0.3 %  Dose: 20 mL  Needle  Needle type: short-bevel   Needle gauge: 22 G  Needle length: 8 cm  Needle localization: ultrasound guidance  Needle insertion depth: 4 cm  Test dose: negative  Assessment  Injection assessment: negative aspiration for heme, no paresthesia on injection and incremental injection  Paresthesia pain: none  Heart rate change: no  Slow fractionated injection: yes

## 2024-12-16 NOTE — PROGRESS NOTES
Cardiology Progress    Impression:  Non-STEMI.  Ongoing chest pain despite IV heparin, IV Cardene.  CAD: triple vessel disease MI LAD   ICM Ef 50%  Hypertension  Hyperlipidemia  Diabetes aic 7.9  CKD.  Status post nephrectomy for renal cell carcinoma.  Plan:  CABG  Will follow postop  HPI:  Ongoing chest pain with elevated blood pressure over the weekend.  Meds:  Scheduled medications  amLODIPine, 10 mg, oral, Daily  aspirin, 81 mg, oral, Daily  atorvastatin, 40 mg, oral, Nightly  fentaNYL PF, , ,   insulin lispro, 0-10 Units, subcutaneous, Before meals & nightly  [Held by provider] isosorbide mononitrate ER, 30 mg, oral, Daily  metoprolol succinate XL, 25 mg, oral, Daily  midazolam, , ,       Continuous medications  EPINEPHrine, 10 mg  heparin, 0-4,000 Units/hr, Last Rate: Stopped (12/16/24 0500)  niCARdipine, 2.5-15 mg/hr, Last Rate: 10 mg/hr (12/16/24 0606)  phenylephrine in 0.9% NaCl, 0.5 mcg/kg/min (Dosing Weight)      PRN medications  PRN medications: acetaminophen **OR** acetaminophen **OR** acetaminophen, acetaminophen-caffeine, dextrose, dextrose, fentaNYL PF, glucagon, glucagon, heparin, methocarbamol, midazolam, morphine, ondansetron **OR** ondansetron, oxygen, polyethylene glycol    Physical exam:  Vitals:    12/16/24 0900   BP: 153/79   Pulse: 75   Resp: 18   Temp:    SpO2: 99%      No JVD.  Chest clear.  No edema.  EKG:  Telemetry shows sinus rhythm.  Echo:  EF 50 to 55%.  Labs:  Lab Results   Component Value Date    WBC 12.3 (H) 12/16/2024    HGB 16.0 12/16/2024    HCT 44.7 12/16/2024     12/16/2024    CHOL 174 12/13/2024    TRIG 195 (H) 12/13/2024    HDL 29.3 12/13/2024    ALT 38 12/12/2024    AST 22 12/12/2024     (L) 12/16/2024    K 4.4 12/16/2024     12/16/2024    CREATININE 1.62 (H) 12/16/2024    BUN 30 (H) 12/16/2024    CO2 18 (L) 12/16/2024    TSH 1.96 12/13/2024    PSA 0.30 07/12/2022    INR 1.0 12/13/2024    HGBA1C 8.7 (H) 12/13/2024     par

## 2024-12-16 NOTE — PROGRESS NOTES
Cardiac Surgery  Progress Note     Nader Zazueta is a 59 y.o. male on day 4 of admission presenting with NSTEMI (non-ST elevated myocardial infarction) (Multi).    Subjective     Interval HPI: Seen and assessed patient this AM while they were laying in bed; the patient was with increased angina this morning unrelieved by morphine.     Review of Systems   Constitutional: Positive for malaise/fatigue. Negative for decreased appetite.   HENT:  Negative for congestion.    Eyes:  Negative for blurred vision and double vision.   Cardiovascular:  Positive for chest pain (Exertional). Negative for dyspnea on exertion, irregular heartbeat, leg swelling, orthopnea and palpitations.   Respiratory:  Negative for cough and shortness of breath.    Endocrine: Negative for cold intolerance and heat intolerance.   Skin:  Negative for nail changes, poor wound healing and rash.   Musculoskeletal:  Negative for arthritis, muscle cramps, muscle weakness, myalgias and stiffness.   Gastrointestinal:  Negative for bloating, nausea and vomiting.   Genitourinary:  Negative for frequency, hesitancy and urgency.   Neurological:  Positive for light-headedness (With nitrates). Negative for dizziness, focal weakness and weakness.   Psychiatric/Behavioral:  Negative for altered mental status.    Allergic/Immunologic: Negative for environmental allergies.         Objective   Physical Exam  Physical Exam  Vitals and nursing note reviewed.   Constitutional:       General: He is not in acute distress.     Appearance: Normal appearance. He is not ill-appearing.   HENT:      Head: Normocephalic and atraumatic.      Nose: Nose normal.      Mouth/Throat:      Mouth: Mucous membranes are moist.      Pharynx: Oropharynx is clear.   Eyes:      Extraocular Movements: Extraocular movements intact.      Conjunctiva/sclera: Conjunctivae normal.      Pupils: Pupils are equal, round, and reactive to light.   Cardiovascular:      Rate and Rhythm: Normal rate and  regular rhythm.      Pulses: Normal pulses.      Heart sounds: Normal heart sounds, S1 normal and S2 normal. No murmur heard.     No systolic murmur is present.      No friction rub. No gallop.   Pulmonary:      Effort: Pulmonary effort is normal.      Breath sounds: Normal breath sounds.   Abdominal:      General: Abdomen is flat. Bowel sounds are normal. There is no distension.      Palpations: Abdomen is soft.   Musculoskeletal:         General: Normal range of motion.      Cervical back: Normal range of motion and neck supple.      Right lower leg: No edema.      Left lower leg: No edema.   Skin:     General: Skin is warm and dry.      Capillary Refill: Capillary refill takes less than 2 seconds.      Findings: No lesion.      Nails: There is no clubbing.   Neurological:      General: No focal deficit present.      Mental Status: He is alert and oriented to person, place, and time. Mental status is at baseline.      Cranial Nerves: Cranial nerves 2-12 are intact.      Sensory: Sensation is intact.      Motor: Motor function is intact.   Psychiatric:         Attention and Perception: Attention and perception normal.         Mood and Affect: Mood normal.         Speech: Speech normal.         Behavior: Behavior normal.         Thought Content: Thought content normal.         Cognition and Memory: Cognition and memory normal.         Judgment: Judgment normal.         Last Recorded Vitals  Vitals:    12/16/24 0745 12/16/24 0800 12/16/24 0815 12/16/24 0830   BP:  145/73  145/76   BP Location:       Patient Position:       Pulse: 77 83 71 71   Resp: 13 24 20 18   Temp:       TempSrc:       SpO2: 100% 100% 99% 98%   Weight:       Height:            Intake/Output last 3 Shifts:  I/O last 3 completed shifts:  In: 1707.4 (17.2 mL/kg) [I.V.:1707.4 (17.2 mL/kg)]  Out: - (0 mL/kg)   Weight: 99.2 kg     Inpatient Medications  amLODIPine, 10 mg, oral, Daily  aspirin, 81 mg, oral, Daily  atorvastatin, 40 mg, oral,  Nightly  insulin lispro, 0-10 Units, subcutaneous, Before meals & nightly  [Held by provider] isosorbide mononitrate ER, 30 mg, oral, Daily  metoprolol succinate XL, 25 mg, oral, Daily  midazolam, , ,       Continuous medications  heparin, 0-4,000 Units/hr, Last Rate: Stopped (12/16/24 0500)  niCARdipine, 2.5-15 mg/hr, Last Rate: 10 mg/hr (12/16/24 0606)      PRN medications  PRN medications: acetaminophen **OR** acetaminophen **OR** acetaminophen, acetaminophen-caffeine, dextrose, dextrose, glucagon, glucagon, heparin, methocarbamol, midazolam, morphine, ondansetron **OR** ondansetron, oxygen, polyethylene glycol     LDA:       Relevant Results  Lab Review  Results from last 7 days   Lab Units 12/16/24  0733   WBC AUTO x10*3/uL 12.3*   HEMOGLOBIN g/dL 16.0   HEMATOCRIT % 44.7   PLATELETS AUTO x10*3/uL 254     Results from last 7 days   Lab Units 12/16/24  0733 12/13/24  0349 12/12/24  0453   SODIUM mmol/L 133*   < > 135*   POTASSIUM mmol/L 4.4   < > 4.1   CHLORIDE mmol/L 101   < > 103   CO2 mmol/L 18*   < > 20*   BUN mg/dL 30*   < > 25*   CREATININE mg/dL 1.62*   < > 1.41*   CALCIUM mg/dL 10.2   < > 9.7   PROTEIN TOTAL g/dL  --   --  7.4   BILIRUBIN TOTAL mg/dL  --   --  0.6   ALK PHOS U/L  --   --  40   ALT U/L  --   --  38   AST U/L  --   --  22   GLUCOSE mg/dL 255*   < > 210*    < > = values in this interval not displayed.     Results from last 7 days   Lab Units 12/16/24  0733   MAGNESIUM mg/dL 1.95     Results from last 7 days   Lab Units 12/16/24  0504   POCT PH, ARTERIAL pH 7.47*   POCT PCO2, ARTERIAL mm Hg 35*   POCT PO2, ARTERIAL mm Hg 94   POCT HCO3 CALCULATED, ARTERIAL mmol/L 25.5   POCT BASE EXCESS, ARTERIAL mmol/L 2.2         Cardiology Tests     Echo:  Transthoracic echo (TTE) complete 12/12/2024  PHYSICIAN INTERPRETATION:  Left Ventricle: Left ventricular ejection fraction is low normal, by visual estimate at 50-55%. There are no regional left ventricular wall motion abnormalities. The left  ventricular cavity size is normal. There is mildly increased septal and normal posterior left ventricular wall thickness. There is left ventricular concentric remodeling. Spectral Doppler shows a normal pattern of left ventricular diastolic filling.  Left Atrium: The left atrium is normal in size.  Right Ventricle: The right ventricle is normal in size. There is normal right ventricular global systolic function.  Right Atrium: The right atrium is normal in size.  Aortic Valve: The aortic valve is trileaflet. There is mild aortic valve cusp calcification. The aortic valve dimensionless index is 0.85. There is no evidence of aortic valve regurgitation. The peak instantaneous gradient of the aortic valve is 11 mmHg. The mean gradient of the aortic valve is 4 mmHg.  Mitral Valve: The mitral valve is normal in structure. There is trace mitral valve regurgitation.  Tricuspid Valve: The tricuspid valve is structurally normal. There is trace tricuspid regurgitation. The right ventricular systolic pressure is unable to be estimated.  Pulmonic Valve: The pulmonic valve is not well visualized. The pulmonic valve regurgitation was not well visualized.  Pericardium: There is no pericardial effusion noted.  Aorta: The aortic root is normal.        CONCLUSIONS:   1. Left ventricular ejection fraction is low normal, by visual estimate at 50-55%.     Ejection Fractions:        EF   Date/Time Value Ref Range Status   12/12/2024 11:07 AM 53 %        Cath:  Cardiac Catheterization Procedure 12/12/2024  Coronary Angiography:  The coronary circulation is right dominant.     Coronary Angiography Comments:  60-year-old man with history of hypertension, hyperlipidemia, diabetes presents with non-STEMI. Urgent heart catheter recommended.     Fluoroscopy demonstrated mild calcification of the coronary tree.     Left main: Large vessel. No significant disease.     LAD: Moderate-sized vessel. LAD is occluded mid vessel. The distal half of the  LAD fills via left to left collaterals. There is a large first diagonal which has a proximal 75% stenosis. There is a large bifurcating second diagonal which is a 80% stenosis in it.     LCx: Moderate-sized vessel. Mid vessel 95% stenosis. The circumflex then gives rise to a moderate-sized first obtuse marginal which has mild disease. The ongoing circumflex is a very small, rudimentary vessel.     RCA: Large dominant vessel. Mild to moderate diffuse disease. Mid RCA there is a focal 50% stenosis.     Left heart catheterization demonstrates an LVEDP of 15 mmHg. Left ventricular angiography shows hypokinesis of the anterior wall. EF 45 to 50%. No mitral regurgitation. No gradient across aortic valve upon pullback.     CONCLUSIONS:   1. Critical three-vessel disease including LAD  with left to left collaterals, severe disease in D1 and D2, subtotal occlusion of moderate-sized circumflex.   2. Mild LV dysfunction, EF estimated 45 to 50%.   3. Patient will be referred for surgical revascularization.         History of Present Illness: Nader Zazueta is a 59 y.o. 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     ED course: On arrival, patient's /102 (now 158/86), heart rate 55, respirations 18, afebrile, saturating 98% on room air. Labs and imaging form, revealing glucose 210, sodium 135, Cr 1.41 and BUN 25 (1.53 and 25 on 8/7/23), initial troponin 27, delta troponin 77. No white count or anemia. Platelets WNL. Flu A/B/COVID-negative. Chest x-ray shows no acute cardiopulmonary process. EKG, per ED physician, sinus bradycardia with a rate of 59 bpm, normal VT, QRS, normal QTc duration. No ST segment or T wave pathology. Good R wave progression throughout the precordial leads. Patient was administered 324 mg aspirin in the ambulance. Patient started on  heparin drip, given Nitropaste and nitro tablet in the ED.      He was seen in consult by cardiologist, Dr. Thor Keyes and went for a [facility]. His coronary anatomy revealed diffuse MV-CAD with a  of the mid LAD (with L to L collateralizations). Cardiac surgery was consulted for bypass grafting evaluation.     Daily Events    12/13: No acute events overnight; patient continues on nitroglycerin gtt for HTN and angina, however endorsing headache so will try to wean down and use PO nitrate.     12/15: Patient with worsening headache overnight as we were utilizing IV nitroglycerine to treat his angina & BP. The patient was seen by cardiology, and his nitrates stopped and he was subsequently started on IV nicardipine for better BP control.     Throughout the day, the patient has continued with ongoing L shoulder pain and gets relief with rest and morphine. Suspect that this is related to his CAD vs musculoskeletal.     Discussed with cardiac surgeons and cardiology; will reprioritization patient as he needs a more urgent surgical revascularization.      Continue on IV nicardipine for tight BP control, PRN morphine, ECGs and heparin gtt.     12/16: This morning, the patient experienced recurrent angina, however it was unrelieved by morphine and a normotensive BP; ECG was without ischemia. Given this, his surgical time was moved up from this afternoon to this morning with Dr. Mendenhall.     Assessment & Plan        MV-CAD  - Patient admitted [facility] on 12/12/24 with anginal complaints   --> HS Troponin I: 27 -> 77 -> 4446  - LHC on 12/12 revealed diffuse L system CAD with coronary anatomy favoring surgical revascularization   - Patient is with continued angina, however stable ECGs  --> Unable to tolerate nitrates, plan for tighter BP control with CCB and utilizing morphine for   - OR Date: 12/16/24  - Hold heparin gtt  - NPO         Essential HTN  - Goal for SBP < 140mmHg   - Continue on metoprolol succinate 25mg every day  -  Amlodipine increased to 10mg daily        T2DM  - HbA1c: 8.7  - Continue on SSI AC/HS      Above patient and plan discussed with cardiac surgeon, Dr. Kb Fermin & Dr. Mendenhall. Plan for OR this morning.       Jovanni Arriaga, LILLIAN-CNP

## 2024-12-17 ENCOUNTER — APPOINTMENT (OUTPATIENT)
Dept: RADIOLOGY | Facility: HOSPITAL | Age: 59
DRG: 233 | End: 2024-12-17
Payer: COMMERCIAL

## 2024-12-17 LAB
ALBUMIN SERPL BCP-MCNC: 3.6 G/DL (ref 3.4–5)
ANION GAP BLDA CALCULATED.4IONS-SCNC: 10 MMO/L (ref 10–25)
ANION GAP SERPL CALC-SCNC: 10 MMOL/L (ref 10–20)
BASE EXCESS BLDA CALC-SCNC: 0.4 MMOL/L (ref -2–3)
BODY TEMPERATURE: 37 DEGREES CELSIUS
BUN SERPL-MCNC: 24 MG/DL (ref 6–23)
CA-I BLD-SCNC: 1.11 MMOL/L (ref 1.1–1.33)
CA-I BLDA-SCNC: 1.19 MMOL/L (ref 1.1–1.33)
CALCIUM SERPL-MCNC: 8.2 MG/DL (ref 8.6–10.3)
CHLORIDE BLDA-SCNC: 103 MMOL/L (ref 98–107)
CHLORIDE SERPL-SCNC: 102 MMOL/L (ref 98–107)
CO2 SERPL-SCNC: 24 MMOL/L (ref 21–32)
CREAT SERPL-MCNC: 1.46 MG/DL (ref 0.5–1.3)
EGFRCR SERPLBLD CKD-EPI 2021: 55 ML/MIN/1.73M*2
ERYTHROCYTE [DISTWIDTH] IN BLOOD BY AUTOMATED COUNT: 12.2 % (ref 11.5–14.5)
GLUCOSE BLD MANUAL STRIP-MCNC: 137 MG/DL (ref 74–99)
GLUCOSE BLD MANUAL STRIP-MCNC: 143 MG/DL (ref 74–99)
GLUCOSE BLD MANUAL STRIP-MCNC: 146 MG/DL (ref 74–99)
GLUCOSE BLD MANUAL STRIP-MCNC: 170 MG/DL (ref 74–99)
GLUCOSE BLD MANUAL STRIP-MCNC: 202 MG/DL (ref 74–99)
GLUCOSE BLD MANUAL STRIP-MCNC: 215 MG/DL (ref 74–99)
GLUCOSE BLDA-MCNC: 134 MG/DL (ref 74–99)
GLUCOSE SERPL-MCNC: 140 MG/DL (ref 74–99)
HCO3 BLDA-SCNC: 24.6 MMOL/L (ref 22–26)
HCT VFR BLD AUTO: 31.2 % (ref 41–52)
HCT VFR BLD EST: 34 % (ref 41–52)
HGB BLD-MCNC: 10.6 G/DL (ref 13.5–17.5)
HGB BLDA-MCNC: 11.3 G/DL (ref 13.5–17.5)
INHALED O2 CONCENTRATION: 32 %
LACTATE BLDA-SCNC: 0.5 MMOL/L (ref 0.4–2)
MAGNESIUM SERPL-MCNC: 1.94 MG/DL (ref 1.6–2.4)
MCH RBC QN AUTO: 31.8 PG (ref 26–34)
MCHC RBC AUTO-ENTMCNC: 34 G/DL (ref 32–36)
MCV RBC AUTO: 94 FL (ref 80–100)
NRBC BLD-RTO: 0 /100 WBCS (ref 0–0)
OXYHGB MFR BLDA: 95.7 % (ref 94–98)
PCO2 BLDA: 37 MM HG (ref 38–42)
PH BLDA: 7.43 PH (ref 7.38–7.42)
PHOSPHATE SERPL-MCNC: 4.5 MG/DL (ref 2.5–4.9)
PLATELET # BLD AUTO: 133 X10*3/UL (ref 150–450)
PO2 BLDA: 88 MM HG (ref 85–95)
POTASSIUM BLDA-SCNC: 4.5 MMOL/L (ref 3.5–5.3)
POTASSIUM SERPL-SCNC: 4.4 MMOL/L (ref 3.5–5.3)
RBC # BLD AUTO: 3.33 X10*6/UL (ref 4.5–5.9)
SAO2 % BLDA: 98 % (ref 94–100)
SODIUM BLDA-SCNC: 133 MMOL/L (ref 136–145)
SODIUM SERPL-SCNC: 132 MMOL/L (ref 136–145)
SPECIMEN DRAWN FROM PATIENT: ABNORMAL
WBC # BLD AUTO: 11.7 X10*3/UL (ref 4.4–11.3)

## 2024-12-17 PROCEDURE — 2060000001 HC INTERMEDIATE ICU ROOM DAILY

## 2024-12-17 PROCEDURE — 85027 COMPLETE CBC AUTOMATED: CPT | Performed by: NURSE PRACTITIONER

## 2024-12-17 PROCEDURE — 82435 ASSAY OF BLOOD CHLORIDE: CPT | Performed by: NURSE PRACTITIONER

## 2024-12-17 PROCEDURE — 99233 SBSQ HOSP IP/OBS HIGH 50: CPT | Performed by: NURSE PRACTITIONER

## 2024-12-17 PROCEDURE — 97165 OT EVAL LOW COMPLEX 30 MIN: CPT | Mod: GO

## 2024-12-17 PROCEDURE — 71045 X-RAY EXAM CHEST 1 VIEW: CPT | Performed by: STUDENT IN AN ORGANIZED HEALTH CARE EDUCATION/TRAINING PROGRAM

## 2024-12-17 PROCEDURE — 71045 X-RAY EXAM CHEST 1 VIEW: CPT

## 2024-12-17 PROCEDURE — 2500000004 HC RX 250 GENERAL PHARMACY W/ HCPCS (ALT 636 FOR OP/ED): Performed by: NURSE PRACTITIONER

## 2024-12-17 PROCEDURE — 2500000004 HC RX 250 GENERAL PHARMACY W/ HCPCS (ALT 636 FOR OP/ED): Performed by: STUDENT IN AN ORGANIZED HEALTH CARE EDUCATION/TRAINING PROGRAM

## 2024-12-17 PROCEDURE — 99232 SBSQ HOSP IP/OBS MODERATE 35: CPT | Performed by: STUDENT IN AN ORGANIZED HEALTH CARE EDUCATION/TRAINING PROGRAM

## 2024-12-17 PROCEDURE — 2500000001 HC RX 250 WO HCPCS SELF ADMINISTERED DRUGS (ALT 637 FOR MEDICARE OP): Performed by: NURSE PRACTITIONER

## 2024-12-17 PROCEDURE — 2500000004 HC RX 250 GENERAL PHARMACY W/ HCPCS (ALT 636 FOR OP/ED)

## 2024-12-17 PROCEDURE — 83735 ASSAY OF MAGNESIUM: CPT | Performed by: NURSE PRACTITIONER

## 2024-12-17 PROCEDURE — 82330 ASSAY OF CALCIUM: CPT | Performed by: NURSE PRACTITIONER

## 2024-12-17 PROCEDURE — 37799 UNLISTED PX VASCULAR SURGERY: CPT | Performed by: NURSE PRACTITIONER

## 2024-12-17 PROCEDURE — P9045 ALBUMIN (HUMAN), 5%, 250 ML: HCPCS | Mod: JZ | Performed by: NURSE PRACTITIONER

## 2024-12-17 PROCEDURE — 2500000005 HC RX 250 GENERAL PHARMACY W/O HCPCS: Performed by: NURSE PRACTITIONER

## 2024-12-17 PROCEDURE — 80069 RENAL FUNCTION PANEL: CPT | Performed by: NURSE PRACTITIONER

## 2024-12-17 PROCEDURE — 2500000002 HC RX 250 W HCPCS SELF ADMINISTERED DRUGS (ALT 637 FOR MEDICARE OP, ALT 636 FOR OP/ED): Performed by: NURSE PRACTITIONER

## 2024-12-17 PROCEDURE — 97161 PT EVAL LOW COMPLEX 20 MIN: CPT | Mod: GP

## 2024-12-17 PROCEDURE — 82947 ASSAY GLUCOSE BLOOD QUANT: CPT

## 2024-12-17 RX ORDER — FUROSEMIDE 10 MG/ML
20 INJECTION INTRAMUSCULAR; INTRAVENOUS 2 TIMES DAILY
Status: DISCONTINUED | OUTPATIENT
Start: 2024-12-17 | End: 2024-12-20 | Stop reason: HOSPADM

## 2024-12-17 RX ORDER — VANCOMYCIN HYDROCHLORIDE 1 G/200ML
1000 INJECTION, SOLUTION INTRAVENOUS EVERY 12 HOURS
Status: COMPLETED | OUTPATIENT
Start: 2024-12-17 | End: 2024-12-18

## 2024-12-17 RX ORDER — METOPROLOL TARTRATE 25 MG/1
25 TABLET, FILM COATED ORAL 2 TIMES DAILY
Status: DISCONTINUED | OUTPATIENT
Start: 2024-12-17 | End: 2024-12-18

## 2024-12-17 RX ORDER — CALCIUM GLUCONATE 20 MG/ML
1 INJECTION, SOLUTION INTRAVENOUS EVERY 4 HOURS
Status: DISPENSED | OUTPATIENT
Start: 2024-12-17 | End: 2024-12-17

## 2024-12-17 RX ORDER — ONDANSETRON HYDROCHLORIDE 2 MG/ML
4 INJECTION, SOLUTION INTRAVENOUS EVERY 4 HOURS PRN
Status: DISCONTINUED | OUTPATIENT
Start: 2024-12-17 | End: 2024-12-20 | Stop reason: HOSPADM

## 2024-12-17 ASSESSMENT — PAIN - FUNCTIONAL ASSESSMENT
PAIN_FUNCTIONAL_ASSESSMENT: 0-10
PAIN_FUNCTIONAL_ASSESSMENT: CPOT (CRITICAL CARE PAIN OBSERVATION TOOL)
PAIN_FUNCTIONAL_ASSESSMENT: 0-10

## 2024-12-17 ASSESSMENT — ENCOUNTER SYMPTOMS
ALTERED MENTAL STATUS: 0
WEAKNESS: 1
NAIL CHANGES: 0
SHORTNESS OF BREATH: 0
FREQUENCY: 0
FOCAL WEAKNESS: 0
DIZZINESS: 0
VOMITING: 0
IRREGULAR HEARTBEAT: 0
DYSPNEA ON EXERTION: 0
MYALGIAS: 0
DECREASED APPETITE: 0
DOUBLE VISION: 0
BLOATING: 0
MUSCLE CRAMPS: 0
NAUSEA: 0
ORTHOPNEA: 0
COUGH: 0
PALPITATIONS: 0
LIGHT-HEADEDNESS: 0
STIFFNESS: 1
BLURRED VISION: 0
POOR WOUND HEALING: 0

## 2024-12-17 ASSESSMENT — PAIN SCALES - GENERAL
PAINLEVEL_OUTOF10: 0 - NO PAIN
PAINLEVEL_OUTOF10: 10 - WORST POSSIBLE PAIN
PAINLEVEL_OUTOF10: 7
PAINLEVEL_OUTOF10: 8
PAINLEVEL_OUTOF10: 3
PAINLEVEL_OUTOF10: 0 - NO PAIN
PAINLEVEL_OUTOF10: 8
PAINLEVEL_OUTOF10: 4
PAINLEVEL_OUTOF10: 5 - MODERATE PAIN
PAINLEVEL_OUTOF10: 3
PAINLEVEL_OUTOF10: 2
PAINLEVEL_OUTOF10: 3
PAINLEVEL_OUTOF10: 7
PAINLEVEL_OUTOF10: 7

## 2024-12-17 ASSESSMENT — COGNITIVE AND FUNCTIONAL STATUS - GENERAL
MOVING FROM LYING ON BACK TO SITTING ON SIDE OF FLAT BED WITH BEDRAILS: A LOT
DAILY ACTIVITIY SCORE: 16
WALKING IN HOSPITAL ROOM: A LITTLE
MOBILITY SCORE: 14
TOILETING: A LOT
STANDING UP FROM CHAIR USING ARMS: A LITTLE
HELP NEEDED FOR BATHING: A LOT
DRESSING REGULAR LOWER BODY CLOTHING: A LOT
CLIMB 3 TO 5 STEPS WITH RAILING: TOTAL
TURNING FROM BACK TO SIDE WHILE IN FLAT BAD: A LOT
MOVING TO AND FROM BED TO CHAIR: A LITTLE
DRESSING REGULAR UPPER BODY CLOTHING: A LOT

## 2024-12-17 ASSESSMENT — PAIN DESCRIPTION - DESCRIPTORS
DESCRIPTORS: ACHING

## 2024-12-17 ASSESSMENT — PAIN DESCRIPTION - LOCATION: LOCATION: CHEST

## 2024-12-17 ASSESSMENT — PAIN DESCRIPTION - ORIENTATION: ORIENTATION: ANTERIOR;MID

## 2024-12-17 NOTE — PROGRESS NOTES
Cardiology Progress    Impression:  Non-STEMI.    Postop CABG x 4.  LIMA-LAD, free INDIGO-D1, SVG-OM1, SVG-RPDA  EF 50 to 55%.  Hypertension  Hyperlipidemia  Diabetes HbA1c 7.9  CKD.  Status post nephrectomy for renal cell carcinoma.  Plan:  Continue aspirin and statin.  Start Plavix when okay with surgery [non-STEMI]  Introduce beta-blocker as blood pressure permits  Follow labs  HPI:  Surgery went well.  Extubated yesterday.  Remains sinus rhythm.  Pressors have been weaned off.  Up to chair.  Pain control adequate.  Meds:  Scheduled medications  acetaminophen, 650 mg, oral, q6h  aspirin, 81 mg, oral, Daily  atorvastatin, 80 mg, oral, Nightly  calcium gluconate, 1 g, intravenous, q4h  ceFAZolin, 2 g, intravenous, q8h  cyclobenzaprine, 5 mg, oral, q8h  enoxaparin, 40 mg, subcutaneous, Daily  gabapentin, 100 mg, oral, TID  insulin lispro, 0-15 Units, subcutaneous, q4h  lidocaine, 1 patch, transdermal, q24h  magnesium oxide, 400 mg, oral, Daily  mupirocin, , Topical, TID  oxygen, , inhalation, Continuous - Inhalation  pantoprazole, 40 mg, oral, Daily before breakfast   Or  pantoprazole, 40 mg, intravenous, Daily before breakfast  psyllium, 1 packet, oral, Daily  sennosides-docusate sodium, 2 tablet, oral, BID  vancomycin, 1,500 mg, intravenous, q12h      Continuous medications  dexmedeTOMIDine, 0-1.5 mcg/kg/hr (Dosing Weight), Last Rate: Stopped (12/17/24 8487)  EPINEPHrine, 10 mg  norepinephrine in sodium chloride 0.9 %, 0.01-1 mcg/kg/min (Dosing Weight), Last Rate: Stopped (12/16/24 5248)  phenylephrine in 0.9% NaCl, 0.5 mcg/kg/min (Dosing Weight)      PRN medications  PRN medications: albumin human, alteplase, calcium chloride, calcium chloride, fentaNYL **OR** fentaNYL, lactated Ringer's, magnesium sulfate, magnesium sulfate, naloxone, ondansetron, oxyCODONE, oxyCODONE, potassium chloride CR **OR** potassium chloride, potassium chloride CR **OR** potassium chloride, potassium chloride, potassium  chloride    Physical exam:  Vitals:    12/17/24 0745   BP:    Pulse: 69   Resp: (!) 36   Temp:    SpO2: 94%      No JVD.  Decreased breath sounds.  No edema.   EKG:  Telemetry shows sinus rhythm.  Echo:  EF 50 to 55%.  Labs:  Lab Results   Component Value Date    WBC 11.7 (H) 12/17/2024    HGB 10.6 (L) 12/17/2024    HCT 31.2 (L) 12/17/2024     (L) 12/17/2024    CHOL 174 12/13/2024    TRIG 195 (H) 12/13/2024    HDL 29.3 12/13/2024    ALT 38 12/12/2024    AST 22 12/12/2024     (L) 12/17/2024    K 4.4 12/17/2024     12/17/2024    CREATININE 1.46 (H) 12/17/2024    BUN 24 (H) 12/17/2024    CO2 24 12/17/2024    TSH 1.96 12/13/2024    PSA 0.30 07/12/2022    INR 1.2 (H) 12/16/2024    HGBA1C 8.7 (H) 12/13/2024     par

## 2024-12-17 NOTE — PROGRESS NOTES
Physical Therapy    Physical Therapy Evaluation    Patient Name: Nader Zazueta  MRN: [id number]  181/181-[id number]  Today's Date: 12/17/2024   Time Calculation  Start Time: 1505  Stop Time: 1524  Time Calculation (min): 19 min    Assessment/Plan   PT Assessment  PT Assessment Results: Decreased strength, Decreased endurance, Decreased mobility  Rehab Prognosis: Good  Evaluation/Treatment Tolerance: Patient tolerated treatment well  End of Session Communication: PCT/NA/CTA  End of Session Patient Position: Up in chair, Alarm off, not on at start of session  IP OR SWING BED PT PLAN  Inpatient or Swing Bed: Inpatient  PT Plan  Treatment/Interventions: Bed mobility, Transfer training, Gait training, Stair training, Strengthening, Endurance training, Therapeutic exercise, Therapeutic activity  PT Plan: Ongoing PT  PT Frequency: 4 times per week  PT Discharge Recommendations: Low intensity level of continued care  PT - OK to Discharge: Yes    Subjective     Current Problem:  Patient Active Problem List   Diagnosis    Abdominal pain, chronic, right lower quadrant    Cerumen impaction    Diabetes (Multi)    Hypertension    Lumbar radiculopathy    Myelopathy (Multi)    Osteomyelitis    CKD (chronic kidney disease)    Renal insufficiency    Renal mass, right    Bee sting    Cellulitis    NSTEMI (non-ST elevated myocardial infarction) (Multi)    Chronic coronary microvascular dysfunction    Coronary artery disease involving coronary bypass graft with unstable angina pectoris (Multi)       General Visit Information:  General  Reason for Referral: PT eval and treat; presents with chest pain, NSTEMI, s/p CABG x 3 on 12/16/24  Referred By: Jovanni Arriaga  Past Medical History Relevant to Rehab: DM, CKD, HTN, HLD, osteomyelitis, lumbar radiculopathy, nephrectomy for renal cell carcinoma  Co-Treatment: OT  Prior to Session Communication: Bedside nurse  Patient Position Received: Up in chair, Alarm off, not on at start of session  General  Comment: Pt resting in chair upon entering, agreeable to PT.    Home Living:  Home Living  Home Living Comments: Pt lives with wife and 8y/o daughter in a house with first floor bed and bath. Basement laundry. 1+2 DIVYA with rail. Walk in tub with grab bars and hand held shower hose. Owns shower chair.    Prior Level of Function:  Prior Function Per Pt/Caregiver Report  Prior Function Comments: IND ADLs, IND IADLs, IND ambulation without device. (+) working in construction. (+) driving    Precautions:  Precautions  Precautions Comment: MITT, telemetry, 1L O2, central line, CVP, avery, ingram, 1 chest tube    Vital Signs:  Vital Signs  Vitals Session:  (VSS throughout session)  Objective     Pain:  Pain Assessment  Pain Assessment:  (5-6/10 pain in chest, back, left arm, right thigh)    Cognition:  Cognition  Overall Cognitive Status: Within Functional Limits    General Assessments:      Activity Tolerance  Endurance: Tolerates less than 10 min exercise, no significant change in vital signs  Early Mobility/Exercise Safety Screen: Proceed with mobilization - No exclusion criteria met  Sensation  Light Touch: No apparent deficits  Strength  Strength Comments: BLE WFL for MMT and ROM        Postural Control  Postural Control: Within Functional Limits  Static Sitting Balance  Static Sitting-Level of Assistance: Close supervision  Dynamic Sitting Balance  Dynamic Sitting-Level of Assistance: Close supervision  Static Standing Balance  Static Standing-Level of Assistance: Contact guard  Dynamic Standing Balance  Dynamic Standing-Level of Assistance: Contact guard    Functional Assessments:     Bed Mobility  Bed Mobility:  (not assessed, pt in chair beginning and end of session)  Transfers  Transfer:  (sit to stand with CGA with nezzi walker, cues for hand placement.)     Stairs  Stairs:  (Pt ambulates 100ft with nezzi walker with CGA. Pt with no acute LOB, slow cem. Mild SOB. Cues for pacing and line management  provided.)       Extremity/Trunk Assessments:        RLE   RLE : Within Functional Limits  LLE   LLE : Within Functional Limits    Outcome Measures:  [facility] Basic Mobility  Turning from your back to your side while in a flat bed without using bedrails: A lot  Moving from lying on your back to sitting on the side of a flat bed without using bedrails: A lot  Moving to and from bed to chair (including a wheelchair): A little  Standing up from a chair using your arms (e.g. wheelchair or bedside chair): A little  To walk in hospital room: A little  Climbing 3-5 steps with railing: Total  Basic Mobility - Total Score: 14           FSS-ICU  Ambulation: Walks >/ or equal to 50 feet with any assistance x1  Rolling: Moderate assistance (performs 50 - 74% of task)  Sitting: Minimal assistance (performs 75% or more of task)  Transfer Sit-to-Stand: Minimal assistance (performs 75% or more of task)  Transfer Supine-to-Sit: Moderate assistance (performs 50 - 74% of task)  Total Score: 16  ICU Mobility Screen  Early Mobility/Exercise Safety Screen: Proceed with mobilization - No exclusion criteria met  ICU Mobility Scale: Walking with assistance of 1 person  E = Exercise and Early Mobility  Early Mobility/Exercise Safety Screen: Proceed with mobilization - No exclusion criteria met  ICU Mobility Scale: Walking with assistance of 1 person          Goals:  Encounter Problems       Encounter Problems (Active)       PT Problem       PT Goal 1 STG - Pt will amb >150' using LRD with IND  (Progressing)       Start:  12/17/24    Expected End:  12/31/24            PT Goal 2 STG - Pt will transition supine <> sitting with IND (Progressing)       Start:  12/17/24    Expected End:  12/31/24            PT Goal 3 STG -  Pt will navigate 4 stairs using rail with SBA  (Progressing)       Start:  12/17/24    Expected End:  12/31/24            PT Goal 4 STG - Pt will transfer STS with IND  (Progressing)       Start:  12/17/24    Expected End:   12/31/24               Pain - Adult            Education Documentation  Precautions, taught by Krystle Corley PT at 12/17/2024  4:05 PM.  Learner: Patient  Readiness: Acceptance  Method: Explanation  Response: Verbalizes Understanding, Needs Reinforcement    Mobility Training, taught by Krystle Corley PT at 12/17/2024  4:05 PM.  Learner: Patient  Readiness: Acceptance  Method: Explanation  Response: Verbalizes Understanding, Needs Reinforcement    Education Comments  No comments found.

## 2024-12-17 NOTE — PROGRESS NOTES
Cardiac Surgery  Progress Note     Nader Zazueta is a 59 y.o. male on day 5 of admission presenting with NSTEMI (non-ST elevated myocardial infarction) (Multi).    Subjective     Interval HPI: Seen and assessed patient this while he was sitting up in bedside chair; in no acute distress and with minimal complaints.    Review of Systems   Constitutional: Positive for malaise/fatigue. Negative for decreased appetite.   HENT:  Negative for congestion.    Eyes:  Negative for blurred vision and double vision.   Cardiovascular:  Positive for chest pain. Negative for dyspnea on exertion, irregular heartbeat, leg swelling, orthopnea and palpitations.   Respiratory:  Negative for cough and shortness of breath.    Endocrine: Negative for cold intolerance and heat intolerance.   Skin:  Negative for nail changes, poor wound healing and rash.   Musculoskeletal:  Positive for stiffness. Negative for arthritis, muscle cramps, muscle weakness and myalgias.   Gastrointestinal:  Negative for bloating, nausea and vomiting.   Genitourinary:  Negative for frequency, hesitancy and urgency.   Neurological:  Positive for weakness. Negative for dizziness, focal weakness and light-headedness.   Psychiatric/Behavioral:  Negative for altered mental status.    Allergic/Immunologic: Negative for environmental allergies.         Objective   Physical Exam  Physical Exam  Vitals and nursing note reviewed.   Constitutional:       General: He is awake. He is not in acute distress.     Appearance: Normal appearance. He is not ill-appearing.      Interventions: Nasal cannula in place.   HENT:      Head: Normocephalic and atraumatic.      Comments:         Nose: Nose normal.      Mouth/Throat:      Mouth: Mucous membranes are moist.      Pharynx: Oropharynx is clear.   Eyes:      Extraocular Movements: Extraocular movements intact.      Conjunctiva/sclera: Conjunctivae normal.      Pupils: Pupils are equal, round, and reactive to light.   Neck:       Comments: Right internal jugular triple lumen catheter   Cardiovascular:      Rate and Rhythm: Normal rate and regular rhythm.      Pulses: Normal pulses.      Heart sounds: Normal heart sounds, S1 normal and S2 normal. No murmur heard.     No systolic murmur is present.      No friction rub. No gallop.      Comments: A-Ventricular Wires   Pulmonary:      Effort: Pulmonary effort is normal. No tachypnea, bradypnea, accessory muscle usage, prolonged expiration or respiratory distress.      Breath sounds: Examination of the right-lower field reveals decreased breath sounds. Examination of the left-lower field reveals decreased breath sounds. Decreased breath sounds present.   Chest:      Comments: Bilateral Pleural + Mediastinal Chest Tubes   Abdominal:      General: Abdomen is flat. Bowel sounds are normal. There is no distension.      Palpations: Abdomen is soft.   Genitourinary:     Comments: Beth Catheter     Musculoskeletal:         General: Normal range of motion.      Cervical back: Normal range of motion and neck supple.      Right lower leg: No edema.      Left lower leg: No edema.   Skin:     General: Skin is warm and dry.      Capillary Refill: Capillary refill takes less than 2 seconds.      Findings: No lesion.      Nails: There is no clubbing.      Comments: Mediastinal Incision with Post-op Dressing      Neurological:      General: No focal deficit present.      Mental Status: He is alert and oriented to person, place, and time. Mental status is at baseline.      GCS: GCS eye subscore is 4. GCS verbal subscore is 5. GCS motor subscore is 6.      Cranial Nerves: Cranial nerves 2-12 are intact.      Sensory: Sensation is intact.      Motor: Motor function is intact.   Psychiatric:         Attention and Perception: Attention and perception normal.         Mood and Affect: Mood normal.         Speech: Speech normal.         Behavior: Behavior normal.         Thought Content: Thought content normal.          Cognition and Memory: Cognition and memory normal.         Judgment: Judgment normal.         Last Recorded Vitals  Vitals:    12/17/24 0945 12/17/24 1000 12/17/24 1015 12/17/24 1100   BP:       BP Location:       Patient Position:       Pulse: 69 68 70 74   Resp: 14 15 15 17   Temp:       TempSrc:       SpO2: 95% 95% 95% 95%   Weight:       Height:            Intake/Output last 3 Shifts:  I/O last 3 completed shifts:  In: 5227 (49.6 mL/kg) [I.V.:1231 (11.7 mL/kg); Blood:461; NG/GT:125; IV Piggyback:3410]  Out: 2815 (26.7 mL/kg) [Urine:2130 (0.6 mL/kg/hr); Blood:250; Chest Tube:435]  Weight: 105.3 kg     Inpatient Medications  acetaminophen, 650 mg, oral, q6h  aspirin, 81 mg, oral, Daily  atorvastatin, 80 mg, oral, Nightly  calcium gluconate, 1 g, intravenous, q4h  ceFAZolin, 2 g, intravenous, q8h  cyclobenzaprine, 5 mg, oral, q8h  enoxaparin, 40 mg, subcutaneous, Daily  gabapentin, 100 mg, oral, TID  insulin lispro, 0-15 Units, subcutaneous, q4h  lidocaine, 1 patch, transdermal, q24h  magnesium oxide, 400 mg, oral, Daily  mupirocin, , Topical, TID  oxygen, , inhalation, Continuous - Inhalation  pantoprazole, 40 mg, oral, Daily before breakfast   Or  pantoprazole, 40 mg, intravenous, Daily before breakfast  psyllium, 1 packet, oral, Daily  sennosides-docusate sodium, 2 tablet, oral, BID  vancomycin, 1,000 mg, intravenous, q12h      Continuous medications       PRN medications  PRN medications: albumin human, alteplase, calcium chloride, calcium chloride, fentaNYL **OR** fentaNYL, lactated Ringer's, magnesium sulfate, magnesium sulfate, naloxone, ondansetron, oxyCODONE, oxyCODONE, potassium chloride CR **OR** potassium chloride, potassium chloride CR **OR** potassium chloride, potassium chloride, potassium chloride     LDA:       Relevant Results  Lab Review  Results from last 7 days   Lab Units 12/17/24  0338   WBC AUTO x10*3/uL 11.7*   HEMOGLOBIN g/dL 10.6*   HEMATOCRIT % 31.2*   PLATELETS AUTO x10*3/uL 133*      Results from last 7 days   Lab Units 12/17/24  0338 12/13/24  0349 12/12/24  0453   SODIUM mmol/L 132*   < > 135*   POTASSIUM mmol/L 4.4   < > 4.1   CHLORIDE mmol/L 102   < > 103   CO2 mmol/L 24   < > 20*   BUN mg/dL 24*   < > 25*   CREATININE mg/dL 1.46*   < > 1.41*   CALCIUM mg/dL 8.2*   < > 9.7   PROTEIN TOTAL g/dL  --   --  7.4   BILIRUBIN TOTAL mg/dL  --   --  0.6   ALK PHOS U/L  --   --  40   ALT U/L  --   --  38   AST U/L  --   --  22   GLUCOSE mg/dL 140*   < > 210*    < > = values in this interval not displayed.     Results from last 7 days   Lab Units 12/17/24  0338   MAGNESIUM mg/dL 1.94     Results from last 7 days   Lab Units 12/17/24  0459   POCT PH, ARTERIAL pH 7.43*   POCT PCO2, ARTERIAL mm Hg 37*   POCT PO2, ARTERIAL mm Hg 88   POCT HCO3 CALCULATED, ARTERIAL mmol/L 24.6   POCT BASE EXCESS, ARTERIAL mmol/L 0.4         Cardiology Tests     Echo:  Transthoracic echo (TTE) complete 12/12/2024  PHYSICIAN INTERPRETATION:  Left Ventricle: Left ventricular ejection fraction is low normal, by visual estimate at 50-55%. There are no regional left ventricular wall motion abnormalities. The left ventricular cavity size is normal. There is mildly increased septal and normal posterior left ventricular wall thickness. There is left ventricular concentric remodeling. Spectral Doppler shows a normal pattern of left ventricular diastolic filling.  Left Atrium: The left atrium is normal in size.  Right Ventricle: The right ventricle is normal in size. There is normal right ventricular global systolic function.  Right Atrium: The right atrium is normal in size.  Aortic Valve: The aortic valve is trileaflet. There is mild aortic valve cusp calcification. The aortic valve dimensionless index is 0.85. There is no evidence of aortic valve regurgitation. The peak instantaneous gradient of the aortic valve is 11 mmHg. The mean gradient of the aortic valve is 4 mmHg.  Mitral Valve: The mitral valve is normal in structure.  There is trace mitral valve regurgitation.  Tricuspid Valve: The tricuspid valve is structurally normal. There is trace tricuspid regurgitation. The right ventricular systolic pressure is unable to be estimated.  Pulmonic Valve: The pulmonic valve is not well visualized. The pulmonic valve regurgitation was not well visualized.  Pericardium: There is no pericardial effusion noted.  Aorta: The aortic root is normal.        CONCLUSIONS:   1. Left ventricular ejection fraction is low normal, by visual estimate at 50-55%.     Ejection Fractions:        EF   Date/Time Value Ref Range Status   12/12/2024 11:07 AM 53 %        Cath:  Cardiac Catheterization Procedure 12/12/2024  Coronary Angiography:  The coronary circulation is right dominant.     Coronary Angiography Comments:  60-year-old man with history of hypertension, hyperlipidemia, diabetes presents with non-STEMI. Urgent heart catheter recommended.     Fluoroscopy demonstrated mild calcification of the coronary tree.     Left main: Large vessel. No significant disease.     LAD: Moderate-sized vessel. LAD is occluded mid vessel. The distal half of the LAD fills via left to left collaterals. There is a large first diagonal which has a proximal 75% stenosis. There is a large bifurcating second diagonal which is a 80% stenosis in it.     LCx: Moderate-sized vessel. Mid vessel 95% stenosis. The circumflex then gives rise to a moderate-sized first obtuse marginal which has mild disease. The ongoing circumflex is a very small, rudimentary vessel.     RCA: Large dominant vessel. Mild to moderate diffuse disease. Mid RCA there is a focal 50% stenosis.     Left heart catheterization demonstrates an LVEDP of 15 mmHg. Left ventricular angiography shows hypokinesis of the anterior wall. EF 45 to 50%. No mitral regurgitation. No gradient across aortic valve upon pullback.     CONCLUSIONS:   1. Critical three-vessel disease including LAD  with left to left collaterals, severe  disease in D1 and D2, subtotal occlusion of moderate-sized circumflex.   2. Mild LV dysfunction, EF estimated 45 to 50%.   3. Patient will be referred for surgical revascularization.         History of Present Illness: Nader Zazueta is a 59 y.o. male with a PMH significant for HTN, HLD, and CKD (solitary kidney secondary to RCC) who presents to [facility] on 12/12/2024 with complaints of sternal chest discomfort.     ED course: On arrival, patient's /102 (now 158/86), heart rate 55, respirations 18, afebrile, saturating 98% on room air. Labs and imaging form, revealing glucose 210, sodium 135, Cr 1.41 and BUN 25 (1.53 and 25 on 8/7/23), initial troponin 27, delta troponin 77. No white count or anemia. Platelets WNL. Flu A/B/COVID-negative. Chest x-ray shows no acute cardiopulmonary process. EKG, per ED physician, sinus bradycardia with a rate of 59 bpm, normal ME, QRS, normal QTc duration. No ST segment or T wave pathology. Good R wave progression throughout the precordial leads. Patient was administered 324 mg aspirin in the ambulance. Patient started on heparin drip, given Nitropaste and nitro tablet in the ED.      He was seen in consult by cardiologist, Dr. Thor Keyes and went for a [facility]. His coronary anatomy revealed diffuse MV-CAD with a  of the mid LAD (with L to L collateralizations). Cardiac surgery was consulted for bypass grafting evaluation.     Daily Events    12/13: No acute events overnight; patient continues on nitroglycerin gtt for HTN and angina, however endorsing headache so will try to wean down and use PO nitrate.     12/15: Patient with worsening headache overnight as we were utilizing IV nitroglycerine to treat his angina & BP. The patient was seen by cardiology, and his nitrates stopped and he was subsequently started on IV nicardipine for better BP control.     Throughout the day, the patient has continued with ongoing L shoulder pain and gets  relief with rest and morphine. Suspect that this is related to his CAD vs musculoskeletal.     Discussed with cardiac surgeons and cardiology; will reprioritization patient as he needs a more urgent surgical revascularization.      Continue on IV nicardipine for tight BP control, PRN morphine, ECGs and heparin gtt.     12/16: This morning, the patient experienced recurrent angina, however it was unrelieved by morphine and a normotensive BP; ECG was without ischemia. Given this, his surgical time was moved up from this afternoon to this morning with Dr. Mendenhall.     12/16 (Post-op): Patient arrived to the ICU in critically ill but stable condition; s/p CABG x4. Currently remains intubated with sedation; on norepinephrine 0.05mg/kg/min while being V paced at 80 BPM. However, after dialing back pacer allowing patient to be in his own rhythm (Sinus 70s), his BP improved allowing     - Vital Signs: HR 80, /64, SpO2 100%   - Hemodynamics: CVP 12, CO 4.9, CI 2.3, SVR 1050  - Ventilator Settings: PRVC-AC / FiO2 60% / PEEP 5 / RR 16     - Pump time: 94 Minutes     Intake & Output:   - Initial R Pleural Chest Tube: 10mL   - Initial L Pleural Chest Tube: 10mL   - Initial Mediastinal Chest Tube: 20mL     12/17: No acute events overnight; patient was extubated in the immediate postop period.  Was on norepinephrine for a short period of time, this has subsequently been weaned off this morning.  Patient is normotensive, so we will start on beta-blockade and light diuresis.  Mediastinal chest tube with moderate output, so will maintain this today, however okay to remove bilateral pleural chest tubes    - Current O2 Requirements: 2L NC       Assessment & Plan        MV-CAD  - Patient admitted to [facility] on 12/12/24 with anginal complaints   --> HS Troponin I: 27 -> 77 -> 4446  - [facility] on 12/12 revealed diffuse L system CAD with coronary anatomy favoring surgical revascularization   - S/p CABG x 4 on 12/16/24 with Dr. Anjali Mendenhall    --> LIMA-LAD, Free INDIGO - D1, SVG-OM1, SVG-rPDA  - Continue on ASA 81mg and Atorvastatin 80  - Starting on Metoprolol tartrate 25mg BID   - Maintain MS chest tube-20cm H20 continuous suction   --> Okay to remove bilateral pleural   - Daily CXR        Mediastinal Incision  - Remove post-op dressing on POD #2  - Dressing C/D/I       Acute Post-Op Pain  - As expected   - Multimodal pain control   --> Scheduled: Acetaminophen, magnesium oxide 400mg QD, gabapentin 100mg TID, methocarbamol 500mg q6h   --> PRN: oxycodone & fentanyl  - Bowel regimen while taking narcotics        Risk for Post-Op Arrhythmia  - Ventricular wires placed  - Discontinue V-wires prior to DC  - Telemetry until discharged       Acute Postoperative Respiratory Insufficiency   - As expected following CABG with post-op atelectasis  - Current O2 Requirements: 2L NC   - Coughing and deep breathing exercises with Incentive spirometry  - Out of bed, early and aggressive mobilization with assistance  - Oxygen as needed, wean to keep saturation above 92%  - ICU intensivist/pulmonologist consulted  - Albuterol nebulizers as needed       Risk for Fluid Volume Overload  - Pre-Op Weight: 99.2 kg     12/17: 105 kg (net positive 3742mL) --> IV furosemide 20mgBID    - Strict I& Os and daily weights    - Monitor CVP and hemodynamics        Acute Post-Op Blood-Loss Anemia  - Pre-Op H/H: 16/44.7     12/17: 16.6/31.2  - Monitor h/h in the initial post op period  - Monitor chest tubes for post op hemorrhage   - Multivitamin/iron tablet   - Daily CBC while in hospital       Post-Op Leukocytosis  - Pre-Op WBC: 12.3     12/17:11.7  - Afebrile, consider elevations as reactive to surgery   - Post-op ATB Q12 for 48hrs  - Daily cbc while in hospital        Essential HTN  - Home Medications: Amlodipine 10mg, lisinopril 10mg  - Hold in the immediate post-op period        Type 2 DM  - Home Medications: Metformin 1g BID   - HbA1c: 8.7  - Goal for BG <150 in the post-operative  period  - SSI       Dyslipidemia  - Home Medications: None  - Started on high intensity statin therapy this admission   - Continue on statin therapy as above     Risk for Electrolyte Disturbances  - Optimize electrolytes per [facility] protocol      Bowel Regimen  - Senna-S BID   - PRN suppositories and miralax    Prophylaxis  - GI: PPI  - DVT: Rolf-Hose & starting enoxaparin  - MRSA: Positive (12/12)   - PT/OT     Disposition  - CVICU (stepdown status)     Patient seen and plan discussed with Dr. Anjali Mendenhall & Dr. Mukul Root.     Jovanni Arriaga, APRN-CNP

## 2024-12-17 NOTE — PROGRESS NOTES
"Nader Zazueta is a 59 y.o. male on day 5 of admission presenting with NSTEMI (non-ST elevated myocardial infarction) (Multi).    Subjective     Post op day 1, in chair, doing well,    Objective     Physical Exam  Constitutional:       Appearance: Normal appearance.   HENT:      Head: Normocephalic and atraumatic.      Right Ear: Tympanic membrane and ear canal normal.      Left Ear: Tympanic membrane and ear canal normal.      Mouth/Throat:      Mouth: Mucous membranes are moist.      Pharynx: Oropharynx is clear.   Eyes:      Extraocular Movements: Extraocular movements intact.      Conjunctiva/sclera: Conjunctivae normal.      Pupils: Pupils are equal, round, and reactive to light.   Cardiovascular:      Rate and Rhythm: Normal rate and regular rhythm.      Pulses: Normal pulses.      Heart sounds: Normal heart sounds.   Pulmonary:      Effort: Pulmonary effort is normal.      Breath sounds: Normal breath sounds.   Abdominal:      General: Abdomen is flat. Bowel sounds are normal.      Palpations: Abdomen is soft.   Musculoskeletal:         General: Normal range of motion.      Cervical back: Normal range of motion and neck supple.   Skin:     General: Skin is warm and dry.      Capillary Refill: Capillary refill takes 2 to 3 seconds.   Neurological:      General: No focal deficit present.      Mental Status: He is alert and oriented to person, place, and time. Mental status is at baseline.   Psychiatric:         Mood and Affect: Mood normal.         Behavior: Behavior normal.         Thought Content: Thought content normal.         Judgment: Judgment normal.         Last Recorded Vitals  Blood pressure 115/64, pulse 70, temperature 36.9 °C (98.4 °F), temperature source Temporal, resp. rate 20, height 1.753 m (5' 9\"), weight 105 kg (232 lb 2.3 oz), SpO2 97%.  Intake/Output last 3 Shifts:  I/O last 3 completed shifts:  In: 5227 (49.6 mL/kg) [I.V.:1231 (11.7 mL/kg); Blood:461; NG/GT:125; IV Piggyback:3410]  Out: " 2815 (26.7 mL/kg) [Urine:2130 (0.6 mL/kg/hr); Blood:250; Chest Tube:435]  Weight: 105.3 kg     Relevant Results                              Assessment/Plan   Assessment & Plan  NSTEMI (non-ST elevated myocardial infarction) (Multi)    Chronic coronary microvascular dysfunction    Coronary artery disease involving coronary bypass graft with unstable angina pectoris (Multi)    NSTEMI  Hypertension  Hyperlipidemia  -Cardiology consult, recommendations appreciated  -EKG, per ED physician, sinus bradycardia with a rate of 59 bpm, normal DC, QRS, normal QTc duration.  No ST segment or T wave pathology.  Good R wave progression throughout the precordial leads  -Chest x-ray shows no acute cardiopulmonary process  -Patient initiated on heparin drip in the ED, continue  -Patient administered 324 mg aspirin by EMS, continue 81 mg aspirin daily  -Will keep patient n.p.o. for now until evaluated by cardio  -Hemoglobin A1c, lipid panel, TSH, echo added on  -Continue home medications when home med rec complete, n.p.o. for now     Hx of incisional hernia  -Patient tender to palpation over site of his incisional hernia repair (RLQ), he states he would like to be evaluated by Dr. Covarrubias  -CT A/P added on (w/out contrast due to pt's solitary kidney)     Type 2 diabetes mellitus  -Sliding scale insulin and diabetic diet  -Hypoglycemic protocol     CKD  Hyponatremia, 135  -Creatinine 1.41 and BUN 25, 1.53 and 25 point 8/7/2023  -Monitor with BMP, avoid nephrotoxic medications     DVT prophylaxis  -Patient currently on heparin drip  -SCDs    12/12: spoke with cardioloyg cath shows multivessel disease will need CABG, CT surgery consulted    12/13: doing well, monitor labs, work up underway, tentative schedule cabg on Monday 12/14:no issues doing well    12/15: family at bedside, surgery today.    12/16: doing well contineu post op car        I spent 35 minutes in the professional and overall care of this patient.      Mustapha Singer,  DO

## 2024-12-17 NOTE — PROGRESS NOTES
Occupational Therapy    Evaluation    Patient Name: Nader Zazueta  MRN: [id number]  Department: [facility]  Room: [address] [place], [place] [address]  Today's Date: 12/17/2024  Time Calculation  Start Time: 1506  Stop Time: 1524  Time Calculation (min): 18 min        Assessment:  End of Session Communication: PCT/NA/CTA  End of Session Patient Position: Up in chair, Alarm off, not on at start of session     Plan:  Treatment Interventions: ADL retraining, Endurance training, Compensatory technique education, Functional transfer training  OT Frequency: 3 times per week  OT Discharge Recommendations: Low intensity level of continued care  OT - OK to Discharge: Yes (to next level of care when cleared by medical team)  Treatment Interventions: ADL retraining, Endurance training, Compensatory technique education, Functional transfer training    Subjective   Current Problem:  1. Coronary artery disease involving coronary bypass graft of native heart with unstable angina pectoris  Transesophageal Echo (MARIUSZ)    Transesophageal Echo (MARIUSZ)      2. NSTEMI (non-ST elevated myocardial infarction) (Multi)  Transthoracic echo (TTE) complete    Transthoracic echo (TTE) complete    Case Request Cath Lab: Left Heart Cath, With LV    Case Request Cath Lab: Left Heart Cath, With LV    Cardiac Catheterization Procedure    Cardiac Catheterization Procedure    Referral to Cardiac Rehab - outpatient (for providers who will be following patient along cardiac rehab)    Vascular US carotid artery duplex bilateral    Vascular US lower extremity vein mapping bilateral    Vascular US upper extremity arterial duplex bilateral    Vascular US carotid artery duplex bilateral    Vascular US lower extremity vein mapping bilateral    Vascular US upper extremity arterial duplex bilateral    Case Request Operating Room: Creation Bypass Graft Coronary Artery, CABG, 2 OR MORE VESSELS    Case Request Operating Room: Creation Bypass Graft Coronary Artery, CABG, 2 OR MORE VESSELS     Vascular US Palmar Arch Evaluation    Vascular US Palmar Arch Evaluation    Transesophageal Echo (MARIUSZ)    Transesophageal Echo (MARIUSZ)    CANCELED: Case Request Cath Lab: Left Heart Cath    CANCELED: Case Request Cath Lab: Left Heart Cath    CANCELED: Case Request Cath Lab: Left Heart Cath, With LV    CANCELED: Case Request Cath Lab: Left Heart Cath, With LV    CANCELED: Cardiac Catheterization Procedure    CANCELED: Cardiac Catheterization Procedure    CANCELED: Vascular US PVR without exercise    CANCELED: Vascular US PVR without exercise      3. Chronic coronary microvascular dysfunction  Vascular US carotid artery duplex bilateral    Vascular US lower extremity vein mapping bilateral    Vascular US upper extremity arterial duplex bilateral    Vascular US carotid artery duplex bilateral    Vascular US lower extremity vein mapping bilateral    Vascular US upper extremity arterial duplex bilateral    Case Request Operating Room: Creation Bypass Graft Coronary Artery, CABG, 2 OR MORE VESSELS    Case Request Operating Room: Creation Bypass Graft Coronary Artery, CABG, 2 OR MORE VESSELS    Vascular US Palmar Arch Evaluation    Vascular US Palmar Arch Evaluation    Transesophageal Echo (MARIUSZ)    Transesophageal Echo (MARIUSZ)    CANCELED: Vascular US PVR without exercise    CANCELED: Vascular US PVR without exercise      4. Other specified symptoms and signs involving the circulatory and respiratory systems  Vascular US carotid artery duplex bilateral    Transesophageal Echo (MARIUSZ)    Transesophageal Echo (MARIUSZ)      5. 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 Encounter for preprocedural cardiovascular examination  Vascular US lower extremity vein mapping bilateral    Vascular US Palmar Arch Evaluation      7. Renal mass, right        8. Renal insufficiency        9. Myelopathy (Multi)        10. Lumbar radiculopathy        11. Secondary  hypertension        12. Type 2 diabetes mellitus without complication, with long-term current use of insulin (Multi)        13. Abdominal pain, chronic, right lower quadrant          General:  General  Reason for Referral: impaired adl; pt. admitted with chest pain, nstemi s/p cabg x 3 on 12/16/24  Referred By: Bart  Past Medical History Relevant to Rehab: dm, ckd, htn, hld, osteomyelitis, lumbar radiculopathy, nephrectomy for renal cell carcinoma  Prior to Session Communication: Bedside nurse  Patient Position Received: Up in chair, Alarm off, not on at start of session  General Comment: pt. agreeable to therapy intervention  Precautions:  Precautions Comment: MITT, chest tube x 1, arterial line, cvp, o2 1 L/min, ingram, cvc, piv, fluids, VSS    Vital Sign (Past 2hrs)        Date/Time Vitals Session Patient Position Pulse Resp SpO2 BP MAP (mmHg)    12/17/24 1500 --  --  77  16  94 %  --  --     12/17/24 1505 --  --  --  --  --  --  --     12/17/24 1600 --  --  80  23  96 %  --  --                         Pain:  Pain Assessment  Pain Assessment:  (5-6/10 chest/LUE/R thigh and back, pt. due to pain medicine soon and states he will request some after session is over, repositioned for comfort at conclusion of session)    Objective   Cognition:  Overall Cognitive Status: Within Functional Limits           Home Living:  Home Living Comments: pt lives with spouse and 6 y/o daughter, 1 floor home with basement laundry, 1+2 step entry with rail, walk in tub with rails and hhs, possible shower seat, st. toilet height  Prior Function:  Prior Function Comments: pt. is independent with adl/iadl/drives, works in construction  IADL History:     ADL:  ADL Comments: would estimate max assist for LB dress/bathe, mod assist for UB bathe/dress due to lines/tubes, pt. set up for grooming/feeding  Activity Tolerance:  Endurance: Decreased tolerance for upright activites  Early Mobility/Exercise Safety Screen: Proceed with mobilization  - No exclusion criteria met  Bed Mobility/Transfers:      Transfers  Transfer:  (sit<> stand from recliner chair:  cga, cues for hand placement)      Functional Mobility:  Functional Mobility  Functional Mobility Performed:  (mobility via nememory lane syndicationsie walker house hold distances, cga with cues for pacing and rest periods)  Sensation:  Sensation Comment: sensation intact  Strength:  Strength Comments: bue's strength at least 3/5    Outcome Measures:[facility] Daily Activity  Putting on and taking off regular lower body clothing: A lot  Bathing (including washing, rinsing, drying): A lot  Putting on and taking off regular upper body clothing: A lot  Toileting, which includes using toilet, bedpan or urinal: A lot  Taking care of personal grooming such as brushing teeth: None  Eating Meals: None  Daily Activity - Total Score: 16         and Early Mobility/Exercise Safety Screen: Proceed with mobilization - No exclusion criteria met  ICU Mobility Scale: Walking with assistance of 1 person [8]    Education Documentation  Precautions, taught by Una Gonzales OT at 12/17/2024  4:07 PM.  Learner: Patient  Readiness: Acceptance  Method: Explanation  Response: Verbalizes Understanding, Needs Reinforcement    ADL Training, taught by Una Gonzales OT at 12/17/2024  4:07 PM.  Learner: Patient  Readiness: Acceptance  Method: Explanation  Response: Verbalizes Understanding, Needs Reinforcement    Education Comments  MITT, energy conservation     Goals:  Encounter Problems       Encounter Problems (Active)       OT Goals       Increase functional mobility and  functional transfers to supervision for bed/chair/toilet with dme prn   (Progressing)       Start:  12/17/24    Expected End:  12/24/24            increase bue ther ex/activity x 7-10 minutes and increase standing tolerance x 3-5 minutes with supervision to promote greater activity tolerance for assist with adl.   (Progressing)       Start:  12/17/24    Expected End:  12/24/24             pt. to apply ec/ws techniques/MITT without cues to all mobility/transfer/adl to decrease fatigue/promote efficient use of energy toward completion of functional tasks.  (Progressing)       Start:  12/17/24    Expected End:  12/24/24

## 2024-12-17 NOTE — OP NOTE
CORONARY ARTERY BYPASS GRAFT (CABG X4) LEFT INTERNAL MAMMARY ARTERY/ AND VEIN Operative Note     Date: 2024  OR Location: [facility] OR    Name: Nader Zazueta, : 1965, Age: 59 y.o., MRN: [id number], Sex: male    Diagnosis  Pre-op Diagnosis      * NSTEMI (non-ST elevated myocardial infarction) (Multi) [I21.4]     * Chronic coronary microvascular dysfunction [I25.85] Post-op Diagnosis     * NSTEMI (non-ST elevated myocardial infarction) (Multi) [I21.4]     * Chronic coronary microvascular dysfunction [I25.85]     Procedures  CABGx4 LIMA-LAD, INDIGO-D2 (INDIGO off LIMA:T graft), SVG-OM1-PDA sequential    Median sternotomy  Central cannulation  EVH right greater saphenous vein  CABG x 4 (LIMA->LAD, free INDIGO from LIMA->DIAG 1, SVG->OM1->PDA)  Sternal plating with Nehal plates    Surgeons      * Anjali Alba - Primary    Resident/Fellow/Other Assistant:  Fortunato: EVH and closure  Courtney: Assistance and closure  Lula Assistance    Staff:   Circulator: Yoanna Archibald Person: Sharee  Surgical Assistant: Renaldo  Surgical Assistant: Tee Hammer Scrub: Savanah Hammer Circulator: Pascale  Surgical Assistant: Lynda Hammer Circulator: Aniya Hammer Scrub: Pascale    Anesthesia Staff: Anesthesiologist: Gallo Hernandez MD  C-AA: MONIQUE Stevens  Perfusionist: Brandon Reno    Procedure Summary  Anesthesia: General  ASA: IV  Estimated Blood Loss: 250mL  Intra-op Medications: * Intraprocedure medication information is unavailable because the case start and end events have not been set *      Intraprocedure I/O Totals       None           Specimen: No specimens collected              Drains and/or Catheters:   Chest Tube 1 Left Pleural 28 Fr (Active)   Function -20 cm H2O 24   Chest Tube Air Leak No 24   Patency Intervention Tip/tilt 24   Drainage Description Serosanguineous 24   Dressing Status Clean;Dry;Occlusive 24 4096    Site Assessment Clean;Dry;Intact 12/17/24 0732   Surrounding Skin Unable to view 12/16/24 1700   Output (mL) 0 mL 12/17/24 0700       Chest Tube 2 Mediastinal 28 Fr (Active)   Function -20 cm H2O 12/17/24 0300   Chest Tube Air Leak No 12/17/24 0300   Patency Intervention Tip/tilt 12/16/24 2000   Drainage Description Serosanguineous 12/17/24 0300   Dressing Status Clean;Dry;Occlusive 12/17/24 0732   Site Assessment Clean;Dry;Intact 12/17/24 0732   Surrounding Skin Unable to view 12/16/24 1700   Output (mL) 20 mL 12/17/24 0700       Chest Tube 3 Right Pleural 28 Fr (Active)   Function -20 cm H2O 12/17/24 0300   Chest Tube Air Leak No 12/17/24 0300   Patency Intervention Tip/tilt 12/16/24 2000   Drainage Description Serosanguineous 12/17/24 0300   Dressing Status Clean;Dry;Occlusive 12/17/24 0732   Site Assessment Clean;Dry;Intact 12/17/24 0732   Surrounding Skin Unable to view 12/16/24 1700   Output (mL) 0 mL 12/17/24 0700       Urethral Catheter Temperature probe;Non-latex 14 Fr. (Active)   Site Assessment Clean;Skin intact 12/17/24 0114   Collection Container Standard drainage bag 12/17/24 0114   Securement Method Securing device (Describe) 12/17/24 0114   Reason for Continuing Urinary Catheterization accurate hourly measurement of urine volume in a critically ill patient that cannot be assessed by other volumes and urine collection strategies 12/16/24 2000   Output (mL) 75 mL 12/17/24 0700       [REMOVED] NG/OG/Feeding Tube OG - [place] sump 16 Fr Center mouth (Removed)   Tube Status Clamped 12/16/24 1800   Placement Verification X-ray 12/16/24 1800   Distal Tube Measurement 55 cm 12/16/24 1800   Site Assessment Clean;Dry;Intact 12/16/24 1800   Drainage Appearance None 12/16/24 1800   NG/OG Interventions Irrigated 12/16/24 1800   Irrigant Tap water 12/16/24 1800   Response To Intervention No resistance met 12/16/24 1800   Intake (mL) 0 mL 12/16/24 1900   Intake - Flush (mL) 0 mL 12/16/24 1900   Output (mL) 0 mL  12/16/24 1900       Tourniquet Times:         Implants:  Implants       Type Name Action Serial No.      Screw X-PLATE, LOW PROFILE - SN/A - [id number] Implanted N/A     Screw PLATE, LP BOX - SN/A - [id number] Implanted N/A     Screw PLATE, LP MANUBRIUM - SN/A - [id number] Implanted N/A     Screw SCREW, SD LOCKING, 2.3 X 12MM - SN/A - [id number] Implanted N/A     Screw SCREW, SD LOCKING, 2.3 X 14MM - SN/A - [id number] Implanted N/A     Screw SCREW, SD LOCKING, 2.3 X 18MM - SN/A - [id number] Implanted N/A                  Indications: Nader Zazueta is an 59 y.o. male who is having surgery for NSTEMI (non-ST elevated myocardial infarction) (Multi) [I21.4]  Chronic coronary microvascular dysfunction [I25.85].     The patient was seen in the preoperative area. The risks, benefits, complications, treatment options, non-operative alternatives, expected recovery and outcomes were discussed with the patient. The possibilities of reaction to medication, pulmonary aspiration, injury to surrounding structures, bleeding, recurrent infection, the need for additional procedures, failure to diagnose a condition, and creating a complication requiring transfusion or operation were discussed with the patient. The patient concurred with the proposed plan, giving informed consent.  The site of surgery was properly noted/marked if necessary per policy. The patient has been actively warmed in preoperative area. Preoperative antibiotics have been ordered and given within 1 hours of incision. Venous thrombosis prophylaxis are not indicated.    Procedure Details:   CABGx4 LIMA-LAD, INDIGO-D2 (INDIGO off LIMA:T graft), SVG-OM1-PDA sequential    OPERATIVE FINDINGS:  There were no pericardial adhesions or effusions.  The ascending aorta was soft without evidence of atherosclerosis.  The left ventricular function was normal.  The patient had severe diffuse coronary artery disease, however we were able to find locations on the LAD, D1, OM1, and  PDA that was suitable for grafting.  - The LAD is chronically occluded and had diffuse plaques throughout the whole length but in the distal third we were able to find a soft spot for the anastomosis  -The D1 was very small to graft  -After testing the left radial artery it was a dominant artery and that is why we did not harvested  -There is adhesions in the left pleural space      OPERATIVE PROCEDURE:  The patient was brought to the operating room, placed on the operating table in supine position.  General endotracheal anesthesia and hemodynamic monitoring were established.  The patient was prepped and draped in standard fashion. A median sternotomy was performed.  The LIMA and INDIGO were harvested in a skeletonized manner. The saphenous vein was harvested endoscopically from the right leg.  The thymus was divided and the pericardium was opened.  The ascending aorta was palpated and it was without evidence of atherosclerosis.  The patient was heparinized.  The ascending aorta and right atrium were cannulated for cardiopulmonary bypass and antegrade cardioplegia cannula was placed.  The patient was placed on cardiopulmonary bypass, the ascending aorta was crossclamped, and the heart was arrested and protected with cold blood cardioplegia.  During the remainder of the cross-clamp time, cold blood cardioplegia was administered every 15 to 20 minutes.      The INDIGO was anastomosed as T graft of the LIMA before starting the cardiopulmonary bypass    GRAFTS:   CABGx4 LIMA-LAD, INDIGO-D2 (INDIGO off LIMA:T graft), SVG-OM1-PDA sequential    Graft 1, PDA:  This vessel was opened proximally, which was 1.5 mm in diameter, free from disease at the point of entry.  A length of reverse saphenous vein was anastomosed end-to-side using continuous 7-0 Prolene.     Graft 2, OM1:    This vessel was opened in its mid course which was 1.5 mm in diameter, free from disease at the point of entry. The length of reverse saphenous vein was  anastomosed side-to-side using continuous 7-0 Prolene.     Graft 3, D1([place]):    This vessel was opened in its mid to distal third, which was 2 mm in diameter, free from disease at the point of entry. The INDIGO was anastomosed end-to-side using continuous 8-0 Prolene.   INDIGO off LIMA:T graft    Graft 4, LAD:    This vessel was opened in its distal third, which was 1.5 mm in diameter, free from disease at the point of entry. The LIMA was anastomosed end-to-side using continuous 8-0 Prolene.     The proximal end of the vein graft was anastomosed to punch hole into the ascending aorta using continuous 6-0 Prolene sutures.     Assessment of graft flows demonstrated good flows in all the grafts.    Cardiopulmonary bypass was weaned uneventfully.  Two right atrial pacing wires were applied. Protamine was administered to reverse systemic anticoagulation. Hemostasis was secured.     Three drains were inserted, 1 in the mediastinum toward the left pleura, 1 in the mediastinum and 1 in the right pleura space.  The sternum was closed with interrupted stainless steel wires  and plating and subcutaneous layers were closed using Vicryl and skin was closed using subcuticular Vicryl.  The patient remained stable, was transferred to the Cardiothoracic Intensive Care Unit in stable cardiorespiratory condition.  I was available for all aspects  of procedure preoperatively and postoperatively    Complications:  None; patient tolerated the procedure well.    Disposition: ICU - intubated and hemodynamically stable.  Condition: stable     Additional Details:   LIMA-LAD >35ml/min PI<3  INDIGO-D1 >80 ml.min PI<3  SVG-OM1 and PDA >100ml/min PI<3    Attending Attestation: I was present and scrubbed for the entire procedure.    Anjali Alba  Phone Number: [phone]

## 2024-12-17 NOTE — CARE PLAN
The patient's goals for the shift include      The clinical goals for the shift include pt will remain hemodynamically stable      Problem: Pain - Adult  Goal: Verbalizes/displays adequate comfort level or baseline comfort level  Outcome: Progressing     Problem: Safety - Adult  Goal: Free from fall injury  Outcome: Progressing     Problem: Discharge Planning  Goal: Discharge to home or other facility with appropriate resources  Outcome: Progressing     Problem: Chronic Conditions and Co-morbidities  Goal: Patient's chronic conditions and co-morbidity symptoms are monitored and maintained or improved  Outcome: Progressing     Problem: Fall/Injury  Goal: Not fall by end of shift  Outcome: Progressing  Goal: Be free from injury by end of the shift  Outcome: Progressing  Goal: Verbalize understanding of personal risk factors for fall in the hospital  Outcome: Progressing  Goal: Verbalize understanding of risk factor reduction measures to prevent injury from fall in the home  Outcome: Progressing  Goal: Pace activities to prevent fatigue by end of the shift  Outcome: Progressing  Goal: Use assistive devices by end of the shift  Outcome: Progressing     Problem: Diabetes  Goal: Increase stability of blood glucose readings by end of shift  Outcome: Progressing  Goal: Maintain glucose levels >70mg/dl to <250mg/dl throughout shift  Outcome: Progressing  Goal: Learn about and adhere to nutrition recommendations by end of shift  Outcome: Progressing  Goal: Increase self care and/or family involovement by end of shift  Outcome: Progressing     Problem: Pain  Goal: Takes deep breaths with improved pain control throughout the shift  Outcome: Progressing  Goal: Performs ADL's with improved pain control throughout shift  Outcome: Progressing  Goal: Participates in PT with improved pain control throughout the shift  Outcome: Progressing  Goal: Free from opioid side effects throughout the shift  Outcome: Progressing  Goal: Free from  acute confusion related to pain meds throughout the shift  Outcome: Progressing     Problem: Nutrition  Goal: Oral intake greater 75%  Outcome: Progressing  Goal: Adequate PO fluid intake  Outcome: Progressing  Goal: Electrolytes WNL  Outcome: Progressing     Problem: ACS/CP/NSTEMI/STEMI  Goal: Chest pain managed (free from pain or at acceptable level)  Outcome: Progressing  Goal: Serial ECG will return to baseline  Outcome: Progressing  Goal: Verbalize understanding of procedures/devices  Outcome: Progressing     Problem: Cardiac catheterization  Goal: Free from dysrhythmias  Outcome: Progressing  Goal: Free from pain  Outcome: Progressing  Goal: No evidence of post procedure complications  Outcome: Progressing  Goal: Promote self management  Outcome: Progressing  Goal: Verbalize understanding of procedure  Outcome: Progressing     Problem: Meds/Post-op Pain  Goal: Pain controlled to tolerate pain level  Outcome: Progressing  Goal: Tolerates prescribed medication  Outcome: Progressing     Problem: DVT/VTE Prevention/Activity  Goal: No decrease in circulation/sensation  Outcome: Progressing  Goal: Prevent skin breakdown  Outcome: Progressing  Goal: Return to preop oxygenation status  Outcome: Progressing  Goal: Tolerates optimal activity  Outcome: Progressing  Goal: Increase self care and/or family involvement in 24 hrs.  Outcome: Progressing     Problem: Wound care/infection prevention  Goal: No signs of infection in 24 hrs.  Outcome: Progressing  Goal: No unexpected bleeding from incision this shift  Outcome: Progressing     Problem: Diet/fluid balance  Goal: Adequate urinary output  Outcome: Progressing  Goal: Free from nausea/vomiting  Outcome: Progressing  Goal: Return in bowel function  Outcome: Progressing  Goal: Tolerates prescribed diet  Outcome: Progressing     Problem: Other goals  Goal: No change in neurological status  Outcome: Progressing  Goal: Stabilize vital signs (return to 10% of  baseline)  Outcome: Progressing     Problem: Skin  Goal: Decreased wound size/increased tissue granulation at next dressing change  Outcome: Progressing  Flowsheets (Taken 12/17/2024 0947)  Decreased wound size/increased tissue granulation at next dressing change: Promote sleep for wound healing  Goal: Participates in plan/prevention/treatment measures  Outcome: Progressing  Flowsheets (Taken 12/17/2024 0947)  Participates in plan/prevention/treatment measures: Discuss with provider PT/OT consult  Goal: Prevent/manage excess moisture  Outcome: Progressing  Flowsheets (Taken 12/17/2024 0947)  Prevent/manage excess moisture: Moisturize dry skin  Goal: Prevent/minimize sheer/friction injuries  Outcome: Progressing  Flowsheets (Taken 12/17/2024 0947)  Prevent/minimize sheer/friction injuries: Complete micro-shifts as needed if patient unable. Adjust patient position to relieve pressure points, not a full turn  Goal: Promote/optimize nutrition  Outcome: Progressing  Flowsheets (Taken 12/17/2024 0947)  Promote/optimize nutrition: Monitor/record intake including meals  Goal: Promote skin healing  Outcome: Progressing  Flowsheets (Taken 12/17/2024 0947)  Promote skin healing: Assess skin/pad under line(s)/device(s)

## 2024-12-17 NOTE — PROGRESS NOTES
PATIENT NAME: Nader Zazueta  MRN: [id number]     SERVICE DATE:  12/16/2024  SERVICE TIME:  4:45 PM     Nader Zazueta is a 59 y.o. male on day 4 of admission presenting with NSTEMI (non-ST elevated myocardial infarction) (Multi).    Subjective: Expected chest discomfort however no chest pain/palpitations.  Little bit of nausea/vomiting this morning from pain meds/coffee.  On a couple liters NC.  Aside from this no acute issues.        HPI  59-year-old male with past medical Hx HTN, HLD, CKD (s/p nephrectomy due to RCC) comes to [facility] from home on 12/12 for CP.  Patient underwent LHC same day and revealed triple-vessel disease   Last TTE 50-55% EF 12/12/2024  Patient underwent CABG x 4 (LIMA->LAD, free INDIGO from LIMA->DIAG 1, SVG->OM1->PDA) 12/16 by Dr. Mendenhall  ICU consulted for postop respiratory insufficiency.     Past Medical History:   #HTN/HLD  #T2DM  #Hx incisional hernia  #Hx CKD (solitary kidney 2/2 RCC) s/p nephrectomy     Surgical History:  Back surgery     Social History:  Non-smoker     Family History:  Diabetes-mother  COPD-father        OBJECTIVE     Vitals          Vitals:     12/16/24 0936 12/16/24 0945 12/16/24 1000 12/16/24 1630   BP:           BP Location:           Patient Position:           Pulse:   72 83 81   Resp:   22 21     Temp:           TempSrc:           SpO2:   97% 97% 100%   Weight: 99.2 kg (218 lb 9.6 oz)         Height:                         Results from last 7 days   Lab Units 12/16/24  1430 12/16/24  0733 12/13/24  0349 12/12/24  0453   WBC AUTO x10*3/uL  --  12.3*   < > 8.8   HEMOGLOBIN g/dL  --  16.0   < > 14.9   HEMATOCRIT %  --  44.7   < > 42.2   PLATELETS AUTO x10*3/uL 167 254   < > 209   NEUTROS PCT AUTO %  --   --   --  58.5   LYMPHS PCT AUTO %  --   --   --  29.2   MONOS PCT AUTO %  --   --   --  7.3   EOS PCT AUTO %  --   --   --  3.4    < > = values in this interval not displayed.             Results from last 7 days   Lab Units 12/16/24  0733 12/13/24  0349  12/12/24  0453   SODIUM mmol/L 133*   < > 135*   POTASSIUM mmol/L 4.4   < > 4.1   CHLORIDE mmol/L 101   < > 103   CO2 mmol/L 18*   < > 20*   BUN mg/dL 30*   < > 25*   CREATININE mg/dL 1.62*   < > 1.41*   CALCIUM mg/dL 10.2   < > 9.7   PROTEIN TOTAL g/dL  --   --  7.4   BILIRUBIN TOTAL mg/dL  --   --  0.6   ALK PHOS U/L  --   --  40   ALT U/L  --   --  38   AST U/L  --   --  22   GLUCOSE mg/dL 255*   < > 210*    < > = values in this interval not displayed.         Scheduled Medications    Scheduled Medications   acetaminophen, 650 mg, oral, q6h  aspirin, 81 mg, oral, Daily  atorvastatin, 80 mg, oral, Nightly  ceFAZolin, 2 g, intravenous, q8h  cyclobenzaprine, 5 mg, oral, q8h  dextrose, , ,   [START ON 12/17/2024] enoxaparin, 40 mg, subcutaneous, Daily  fentaNYL PF, , ,   gabapentin, 100 mg, oral, TID  lidocaine, 1 patch, transdermal, q24h  magnesium oxide, 400 mg, oral, Daily  midazolam, , ,   oxygen, , inhalation, Continuous - Inhalation  [START ON 12/17/2024] pantoprazole, 40 mg, oral, Daily before breakfast   Or  [START ON 12/17/2024] pantoprazole, 40 mg, intravenous, Daily before breakfast  psyllium, 1 packet, oral, Daily  sennosides-docusate sodium, 2 tablet, oral, BID               Physical Exam   General: laying in bed, appears comfortable  HEENT:  Normocephalic, atraumatic  Chest:  Clear to auscultation. Bilateral and appropriate chest rise  CV:  Regular rate and rhythm.    Extremities:  No lower extremity edema or cyanosis.   Skin:  Warm and dry.        Assessment & Plan  59-year-old male with past medical Hx HTN, HLD, CKD (s/p nephrectomy due to RCC) comes to [facility] from home on 12/12 for CP.  Patient underwent LHC same day and revealed triple-vessel disease   Last TTE 50-55% EF 12/12/2024  Patient underwent CABG x 4 (LIMA->LAD, free INDIGO from LIMA->DIAG 1, SVG->OM1->PDA) 12/16 by Dr. Mendenhall  ICU consulted for postop respiratory insufficiency.     Neurological System:  Postoperative pain  - Analgesia: PRN  Tylenol, Oxycodone, Fentanyl per CTS  - No new neurological issues expected  - Maintain normal sleep/wake cycle  - Avoid oversedating patient     Cardiovascular System:  CAD s/p CABG 12/16  Hx essential HTN/HLD  Postop hypotension, as expected  - CABG x 4 (LIMA->LAD, free INDIGO from LIMA->DIAG 1, SVG->OM1->PDA)   - Last TTE 50-55% EF 12/12/2024  - Patient with postop hypotension after CABG surgery, expected  - Maintain MAP >70, wean as tolerated.  - On ASA and statin. Resume BB as tolerated.  Home metoprolol succinate 25 mg daily, amlodipine 10 mg daily  - Diuresis per CTS. Keep Mg>2, K>4.     Respiratory System:  Postop respiratory insufficiency, as expected  - Patient extubated on 12/17. Wean as tolerated, maintain O2 sats>92%.  - Chest tube management per CTS  - CXR shows post CABG changes, central lines, clips. Atelectasis.  - Encourage incentive spirometer use q1 hour  - Daily CXR     Gastrointestinal System:  - Diet: CLD  - Prophylaxis: Protonix 40 mg daily  - No expected issues     Endocrine System:  Hx T2DM  - A1c 8.7%  - SSI, maintain BG between 140-180s     Renal System:  Hx CKD 3B (baseline 1.4-1.5)  Hx solitary kidney 2/2 RCC s/p nephrectomy  -Trend renal panel, replete electrolytes PRN     Hematological System:  Postop anemia, as expected  - Trend H&H     Infection Disease System:  Postop leukocytosis, reactive  - Prophylactic antibiotics per CTS     Skin/MSK:  No active issues.     Vent/O2: None  Lines/Devices: PIV's  Drips: None  ATBx: Vancomycin  Diet: CLD  IVF: None  PPX: Lovenox, Protonix  Code: Full  Dispo: YASMIN Stringer  Internal Medicine PGY-2

## 2024-12-17 NOTE — CARE PLAN
The patient's goals for the shift include  maintain adequate pain control.     The clinical goals for the shift include pt will remain hemodynamically stable    Over the shift, the patient did not make progress toward the following goals. Barriers to progression include post op pain, oxygen requirements, weakness. Recommendations to address these barriers include     Problem: Pain - Adult  Goal: Verbalizes/displays adequate comfort level or baseline comfort level  Outcome: Progressing     Problem: Safety - Adult  Goal: Free from fall injury  Outcome: Progressing     Problem: Discharge Planning  Goal: Discharge to home or other facility with appropriate resources  Outcome: Progressing     Problem: Chronic Conditions and Co-morbidities  Goal: Patient's chronic conditions and co-morbidity symptoms are monitored and maintained or improved  Outcome: Progressing     Problem: Fall/Injury  Goal: Not fall by end of shift  Outcome: Progressing  Goal: Be free from injury by end of the shift  Outcome: Progressing  Goal: Verbalize understanding of personal risk factors for fall in the hospital  Outcome: Progressing  Goal: Verbalize understanding of risk factor reduction measures to prevent injury from fall in the home  Outcome: Progressing  Goal: Pace activities to prevent fatigue by end of the shift  Outcome: Progressing  Goal: Use assistive devices by end of the shift  Outcome: Progressing     Problem: Diabetes  Goal: Increase stability of blood glucose readings by end of shift  Outcome: Progressing  Goal: Maintain glucose levels >70mg/dl to <250mg/dl throughout shift  Outcome: Progressing  Goal: Learn about and adhere to nutrition recommendations by end of shift  Outcome: Progressing  Goal: Increase self care and/or family involovement by end of shift  Outcome: Progressing     Problem: Pain  Goal: Takes deep breaths with improved pain control throughout the shift  Outcome: Progressing  Goal: Performs ADL's with improved pain  control throughout shift  Outcome: Progressing  Goal: Participates in PT with improved pain control throughout the shift  Outcome: Progressing  Goal: Free from opioid side effects throughout the shift  Outcome: Progressing  Goal: Free from acute confusion related to pain meds throughout the shift  Outcome: Progressing     Problem: DVT/VTE Prevention/Activity  Goal: No decrease in circulation/sensation  Outcome: Progressing  Goal: Prevent skin breakdown  Outcome: Progressing  Goal: Return to preop oxygenation status  Outcome: Progressing  Goal: Tolerates optimal activity  Outcome: Progressing  Goal: Increase self care and/or family involvement in 24 hrs.  Outcome: Progressing     Problem: Meds/Post-op Pain  Goal: Pain controlled to tolerate pain level  Outcome: Progressing  Goal: Tolerates prescribed medication  Outcome: Progressing     Problem: Diet/fluid balance  Goal: Adequate urinary output  Outcome: Progressing  Goal: Free from nausea/vomiting  Outcome: Progressing  Goal: Return in bowel function  Outcome: Progressing  Goal: Tolerates prescribed diet  Outcome: Progressing.

## 2024-12-18 ENCOUNTER — APPOINTMENT (OUTPATIENT)
Dept: RADIOLOGY | Facility: HOSPITAL | Age: 59
DRG: 233 | End: 2024-12-18
Payer: COMMERCIAL

## 2024-12-18 LAB
ALBUMIN SERPL BCP-MCNC: 4 G/DL (ref 3.4–5)
ANION GAP SERPL CALC-SCNC: 12 MMOL/L (ref 10–20)
ATRIAL RATE: 39 BPM
ATRIAL RATE: 69 BPM
ATRIAL RATE: 71 BPM
ATRIAL RATE: 72 BPM
BUN SERPL-MCNC: 22 MG/DL (ref 6–23)
CA-I BLD-SCNC: 1.16 MMOL/L (ref 1.1–1.33)
CALCIUM SERPL-MCNC: 9 MG/DL (ref 8.6–10.3)
CHLORIDE SERPL-SCNC: 97 MMOL/L (ref 98–107)
CO2 SERPL-SCNC: 29 MMOL/L (ref 21–32)
CREAT SERPL-MCNC: 1.42 MG/DL (ref 0.5–1.3)
DIASTOLIC BLOOD PRESSURE: 55 MMHG
DIASTOLIC BLOOD PRESSURE: 70 MMHG
DIASTOLIC BLOOD PRESSURE: 72 MMHG
DIASTOLIC BLOOD PRESSURE: 76 MMHG
EGFRCR SERPLBLD CKD-EPI 2021: 57 ML/MIN/1.73M*2
ERYTHROCYTE [DISTWIDTH] IN BLOOD BY AUTOMATED COUNT: 12.2 % (ref 11.5–14.5)
GLUCOSE BLD MANUAL STRIP-MCNC: 121 MG/DL (ref 74–99)
GLUCOSE BLD MANUAL STRIP-MCNC: 141 MG/DL (ref 74–99)
GLUCOSE BLD MANUAL STRIP-MCNC: 216 MG/DL (ref 74–99)
GLUCOSE BLD MANUAL STRIP-MCNC: 237 MG/DL (ref 74–99)
GLUCOSE BLD MANUAL STRIP-MCNC: 239 MG/DL (ref 74–99)
GLUCOSE SERPL-MCNC: 128 MG/DL (ref 74–99)
HCT VFR BLD AUTO: 39.3 % (ref 41–52)
HGB BLD-MCNC: 13.3 G/DL (ref 13.5–17.5)
MAGNESIUM SERPL-MCNC: 1.98 MG/DL (ref 1.6–2.4)
MCH RBC QN AUTO: 31.6 PG (ref 26–34)
MCHC RBC AUTO-ENTMCNC: 33.8 G/DL (ref 32–36)
MCV RBC AUTO: 93 FL (ref 80–100)
NRBC BLD-RTO: 0 /100 WBCS (ref 0–0)
P AXIS: 66 DEGREES
P AXIS: 71 DEGREES
P AXIS: 79 DEGREES
P OFFSET: 186 MS
P OFFSET: 189 MS
P OFFSET: 192 MS
P ONSET: 125 MS
P ONSET: 127 MS
P ONSET: 132 MS
PHOSPHATE SERPL-MCNC: 3.4 MG/DL (ref 2.5–4.9)
PLATELET # BLD AUTO: 189 X10*3/UL (ref 150–450)
POTASSIUM SERPL-SCNC: 3.8 MMOL/L (ref 3.5–5.3)
PR INTERVAL: 188 MS
PR INTERVAL: 196 MS
PR INTERVAL: 200 MS
Q ONSET: 180 MS
Q ONSET: 225 MS
Q ONSET: 225 MS
Q ONSET: 226 MS
QRS COUNT: 11 BEATS
QRS COUNT: 12 BEATS
QRS COUNT: 12 BEATS
QRS COUNT: 14 BEATS
QRS DURATION: 104 MS
QRS DURATION: 104 MS
QRS DURATION: 112 MS
QRS DURATION: 160 MS
QT INTERVAL: 396 MS
QT INTERVAL: 414 MS
QT INTERVAL: 422 MS
QT INTERVAL: 540 MS
QTC CALCULATION(BAZETT): 424 MS
QTC CALCULATION(BAZETT): 449 MS
QTC CALCULATION(BAZETT): 462 MS
QTC CALCULATION(BAZETT): 627 MS
QTC FREDERICIA: 415 MS
QTC FREDERICIA: 438 MS
QTC FREDERICIA: 448 MS
QTC FREDERICIA: 596 MS
R AXIS: -67 DEGREES
R AXIS: 81 DEGREES
R AXIS: 86 DEGREES
R AXIS: 89 DEGREES
RBC # BLD AUTO: 4.21 X10*6/UL (ref 4.5–5.9)
SODIUM SERPL-SCNC: 134 MMOL/L (ref 136–145)
STAPHYLOCOCCUS SPEC CULT: NORMAL
SYSTOLIC BLOOD PRESSURE: 114 MMHG
SYSTOLIC BLOOD PRESSURE: 122 MMHG
SYSTOLIC BLOOD PRESSURE: 128 MMHG
SYSTOLIC BLOOD PRESSURE: 130 MMHG
T AXIS: 106 DEGREES
T AXIS: 55 DEGREES
T AXIS: 62 DEGREES
T AXIS: 73 DEGREES
T OFFSET: 423 MS
T OFFSET: 433 MS
T OFFSET: 436 MS
T OFFSET: 450 MS
VENTRICULAR RATE: 69 BPM
VENTRICULAR RATE: 71 BPM
VENTRICULAR RATE: 72 BPM
VENTRICULAR RATE: 81 BPM
WBC # BLD AUTO: 15 X10*3/UL (ref 4.4–11.3)

## 2024-12-18 PROCEDURE — 97535 SELF CARE MNGMENT TRAINING: CPT | Mod: GO

## 2024-12-18 PROCEDURE — 97116 GAIT TRAINING THERAPY: CPT | Mod: GP,CQ

## 2024-12-18 PROCEDURE — 2500000004 HC RX 250 GENERAL PHARMACY W/ HCPCS (ALT 636 FOR OP/ED): Performed by: NURSE PRACTITIONER

## 2024-12-18 PROCEDURE — 2500000005 HC RX 250 GENERAL PHARMACY W/O HCPCS: Performed by: NURSE PRACTITIONER

## 2024-12-18 PROCEDURE — 2500000004 HC RX 250 GENERAL PHARMACY W/ HCPCS (ALT 636 FOR OP/ED)

## 2024-12-18 PROCEDURE — 2500000002 HC RX 250 W HCPCS SELF ADMINISTERED DRUGS (ALT 637 FOR MEDICARE OP, ALT 636 FOR OP/ED): Performed by: NURSE PRACTITIONER

## 2024-12-18 PROCEDURE — 37799 UNLISTED PX VASCULAR SURGERY: CPT | Performed by: NURSE PRACTITIONER

## 2024-12-18 PROCEDURE — 80069 RENAL FUNCTION PANEL: CPT | Performed by: NURSE PRACTITIONER

## 2024-12-18 PROCEDURE — 2500000001 HC RX 250 WO HCPCS SELF ADMINISTERED DRUGS (ALT 637 FOR MEDICARE OP)

## 2024-12-18 PROCEDURE — 2060000001 HC INTERMEDIATE ICU ROOM DAILY

## 2024-12-18 PROCEDURE — 2500000004 HC RX 250 GENERAL PHARMACY W/ HCPCS (ALT 636 FOR OP/ED): Performed by: STUDENT IN AN ORGANIZED HEALTH CARE EDUCATION/TRAINING PROGRAM

## 2024-12-18 PROCEDURE — 82947 ASSAY GLUCOSE BLOOD QUANT: CPT

## 2024-12-18 PROCEDURE — 2500000002 HC RX 250 W HCPCS SELF ADMINISTERED DRUGS (ALT 637 FOR MEDICARE OP, ALT 636 FOR OP/ED)

## 2024-12-18 PROCEDURE — 99232 SBSQ HOSP IP/OBS MODERATE 35: CPT | Performed by: STUDENT IN AN ORGANIZED HEALTH CARE EDUCATION/TRAINING PROGRAM

## 2024-12-18 PROCEDURE — 71045 X-RAY EXAM CHEST 1 VIEW: CPT | Performed by: RADIOLOGY

## 2024-12-18 PROCEDURE — RXMED WILLOW AMBULATORY MEDICATION CHARGE

## 2024-12-18 PROCEDURE — 83735 ASSAY OF MAGNESIUM: CPT | Performed by: NURSE PRACTITIONER

## 2024-12-18 PROCEDURE — 82330 ASSAY OF CALCIUM: CPT | Performed by: NURSE PRACTITIONER

## 2024-12-18 PROCEDURE — 85027 COMPLETE CBC AUTOMATED: CPT | Performed by: NURSE PRACTITIONER

## 2024-12-18 PROCEDURE — 71045 X-RAY EXAM CHEST 1 VIEW: CPT

## 2024-12-18 PROCEDURE — 2500000001 HC RX 250 WO HCPCS SELF ADMINISTERED DRUGS (ALT 637 FOR MEDICARE OP): Performed by: NURSE PRACTITIONER

## 2024-12-18 PROCEDURE — 2500000005 HC RX 250 GENERAL PHARMACY W/O HCPCS

## 2024-12-18 PROCEDURE — 99232 SBSQ HOSP IP/OBS MODERATE 35: CPT

## 2024-12-18 RX ORDER — DEXTROSE 50 % IN WATER (D50W) INTRAVENOUS SYRINGE
12.5
Status: DISCONTINUED | OUTPATIENT
Start: 2024-12-18 | End: 2024-12-20 | Stop reason: HOSPADM

## 2024-12-18 RX ORDER — DEXTROSE 50 % IN WATER (D50W) INTRAVENOUS SYRINGE
25
Status: DISCONTINUED | OUTPATIENT
Start: 2024-12-18 | End: 2024-12-20 | Stop reason: HOSPADM

## 2024-12-18 RX ORDER — HYDRALAZINE HYDROCHLORIDE 25 MG/1
25 TABLET, FILM COATED ORAL 3 TIMES DAILY
Status: DISCONTINUED | OUTPATIENT
Start: 2024-12-18 | End: 2024-12-20 | Stop reason: HOSPADM

## 2024-12-18 RX ORDER — CLOPIDOGREL BISULFATE 75 MG/1
75 TABLET ORAL DAILY
Status: DISCONTINUED | OUTPATIENT
Start: 2024-12-18 | End: 2024-12-20 | Stop reason: HOSPADM

## 2024-12-18 RX ORDER — METOPROLOL TARTRATE 50 MG/1
50 TABLET ORAL 2 TIMES DAILY
Status: DISCONTINUED | OUTPATIENT
Start: 2024-12-18 | End: 2024-12-20 | Stop reason: HOSPADM

## 2024-12-18 RX ORDER — MUPIROCIN 20 MG/G
OINTMENT TOPICAL 3 TIMES DAILY
Status: DISCONTINUED | OUTPATIENT
Start: 2024-12-18 | End: 2024-12-20 | Stop reason: HOSPADM

## 2024-12-18 RX ORDER — AMLODIPINE BESYLATE 10 MG/1
10 TABLET ORAL DAILY
Status: DISCONTINUED | OUTPATIENT
Start: 2024-12-18 | End: 2024-12-20 | Stop reason: HOSPADM

## 2024-12-18 RX ORDER — INSULIN LISPRO 100 [IU]/ML
0-15 INJECTION, SOLUTION INTRAVENOUS; SUBCUTANEOUS
Status: DISCONTINUED | OUTPATIENT
Start: 2024-12-18 | End: 2024-12-20 | Stop reason: HOSPADM

## 2024-12-18 RX ORDER — HYDRALAZINE HYDROCHLORIDE 20 MG/ML
10 INJECTION INTRAMUSCULAR; INTRAVENOUS EVERY 4 HOURS PRN
Status: DISCONTINUED | OUTPATIENT
Start: 2024-12-18 | End: 2024-12-20 | Stop reason: HOSPADM

## 2024-12-18 RX ORDER — LABETALOL HYDROCHLORIDE 5 MG/ML
20 INJECTION, SOLUTION INTRAVENOUS ONCE
Status: COMPLETED | OUTPATIENT
Start: 2024-12-18 | End: 2024-12-18

## 2024-12-18 RX ORDER — INSULIN GLARGINE 100 [IU]/ML
5 INJECTION, SOLUTION SUBCUTANEOUS DAILY
Status: DISCONTINUED | OUTPATIENT
Start: 2024-12-18 | End: 2024-12-19

## 2024-12-18 RX ORDER — METOPROLOL TARTRATE 25 MG/1
25 TABLET, FILM COATED ORAL ONCE
Status: COMPLETED | OUTPATIENT
Start: 2024-12-18 | End: 2024-12-18

## 2024-12-18 ASSESSMENT — PAIN SCALES - GENERAL
PAINLEVEL_OUTOF10: 6
PAINLEVEL_OUTOF10: 0 - NO PAIN
PAINLEVEL_OUTOF10: 2
PAINLEVEL_OUTOF10: 6
PAINLEVEL_OUTOF10: 6
PAINLEVEL_OUTOF10: 8
PAINLEVEL_OUTOF10: 3
PAINLEVEL_OUTOF10: 6

## 2024-12-18 ASSESSMENT — COGNITIVE AND FUNCTIONAL STATUS - GENERAL
STANDING UP FROM CHAIR USING ARMS: A LITTLE
TOILETING: A LOT
MOVING TO AND FROM BED TO CHAIR: A LITTLE
DAILY ACTIVITIY SCORE: 21
DRESSING REGULAR LOWER BODY CLOTHING: A LOT
TURNING FROM BACK TO SIDE WHILE IN FLAT BAD: A LOT
DRESSING REGULAR UPPER BODY CLOTHING: A LITTLE
DRESSING REGULAR LOWER BODY CLOTHING: A LITTLE
MOVING FROM LYING ON BACK TO SITTING ON SIDE OF FLAT BED WITH BEDRAILS: A LOT
CLIMB 3 TO 5 STEPS WITH RAILING: A LITTLE
WALKING IN HOSPITAL ROOM: A LITTLE
WALKING IN HOSPITAL ROOM: A LITTLE
HELP NEEDED FOR BATHING: A LITTLE
MOBILITY SCORE: 15
DRESSING REGULAR UPPER BODY CLOTHING: A LITTLE
MOBILITY SCORE: 22
HELP NEEDED FOR BATHING: A LOT
DAILY ACTIVITIY SCORE: 17
CLIMB 3 TO 5 STEPS WITH RAILING: A LOT

## 2024-12-18 ASSESSMENT — PAIN - FUNCTIONAL ASSESSMENT
PAIN_FUNCTIONAL_ASSESSMENT: 0-10

## 2024-12-18 ASSESSMENT — ENCOUNTER SYMPTOMS
DECREASED APPETITE: 0
DOUBLE VISION: 0
BLOATING: 0
NAIL CHANGES: 0
FREQUENCY: 0
STIFFNESS: 1
NAUSEA: 0
POOR WOUND HEALING: 0
BLURRED VISION: 0
PALPITATIONS: 0
MYALGIAS: 0
ORTHOPNEA: 0
DIZZINESS: 0
FOCAL WEAKNESS: 0
DYSPNEA ON EXERTION: 0
MUSCLE CRAMPS: 0
VOMITING: 0
COUGH: 0
ALTERED MENTAL STATUS: 0
IRREGULAR HEARTBEAT: 0
LIGHT-HEADEDNESS: 0
SHORTNESS OF BREATH: 0
WEAKNESS: 1

## 2024-12-18 ASSESSMENT — ACTIVITIES OF DAILY LIVING (ADL): HOME_MANAGEMENT_TIME_ENTRY: 10

## 2024-12-18 ASSESSMENT — PAIN DESCRIPTION - DESCRIPTORS: DESCRIPTORS: ACHING

## 2024-12-18 ASSESSMENT — PAIN DESCRIPTION - LOCATION: LOCATION: SHOULDER

## 2024-12-18 NOTE — CARE PLAN
Problem: Pain - Adult  Goal: Verbalizes/displays adequate comfort level or baseline comfort level  Outcome: Progressing     Problem: Safety - Adult  Goal: Free from fall injury  Outcome: Progressing     Problem: Discharge Planning  Goal: Discharge to home or other facility with appropriate resources  Outcome: Progressing     Problem: Chronic Conditions and Co-morbidities  Goal: Patient's chronic conditions and co-morbidity symptoms are monitored and maintained or improved  Outcome: Progressing     Problem: Fall/Injury  Goal: Not fall by end of shift  Outcome: Progressing  Goal: Be free from injury by end of the shift  Outcome: Progressing  Goal: Verbalize understanding of personal risk factors for fall in the hospital  Outcome: Progressing  Goal: Verbalize understanding of risk factor reduction measures to prevent injury from fall in the home  Outcome: Progressing  Goal: Pace activities to prevent fatigue by end of the shift  Outcome: Progressing  Goal: Use assistive devices by end of the shift  Outcome: Progressing     Problem: Diabetes  Goal: Increase stability of blood glucose readings by end of shift  Outcome: Progressing  Goal: Maintain glucose levels >70mg/dl to <250mg/dl throughout shift  Outcome: Progressing  Goal: Learn about and adhere to nutrition recommendations by end of shift  Outcome: Progressing  Goal: Increase self care and/or family involovement by end of shift  Outcome: Progressing     Problem: Pain  Goal: Takes deep breaths with improved pain control throughout the shift  Outcome: Progressing  Goal: Performs ADL's with improved pain control throughout shift  Outcome: Progressing  Goal: Participates in PT with improved pain control throughout the shift  Outcome: Progressing  Goal: Free from opioid side effects throughout the shift  Outcome: Progressing  Goal: Free from acute confusion related to pain meds throughout the shift  Outcome: Progressing     Problem: Nutrition  Goal: Oral intake  greater 75%  Outcome: Progressing  Goal: Adequate PO fluid intake  Outcome: Progressing  Goal: Electrolytes WNL  Outcome: Progressing     Problem: ACS/CP/NSTEMI/STEMI  Goal: Chest pain managed (free from pain or at acceptable level)  Outcome: Progressing  Goal: Serial ECG will return to baseline  Outcome: Progressing  Goal: Verbalize understanding of procedures/devices  Outcome: Progressing     Problem: Cardiac catheterization  Goal: Free from dysrhythmias  Outcome: Progressing  Goal: Free from pain  Outcome: Progressing  Goal: No evidence of post procedure complications  Outcome: Progressing  Goal: Promote self management  Outcome: Progressing  Goal: Verbalize understanding of procedure  Outcome: Progressing     Problem: Meds/Post-op Pain  Goal: Pain controlled to tolerate pain level  Outcome: Progressing  Goal: Tolerates prescribed medication  Outcome: Progressing     Problem: DVT/VTE Prevention/Activity  Goal: No decrease in circulation/sensation  Outcome: Progressing  Goal: Prevent skin breakdown  Outcome: Progressing  Goal: Return to preop oxygenation status  Outcome: Progressing  Goal: Tolerates optimal activity  Outcome: Progressing  Goal: Increase self care and/or family involvement in 24 hrs.  Outcome: Progressing     Problem: Wound care/infection prevention  Goal: No signs of infection in 24 hrs.  Outcome: Progressing  Goal: No unexpected bleeding from incision this shift  Outcome: Progressing     Problem: Diet/fluid balance  Goal: Adequate urinary output  Outcome: Progressing  Goal: Free from nausea/vomiting  Outcome: Progressing  Goal: Return in bowel function  Outcome: Progressing  Goal: Tolerates prescribed diet  Outcome: Progressing     Problem: Other goals  Goal: No change in neurological status  Outcome: Progressing  Goal: Stabilize vital signs (return to 10% of baseline)  Outcome: Progressing     Problem: Skin  Goal: Decreased wound size/increased tissue granulation at next dressing  change  Outcome: Progressing  Goal: Participates in plan/prevention/treatment measures  Outcome: Progressing  Goal: Prevent/manage excess moisture  Outcome: Progressing  Goal: Prevent/minimize sheer/friction injuries  Outcome: Progressing  Goal: Promote/optimize nutrition  Outcome: Progressing  Goal: Promote skin healing  Outcome: Progressing

## 2024-12-18 NOTE — PROGRESS NOTES
Occupational Therapy    OT Treatment    Patient Name: Nader Zazueta  MRN: [id number]  Department: [facility]  Room: [address] [place], [place] [address]  Today's Date: 12/18/2024  Time Calculation  Start Time: 1111  Stop Time: 1134  Time Calculation (min): 23 min        Assessment:  Evaluation/Treatment Tolerance: Patient tolerated treatment well  Medical Staff Made Aware: Yes  End of Session Communication: Bedside nurse  End of Session Patient Position: Up in chair, Alarm off, not on at start of session  Evaluation/Treatment Tolerance: Patient tolerated treatment well  Medical Staff Made Aware: Yes  Plan:  Treatment Interventions: ADL retraining, Endurance training, Compensatory technique education, Functional transfer training  OT Frequency: 3 times per week  OT Discharge Recommendations: Low intensity level of continued care  OT - OK to Discharge: Yes (to next level of care when cleared by medical team)  Treatment Interventions: ADL retraining, Endurance training, Compensatory technique education, Functional transfer training    Subjective   Previous Visit Info:  OT Last Visit  OT Received On: 12/18/24  General:  General  Co-Treatment:  (PTA)  Prior to Session Communication: Bedside nurse  Patient Position Received: Up in chair, Alarm off, not on at start of session  General Comment: pt agreeable to OT  Precautions:  Medical Precautions: Fall precautions, Oxygen therapy device and L/min  Post-Surgical Precautions: Move in the Tube  Precautions Comment: ingram, chest tube, PIV    Vital Signs (Past 2hrs)        Date/Time Vitals Session Patient Position Pulse Resp SpO2 BP MAP (mmHg)    12/18/24 1200 --  --  76  --  97 %  162/93  120     12/18/24 1300 --  --  78  --  98 %  154/94  116                         Pain:  Pain Assessment  Pain Assessment:  (7/10 pain chest tube site, back, shoulders)  Pain Interventions: Repositioned, Emotional support    Objective    Cognition:  Cognition  Overall Cognitive Status: Within Functional  Limits    Activities of Daily Living: Grooming  Grooming Level of Assistance: Setup, Contact guard  Grooming Where Assessed: Standing sinkside  Grooming Comments: pt completes oral cares standing at sink    Bed Mobility/Transfers: Transfers  Transfer: Yes  Transfer 1  Technique 1: Sit to stand, Stand to sit  Transfer Device 1:  (nezzi walker)  Trials/Comments 1: SBA      Functional Mobility:  Functional Mobility  Functional Mobility Performed: Yes  Functional Mobility 1  Device 1:  (nezzi walker)  Comments 1: pt completes functional mobility in hallway ~100 ft for simulated community access with initial CGA for safety, improving to SBA with time    Outcome Measures:[facility] Daily Activity  Putting on and taking off regular lower body clothing: A lot  Bathing (including washing, rinsing, drying): A lot  Putting on and taking off regular upper body clothing: A little  Toileting, which includes using toilet, bedpan or urinal: A lot  Taking care of personal grooming such as brushing teeth: None  Eating Meals: None  Daily Activity - Total Score: 17        Education Documentation  Precautions, taught by Saima Puente OT at 12/18/2024  1:31 PM.  Learner: Patient  Readiness: Acceptance  Method: Explanation  Response: Verbalizes Understanding    ADL Training, taught by Saima Puente OT at 12/18/2024  1:31 PM.  Learner: Patient  Readiness: Acceptance  Method: Explanation  Response: Verbalizes Understanding    Education Comments  No comments found.        Goals:  Encounter Problems       Encounter Problems (Active)       OT Goals       Increase functional mobility and  functional transfers to supervision for bed/chair/toilet with dme prn   (Progressing)       Start:  12/17/24    Expected End:  12/24/24            increase bue ther ex/activity x 7-10 minutes and increase standing tolerance x 3-5 minutes with supervision to promote greater activity tolerance for assist with adl.   (Progressing)       Start:  12/17/24     Expected End:  12/24/24            pt. to apply ec/ws techniques/MITT without cues to all mobility/transfer/adl to decrease fatigue/promote efficient use of energy toward completion of functional tasks.  (Progressing)       Start:  12/17/24    Expected End:  12/24/24

## 2024-12-18 NOTE — CARE PLAN
The patient's goals for the shift include      Problem: Pain - Adult  Goal: Verbalizes/displays adequate comfort level or baseline comfort level  Outcome: Progressing     Problem: Discharge Planning  Goal: Discharge to home or other facility with appropriate resources  Outcome: Progressing     Problem: Chronic Conditions and Co-morbidities  Goal: Patient's chronic conditions and co-morbidity symptoms are monitored and maintained or improved  Outcome: Progressing     Problem: Fall/Injury  Goal: Not fall by end of shift  Outcome: Progressing  Goal: Be free from injury by end of the shift  Outcome: Progressing  Goal: Verbalize understanding of personal risk factors for fall in the hospital  Outcome: Progressing  Goal: Verbalize understanding of risk factor reduction measures to prevent injury from fall in the home  Outcome: Progressing  Goal: Pace activities to prevent fatigue by end of the shift  Outcome: Progressing  Goal: Use assistive devices by end of the shift  Outcome: Progressing     Problem: Diabetes  Goal: Increase stability of blood glucose readings by end of shift  Outcome: Progressing  Goal: Maintain glucose levels >70mg/dl to <250mg/dl throughout shift  Outcome: Progressing  Goal: Learn about and adhere to nutrition recommendations by end of shift  Outcome: Progressing  Goal: Increase self care and/or family involovement by end of shift  Outcome: Progressing     Problem: Pain  Goal: Takes deep breaths with improved pain control throughout the shift  Outcome: Progressing  Goal: Performs ADL's with improved pain control throughout shift  Outcome: Progressing  Goal: Participates in PT with improved pain control throughout the shift  Outcome: Progressing  Goal: Free from opioid side effects throughout the shift  Outcome: Progressing  Goal: Free from acute confusion related to pain meds throughout the shift  Outcome: Progressing     Problem: Nutrition  Goal: Oral intake greater 75%  Outcome: Progressing  Goal:  Adequate PO fluid intake  Outcome: Progressing  Goal: Electrolytes WNL  Outcome: Progressing     Problem: ACS/CP/NSTEMI/STEMI  Goal: Chest pain managed (free from pain or at acceptable level)  Outcome: Progressing  Goal: Serial ECG will return to baseline  Outcome: Progressing  Goal: Verbalize understanding of procedures/devices  Outcome: Progressing     The clinical goals for the shift include pt will remain hemodynamically stable

## 2024-12-18 NOTE — PROGRESS NOTES
PATIENT NAME: Nader Zazueta  MRN: [id number]     SERVICE DATE:  12/16/2024  SERVICE TIME:  4:45 PM     Nader Zazueta is a 59 y.o. male on day 4 of admission presenting with NSTEMI (non-ST elevated myocardial infarction) (Multi).    Subjective: Expected chest discomfort however no chest pain/palpitations.  Little bit of nausea however no vomiting.  Tolerating CLD diet well.  Breathing without issues.  On 4 L NC.        HPI  59-year-old male with past medical Hx HTN, HLD, CKD (s/p nephrectomy due to RCC) comes to [facility] from home on 12/12 for CP.  Patient underwent LHC same day and revealed triple-vessel disease   Last TTE 50-55% EF 12/12/2024  Patient underwent CABG x 4 (LIMA->LAD, free INDIGO from LIMA->DIAG 1, SVG->OM1->PDA) 12/16 by Dr. Mendenhall  ICU consulted for postop respiratory insufficiency.     Past Medical History:   #HTN/HLD  #T2DM  #Hx incisional hernia  #Hx CKD (solitary kidney 2/2 RCC) s/p nephrectomy     Surgical History:  Back surgery     Social History:  Non-smoker     Family History:  Diabetes-mother  COPD-father        OBJECTIVE     Vitals          Vitals:     12/16/24 0936 12/16/24 0945 12/16/24 1000 12/16/24 1630   BP:           BP Location:           Patient Position:           Pulse:   72 83 81   Resp:   22 21     Temp:           TempSrc:           SpO2:   97% 97% 100%   Weight: 99.2 kg (218 lb 9.6 oz)         Height:                         Results from last 7 days   Lab Units 12/16/24  1430 12/16/24  0733 12/13/24  0349 12/12/24  0453   WBC AUTO x10*3/uL  --  12.3*   < > 8.8   HEMOGLOBIN g/dL  --  16.0   < > 14.9   HEMATOCRIT %  --  44.7   < > 42.2   PLATELETS AUTO x10*3/uL 167 254   < > 209   NEUTROS PCT AUTO %  --   --   --  58.5   LYMPHS PCT AUTO %  --   --   --  29.2   MONOS PCT AUTO %  --   --   --  7.3   EOS PCT AUTO %  --   --   --  3.4    < > = values in this interval not displayed.             Results from last 7 days   Lab Units 12/16/24  0733 12/13/24  0349 12/12/24  0453   SODIUM  mmol/L 133*   < > 135*   POTASSIUM mmol/L 4.4   < > 4.1   CHLORIDE mmol/L 101   < > 103   CO2 mmol/L 18*   < > 20*   BUN mg/dL 30*   < > 25*   CREATININE mg/dL 1.62*   < > 1.41*   CALCIUM mg/dL 10.2   < > 9.7   PROTEIN TOTAL g/dL  --   --  7.4   BILIRUBIN TOTAL mg/dL  --   --  0.6   ALK PHOS U/L  --   --  40   ALT U/L  --   --  38   AST U/L  --   --  22   GLUCOSE mg/dL 255*   < > 210*    < > = values in this interval not displayed.         Scheduled Medications    Scheduled Medications   acetaminophen, 650 mg, oral, q6h  aspirin, 81 mg, oral, Daily  atorvastatin, 80 mg, oral, Nightly  ceFAZolin, 2 g, intravenous, q8h  cyclobenzaprine, 5 mg, oral, q8h  dextrose, , ,   [START ON 12/17/2024] enoxaparin, 40 mg, subcutaneous, Daily  fentaNYL PF, , ,   gabapentin, 100 mg, oral, TID  lidocaine, 1 patch, transdermal, q24h  magnesium oxide, 400 mg, oral, Daily  midazolam, , ,   oxygen, , inhalation, Continuous - Inhalation  [START ON 12/17/2024] pantoprazole, 40 mg, oral, Daily before breakfast   Or  [START ON 12/17/2024] pantoprazole, 40 mg, intravenous, Daily before breakfast  psyllium, 1 packet, oral, Daily  sennosides-docusate sodium, 2 tablet, oral, BID               Physical Exam   General: laying in bed, appears comfortable  HEENT:  Normocephalic, atraumatic  Chest:  Clear to auscultation. Bilateral and appropriate chest rise  CV:  Regular rate and rhythm.    Extremities:  No lower extremity edema or cyanosis.   Skin:  Warm and dry.        Assessment & Plan  59-year-old male with past medical Hx HTN, HLD, CKD (s/p nephrectomy due to RCC) comes to [facility] from home on 12/12 for CP.  Patient underwent LHC same day and revealed triple-vessel disease   Last TTE 50-55% EF 12/12/2024  Patient underwent CABG x 4 (LIMA->LAD, free INDIGO from LIMA->DIAG 1, SVG->OM1->PDA) 12/16 by Dr. Mendenhall  ICU consulted for postop respiratory insufficiency.     Neurological System:  Postoperative pain  - Analgesia: PRN Tylenol, Oxycodone,  Fentanyl per CTS  - No new neurological issues expected  - Maintain normal sleep/wake cycle  - Avoid oversedating patient     Cardiovascular System:  CAD s/p CABG 12/16  Hx essential HTN/HLD  Postop hypotension, as expected  - CABG x 4 (LIMA->LAD, free INDIGO from LIMA->DIAG 1, SVG->OM1->PDA)   - Last TTE 50-55% EF 12/12/2024  - Patient with postop hypotension after CABG surgery, expected  - Maintain MAP >70, wean as tolerated.  - On ASA and statin. Resume BB as tolerated.  Home metoprolol succinate 25 mg daily, amlodipine 10 mg daily  - Diuresis per CTS. Keep Mg>2, K>4.     Respiratory System:  Postop respiratory insufficiency, as expected  #Bilateral atelectasis  #Trace pneumothorax  - Patient extubated on 12/17. Wean as tolerated, maintain O2 sats>92%.  - Chest tube management per CTS  - CXR shows post CABG changes, central lines, clips.  Bilateral atelectasis.  - Encourage incentive spirometer use q1 hour  - Daily CXR     Gastrointestinal System:  - Diet: CLD  - Prophylaxis: Protonix 40 mg daily  - No expected issues     Endocrine System:  Hx T2DM  - A1c 8.7%  - SSI, maintain BG between 140-180s     Renal System:  Hx CKD 3B (baseline 1.4-1.5)  Hx solitary kidney 2/2 RCC s/p nephrectomy  -Trend renal panel, replete electrolytes PRN     Hematological System:  Postop anemia, as expected  - Trend H&H     Infection Disease System:  Postop leukocytosis, reactive  - Prophylactic antibiotics per CTS     Skin/MSK:  No active issues.     Vent/O2: None  Lines/Devices: Chest tube mediastinal, CVC RIJ, Beth  Drips: None  ATBx: Vancomycin  Diet: CLD  IVF: None  PPX: Lovenox, Protonix  Code: Full  Dispo: YASMIN Stringer  Internal Medicine PGY-2

## 2024-12-18 NOTE — PROGRESS NOTES
Physical Therapy    Physical Therapy Treatment    Patient Name: Nader Zazueta  MRN: [id number]  Department: [facility]  Room: [address] [place], [place] [address]  Today's Date: 12/18/2024  Time Calculation  Start Time: 1111  Stop Time: 1134  Time Calculation (min): 23 min         Assessment/Plan   PT Assessment  End of Session Communication: Bedside nurse  End of Session Patient Position: Up in chair, Alarm off, not on at start of session     PT Plan  Treatment/Interventions: Bed mobility, Transfer training, Gait training, Stair training, Strengthening, Endurance training, Therapeutic exercise, Therapeutic activity  PT Plan: Ongoing PT  PT Frequency: 4 times per week  PT Discharge Recommendations: Low intensity level of continued care  PT - OK to Discharge: Yes      General Visit Information:   PT  Visit  PT Received On: 12/18/24  General  Co-Treatment: co-tx with OT to ensure safe therapeutic tx to facilitate maximum participation with skilled intervention  Prior to Session Communication: Bedside nurse  Patient Position Received: Up in chair, Alarm off, not on at start of session  General Comment:  (pt reports feeling fatigued though is pleasant and agreeable to participate in therapy.  increased time required for skilled line/ICU equipment management and room setup to ensure pt safety during mobility in ICU setting.)    Subjective   Precautions:  Precautions  Medical Precautions: Fall precautions, Cardiac precautions, Oxygen therapy device and L/min (3L)  Post-Surgical Precautions: Move in the Tube  Precautions Comment: cvc; chest tube; tele; ingram cath; VSS        Objective   Pain:  Pain Assessment  Pain Assessment: 0-10  0-10 (Numeric) Pain Score:  (7-8)  Pain Location:  (chest, back, and shoulders)    Treatments:  Bed Mobility  Bed Mobility:  (NT)    Ambulation/Gait Training  Ambulation/Gait Training Performed:  (pt ambulates >/=150 ft x 2 with Anna walker and CGA progressing to SBA.  slow, steady cem; no episodes LOB.  pt reports  feeling nauseous by end of ambulation and asking for meds; communicated with RN.)    Transfers  Transfer:  (sit <> stand with SBA up to N[place]ie walker.  mild cuing for safe hand placement.)    Outcome Measures:  [facility] Basic Mobility  Turning from your back to your side while in a flat bed without using bedrails: A lot  Moving from lying on your back to sitting on the side of a flat bed without using bedrails: A lot  Moving to and from bed to chair (including a wheelchair): A little  Standing up from a chair using your arms (e.g. wheelchair or bedside chair): A little  To walk in hospital room: A little  Climbing 3-5 steps with railing: A lot  Basic Mobility - Total Score: 15    Education Documentation  Precautions, taught by Lyubov Bee PTA at 12/18/2024  3:57 PM.  Learner: Patient  Readiness: Acceptance  Method: Explanation  Response: Verbalizes Understanding    Mobility Training, taught by Lyubov Bee PTA at 12/18/2024  3:57 PM.  Learner: Patient  Readiness: Acceptance  Method: Explanation  Response: Verbalizes Understanding    Education Comments  No comments found.        EDUCATION:       Encounter Problems       Encounter Problems (Active)       PT Problem       PT Goal 1 STG - Pt will amb >150' using LRD with IND  (Progressing)       Start:  12/17/24    Expected End:  12/31/24            PT Goal 2 STG - Pt will transition supine <> sitting with IND (Progressing)       Start:  12/17/24    Expected End:  12/31/24            PT Goal 3 STG -  Pt will navigate 4 stairs using rail with SBA  (Progressing)       Start:  12/17/24    Expected End:  12/31/24            PT Goal 4 STG - Pt will transfer STS with IND  (Progressing)       Start:  12/17/24    Expected End:  12/31/24

## 2024-12-18 NOTE — PROGRESS NOTES
PATIENT NAME: Nader Zazueta  MRN: [id number]     SERVICE DATE:  12/16/2024  SERVICE TIME:  4:45 PM     Nader Zazueta is a 59 y.o. male on day 4 of admission presenting with NSTEMI (non-ST elevated myocardial infarction) (Multi).    Subjective: Expected chest discomfort however no chest pain/palpitations.  Little bit of nausea however no vomiting.  Has not eaten breakfast 3 L NC.        HPI  59-year-old male with past medical Hx HTN, HLD, CKD (s/p nephrectomy due to RCC) comes to [facility] from home on 12/12 for CP.  Patient underwent LHC same day and revealed triple-vessel disease   Last TTE 50-55% EF 12/12/2024  Patient underwent CABG x 4 (LIMA->LAD, free INDIGO from LIMA->DIAG 1, SVG->OM1->PDA) 12/16 by Dr. Mendenhall  ICU consulted for postop respiratory insufficiency.     Past Medical History:   #HTN/HLD  #T2DM  #Hx incisional hernia  #Hx CKD (solitary kidney 2/2 RCC) s/p nephrectomy     Surgical History:  Back surgery     Social History:  Non-smoker     Family History:  Diabetes-mother  COPD-father        OBJECTIVE     Vitals          Vitals:     12/16/24 0936 12/16/24 0945 12/16/24 1000 12/16/24 1630   BP:           BP Location:           Patient Position:           Pulse:   72 83 81   Resp:   22 21     Temp:           TempSrc:           SpO2:   97% 97% 100%   Weight: 99.2 kg (218 lb 9.6 oz)         Height:                         Results from last 7 days   Lab Units 12/16/24  1430 12/16/24  0733 12/13/24  0349 12/12/24  0453   WBC AUTO x10*3/uL  --  12.3*   < > 8.8   HEMOGLOBIN g/dL  --  16.0   < > 14.9   HEMATOCRIT %  --  44.7   < > 42.2   PLATELETS AUTO x10*3/uL 167 254   < > 209   NEUTROS PCT AUTO %  --   --   --  58.5   LYMPHS PCT AUTO %  --   --   --  29.2   MONOS PCT AUTO %  --   --   --  7.3   EOS PCT AUTO %  --   --   --  3.4    < > = values in this interval not displayed.             Results from last 7 days   Lab Units 12/16/24  0733 12/13/24  0349 12/12/24  0453   SODIUM mmol/L 133*   < > 135*    POTASSIUM mmol/L 4.4   < > 4.1   CHLORIDE mmol/L 101   < > 103   CO2 mmol/L 18*   < > 20*   BUN mg/dL 30*   < > 25*   CREATININE mg/dL 1.62*   < > 1.41*   CALCIUM mg/dL 10.2   < > 9.7   PROTEIN TOTAL g/dL  --   --  7.4   BILIRUBIN TOTAL mg/dL  --   --  0.6   ALK PHOS U/L  --   --  40   ALT U/L  --   --  38   AST U/L  --   --  22   GLUCOSE mg/dL 255*   < > 210*    < > = values in this interval not displayed.         Scheduled Medications    Scheduled Medications   acetaminophen, 650 mg, oral, q6h  aspirin, 81 mg, oral, Daily  atorvastatin, 80 mg, oral, Nightly  ceFAZolin, 2 g, intravenous, q8h  cyclobenzaprine, 5 mg, oral, q8h  dextrose, , ,   [START ON 12/17/2024] enoxaparin, 40 mg, subcutaneous, Daily  fentaNYL PF, , ,   gabapentin, 100 mg, oral, TID  lidocaine, 1 patch, transdermal, q24h  magnesium oxide, 400 mg, oral, Daily  midazolam, , ,   oxygen, , inhalation, Continuous - Inhalation  [START ON 12/17/2024] pantoprazole, 40 mg, oral, Daily before breakfast   Or  [START ON 12/17/2024] pantoprazole, 40 mg, intravenous, Daily before breakfast  psyllium, 1 packet, oral, Daily  sennosides-docusate sodium, 2 tablet, oral, BID               Physical Exam   General: laying in bed, appears comfortable  HEENT:  Normocephalic, atraumatic  Chest:  Clear to auscultation. Bilateral and appropriate chest rise  CV:  Regular rate and rhythm.    Extremities:  No lower extremity edema or cyanosis.   Skin:  Warm and dry.        Assessment & Plan  59-year-old male with past medical Hx HTN, HLD, CKD (s/p nephrectomy due to RCC) comes to [facility] from home on 12/12 for CP.  Patient underwent LHC same day and revealed triple-vessel disease   Last TTE 50-55% EF 12/12/2024  Patient underwent CABG x 4 (LIMA->LAD, free INDIGO from LIMA->DIAG 1, SVG->OM1->PDA) 12/16 by Dr. Mendenhall  ICU consulted for postop respiratory insufficiency.     Neurological System:  Postoperative pain  - Analgesia: PRN Tylenol, Oxycodone, Fentanyl per CTS  - No new  neurological issues expected  - Maintain normal sleep/wake cycle  - Avoid oversedating patient     Cardiovascular System:  CAD s/p CABG 12/16  Hx essential HTN/HLD  Postop hypotension, as expected  - CABG x 4 (LIMA->LAD, free INDIGO from LIMA->DIAG 1, SVG->OM1->PDA)   - Last TTE 50-55% EF 12/12/2024  - Patient with postop hypotension after CABG surgery, expected  - Maintain MAP >70, wean as tolerated.  - On ASA and statin. Resume metoprolol tartrate 50 mg twice daily as tolerated.  Home metoprolol succinate 25 mg daily, amlodipine 10 mg daily  - Diuresis per CTS. Keep Mg>2, K>4.     Respiratory System:  Postop respiratory insufficiency, as expected  #Bilateral atelectasis, improved  #Trace pneumothorax  - Patient extubated on 12/17. Wean as tolerated, maintain O2 sats>92%.  - Chest tube management per CTS  - CXR shows post CABG changes, central lines, clips.  Bilateral atelectasis.  - Encourage incentive spirometer use q1 hour  - Daily CXR     Gastrointestinal System:  - Diet: Carb controlled  - Prophylaxis: Protonix 40 mg daily  - No expected issues     Endocrine System:  Hx T2DM  - A1c 8.7%  - SSI3, Lantus 5, maintain BG between 140-180s     Renal System:  Hx CKD 3B (baseline 1.4-1.5)  Hx solitary kidney 2/2 RCC s/p nephrectomy  -Trend renal panel, replete electrolytes PRN     Hematological System:  Postop anemia, as expected  - Trend H&H     Infection Disease System:  Postop leukocytosis, reactive  - Prophylactic antibiotics per CTS     Skin/MSK:  No active issues.     Vent/O2: None  Lines/Devices: PIV's  Drips: None  ATBx: None  Diet: Carb controlled  IVF: None  PPX: Lovenox, Protonix  Code: Full  Dispo: [facility]       Dr. Jason Stringer  Internal Medicine PGY-2

## 2024-12-18 NOTE — PROGRESS NOTES
12/18/2024  Followed up wit pt in room, introduced self and explained role.  AMPAC- 14, low recommended.  Discussed recommendation. Discussed HHC.  Pt would like [facility]. Discussed freedom of choice.   Support provided.   Ct Team will continue to follow.   Jane Louie RN TCC

## 2024-12-18 NOTE — PROGRESS NOTES
"Nader Zazueta is a 59 y.o. male on day 6 of admission presenting with NSTEMI (non-ST elevated myocardial infarction) (Multi).    Subjective     Post op day 2, off pressors doing well,    Objective     Physical Exam  Constitutional:       Appearance: Normal appearance.   HENT:      Head: Normocephalic and atraumatic.      Right Ear: Tympanic membrane and ear canal normal.      Left Ear: Tympanic membrane and ear canal normal.      Mouth/Throat:      Mouth: Mucous membranes are moist.      Pharynx: Oropharynx is clear.   Eyes:      Extraocular Movements: Extraocular movements intact.      Conjunctiva/sclera: Conjunctivae normal.      Pupils: Pupils are equal, round, and reactive to light.   Cardiovascular:      Rate and Rhythm: Normal rate and regular rhythm.      Pulses: Normal pulses.      Heart sounds: Normal heart sounds.   Pulmonary:      Effort: Pulmonary effort is normal.      Breath sounds: Normal breath sounds.   Abdominal:      General: Abdomen is flat. Bowel sounds are normal.      Palpations: Abdomen is soft.   Musculoskeletal:         General: Normal range of motion.      Cervical back: Normal range of motion and neck supple.   Skin:     General: Skin is warm and dry.      Capillary Refill: Capillary refill takes 2 to 3 seconds.   Neurological:      General: No focal deficit present.      Mental Status: He is alert and oriented to person, place, and time. Mental status is at baseline.   Psychiatric:         Mood and Affect: Mood normal.         Behavior: Behavior normal.         Thought Content: Thought content normal.         Judgment: Judgment normal.         Last Recorded Vitals  Blood pressure 142/90, pulse 80, temperature 35.7 °C (96.3 °F), temperature source Temporal, resp. rate (!) 33, height 1.753 m (5' 9\"), weight 99.8 kg (220 lb), SpO2 97%.  Intake/Output last 3 Shifts:  I/O last 3 completed shifts:  In: 1404.7 (13.3 mL/kg) [I.V.:354.7 (3.4 mL/kg); IV Piggyback:1050]  Out: 4805 (45.6 mL/kg) " [Urine:4225 (1.1 mL/kg/hr); Chest Tube:580]  Weight: 105.3 kg     Relevant Results                              Assessment/Plan   Assessment & Plan  NSTEMI (non-ST elevated myocardial infarction) (Multi)    Chronic coronary microvascular dysfunction    Coronary artery disease involving coronary bypass graft with unstable angina pectoris (Multi)    NSTEMI  Hypertension  Hyperlipidemia  -Cardiology consult, recommendations appreciated  -EKG, per ED physician, sinus bradycardia with a rate of 59 bpm, normal ND, QRS, normal QTc duration.  No ST segment or T wave pathology.  Good R wave progression throughout the precordial leads  -Chest x-ray shows no acute cardiopulmonary process  -Patient initiated on heparin drip in the ED, continue  -Patient administered 324 mg aspirin by EMS, continue 81 mg aspirin daily  -Will keep patient n.p.o. for now until evaluated by cardio  -Hemoglobin A1c, lipid panel, TSH, echo added on  -Continue home medications when home med rec complete, n.p.o. for now     Hx of incisional hernia  -Patient tender to palpation over site of his incisional hernia repair (RLQ), he states he would like to be evaluated by Dr. Covarrubias  -CT A/P added on (w/out contrast due to pt's solitary kidney)     Type 2 diabetes mellitus  -Sliding scale insulin and diabetic diet  -Hypoglycemic protocol     CKD  Hyponatremia, 135  -Creatinine 1.41 and BUN 25, 1.53 and 25 point 8/7/2023  -Monitor with BMP, avoid nephrotoxic medications     DVT prophylaxis  -Patient currently on heparin drip  -SCDs    12/12: spoke with cardioloyg cath shows multivessel disease will need CABG, CT surgery consulted    12/13: doing well, monitor labs, work up underway, tentative schedule cabg on Monday 12/14:no issues doing well    12/15: family at bedside, surgery today.    12/16: doing well contineu post op car     12/17: doing wlel, progressing as expected       I spent 35 minutes in the professional and overall care of this  patient.      Mustapha Singer, DO

## 2024-12-18 NOTE — PROGRESS NOTES
Cardiology Progress    Impression:  Non-STEMI.    Postop CABG x 4.  LIMA-LAD, free INDIGO-D1, SVG-OM1, SVG-RPDA  EF 50 to 55%.  Hypertension  Hyperlipidemia  Diabetes HbA1c 7.9  CKD.  Status post nephrectomy for renal cell carcinoma.  Plan:  Continue DAPT, statin, beta-blocker.  Continue diuresis  Infectious workup per surgical team.  Ambulate  Follow labs  HPI:  Breathing okay.  Pain well-managed.  Having some chills and sweats today.  Meds:  Scheduled medications  acetaminophen, 650 mg, oral, q6h  amLODIPine, 10 mg, oral, Daily  aspirin, 81 mg, oral, Daily  atorvastatin, 80 mg, oral, Nightly  ceFAZolin, 2 g, intravenous, q8h  clopidogrel, 75 mg, oral, Daily  cyclobenzaprine, 5 mg, oral, q8h  enoxaparin, 40 mg, subcutaneous, Daily  furosemide, 20 mg, intravenous, BID  insulin glargine, 5 Units, subcutaneous, Daily  insulin lispro, 0-15 Units, subcutaneous, TID AC  lidocaine, 1 patch, transdermal, q24h  magnesium oxide, 400 mg, oral, Daily  metoprolol tartrate, 50 mg, oral, BID  mupirocin, , Topical, TID  oxygen, , inhalation, Continuous - Inhalation  psyllium, 1 packet, oral, Daily  sennosides-docusate sodium, 2 tablet, oral, BID  vancomycin, 1,000 mg, intravenous, q12h      Continuous medications     PRN medications  PRN medications: albumin human, alteplase, calcium chloride, calcium chloride, dextrose, dextrose, fentaNYL **OR** fentaNYL, glucagon, glucagon, hydrALAZINE, lactated Ringer's, magnesium sulfate, magnesium sulfate, naloxone, ondansetron, oxyCODONE, oxyCODONE, potassium chloride CR **OR** potassium chloride, potassium chloride CR **OR** potassium chloride, potassium chloride, potassium chloride    Physical exam:  Vitals:    12/18/24 1200   BP: (!) 162/93   Pulse: 76   Resp:    Temp:    SpO2: 97%      No JVD.  Decreased breath sounds.  Trace edema.  EKG:  Telemetry showing sinus rhythm.  Echo:  EF 50 to 55%.  Labs:  Lab Results   Component Value Date    WBC 15.0 (H) 12/18/2024    HGB 13.3 (L) 12/18/2024     HCT 39.3 (L) 12/18/2024     12/18/2024    CHOL 174 12/13/2024    TRIG 195 (H) 12/13/2024    HDL 29.3 12/13/2024    ALT 38 12/12/2024    AST 22 12/12/2024     (L) 12/18/2024    K 3.8 12/18/2024    CL 97 (L) 12/18/2024    CREATININE 1.42 (H) 12/18/2024    BUN 22 12/18/2024    CO2 29 12/18/2024    TSH 1.96 12/13/2024    PSA 0.30 07/12/2022    INR 1.2 (H) 12/16/2024    HGBA1C 8.7 (H) 12/13/2024     par

## 2024-12-18 NOTE — PROGRESS NOTES
"Nutrition Follow Up Assessment:     Nutrition History:  Energy Intake:  (diet advanced from CLD to Regular today)  Food and Nutrient History: Pt sitting in chair at bedside at visit. No complaints voiced.  Pt is POD#2 CABG x4. Rik twice daily ordered. Will send Ensure HP once daily until intakes established.  No BM since before surgery. Will add 75gm carb controlled to diet as pt is diabetic with A1c >8  Food Allergies/Intolerances:  None  GI Symptoms: None  Oral Problems: None       Anthropometrics:  Height: 175.3 cm (5' 9\")   Weight: 99.8 kg (220 lb)   BMI (Calculated): 32.47  IBW/kg (Dietitian Calculated): 72.7 kg          Weight History:     Weight Change %:  Weight History / % Weight Change: 12/18 99.8kg, 12/17 105kg, 12/16 99.1kg, 12/15 104kg, 12/14 99.4kg, 12/13 99.6kg, 12/12 100kg   (weight flux from fluid shift post op)    Nutrition Focused Physical Exam Findings:  defer: completed 12/13/24  Subcutaneous Fat Loss:      Muscle Wasting:     Edema:  Edema: none  Physical Findings:  Skin: Positive (midsternal incision, RLE x3 incisions)    Nutrition Significant Labs:  BMP Trend:   Results from last 7 days   Lab Units 12/18/24  0434 12/17/24  0338 12/16/24  1650 12/16/24  0733   GLUCOSE mg/dL 128* 140* 154* 255*   CALCIUM mg/dL 9.0 8.2* 8.5* 10.2   SODIUM mmol/L 134* 132* 139 133*   POTASSIUM mmol/L 3.8 4.4 3.6 4.4   CO2 mmol/L 29 24 24 18*   CHLORIDE mmol/L 97* 102 106 101   BUN mg/dL 22 24* 28* 30*   CREATININE mg/dL 1.42* 1.46* 1.68* 1.62*    , BG POCT trend:   Results from last 7 days   Lab Units 12/18/24  1114 12/18/24  0745 12/18/24  0421 12/17/24  2318 12/17/24  1909   POCT GLUCOSE mg/dL 239* 121* 141* 146* 170*        Nutrition Specific Medications:  Reviewed     I/O:   Last BM Date: 12/13/24;      Dietary Orders (From admission, onward)       Start     Ordered    12/18/24 1222  Oral nutritional supplements  Until discontinued        Question Answer Comment   Deliver with Dinner    Select supplement: " Ensure High Protein        12/18/24 1222    12/18/24 0929  Adult diet Regular  Diet effective now        Question:  Diet type  Answer:  Regular    12/18/24 0928    12/16/24 1651  May Participate in Room Service  ( ROOM SERVICE MAY PARTICIPATE)  Once        Question:  .  Answer:  Yes    12/16/24 1650    12/16/24 1638  Oral nutritional supplements - Rik  Until discontinued        Comments: Twice a day.   Question Answer Comment   Deliver with Breakfast    Deliver with Lunch    Select supplement: Rik        12/16/24 1637                     Estimated Needs:   Total Energy Estimated Needs (kCal): 1817 kCal  Method for Estimating Needs: 25 kcal/kg IBW  Total Protein Estimated Needs (g): 58 g  Method for Estimating Needs: 0.8 g/kg IBW  Total Fluid Estimated Needs (mL): 1817 mL  Method for Estimating Needs: 1 ml/kcal or per MD        Nutrition Diagnosis   Malnutrition Diagnosis  Patient has Malnutrition Diagnosis: No    Nutrition Diagnosis  Patient has Nutrition Diagnosis: Yes  Diagnosis Status (1): Ongoing  Nutrition Diagnosis 1: Inadequate oral intake  Related to (1): N/V, decreased appetite  As Evidenced by (1): reports of eating lightly, mostly carbohydrate foods during this admission  Additional Nutrition Diagnosis: Diagnosis 2  Diagnosis Status (2): New  Nutrition Diagnosis 2: Increased nutrient needs  Related to (2): physiological causes increasing nutrient needs  As Evidenced by (2): post op wound healing needs       Nutrition Interventions/Recommendations         Nutrition Prescription:  Individualized Nutrition Prescription Provided for : Will modify diet to 75gm carb controlled (will defer cardiac restriction until intakes established); Rik twice daily. Will order Ensure HP once daily        Nutrition Interventions:   Interventions: Meals and snacks, Medical food supplement  Meals and Snacks: Carbohydrate-modified diet, General healthful diet  Goal: consume >75% of meals  Medical Food Supplement:  Commercial beverage  Goal: consume >75% of Ensure HP once daily (for an additional 160 kcals, 16 gm protein each)  Additional Interventions: consume >75% of Rik twice daily (for an additional 90 kcals, 2.5 gm protein, supplement glutamine and arginine)    Collaboration and Referral of Nutrition Care:  (spoke with pt)    Nutrition Education:   Will follow up with pt on education needs      Nutrition Monitoring and Evaluation   Food/Nutrient Related History Monitoring  Monitoring and Evaluation Plan: Energy intake, Fluid intake, Amount of food  Energy Intake: Estimated energy intake  Criteria: Meal/ONS intake to meet >75% of estimated needs  Fluid Intake: Estimated fluid intake  Criteria: fluid intake to meet >75% of estimated need  Amount of Food: Estimated amout of food, Medical food intake  Criteria: Pt to consume >75% of meals/ONS    Body Composition/Growth/Weight History  Monitoring and Evaluation Plan: Weight  Weight: Measured weight  Criteria: maintain stable weight    Biochemical Data, Medical Tests and Procedures  Monitoring and Evaluation Plan: Electrolyte/renal panel, Glucose/endocrine profile  Electrolyte and Renal Panel: Sodium, Creatinine  Criteria: labs WNL  Glucose/Endocrine Profile: Glucose, casual  Criteria: BG within acceptable range    Nutrition Focused Physical Findings  Monitoring and Evaluation Plan: Digestive System, Skin  Criteria: BM at least every 2-3 days  Criteria: promote healing through adequate nutrition         Time Spent (min): 30 minutes

## 2024-12-18 NOTE — PROGRESS NOTES
Cardiac Surgery  Progress Note     Nader Zazueta is a 59 y.o. male on day 6 of admission presenting with NSTEMI (non-ST elevated myocardial infarction) (Multi).    Subjective     Interval HPI: Seen and assessed patient this while he was sitting up in bedside chair; in no acute distress and with minimal complaints.    Review of Systems   Constitutional: Positive for malaise/fatigue. Negative for decreased appetite.   HENT:  Negative for congestion.    Eyes:  Negative for blurred vision and double vision.   Cardiovascular:  Positive for chest pain. Negative for dyspnea on exertion, irregular heartbeat, leg swelling, orthopnea and palpitations.   Respiratory:  Negative for cough and shortness of breath.    Endocrine: Negative for cold intolerance and heat intolerance.   Skin:  Negative for nail changes, poor wound healing and rash.   Musculoskeletal:  Positive for stiffness. Negative for arthritis, muscle cramps, muscle weakness and myalgias.   Gastrointestinal:  Negative for bloating, nausea and vomiting.   Genitourinary:  Negative for frequency, hesitancy and urgency.   Neurological:  Positive for weakness. Negative for dizziness, focal weakness and light-headedness.   Psychiatric/Behavioral:  Negative for altered mental status.    Allergic/Immunologic: Negative for environmental allergies.         Objective   Physical Exam  Physical Exam  Vitals and nursing note reviewed.   Constitutional:       General: He is awake. He is not in acute distress.     Appearance: Normal appearance. He is not ill-appearing.      Interventions: Nasal cannula in place.   HENT:      Head: Normocephalic and atraumatic.      Comments:         Nose: Nose normal.      Mouth/Throat:      Mouth: Mucous membranes are moist.      Pharynx: Oropharynx is clear.   Eyes:      Extraocular Movements: Extraocular movements intact.      Conjunctiva/sclera: Conjunctivae normal.      Pupils: Pupils are equal, round, and reactive to light.   Neck:       Comments: Right internal jugular triple lumen catheter   Cardiovascular:      Rate and Rhythm: Normal rate and regular rhythm.      Pulses: Normal pulses.      Heart sounds: Normal heart sounds, S1 normal and S2 normal. No murmur heard.     No systolic murmur is present.      No friction rub. No gallop.      Comments: A-Ventricular Wires   Pulmonary:      Effort: Pulmonary effort is normal. No tachypnea, bradypnea, accessory muscle usage, prolonged expiration or respiratory distress.      Breath sounds: Examination of the right-lower field reveals decreased breath sounds. Examination of the left-lower field reveals decreased breath sounds. Decreased breath sounds present.   Chest:      Comments: Mediastinal Chest Tube to -20 cm wall suction, minimal serosanguinous output and no airleak   Abdominal:      General: Abdomen is flat. Bowel sounds are normal. There is no distension.      Palpations: Abdomen is soft.   Genitourinary:     Comments: Beth Catheter     Musculoskeletal:         General: Normal range of motion.      Cervical back: Normal range of motion and neck supple.      Right lower leg: No edema.      Left lower leg: No edema.   Skin:     General: Skin is warm and dry.      Capillary Refill: Capillary refill takes less than 2 seconds.      Findings: No lesion.      Nails: There is no clubbing.      Comments: Mediastinal Incision with Post-op Dressing      Neurological:      General: No focal deficit present.      Mental Status: He is alert and oriented to person, place, and time. Mental status is at baseline.      GCS: GCS eye subscore is 4. GCS verbal subscore is 5. GCS motor subscore is 6.      Cranial Nerves: Cranial nerves 2-12 are intact.      Sensory: Sensation is intact.      Motor: Motor function is intact.   Psychiatric:         Attention and Perception: Attention and perception normal.         Mood and Affect: Mood normal.         Speech: Speech normal.         Behavior: Behavior normal.          "Thought Content: Thought content normal.         Cognition and Memory: Cognition and memory normal.         Judgment: Judgment normal.         Last Recorded Vitals  Vitals:    12/18/24 0700 12/18/24 0750 12/18/24 0800 12/18/24 0850   BP: 137/87 137/87 135/88    BP Location:  Left arm     Patient Position:  Lying     Pulse: 79  81    Resp: 24  (!) 33    Temp:  35.7 °C (96.3 °F)     TempSrc:  Temporal     SpO2: 97%  95%    Weight:    99.8 kg (220 lb)   Height:    1.753 m (5' 9\")        Intake/Output last 3 Shifts:  I/O last 3 completed shifts:  In: 1404.7 (13.3 mL/kg) [I.V.:354.7 (3.4 mL/kg); IV Piggyback:1050]  Out: 4805 (45.6 mL/kg) [Urine:4225 (1.1 mL/kg/hr); Chest Tube:580]  Weight: 105.3 kg     Inpatient Medications  acetaminophen, 650 mg, oral, q6h  aspirin, 81 mg, oral, Daily  atorvastatin, 80 mg, oral, Nightly  ceFAZolin, 2 g, intravenous, q8h  cyclobenzaprine, 5 mg, oral, q8h  enoxaparin, 40 mg, subcutaneous, Daily  furosemide, 20 mg, intravenous, BID  gabapentin, 100 mg, oral, TID  insulin lispro, 0-15 Units, subcutaneous, q4h  lidocaine, 1 patch, transdermal, q24h  magnesium oxide, 400 mg, oral, Daily  metoprolol tartrate, 25 mg, oral, BID  mupirocin, , Topical, TID  oxygen, , inhalation, Continuous - Inhalation  psyllium, 1 packet, oral, Daily  sennosides-docusate sodium, 2 tablet, oral, BID  vancomycin, 1,000 mg, intravenous, q12h      Continuous medications       PRN medications  PRN medications: albumin human, alteplase, calcium chloride, calcium chloride, fentaNYL **OR** fentaNYL, lactated Ringer's, magnesium sulfate, magnesium sulfate, naloxone, ondansetron, oxyCODONE, oxyCODONE, potassium chloride CR **OR** potassium chloride, potassium chloride CR **OR** potassium chloride, potassium chloride, potassium chloride     LDA:       Relevant Results  Lab Review  Results from last 7 days   Lab Units 12/18/24  0434   WBC AUTO x10*3/uL 15.0*   HEMOGLOBIN g/dL 13.3*   HEMATOCRIT % 39.3*   PLATELETS AUTO " x10*3/uL 189     Results from last 7 days   Lab Units 12/18/24  0434 12/13/24  0349 12/12/24  0453   SODIUM mmol/L 134*   < > 135*   POTASSIUM mmol/L 3.8   < > 4.1   CHLORIDE mmol/L 97*   < > 103   CO2 mmol/L 29   < > 20*   BUN mg/dL 22   < > 25*   CREATININE mg/dL 1.42*   < > 1.41*   CALCIUM mg/dL 9.0   < > 9.7   PROTEIN TOTAL g/dL  --   --  7.4   BILIRUBIN TOTAL mg/dL  --   --  0.6   ALK PHOS U/L  --   --  40   ALT U/L  --   --  38   AST U/L  --   --  22   GLUCOSE mg/dL 128*   < > 210*    < > = values in this interval not displayed.     Results from last 7 days   Lab Units 12/18/24  0434   MAGNESIUM mg/dL 1.98     Results from last 7 days   Lab Units 12/17/24  0459   POCT PH, ARTERIAL pH 7.43*   POCT PCO2, ARTERIAL mm Hg 37*   POCT PO2, ARTERIAL mm Hg 88   POCT HCO3 CALCULATED, ARTERIAL mmol/L 24.6   POCT BASE EXCESS, ARTERIAL mmol/L 0.4         Cardiology Tests     Echo:  Transthoracic echo (TTE) complete 12/12/2024  PHYSICIAN INTERPRETATION:  Left Ventricle: Left ventricular ejection fraction is low normal, by visual estimate at 50-55%. There are no regional left ventricular wall motion abnormalities. The left ventricular cavity size is normal. There is mildly increased septal and normal posterior left ventricular wall thickness. There is left ventricular concentric remodeling. Spectral Doppler shows a normal pattern of left ventricular diastolic filling.  Left Atrium: The left atrium is normal in size.  Right Ventricle: The right ventricle is normal in size. There is normal right ventricular global systolic function.  Right Atrium: The right atrium is normal in size.  Aortic Valve: The aortic valve is trileaflet. There is mild aortic valve cusp calcification. The aortic valve dimensionless index is 0.85. There is no evidence of aortic valve regurgitation. The peak instantaneous gradient of the aortic valve is 11 mmHg. The mean gradient of the aortic valve is 4 mmHg.  Mitral Valve: The mitral valve is normal in  structure. There is trace mitral valve regurgitation.  Tricuspid Valve: The tricuspid valve is structurally normal. There is trace tricuspid regurgitation. The right ventricular systolic pressure is unable to be estimated.  Pulmonic Valve: The pulmonic valve is not well visualized. The pulmonic valve regurgitation was not well visualized.  Pericardium: There is no pericardial effusion noted.  Aorta: The aortic root is normal.        CONCLUSIONS:   1. Left ventricular ejection fraction is low normal, by visual estimate at 50-55%.     Ejection Fractions:        EF   Date/Time Value Ref Range Status   12/12/2024 11:07 AM 53 %        Cath:  Cardiac Catheterization Procedure 12/12/2024  Coronary Angiography:  The coronary circulation is right dominant.     Coronary Angiography Comments:  60-year-old man with history of hypertension, hyperlipidemia, diabetes presents with non-STEMI. Urgent heart catheter recommended.     Fluoroscopy demonstrated mild calcification of the coronary tree.     Left main: Large vessel. No significant disease.     LAD: Moderate-sized vessel. LAD is occluded mid vessel. The distal half of the LAD fills via left to left collaterals. There is a large first diagonal which has a proximal 75% stenosis. There is a large bifurcating second diagonal which is a 80% stenosis in it.     LCx: Moderate-sized vessel. Mid vessel 95% stenosis. The circumflex then gives rise to a moderate-sized first obtuse marginal which has mild disease. The ongoing circumflex is a very small, rudimentary vessel.     RCA: Large dominant vessel. Mild to moderate diffuse disease. Mid RCA there is a focal 50% stenosis.     Left heart catheterization demonstrates an LVEDP of 15 mmHg. Left ventricular angiography shows hypokinesis of the anterior wall. EF 45 to 50%. No mitral regurgitation. No gradient across aortic valve upon pullback.     CONCLUSIONS:   1. Critical three-vessel disease including LAD  with left to left  collaterals, severe disease in D1 and D2, subtotal occlusion of moderate-sized circumflex.   2. Mild LV dysfunction, EF estimated 45 to 50%.   3. Patient will be referred for surgical revascularization.         History of Present Illness: Nader Zazueta is a 59 y.o. male with a PMH significant for HTN, HLD, and CKD (solitary kidney secondary to RCC) who presents to [facility] on 12/12/2024 with complaints of sternal chest discomfort.     ED course: On arrival, patient's /102 (now 158/86), heart rate 55, respirations 18, afebrile, saturating 98% on room air. Labs and imaging form, revealing glucose 210, sodium 135, Cr 1.41 and BUN 25 (1.53 and 25 on 8/7/23), initial troponin 27, delta troponin 77. No white count or anemia. Platelets WNL. Flu A/B/COVID-negative. Chest x-ray shows no acute cardiopulmonary process. EKG, per ED physician, sinus bradycardia with a rate of 59 bpm, normal NE, QRS, normal QTc duration. No ST segment or T wave pathology. Good R wave progression throughout the precordial leads. Patient was administered 324 mg aspirin in the ambulance. Patient started on heparin drip, given Nitropaste and nitro tablet in the ED.      He was seen in consult by cardiologist, Dr. Thor Keyes and went for a [facility]. His coronary anatomy revealed diffuse MV-CAD with a  of the mid LAD (with L to L collateralizations). Cardiac surgery was consulted for bypass grafting evaluation.     Daily Events    12/13: No acute events overnight; patient continues on nitroglycerin gtt for HTN and angina, however endorsing headache so will try to wean down and use PO nitrate.     12/15: Patient with worsening headache overnight as we were utilizing IV nitroglycerine to treat his angina & BP. The patient was seen by cardiology, and his nitrates stopped and he was subsequently started on IV nicardipine for better BP control.     Throughout the day, the patient has continued with ongoing L  shoulder pain and gets relief with rest and morphine. Suspect that this is related to his CAD vs musculoskeletal.     Discussed with cardiac surgeons and cardiology; will reprioritization patient as he needs a more urgent surgical revascularization.      Continue on IV nicardipine for tight BP control, PRN morphine, ECGs and heparin gtt.     12/16: This morning, the patient experienced recurrent angina, however it was unrelieved by morphine and a normotensive BP; ECG was without ischemia. Given this, his surgical time was moved up from this afternoon to this morning with Dr. Mendenhall.     12/16 (Post-op): Patient arrived to the ICU in critically ill but stable condition; s/p CABG x4. Currently remains intubated with sedation; on norepinephrine 0.05mg/kg/min while being V paced at 80 BPM. However, after dialing back pacer allowing patient to be in his own rhythm (Sinus 70s), his BP improved allowing     - Vital Signs: HR 80, /64, SpO2 100%   - Hemodynamics: CVP 12, CO 4.9, CI 2.3, SVR 1050  - Ventilator Settings: PRVC-AC / FiO2 60% / PEEP 5 / RR 16     - Pump time: 94 Minutes     Intake & Output:   - Initial R Pleural Chest Tube: 10mL   - Initial L Pleural Chest Tube: 10mL   - Initial Mediastinal Chest Tube: 20mL     12/17: No acute events overnight; patient was extubated in the immediate postop period.  Was on norepinephrine for a short period of time, this has subsequently been weaned off this morning.  Patient is normotensive, so we will start on beta-blockade and light diuresis.  Mediastinal chest tube with moderate output, so will maintain this today, however okay to remove bilateral pleural chest tubes    - Current O2 Requirements: 2L NC     12/18/24: No acute events overnight. Remains off of vasoactive infusions. Diuresing well with scheduled IV lasix. Will plan to remove lines, ingram and chest tube today and increase beta-blockade.     - Current O2 Requirements: 1-2L NC    Assessment & Plan         MV-CAD  - Patient admitted to [facility] on 12/12/24 with anginal complaints   --> HS Troponin I: 27 -> 77 -> 4446  - [facility] on 12/12 revealed diffuse L system CAD with coronary anatomy favoring surgical revascularization   - S/p CABG x 4 on 12/16/24 with Dr. Anjali Mendenhall   --> LIMA-LAD, Free INDIGO - D1, SVG-OM1, SVG-rPDA  - Continue on ASA 81mg and Atorvastatin 80  - Start plavix x1 year for NSTEMI  - Increase metoprolol to 50 mg BID  - Remove mediastinal chest tube   - Daily CXR        Mediastinal Incision  - Remove post-op dressing on POD #2  - Dressing C/D/I       Acute Post-Op Pain  - As expected   - Multimodal pain control   --> Scheduled: Acetaminophen, magnesium oxide 400mg QD, gabapentin 100mg TID, methocarbamol 500mg q6h   --> PRN: oxycodone & fentanyl  - Bowel regimen while taking narcotics        Risk for Post-Op Arrhythmia  - Ventricular wires placed  - Discontinue V-wires prior to DC  - Telemetry until discharged       Acute Postoperative Respiratory Insufficiency   - As expected following CABG with post-op atelectasis  - Current O2 Requirements: 1-2L NC   - Coughing and deep breathing exercises with Incentive spirometry  - Out of bed, early and aggressive mobilization with assistance  - Oxygen as needed, wean to keep saturation above 92%  - ICU intensivist/pulmonologist consulted  - Albuterol nebulizers as needed       Risk for Fluid Volume Overload  - Pre-Op Weight: 99.2 kg     12/17: 105 kg (net positive 3742mL) --> IV furosemide 20mgBID       12/18: 99.8 kg ---> IV lasix 20 mg BID  - Strict I& Os and daily weights    - Monitor CVP and hemodynamics        Acute Post-Op Blood-Loss Anemia  - Pre-Op H/H: 16/44.7     12/17: 16.6/31.2     12/18: 13.3/39.3  - Monitor h/h in the initial post op period  - Monitor chest tubes for post op hemorrhage   - Multivitamin/iron tablet   - Daily CBC while in hospital       Post-Op Leukocytosis  - Pre-Op WBC: 12.3     12/17:11.7     12/18: 15.0  - Afebrile, consider  elevations as reactive to surgery   - Post-op ATB Q12 for 48hrs  - Daily cbc while in hospital        Essential HTN  - Home Medications: Amlodipine 10mg, lisinopril 10mg  - Hold in the immediate post-op period        Type 2 DM  - Home Medications: Metformin 1g BID, glimepiride 2 mg BID  - HbA1c: 8.7  - Goal for BG <150 in the post-operative period  - SSI  - Could benefit from improved glucose control. Consider endocrine consult.       Dyslipidemia  - Home Medications: None  - Started on high intensity statin therapy this admission   - Continue on statin therapy as above     Risk for Electrolyte Disturbances  - Optimize electrolytes per [facility] protocol      Bowel Regimen  - Senna-S BID   - PRN suppositories and miralax    Prophylaxis  - GI: PPI  - DVT: Rolf-Hose & starting enoxaparin  - MRSA: Positive (12/12)-- ancef, vanc and mupirocin   - PT/OT     Disposition  - CVICU (stepdown status)     Patient seen and plan discussed with Dr. Anjali Mendenhall & Dr. Mukul Root.     LILLIAN Olson-CNP

## 2024-12-19 ENCOUNTER — APPOINTMENT (OUTPATIENT)
Dept: RADIOLOGY | Facility: HOSPITAL | Age: 59
DRG: 233 | End: 2024-12-19
Payer: COMMERCIAL

## 2024-12-19 ENCOUNTER — PHARMACY VISIT (OUTPATIENT)
Dept: PHARMACY | Facility: CLINIC | Age: 59
End: 2024-12-19
Payer: COMMERCIAL

## 2024-12-19 LAB
ALBUMIN SERPL BCP-MCNC: 4.1 G/DL (ref 3.4–5)
ANION GAP SERPL CALC-SCNC: 14 MMOL/L (ref 10–20)
ATRIAL RATE: 60 BPM
BLOOD EXPIRATION DATE: NORMAL
BUN SERPL-MCNC: 30 MG/DL (ref 6–23)
CA-I BLD-SCNC: 1.06 MMOL/L (ref 1.1–1.33)
CALCIUM SERPL-MCNC: 9.5 MG/DL (ref 8.6–10.3)
CHLORIDE SERPL-SCNC: 97 MMOL/L (ref 98–107)
CO2 SERPL-SCNC: 27 MMOL/L (ref 21–32)
CREAT SERPL-MCNC: 1.31 MG/DL (ref 0.5–1.3)
DISPENSE STATUS: NORMAL
EGFRCR SERPLBLD CKD-EPI 2021: 63 ML/MIN/1.73M*2
ERYTHROCYTE [DISTWIDTH] IN BLOOD BY AUTOMATED COUNT: 12.1 % (ref 11.5–14.5)
GLUCOSE BLD MANUAL STRIP-MCNC: 146 MG/DL (ref 74–99)
GLUCOSE BLD MANUAL STRIP-MCNC: 200 MG/DL (ref 74–99)
GLUCOSE BLD MANUAL STRIP-MCNC: 206 MG/DL (ref 74–99)
GLUCOSE BLD MANUAL STRIP-MCNC: 210 MG/DL (ref 74–99)
GLUCOSE BLD MANUAL STRIP-MCNC: 260 MG/DL (ref 74–99)
GLUCOSE BLD MANUAL STRIP-MCNC: 274 MG/DL (ref 74–99)
GLUCOSE SERPL-MCNC: 200 MG/DL (ref 74–99)
HCT VFR BLD AUTO: 41.2 % (ref 41–52)
HGB BLD-MCNC: 14.3 G/DL (ref 13.5–17.5)
MAGNESIUM SERPL-MCNC: 1.89 MG/DL (ref 1.6–2.4)
MCH RBC QN AUTO: 32.1 PG (ref 26–34)
MCHC RBC AUTO-ENTMCNC: 34.7 G/DL (ref 32–36)
MCV RBC AUTO: 92 FL (ref 80–100)
NRBC BLD-RTO: 0 /100 WBCS (ref 0–0)
P AXIS: 81 DEGREES
PHOSPHATE SERPL-MCNC: 3 MG/DL (ref 2.5–4.9)
PLATELET # BLD AUTO: 209 X10*3/UL (ref 150–450)
POTASSIUM SERPL-SCNC: 4.1 MMOL/L (ref 3.5–5.3)
PR INTERVAL: 206 MS
PRODUCT BLOOD TYPE: 6200
PRODUCT CODE: NORMAL
Q ONSET: 253 MS
QRS COUNT: 9 BEATS
QRS DURATION: 116 MS
QT INTERVAL: 428 MS
QTC CALCULATION(BAZETT): 424 MS
QTC FREDERICIA: 425 MS
R AXIS: 60 DEGREES
RBC # BLD AUTO: 4.46 X10*6/UL (ref 4.5–5.9)
SODIUM SERPL-SCNC: 134 MMOL/L (ref 136–145)
T AXIS: 55 DEGREES
T OFFSET: 467 MS
UNIT ABO: NORMAL
UNIT NUMBER: NORMAL
UNIT RH: NORMAL
UNIT VOLUME: 350
VENTRICULAR RATE: 59 BPM
WBC # BLD AUTO: 14.8 X10*3/UL (ref 4.4–11.3)
XM INTEP: NORMAL

## 2024-12-19 PROCEDURE — 2500000002 HC RX 250 W HCPCS SELF ADMINISTERED DRUGS (ALT 637 FOR MEDICARE OP, ALT 636 FOR OP/ED)

## 2024-12-19 PROCEDURE — 80069 RENAL FUNCTION PANEL: CPT

## 2024-12-19 PROCEDURE — 71045 X-RAY EXAM CHEST 1 VIEW: CPT

## 2024-12-19 PROCEDURE — 2500000001 HC RX 250 WO HCPCS SELF ADMINISTERED DRUGS (ALT 637 FOR MEDICARE OP)

## 2024-12-19 PROCEDURE — 71045 X-RAY EXAM CHEST 1 VIEW: CPT | Performed by: RADIOLOGY

## 2024-12-19 PROCEDURE — 99232 SBSQ HOSP IP/OBS MODERATE 35: CPT | Performed by: STUDENT IN AN ORGANIZED HEALTH CARE EDUCATION/TRAINING PROGRAM

## 2024-12-19 PROCEDURE — 2500000005 HC RX 250 GENERAL PHARMACY W/O HCPCS: Performed by: NURSE PRACTITIONER

## 2024-12-19 PROCEDURE — 85027 COMPLETE CBC AUTOMATED: CPT

## 2024-12-19 PROCEDURE — 37799 UNLISTED PX VASCULAR SURGERY: CPT

## 2024-12-19 PROCEDURE — 2500000005 HC RX 250 GENERAL PHARMACY W/O HCPCS

## 2024-12-19 PROCEDURE — 82947 ASSAY GLUCOSE BLOOD QUANT: CPT

## 2024-12-19 PROCEDURE — 82330 ASSAY OF CALCIUM: CPT

## 2024-12-19 PROCEDURE — 83735 ASSAY OF MAGNESIUM: CPT

## 2024-12-19 PROCEDURE — 2060000001 HC INTERMEDIATE ICU ROOM DAILY

## 2024-12-19 PROCEDURE — 99233 SBSQ HOSP IP/OBS HIGH 50: CPT | Performed by: NURSE PRACTITIONER

## 2024-12-19 PROCEDURE — RXMED WILLOW AMBULATORY MEDICATION CHARGE

## 2024-12-19 PROCEDURE — 2500000004 HC RX 250 GENERAL PHARMACY W/ HCPCS (ALT 636 FOR OP/ED): Performed by: NURSE PRACTITIONER

## 2024-12-19 PROCEDURE — 2500000001 HC RX 250 WO HCPCS SELF ADMINISTERED DRUGS (ALT 637 FOR MEDICARE OP): Performed by: NURSE PRACTITIONER

## 2024-12-19 PROCEDURE — 97530 THERAPEUTIC ACTIVITIES: CPT | Mod: GP

## 2024-12-19 PROCEDURE — 99231 SBSQ HOSP IP/OBS SF/LOW 25: CPT

## 2024-12-19 RX ORDER — METOLAZONE 2.5 MG/1
2.5 TABLET ORAL ONCE
Status: COMPLETED | OUTPATIENT
Start: 2024-12-19 | End: 2024-12-19

## 2024-12-19 RX ORDER — INSULIN GLARGINE 100 [IU]/ML
10 INJECTION, SOLUTION SUBCUTANEOUS DAILY
Status: DISCONTINUED | OUTPATIENT
Start: 2024-12-20 | End: 2024-12-20 | Stop reason: HOSPADM

## 2024-12-19 RX ORDER — SEMAGLUTIDE 0.68 MG/ML
INJECTION, SOLUTION SUBCUTANEOUS
Qty: 3 ML | Refills: 3 | Status: ON HOLD | OUTPATIENT
Start: 2024-12-19 | End: 2025-02-15

## 2024-12-19 RX ORDER — FUROSEMIDE 10 MG/ML
20 INJECTION INTRAMUSCULAR; INTRAVENOUS ONCE
Status: COMPLETED | OUTPATIENT
Start: 2024-12-19 | End: 2024-12-19

## 2024-12-19 RX ORDER — LISINOPRIL 5 MG/1
5 TABLET ORAL DAILY
Status: DISCONTINUED | OUTPATIENT
Start: 2024-12-19 | End: 2024-12-20 | Stop reason: HOSPADM

## 2024-12-19 ASSESSMENT — ENCOUNTER SYMPTOMS
PALPITATIONS: 0
DOUBLE VISION: 0
BLURRED VISION: 0
FREQUENCY: 0
SHORTNESS OF BREATH: 0
DIZZINESS: 0
LIGHT-HEADEDNESS: 0
VOMITING: 0
DYSPNEA ON EXERTION: 0
BLOATING: 0
ORTHOPNEA: 0
NAIL CHANGES: 0
STIFFNESS: 1
NAUSEA: 0
POOR WOUND HEALING: 0
DECREASED APPETITE: 0
IRREGULAR HEARTBEAT: 0
COUGH: 0
ALTERED MENTAL STATUS: 0
WEAKNESS: 1
MYALGIAS: 0
MUSCLE CRAMPS: 0
FOCAL WEAKNESS: 0

## 2024-12-19 ASSESSMENT — PAIN - FUNCTIONAL ASSESSMENT
PAIN_FUNCTIONAL_ASSESSMENT: 0-10

## 2024-12-19 ASSESSMENT — COGNITIVE AND FUNCTIONAL STATUS - GENERAL
STANDING UP FROM CHAIR USING ARMS: A LITTLE
DRESSING REGULAR LOWER BODY CLOTHING: A LITTLE
TOILETING: A LITTLE
MOBILITY SCORE: 18
WALKING IN HOSPITAL ROOM: A LITTLE
MOVING TO AND FROM BED TO CHAIR: A LITTLE
DRESSING REGULAR UPPER BODY CLOTHING: A LITTLE
CLIMB 3 TO 5 STEPS WITH RAILING: A LITTLE
DAILY ACTIVITIY SCORE: 20
MOVING FROM LYING ON BACK TO SITTING ON SIDE OF FLAT BED WITH BEDRAILS: A LITTLE
HELP NEEDED FOR BATHING: A LITTLE
TURNING FROM BACK TO SIDE WHILE IN FLAT BAD: A LITTLE

## 2024-12-19 ASSESSMENT — PAIN SCALES - GENERAL
PAINLEVEL_OUTOF10: 6
PAINLEVEL_OUTOF10: 0 - NO PAIN

## 2024-12-19 ASSESSMENT — PAIN DESCRIPTION - LOCATION: LOCATION: SHOULDER

## 2024-12-19 ASSESSMENT — PAIN DESCRIPTION - ORIENTATION: ORIENTATION: LEFT

## 2024-12-19 NOTE — PROGRESS NOTES
Occupational Therapy    OT Treatment    Patient Name: Nader Zazueta  MRN: [id number]  Department: [facility]  Room: [address] [place], [place] [address]  Today's Date: 12/19/2024  Time Calculation  Start Time: 1140  Stop Time: 1153  Time Calculation (min): 13 min        Assessment:  End of Session Communication: Bedside nurse  End of Session Patient Position: Bed, 4 rail up, Alarm off, not on at start of session (pt's preference)     Plan:  Treatment Interventions: ADL retraining, Endurance training, Compensatory technique education, Functional transfer training  OT Frequency: 3 times per week  OT Discharge Recommendations: Low intensity level of continued care  OT - OK to Discharge: Yes (to next level of care when cleared by medical team)  Treatment Interventions: ADL retraining, Endurance training, Compensatory technique education, Functional transfer training    Subjective   Previous Visit Info:  OT Last Visit  OT Received On: 12/19/24  General:  General  Prior to Session Communication: Bedside nurse  Patient Position Received: Bed, 4 rail up, Alarm off, not on at start of session (pt. preference)  Precautions:  Precautions Comment: MITT, tele, VSS    Vital Signs (Past 2hrs)        Date/Time Vitals Session Patient Position Pulse Resp SpO2 BP MAP (mmHg)    12/19/24 1500 --  --  76  --  93 %  116/75  91     12/19/24 1600 --  --  78  --  93 %  127/80  100                         Pain:  Pain Assessment  Pain Assessment:  (no pain complaints)    Objective    Cognition:     Coordination:     Activities of Daily Living: UE Dressing  UE Dressing Comments: pt. able to don gown with supervision and assist for tele    LE Dressing  LE Dressing:  (pt. able to don socks seated eob figure four technique, supervision and cues for energy conservation)  Bed Mobility/Transfers: Bed Mobility  Bed Mobility:  (supervision supine <> sit)    Transfers  Transfer:  (sit<> stand from eob;  supervision)      Functional Mobility:  Functional Mobility  Functional  Mobility Performed:  (mobility without device completed with house hold distances with supervision, cues for energy conservation)      Outcome Measures:[facility] Daily Activity  Putting on and taking off regular lower body clothing: A little  Bathing (including washing, rinsing, drying): A little  Putting on and taking off regular upper body clothing: A little  Toileting, which includes using toilet, bedpan or urinal: A little  Taking care of personal grooming such as brushing teeth: None  Eating Meals: None  Daily Activity - Total Score: 20         and Early Mobility/Exercise Safety Screen: Proceed with mobilization - No exclusion criteria met  ICU Mobility Scale: Walking with assistance of 1 person [8]    Education Documentation  Precautions, taught by Una Gonzales OT at 12/19/2024  4:37 PM.  Learner: Patient  Readiness: Acceptance  Method: Explanation  Response: Verbalizes Understanding, Needs Reinforcement    ADL Training, taught by Una Gonzales OT at 12/19/2024  4:37 PM.  Learner: Patient  Readiness: Acceptance  Method: Explanation  Response: Verbalizes Understanding, Needs Reinforcement         Goals:  Encounter Problems       Encounter Problems (Active)       OT Goals       Increase functional mobility and  functional transfers to supervision for bed/chair/toilet with dme prn   (Progressing)       Start:  12/17/24    Expected End:  12/24/24            increase bue ther ex/activity x 7-10 minutes and increase standing tolerance x 3-5 minutes with supervision to promote greater activity tolerance for assist with adl.   (Progressing)       Start:  12/17/24    Expected End:  12/24/24            pt. to apply ec/ws techniques/MITT without cues to all mobility/transfer/adl to decrease fatigue/promote efficient use of energy toward completion of functional tasks.  (Progressing)       Start:  12/17/24    Expected End:  12/24/24

## 2024-12-19 NOTE — PROGRESS NOTES
Cardiology Progress    Impression:  Non-STEMI.    Postop CABG x 4.  LIMA-LAD, free INDIGO-D1, SVG-OM1, SVG-RPDA  EF 50 to 55%.  Hypertension  Hyperlipidemia  Diabetes HbA1c 7.9  CKD.  Status post nephrectomy for renal cell carcinoma.  Plan:  Continue DAPT, statin, beta-blocker.  Continue diuresis  Ambulate  Follow labs  HPI:  No problems overnight.  Remains sinus rhythm.  Meds:  Scheduled medications  acetaminophen, 650 mg, oral, q6h  amLODIPine, 10 mg, oral, Daily  aspirin, 81 mg, oral, Daily  atorvastatin, 80 mg, oral, Nightly  clopidogrel, 75 mg, oral, Daily  cyclobenzaprine, 5 mg, oral, q8h  enoxaparin, 40 mg, subcutaneous, Daily  furosemide, 20 mg, intravenous, BID  hydrALAZINE, 25 mg, oral, TID  insulin glargine, 5 Units, subcutaneous, Daily  insulin lispro, 0-15 Units, subcutaneous, TID AC  lidocaine, 1 patch, transdermal, q24h  magnesium oxide, 400 mg, oral, Daily  metoprolol tartrate, 50 mg, oral, BID  mupirocin, , Topical, TID  oxygen, , inhalation, Continuous - Inhalation  psyllium, 1 packet, oral, Daily  sennosides-docusate sodium, 2 tablet, oral, BID      Continuous medications     PRN medications  PRN medications: albumin human, alteplase, calcium chloride, calcium chloride, dextrose, dextrose, fentaNYL **OR** fentaNYL, glucagon, glucagon, hydrALAZINE, lactated Ringer's, magnesium sulfate, magnesium sulfate, naloxone, ondansetron, oxyCODONE, oxyCODONE, potassium chloride CR **OR** potassium chloride, potassium chloride CR **OR** potassium chloride, potassium chloride, potassium chloride    Physical exam:  Vitals:    12/19/24 0800   BP: 126/73   Pulse: 81   Resp:    Temp:    SpO2: 97%      No JVD.  Chest exam reveals decreased breath sounds.  Minimal edema.  EKG:  Telemetry shows sinus rhythm.  Echo:  EF 50 to 55%.  Labs:  Lab Results   Component Value Date    WBC 14.8 (H) 12/19/2024    HGB 14.3 12/19/2024    HCT 41.2 12/19/2024     12/19/2024    CHOL 174 12/13/2024    TRIG 195 (H) 12/13/2024     HDL 29.3 12/13/2024    ALT 38 12/12/2024    AST 22 12/12/2024     (L) 12/19/2024    K 4.1 12/19/2024    CL 97 (L) 12/19/2024    CREATININE 1.31 (H) 12/19/2024    BUN 30 (H) 12/19/2024    CO2 27 12/19/2024    TSH 1.96 12/13/2024    PSA 0.30 07/12/2022    INR 1.2 (H) 12/16/2024    HGBA1C 8.7 (H) 12/13/2024     par

## 2024-12-19 NOTE — CARE PLAN
The patient's goals for the shift include      The clinical goals for the shift include progressing    Over the shift, the patient did not make progress toward the following goals. Barriers to progression include . Recommendations to address these barriers include .

## 2024-12-19 NOTE — PROGRESS NOTES
Physical Therapy    Physical Therapy Treatment    Patient Name: Nader Zazueta  MRN: [id number]  Today's Date: 12/19/2024  Time Calculation  Start Time: 1145  Stop Time: 1154  Time Calculation (min): 9 min     181/181-A    Assessment/Plan   PT Assessment  PT Assessment Results: Decreased strength, Decreased endurance, Decreased mobility  Rehab Prognosis: Good  Evaluation/Treatment Tolerance: Patient tolerated treatment well  End of Session Communication: Bedside nurse  End of Session Patient Position: Up in chair, Alarm off, not on at start of session     PT Plan  Treatment/Interventions: Bed mobility, Transfer training, Gait training, Stair training, Strengthening, Endurance training, Therapeutic exercise, Therapeutic activity  PT Plan: Ongoing PT  PT Frequency: 4 times per week  PT Discharge Recommendations: Low intensity level of continued care  PT - OK to Discharge: Yes    Current Problem:  Patient Active Problem List   Diagnosis    Abdominal pain, chronic, right lower quadrant    Cerumen impaction    Diabetes (Multi)    Hypertension    Lumbar radiculopathy    Myelopathy (Multi)    Osteomyelitis    CKD (chronic kidney disease)    Renal insufficiency    Renal mass, right    Bee sting    Cellulitis    NSTEMI (non-ST elevated myocardial infarction) (Multi)    Chronic coronary microvascular dysfunction    Coronary artery disease involving coronary bypass graft with unstable angina pectoris (Multi)       General Visit Information:   PT  Visit  PT Received On: 12/19/24  General  Reason for Referral: PT eval and treat; presents with chest pain, NSTEMI, s/p CABG x 3 on 12/16/24  Referred By: Jovanni Arriaga  Past Medical History Relevant to Rehab: DM, CKD, HTN, HLD, osteomyelitis, lumbar radiculopathy, nephrectomy for renal cell carcinoma  Co-Treatment: OT  Prior to Session Communication: Bedside nurse  Patient Position Received: Up in chair, Alarm off, not on at start of session  General Comment: Pt sitting EOB with OT  beginning of session.  Subjective     Precautions:  Precautions  Medical Precautions: Fall precautions, Cardiac precautions, Oxygen therapy device and L/min (3L)  Post-Surgical Precautions: Move in the Tube  Precautions Comment: MITT, telemetry    Vital Signs:  Vital Signs  Vitals Session:  (VSS throughout session)  Objective     Pain:  Pain Assessment  Pain Assessment:  (pt denies pain)    Cognition:  Cognition  Overall Cognitive Status: Within Functional Limits    Postural Control:  Postural Control  Postural Control: Within Functional Limits    Extremity/Trunk Assessments:        RLE   RLE : Within Functional Limits  LLE   LLE : Within Functional Limits    Activity Tolerance:  Activity Tolerance  Endurance: Tolerates less than 10 min exercise, no significant change in vital signs  Early Mobility/Exercise Safety Screen: Proceed with mobilization - No exclusion criteria met    Treatments:    Bed Mobility  Bed Mobility:  (not assessed, pt in chair beginning and end of session)  Ambulation/Gait Training  Ambulation/Gait Training Performed:  (Pt ambulates >150ft without device with cues for activity pacing, no acute LOB. SBA)  Transfers  Transfer:  (sit to stand with supervision with no device)  Stairs  Stairs:  (Pt ascends/descends 5 stairs with rail. Reciprocal steps with cues for pacing. SBA)       Outcome Measures:     [facility] Basic Mobility  Turning from your back to your side while in a flat bed without using bedrails: A little  Moving from lying on your back to sitting on the side of a flat bed without using bedrails: A little  Moving to and from bed to chair (including a wheelchair): A little  Standing up from a chair using your arms (e.g. wheelchair or bedside chair): A little  To walk in hospital room: A little  Climbing 3-5 steps with railing: A little  Basic Mobility - Total Score: 18              FSS-ICU  Ambulation: Walks >/ or equal to 150 feet with supervision  Rolling: Supervision or set-up  only  Sitting: Complete independence  Transfer Sit-to-Stand: Supervision or set-up only  Transfer Supine-to-Sit: Supervision or set-up only  Total Score: 27  ICU Mobility Screen  Early Mobility/Exercise Safety Screen: Proceed with mobilization - No exclusion criteria met  ICU Mobility Scale: Walking with assistance of 1 person  E = Exercise and Early Mobility  Early Mobility/Exercise Safety Screen: Proceed with mobilization - No exclusion criteria met  ICU Mobility Scale: Walking with assistance of 1 person        Education Documentation  Precautions, taught by Krystle Corley PT at 12/19/2024  4:43 PM.  Learner: Patient  Readiness: Acceptance  Method: Explanation  Response: Verbalizes Understanding    Mobility Training, taught by Krystle Corley PT at 12/19/2024  4:43 PM.  Learner: Patient  Readiness: Acceptance  Method: Explanation  Response: Verbalizes Understanding    Education Comments  No comments found.      ICU Mobility Scale: Walking with assistance of 1 person [8]    EDUCATION:     Encounter Problems       Encounter Problems (Active)       PT Problem       PT Goal 1 STG - Pt will amb >150' using LRD with IND  (Progressing)       Start:  12/17/24    Expected End:  12/31/24            PT Goal 2 STG - Pt will transition supine <> sitting with IND (Progressing)       Start:  12/17/24    Expected End:  12/31/24            PT Goal 3 STG -  Pt will navigate 4 stairs using rail with SBA  (Progressing)       Start:  12/17/24    Expected End:  12/31/24            PT Goal 4 STG - Pt will transfer STS with IND  (Progressing)       Start:  12/17/24    Expected End:  12/31/24               Pain - Adult

## 2024-12-19 NOTE — NURSING NOTE
12/19/24 1000 Patient Navigator  I attempted to see the patient however he is sleeping. I spoke with the patient's RN, Javed, and he states that the patient hasn't slept well over the past 2 nights.   Francia RAMAN, RN  Patient Navigator  Diabetes Care &   Stroke Educator

## 2024-12-19 NOTE — PROGRESS NOTES
Cardiac Surgery  Progress Note     Nader Zazueta is a 59 y.o. male on day 7 of admission presenting with NSTEMI (non-ST elevated myocardial infarction) (Multi).    Subjective     Interval HPI: Seen and assessed patient this while he was sitting up in bedside chair; in no acute distress and with minimal complaints.    Review of Systems   Constitutional: Positive for malaise/fatigue. Negative for decreased appetite.   HENT:  Negative for congestion.    Eyes:  Negative for blurred vision and double vision.   Cardiovascular:  Positive for chest pain. Negative for dyspnea on exertion, irregular heartbeat, leg swelling, orthopnea and palpitations.   Respiratory:  Negative for cough and shortness of breath.    Endocrine: Negative for cold intolerance and heat intolerance.   Skin:  Negative for nail changes, poor wound healing and rash.   Musculoskeletal:  Positive for stiffness. Negative for arthritis, muscle cramps, muscle weakness and myalgias.   Gastrointestinal:  Negative for bloating, nausea and vomiting.   Genitourinary:  Negative for frequency, hesitancy and urgency.   Neurological:  Positive for weakness. Negative for dizziness, focal weakness and light-headedness.   Psychiatric/Behavioral:  Negative for altered mental status.    Allergic/Immunologic: Negative for environmental allergies.         Objective   Physical Exam  Physical Exam  Vitals and nursing note reviewed.   Constitutional:       General: He is awake. He is not in acute distress.     Appearance: Normal appearance. He is not ill-appearing.      Interventions: Nasal cannula in place.   HENT:      Head: Normocephalic and atraumatic.      Comments:         Nose: Nose normal.      Mouth/Throat:      Mouth: Mucous membranes are moist.      Pharynx: Oropharynx is clear.   Eyes:      Extraocular Movements: Extraocular movements intact.      Conjunctiva/sclera: Conjunctivae normal.      Pupils: Pupils are equal, round, and reactive to light.   Neck:       Comments: Right internal jugular triple lumen catheter   Cardiovascular:      Rate and Rhythm: Normal rate and regular rhythm.      Pulses: Normal pulses.      Heart sounds: Normal heart sounds, S1 normal and S2 normal. No murmur heard.     No systolic murmur is present.      No friction rub. No gallop.      Comments: A-Ventricular Wires   Pulmonary:      Effort: Pulmonary effort is normal. No tachypnea, bradypnea, accessory muscle usage, prolonged expiration or respiratory distress.      Breath sounds: Examination of the right-lower field reveals decreased breath sounds. Examination of the left-lower field reveals decreased breath sounds. Decreased breath sounds present.   Chest:      Comments: Mediastinal Chest Tube to -20 cm wall suction, minimal serosanguinous output and no airleak   Abdominal:      General: Abdomen is flat. Bowel sounds are normal. There is no distension.      Palpations: Abdomen is soft.   Genitourinary:     Comments: Beth Catheter     Musculoskeletal:         General: Normal range of motion.      Cervical back: Normal range of motion and neck supple.      Right lower leg: No edema.      Left lower leg: No edema.   Skin:     General: Skin is warm and dry.      Capillary Refill: Capillary refill takes less than 2 seconds.      Findings: No lesion.      Nails: There is no clubbing.      Comments: Mediastinal Incision with Post-op Dressing      Neurological:      General: No focal deficit present.      Mental Status: He is alert and oriented to person, place, and time. Mental status is at baseline.      GCS: GCS eye subscore is 4. GCS verbal subscore is 5. GCS motor subscore is 6.      Cranial Nerves: Cranial nerves 2-12 are intact.      Sensory: Sensation is intact.      Motor: Motor function is intact.   Psychiatric:         Attention and Perception: Attention and perception normal.         Mood and Affect: Mood normal.         Speech: Speech normal.         Behavior: Behavior normal.          Thought Content: Thought content normal.         Cognition and Memory: Cognition and memory normal.         Judgment: Judgment normal.         Last Recorded Vitals  Vitals:    12/19/24 1100 12/19/24 1200 12/19/24 1300 12/19/24 1400   BP: 134/79 126/73 123/74 124/77   Pulse: 82 74 75 76   Resp:       Temp:  36.4 °C (97.5 °F)     TempSrc:  Temporal     SpO2: 93% 91% 94% 94%   Weight:       Height:            Intake/Output last 3 Shifts:  I/O last 3 completed shifts:  In: 240 (2.4 mL/kg) [P.O.:240]  Out: 2035 (20.1 mL/kg) [Urine:1955 (0.5 mL/kg/hr); Chest Tube:80]  Weight: 101.3 kg     Inpatient Medications  acetaminophen, 650 mg, oral, q6h  amLODIPine, 10 mg, oral, Daily  aspirin, 81 mg, oral, Daily  atorvastatin, 80 mg, oral, Nightly  clopidogrel, 75 mg, oral, Daily  cyclobenzaprine, 5 mg, oral, q8h  enoxaparin, 40 mg, subcutaneous, Daily  furosemide, 20 mg, intravenous, BID  hydrALAZINE, 25 mg, oral, TID  insulin glargine, 5 Units, subcutaneous, Daily  insulin lispro, 0-15 Units, subcutaneous, TID AC  lidocaine, 1 patch, transdermal, q24h  magnesium oxide, 400 mg, oral, Daily  metoprolol tartrate, 50 mg, oral, BID  mupirocin, , Topical, TID  oxygen, , inhalation, Continuous - Inhalation  psyllium, 1 packet, oral, Daily  sennosides-docusate sodium, 2 tablet, oral, BID      Continuous medications       PRN medications  PRN medications: albumin human, alteplase, calcium chloride, calcium chloride, dextrose, dextrose, fentaNYL **OR** fentaNYL, glucagon, glucagon, hydrALAZINE, lactated Ringer's, magnesium sulfate, magnesium sulfate, naloxone, ondansetron, oxyCODONE, oxyCODONE, potassium chloride CR **OR** potassium chloride, potassium chloride CR **OR** potassium chloride, potassium chloride, potassium chloride     LDA:       Relevant Results  Lab Review  Results from last 7 days   Lab Units 12/19/24  0541   WBC AUTO x10*3/uL 14.8*   HEMOGLOBIN g/dL 14.3   HEMATOCRIT % 41.2   PLATELETS AUTO x10*3/uL 209     Results from  last 7 days   Lab Units 12/19/24  0541   SODIUM mmol/L 134*   POTASSIUM mmol/L 4.1   CHLORIDE mmol/L 97*   CO2 mmol/L 27   BUN mg/dL 30*   CREATININE mg/dL 1.31*   CALCIUM mg/dL 9.5   GLUCOSE mg/dL 200*     Results from last 7 days   Lab Units 12/19/24  0541   MAGNESIUM mg/dL 1.89     Results from last 7 days   Lab Units 12/17/24  0459   POCT PH, ARTERIAL pH 7.43*   POCT PCO2, ARTERIAL mm Hg 37*   POCT PO2, ARTERIAL mm Hg 88   POCT HCO3 CALCULATED, ARTERIAL mmol/L 24.6   POCT BASE EXCESS, ARTERIAL mmol/L 0.4         Cardiology Tests     Echo:  Transthoracic echo (TTE) complete 12/12/2024  PHYSICIAN INTERPRETATION:  Left Ventricle: Left ventricular ejection fraction is low normal, by visual estimate at 50-55%. There are no regional left ventricular wall motion abnormalities. The left ventricular cavity size is normal. There is mildly increased septal and normal posterior left ventricular wall thickness. There is left ventricular concentric remodeling. Spectral Doppler shows a normal pattern of left ventricular diastolic filling.  Left Atrium: The left atrium is normal in size.  Right Ventricle: The right ventricle is normal in size. There is normal right ventricular global systolic function.  Right Atrium: The right atrium is normal in size.  Aortic Valve: The aortic valve is trileaflet. There is mild aortic valve cusp calcification. The aortic valve dimensionless index is 0.85. There is no evidence of aortic valve regurgitation. The peak instantaneous gradient of the aortic valve is 11 mmHg. The mean gradient of the aortic valve is 4 mmHg.  Mitral Valve: The mitral valve is normal in structure. There is trace mitral valve regurgitation.  Tricuspid Valve: The tricuspid valve is structurally normal. There is trace tricuspid regurgitation. The right ventricular systolic pressure is unable to be estimated.  Pulmonic Valve: The pulmonic valve is not well visualized. The pulmonic valve regurgitation was not well  visualized.  Pericardium: There is no pericardial effusion noted.  Aorta: The aortic root is normal.        CONCLUSIONS:   1. Left ventricular ejection fraction is low normal, by visual estimate at 50-55%.     Ejection Fractions:        EF   Date/Time Value Ref Range Status   12/12/2024 11:07 AM 53 %        Cath:  Cardiac Catheterization Procedure 12/12/2024  Coronary Angiography:  The coronary circulation is right dominant.     Coronary Angiography Comments:  60-year-old man with history of hypertension, hyperlipidemia, diabetes presents with non-STEMI. Urgent heart catheter recommended.     Fluoroscopy demonstrated mild calcification of the coronary tree.     Left main: Large vessel. No significant disease.     LAD: Moderate-sized vessel. LAD is occluded mid vessel. The distal half of the LAD fills via left to left collaterals. There is a large first diagonal which has a proximal 75% stenosis. There is a large bifurcating second diagonal which is a 80% stenosis in it.     LCx: Moderate-sized vessel. Mid vessel 95% stenosis. The circumflex then gives rise to a moderate-sized first obtuse marginal which has mild disease. The ongoing circumflex is a very small, rudimentary vessel.     RCA: Large dominant vessel. Mild to moderate diffuse disease. Mid RCA there is a focal 50% stenosis.     Left heart catheterization demonstrates an LVEDP of 15 mmHg. Left ventricular angiography shows hypokinesis of the anterior wall. EF 45 to 50%. No mitral regurgitation. No gradient across aortic valve upon pullback.     CONCLUSIONS:   1. Critical three-vessel disease including LAD  with left to left collaterals, severe disease in D1 and D2, subtotal occlusion of moderate-sized circumflex.   2. Mild LV dysfunction, EF estimated 45 to 50%.   3. Patient will be referred for surgical revascularization.         History of Present Illness: Nader Zazueta is a 59 y.o. male with a PMH significant for HTN, HLD, and CKD (solitary kidney  secondary to RCC) who presents to [facility] on 12/12/2024 with complaints of sternal chest discomfort.     ED course: On arrival, patient's /102 (now 158/86), heart rate 55, respirations 18, afebrile, saturating 98% on room air. Labs and imaging form, revealing glucose 210, sodium 135, Cr 1.41 and BUN 25 (1.53 and 25 on 8/7/23), initial troponin 27, delta troponin 77. No white count or anemia. Platelets WNL. Flu A/B/COVID-negative. Chest x-ray shows no acute cardiopulmonary process. EKG, per ED physician, sinus bradycardia with a rate of 59 bpm, normal IL, QRS, normal QTc duration. No ST segment or T wave pathology. Good R wave progression throughout the precordial leads. Patient was administered 324 mg aspirin in the ambulance. Patient started on heparin drip, given Nitropaste and nitro tablet in the ED.      He was seen in consult by cardiologist, Dr. Thor Keyes and went for a [facility]. His coronary anatomy revealed diffuse MV-CAD with a  of the mid LAD (with L to L collateralizations). Cardiac surgery was consulted for bypass grafting evaluation.     Daily Events    12/13: No acute events overnight; patient continues on nitroglycerin gtt for HTN and angina, however endorsing headache so will try to wean down and use PO nitrate.     12/15: Patient with worsening headache overnight as we were utilizing IV nitroglycerine to treat his angina & BP. The patient was seen by cardiology, and his nitrates stopped and he was subsequently started on IV nicardipine for better BP control.     Throughout the day, the patient has continued with ongoing L shoulder pain and gets relief with rest and morphine. Suspect that this is related to his CAD vs musculoskeletal.     Discussed with cardiac surgeons and cardiology; will reprioritization patient as he needs a more urgent surgical revascularization.      Continue on IV nicardipine for tight BP control, PRN morphine, ECGs and heparin gtt.      12/16: This morning, the patient experienced recurrent angina, however it was unrelieved by morphine and a normotensive BP; ECG was without ischemia. Given this, his surgical time was moved up from this afternoon to this morning with Dr. Mendenhall.     12/16 (Post-op): Patient arrived to the ICU in critically ill but stable condition; s/p CABG x4. Currently remains intubated with sedation; on norepinephrine 0.05mg/kg/min while being V paced at 80 BPM. However, after dialing back pacer allowing patient to be in his own rhythm (Sinus 70s), his BP improved allowing     - Vital Signs: HR 80, /64, SpO2 100%   - Hemodynamics: CVP 12, CO 4.9, CI 2.3, SVR 1050  - Ventilator Settings: PRVC-AC / FiO2 60% / PEEP 5 / RR 16     - Pump time: 94 Minutes     Intake & Output:   - Initial R Pleural Chest Tube: 10mL   - Initial L Pleural Chest Tube: 10mL   - Initial Mediastinal Chest Tube: 20mL     12/17: No acute events overnight; patient was extubated in the immediate postop period.  Was on norepinephrine for a short period of time, this has subsequently been weaned off this morning.  Patient is normotensive, so we will start on beta-blockade and light diuresis.  Mediastinal chest tube with moderate output, so will maintain this today, however okay to remove bilateral pleural chest tubes    - Current O2 Requirements: 2L NC     12/18/24: No acute events overnight. Remains off of vasoactive infusions. Diuresing well with scheduled IV lasix. Will plan to remove lines, ingram and chest tube today and increase beta-blockade.     - Current O2 Requirements: 1-2L NC    12/19/24: Continuing with no acute events overnight; continues on supplemental O2. Plan to give one time dose of metolazone with IV furosemide. Otherwise, plan for likely discharge to home tomorrow once off of O2.     - Current O2 Requirements: 1-2L NC     Assessment & Plan        MV-CAD  - Patient admitted to [facility] on 12/12/24 with anginal complaints   --> HS Troponin  I: 27 -> 77 -> 4446  - [facility] on 12/12 revealed diffuse L system CAD with coronary anatomy favoring surgical revascularization   - S/p CABG x 4 on 12/16/24 with Dr. Anjali Mendenhall   --> LIMA-LAD, Free INDIGO - D1, SVG-OM1, SVG-rPDA  - Continue on ASA 81mg and Atorvastatin 80  - Continue on clopidogrel 75mg every day for 1 year given NSTEMI  --> Started on 12/18  - Continue on metoprolol to 50 mg BID   - Daily CXR        Mediastinal Incision  - SUNDAY, well approximated, C/D/I        Acute Post-Op Pain  - As expected   - Multimodal pain control   --> Scheduled: Acetaminophen, magnesium oxide 400mg QD, gabapentin 100mg TID, methocarbamol 500mg q6h   --> PRN: oxycodone & fentanyl  - Bowel regimen while taking narcotics        Risk for Post-Op Arrhythmia  - Ventricular wires placed  - Discontinue V-wires prior to DC  - Telemetry until discharged       Acute Postoperative Respiratory Insufficiency   - As expected following CABG with post-op atelectasis  - Current O2 Requirements: 1-2L NC   - Coughing and deep breathing exercises with Incentive spirometry  - Out of bed, early and aggressive mobilization with assistance  - Oxygen as needed, wean to keep saturation above 92%  - ICU intensivist/pulmonologist consulted  - Albuterol nebulizers as needed       Risk for Fluid Volume Overload  - Pre-Op Weight: 99.2 kg     12/17: 105 kg (net positive 3742mL) --> IV furosemide 20mgBID       12/18: 99.8 kg ---> IV lasix 20 mg BID     12/19: 101 kg (net positive 1362mL) --> IV furosemide 20mg BID + metolazone 2.5mg x1   - Strict I& Os and daily weights         Acute Post-Op Blood-Loss Anemia  - Pre-Op H/H: 16/44.7     12/17: 16.6/31.2     12/18: 13.3/39.3     12/19: 14.3/41.2  - Monitor h/h in the initial post op period  - Multivitamin/iron tablet   - Daily CBC while in hospital       Post-Op Leukocytosis  - Pre-Op WBC: 12.3     12/17:11.7     12/18: 15.0     12/19:14.8  - Afebrile, consider elevations as reactive to surgery   - Daily cbc  while in hospital        Essential HTN  - Home Medications: Amlodipine 10mg, lisinopril 10mg  - Continue on amlodipine 10mg   - Addition of lisinopril 5mg every day        Type 2 DM  - Home Medications: Metformin 1g BID, glimepiride 2 mg BID  - HbA1c: 8.7  - Goal for BG <150 in the post-operative period  - SSI plus Lantus 10u HS   - In the setting of T2DM with CAD, patient would benefit from addition of GLP-1   --> Ozempic 0.25mg qWeekly for 4 weeks, then increase to 0.5mg qWeekly therafter   --> Follow up labs in 3 months        Dyslipidemia  - Home Medications: None  - Started on high intensity statin therapy this admission   - Continue on statin therapy as above     Risk for Electrolyte Disturbances  - Optimize electrolytes per [facility] protocol      Bowel Regimen  - Senna-S BID   - PRN suppositories and miralax    Prophylaxis  - GI: PPI  - DVT: Rolf-Hose & starting enoxaparin  - MRSA: Positive (12/12)-- ancef, vanc and mupirocin   - PT/OT     Disposition  - CVICU (stepdown status)     Patient seen and plan discussed with Dr. Anjali Mendenhall & Dr. Mukul Root.     Jovanni Arriaga, APRN-CNP

## 2024-12-19 NOTE — PROGRESS NOTES
"Nader Zazueta is a 59 y.o. male on day 7 of admission presenting with NSTEMI (non-ST elevated myocardial infarction) (Multi).    Subjective     Post op day 3, resting doing well no issues    Objective     Physical Exam  Constitutional:       Appearance: Normal appearance.   HENT:      Head: Normocephalic and atraumatic.      Right Ear: Tympanic membrane and ear canal normal.      Left Ear: Tympanic membrane and ear canal normal.      Mouth/Throat:      Mouth: Mucous membranes are moist.      Pharynx: Oropharynx is clear.   Eyes:      Extraocular Movements: Extraocular movements intact.      Conjunctiva/sclera: Conjunctivae normal.      Pupils: Pupils are equal, round, and reactive to light.   Cardiovascular:      Rate and Rhythm: Normal rate and regular rhythm.      Pulses: Normal pulses.      Heart sounds: Normal heart sounds.   Pulmonary:      Effort: Pulmonary effort is normal.      Breath sounds: Normal breath sounds.   Abdominal:      General: Abdomen is flat. Bowel sounds are normal.      Palpations: Abdomen is soft.   Musculoskeletal:         General: Normal range of motion.      Cervical back: Normal range of motion and neck supple.   Skin:     General: Skin is warm and dry.      Capillary Refill: Capillary refill takes 2 to 3 seconds.   Neurological:      General: No focal deficit present.      Mental Status: He is alert and oriented to person, place, and time. Mental status is at baseline.   Psychiatric:         Mood and Affect: Mood normal.         Behavior: Behavior normal.         Thought Content: Thought content normal.         Judgment: Judgment normal.         Last Recorded Vitals  Blood pressure 134/79, pulse 82, temperature 36 °C (96.8 °F), temperature source Temporal, resp. rate (!) 33, height 1.753 m (5' 9\"), weight 101 kg (223 lb 5.2 oz), SpO2 93%.  Intake/Output last 3 Shifts:  I/O last 3 completed shifts:  In: 240 (2.4 mL/kg) [P.O.:240]  Out: 2035 (20.1 mL/kg) [Urine:1955 (0.5 mL/kg/hr); " Chest Tube:80]  Weight: 101.3 kg     Relevant Results                              Assessment/Plan   Assessment & Plan  NSTEMI (non-ST elevated myocardial infarction) (Multi)    Chronic coronary microvascular dysfunction    Coronary artery disease involving coronary bypass graft with unstable angina pectoris (Multi)    NSTEMI  Hypertension  Hyperlipidemia  -Cardiology consult, recommendations appreciated  -EKG, per ED physician, sinus bradycardia with a rate of 59 bpm, normal NE, QRS, normal QTc duration.  No ST segment or T wave pathology.  Good R wave progression throughout the precordial leads  -Chest x-ray shows no acute cardiopulmonary process  -Patient initiated on heparin drip in the ED, continue  -Patient administered 324 mg aspirin by EMS, continue 81 mg aspirin daily  -Will keep patient n.p.o. for now until evaluated by cardio  -Hemoglobin A1c, lipid panel, TSH, echo added on  -Continue home medications when home med rec complete, n.p.o. for now     Hx of incisional hernia  -Patient tender to palpation over site of his incisional hernia repair (RLQ), he states he would like to be evaluated by Dr. Covarrubias  -CT A/P added on (w/out contrast due to pt's solitary kidney)     Type 2 diabetes mellitus  -Sliding scale insulin and diabetic diet  -Hypoglycemic protocol     CKD  Hyponatremia, 135  -Creatinine 1.41 and BUN 25, 1.53 and 25 point 8/7/2023  -Monitor with BMP, avoid nephrotoxic medications     DVT prophylaxis  -Patient currently on heparin drip  -SCDs    12/12: spoke with cardioloyg cath shows multivessel disease will need CABG, CT surgery consulted    12/13: doing well, monitor labs, work up underway, tentative schedule cabg on Monday 12/14:no issues doing well    12/15: family at bedside, surgery today.    12/16: doing well contineu post op car     12/17: doing wlel, progressing as expected    12/18: stable, no issues, dc planning in next few days       I spent 35 minutes in the professional and overall  care of this patient.      Mustapha Singer, DO

## 2024-12-19 NOTE — PROGRESS NOTES
PATIENT NAME: Nader Zazueta  MRN: [id number]     SERVICE DATE:  12/16/2024  SERVICE TIME:  4:45 PM     Nader Zazueta is a 59 y.o. male on day 4 of admission presenting with NSTEMI (non-ST elevated myocardial infarction) (Multi).    Subjective:   Patient been having fevers, chills, nausea apparently since he has been here.  Having poor oral intake because of this.  Pain not significantly elevated noted to be 6 out of 10.  Currently on room air resting on his bed.  He does describe these periods of fevers/chills to be waxing and waning and does not happen at a particular time.     HPI  59-year-old male with past medical Hx HTN, HLD, CKD (s/p nephrectomy due to RCC) comes to [facility] from home on 12/12 for CP.  Patient underwent LHC same day and revealed triple-vessel disease   Last TTE 50-55% EF 12/12/2024  Patient underwent CABG x 4 (LIMA->LAD, free INDIGO from LIMA->DIAG 1, SVG->OM1->PDA) 12/16 by Dr. Mendenhall  ICU consulted for postop respiratory insufficiency.     Past Medical History:   #HTN/HLD  #T2DM  #Hx incisional hernia  #Hx CKD (solitary kidney 2/2 RCC) s/p nephrectomy     Surgical History:  Back surgery     Social History:  Non-smoker     Family History:  Diabetes-mother  COPD-father        OBJECTIVE     Vitals          Vitals:     12/16/24 0936 12/16/24 0945 12/16/24 1000 12/16/24 1630   BP:           BP Location:           Patient Position:           Pulse:   72 83 81   Resp:   22 21     Temp:           TempSrc:           SpO2:   97% 97% 100%   Weight: 99.2 kg (218 lb 9.6 oz)         Height:                         Results from last 7 days   Lab Units 12/16/24  1430 12/16/24  0733 12/13/24  0349 12/12/24  0453   WBC AUTO x10*3/uL  --  12.3*   < > 8.8   HEMOGLOBIN g/dL  --  16.0   < > 14.9   HEMATOCRIT %  --  44.7   < > 42.2   PLATELETS AUTO x10*3/uL 167 254   < > 209   NEUTROS PCT AUTO %  --   --   --  58.5   LYMPHS PCT AUTO %  --   --   --  29.2   MONOS PCT AUTO %  --   --   --  7.3   EOS PCT AUTO %  --    --   --  3.4    < > = values in this interval not displayed.             Results from last 7 days   Lab Units 12/16/24  0733 12/13/24  0349 12/12/24  0453   SODIUM mmol/L 133*   < > 135*   POTASSIUM mmol/L 4.4   < > 4.1   CHLORIDE mmol/L 101   < > 103   CO2 mmol/L 18*   < > 20*   BUN mg/dL 30*   < > 25*   CREATININE mg/dL 1.62*   < > 1.41*   CALCIUM mg/dL 10.2   < > 9.7   PROTEIN TOTAL g/dL  --   --  7.4   BILIRUBIN TOTAL mg/dL  --   --  0.6   ALK PHOS U/L  --   --  40   ALT U/L  --   --  38   AST U/L  --   --  22   GLUCOSE mg/dL 255*   < > 210*    < > = values in this interval not displayed.         Scheduled Medications    Scheduled Medications   acetaminophen, 650 mg, oral, q6h  aspirin, 81 mg, oral, Daily  atorvastatin, 80 mg, oral, Nightly  ceFAZolin, 2 g, intravenous, q8h  cyclobenzaprine, 5 mg, oral, q8h  dextrose, , ,   [START ON 12/17/2024] enoxaparin, 40 mg, subcutaneous, Daily  fentaNYL PF, , ,   gabapentin, 100 mg, oral, TID  lidocaine, 1 patch, transdermal, q24h  magnesium oxide, 400 mg, oral, Daily  midazolam, , ,   oxygen, , inhalation, Continuous - Inhalation  [START ON 12/17/2024] pantoprazole, 40 mg, oral, Daily before breakfast   Or  [START ON 12/17/2024] pantoprazole, 40 mg, intravenous, Daily before breakfast  psyllium, 1 packet, oral, Daily  sennosides-docusate sodium, 2 tablet, oral, BID            Physical Exam   General: laying in bed, appears comfortable  HEENT:  Normocephalic, atraumatic  Chest:  Clear to auscultation. Bilateral and appropriate chest rise  CV:  Regular rate and rhythm.    Extremities:  No lower extremity edema or cyanosis.   Skin:  Warm and dry.       Assessment & Plan  59-year-old male with past medical Hx HTN, HLD, CKD (s/p nephrectomy due to RCC) comes to [facility] from home on 12/12 for CP.  Patient underwent LHC same day and revealed triple-vessel disease   Last TTE 50-55% EF 12/12/2024  Patient underwent CABG x 4 (LIMA->LAD, free INDIGO from LIMA->DIAG 1, SVG->OM1->PDA)  12/16 by Dr. Mendenhall  ICU consulted for postop respiratory insufficiency.     Neurological System:  Postoperative pain  - Analgesia: PRN Tylenol, Oxycodone, Fentanyl per CTS  - No new neurological issues expected  - Maintain normal sleep/wake cycle  - Avoid oversedating patient     Cardiovascular System:  CAD s/p CABG 12/16  Hx essential HTN/HLD  Postop hypotension, as expected  - CABG x 4 (LIMA->LAD, free INDIGO from LIMA->DIAG 1, SVG->OM1->PDA)   - Last TTE 50-55% EF 12/12/2024  - Patient with postop hypotension after CABG surgery, expected  - Maintain MAP >70, wean as tolerated.  - On ASA/Plavix and statin. Resume metoprolol tartrate 50 mg twice daily as tolerated.  Home metoprolol succinate 25 mg daily, amlodipine 10 mg daily.  Lisinopril 5 mg daily  - Diuresis per CTS. Keep Mg>2, K>4.     Respiratory System:  Postop respiratory insufficiency, as expected  #Bilateral atelectasis, improved  #Trace pneumothorax, resolved  - Patient extubated on 12/17. Wean as tolerated, maintain O2 sats>92%.  - Chest tube management per CTS  - CXR shows post CABG changes, central lines, clips.  Bilateral atelectasis.  - Encourage incentive spirometer use q1 hour  - Daily CXR     Gastrointestinal System:  - Diet: Carb controlled  - Prophylaxis: Protonix 40 mg daily  - No expected issues     Endocrine System:  Hx T2DM  - A1c 8.7%  - SSI3, Lantus 10, maintain BG between 140-180s     Renal System:  Hx CKD 3B (baseline 1.4-1.5)  Hx solitary kidney 2/2 RCC s/p nephrectomy  -Trend renal panel, replete electrolytes PRN     Hematological System:  Postop anemia, as expected  - Trend H&H     Infection Disease System:  #Subjective fever/chills  -Over the past few days; will send RSV, COVID, flu.  Pending Pro-William.  Pending urinalysis.  -Prophylactic antibiotics per CTS     Skin/MSK:  No active issues.     Vent/O2: None  Lines/Devices: PIV's  Drips: None  ATBx: None  Diet: Carb controlled  IVF: None  PPX: Lovenox  Code: Full  Dispo: YASMIN García  Jason Stringer  Internal Medicine PGY-2

## 2024-12-20 ENCOUNTER — HOSPITAL ENCOUNTER (OUTPATIENT)
Dept: CARDIOLOGY | Facility: HOSPITAL | Age: 59
Discharge: HOME | DRG: 233 | End: 2024-12-20
Payer: COMMERCIAL

## 2024-12-20 ENCOUNTER — PHARMACY VISIT (OUTPATIENT)
Dept: PHARMACY | Facility: CLINIC | Age: 59
End: 2024-12-20
Payer: COMMERCIAL

## 2024-12-20 ENCOUNTER — DOCUMENTATION (OUTPATIENT)
Dept: HOME HEALTH SERVICES | Facility: HOME HEALTH | Age: 59
End: 2024-12-20
Payer: COMMERCIAL

## 2024-12-20 ENCOUNTER — DOCUMENTATION (OUTPATIENT)
Dept: CARDIAC SURGERY | Facility: CLINIC | Age: 59
End: 2024-12-20

## 2024-12-20 ENCOUNTER — HOME HEALTH ADMISSION (OUTPATIENT)
Dept: HOME HEALTH SERVICES | Facility: HOME HEALTH | Age: 59
End: 2024-12-20
Payer: COMMERCIAL

## 2024-12-20 ENCOUNTER — APPOINTMENT (OUTPATIENT)
Dept: RADIOLOGY | Facility: HOSPITAL | Age: 59
DRG: 233 | End: 2024-12-20
Payer: COMMERCIAL

## 2024-12-20 VITALS
HEART RATE: 75 BPM | DIASTOLIC BLOOD PRESSURE: 76 MMHG | RESPIRATION RATE: 19 BRPM | SYSTOLIC BLOOD PRESSURE: 145 MMHG | TEMPERATURE: 95.2 F | OXYGEN SATURATION: 94 % | WEIGHT: 210.4 LBS | HEIGHT: 69 IN | BODY MASS INDEX: 31.16 KG/M2

## 2024-12-20 PROBLEM — I21.4 NSTEMI (NON-ST ELEVATED MYOCARDIAL INFARCTION) (MULTI): Status: RESOLVED | Noted: 2024-12-12 | Resolved: 2024-12-20

## 2024-12-20 LAB
ALBUMIN SERPL BCP-MCNC: 4 G/DL (ref 3.4–5)
ANION GAP SERPL CALC-SCNC: 17 MMOL/L (ref 10–20)
APPEARANCE UR: CLEAR
BILIRUB UR STRIP.AUTO-MCNC: NEGATIVE MG/DL
BUN SERPL-MCNC: 37 MG/DL (ref 6–23)
CA-I BLD-SCNC: 1.14 MMOL/L (ref 1.1–1.33)
CALCIUM SERPL-MCNC: 9.6 MG/DL (ref 8.6–10.3)
CHLORIDE SERPL-SCNC: 97 MMOL/L (ref 98–107)
CO2 SERPL-SCNC: 24 MMOL/L (ref 21–32)
COLOR UR: ABNORMAL
CREAT SERPL-MCNC: 1.37 MG/DL (ref 0.5–1.3)
EGFRCR SERPLBLD CKD-EPI 2021: 60 ML/MIN/1.73M*2
ERYTHROCYTE [DISTWIDTH] IN BLOOD BY AUTOMATED COUNT: 12.2 % (ref 11.5–14.5)
GLUCOSE BLD MANUAL STRIP-MCNC: 207 MG/DL (ref 74–99)
GLUCOSE BLD MANUAL STRIP-MCNC: 231 MG/DL (ref 74–99)
GLUCOSE BLD MANUAL STRIP-MCNC: 242 MG/DL (ref 74–99)
GLUCOSE SERPL-MCNC: 197 MG/DL (ref 74–99)
GLUCOSE UR STRIP.AUTO-MCNC: ABNORMAL MG/DL
HCT VFR BLD AUTO: 42 % (ref 41–52)
HGB BLD-MCNC: 14.6 G/DL (ref 13.5–17.5)
KETONES UR STRIP.AUTO-MCNC: ABNORMAL MG/DL
LEUKOCYTE ESTERASE UR QL STRIP.AUTO: NEGATIVE
MAGNESIUM SERPL-MCNC: 2.03 MG/DL (ref 1.6–2.4)
MCH RBC QN AUTO: 31.4 PG (ref 26–34)
MCHC RBC AUTO-ENTMCNC: 34.8 G/DL (ref 32–36)
MCV RBC AUTO: 90 FL (ref 80–100)
NITRITE UR QL STRIP.AUTO: NEGATIVE
NRBC BLD-RTO: 0 /100 WBCS (ref 0–0)
PH UR STRIP.AUTO: 6.5 [PH]
PHOSPHATE SERPL-MCNC: 3.5 MG/DL (ref 2.5–4.9)
PLATELET # BLD AUTO: 294 X10*3/UL (ref 150–450)
POTASSIUM SERPL-SCNC: 4 MMOL/L (ref 3.5–5.3)
PROCALCITONIN SERPL-MCNC: 0.6 NG/ML
PROT UR STRIP.AUTO-MCNC: ABNORMAL MG/DL
RBC # BLD AUTO: 4.65 X10*6/UL (ref 4.5–5.9)
RBC # UR STRIP.AUTO: NEGATIVE /UL
RBC #/AREA URNS AUTO: NORMAL /HPF
RSV RNA RESP QL NAA+PROBE: NOT DETECTED
SARS-COV-2 RNA RESP QL NAA+PROBE: NOT DETECTED
SODIUM SERPL-SCNC: 134 MMOL/L (ref 136–145)
SP GR UR STRIP.AUTO: 1.02
UROBILINOGEN UR STRIP.AUTO-MCNC: NORMAL MG/DL
WBC # BLD AUTO: 15.8 X10*3/UL (ref 4.4–11.3)
WBC #/AREA URNS AUTO: NORMAL /HPF

## 2024-12-20 PROCEDURE — 85027 COMPLETE CBC AUTOMATED: CPT

## 2024-12-20 PROCEDURE — 2500000004 HC RX 250 GENERAL PHARMACY W/ HCPCS (ALT 636 FOR OP/ED): Performed by: NURSE PRACTITIONER

## 2024-12-20 PROCEDURE — 99238 HOSP IP/OBS DSCHRG MGMT 30/<: CPT

## 2024-12-20 PROCEDURE — 82330 ASSAY OF CALCIUM: CPT

## 2024-12-20 PROCEDURE — 36415 COLL VENOUS BLD VENIPUNCTURE: CPT

## 2024-12-20 PROCEDURE — 87635 SARS-COV-2 COVID-19 AMP PRB: CPT

## 2024-12-20 PROCEDURE — 87634 RSV DNA/RNA AMP PROBE: CPT

## 2024-12-20 PROCEDURE — 83735 ASSAY OF MAGNESIUM: CPT

## 2024-12-20 PROCEDURE — RXMED WILLOW AMBULATORY MEDICATION CHARGE

## 2024-12-20 PROCEDURE — 2500000005 HC RX 250 GENERAL PHARMACY W/O HCPCS: Performed by: NURSE PRACTITIONER

## 2024-12-20 PROCEDURE — 2500000004 HC RX 250 GENERAL PHARMACY W/ HCPCS (ALT 636 FOR OP/ED): Performed by: STUDENT IN AN ORGANIZED HEALTH CARE EDUCATION/TRAINING PROGRAM

## 2024-12-20 PROCEDURE — 2500000001 HC RX 250 WO HCPCS SELF ADMINISTERED DRUGS (ALT 637 FOR MEDICARE OP)

## 2024-12-20 PROCEDURE — 84145 PROCALCITONIN (PCT): CPT | Mod: PARLAB

## 2024-12-20 PROCEDURE — 71045 X-RAY EXAM CHEST 1 VIEW: CPT

## 2024-12-20 PROCEDURE — 82947 ASSAY GLUCOSE BLOOD QUANT: CPT

## 2024-12-20 PROCEDURE — 80069 RENAL FUNCTION PANEL: CPT

## 2024-12-20 PROCEDURE — 2500000002 HC RX 250 W HCPCS SELF ADMINISTERED DRUGS (ALT 637 FOR MEDICARE OP, ALT 636 FOR OP/ED)

## 2024-12-20 PROCEDURE — 71045 X-RAY EXAM CHEST 1 VIEW: CPT | Performed by: RADIOLOGY

## 2024-12-20 PROCEDURE — 2500000002 HC RX 250 W HCPCS SELF ADMINISTERED DRUGS (ALT 637 FOR MEDICARE OP, ALT 636 FOR OP/ED): Performed by: NURSE PRACTITIONER

## 2024-12-20 PROCEDURE — 2500000001 HC RX 250 WO HCPCS SELF ADMINISTERED DRUGS (ALT 637 FOR MEDICARE OP): Performed by: NURSE PRACTITIONER

## 2024-12-20 PROCEDURE — 81001 URINALYSIS AUTO W/SCOPE: CPT

## 2024-12-20 PROCEDURE — 99232 SBSQ HOSP IP/OBS MODERATE 35: CPT | Performed by: STUDENT IN AN ORGANIZED HEALTH CARE EDUCATION/TRAINING PROGRAM

## 2024-12-20 PROCEDURE — 93005 ELECTROCARDIOGRAM TRACING: CPT

## 2024-12-20 RX ORDER — ACETAMINOPHEN 325 MG/1
650 TABLET ORAL EVERY 6 HOURS PRN
Qty: 30 TABLET | Refills: 0 | Status: ON HOLD | OUTPATIENT
Start: 2024-12-20

## 2024-12-20 RX ORDER — ATORVASTATIN CALCIUM 80 MG/1
80 TABLET, FILM COATED ORAL NIGHTLY
Qty: 30 TABLET | Refills: 2 | Status: ON HOLD | OUTPATIENT
Start: 2024-12-20 | End: 2025-03-20

## 2024-12-20 RX ORDER — METHOCARBAMOL 500 MG/1
500 TABLET, FILM COATED ORAL 4 TIMES DAILY
Qty: 20 TABLET | Refills: 0 | Status: ON HOLD | OUTPATIENT
Start: 2024-12-20

## 2024-12-20 RX ORDER — LISINOPRIL 5 MG/1
5 TABLET ORAL DAILY
Qty: 30 TABLET | Refills: 1 | Status: ON HOLD | OUTPATIENT
Start: 2024-12-21 | End: 2025-02-19

## 2024-12-20 RX ORDER — METOPROLOL TARTRATE 50 MG/1
50 TABLET ORAL 2 TIMES DAILY
Qty: 180 TABLET | Refills: 0 | Status: ON HOLD | OUTPATIENT
Start: 2024-12-20 | End: 2025-03-20

## 2024-12-20 RX ORDER — OXYCODONE HYDROCHLORIDE 5 MG/1
5 TABLET ORAL EVERY 6 HOURS PRN
Qty: 15 TABLET | Refills: 0 | Status: ON HOLD | OUTPATIENT
Start: 2024-12-20 | End: 2025-01-31

## 2024-12-20 RX ORDER — CLOPIDOGREL BISULFATE 75 MG/1
75 TABLET ORAL DAILY
Qty: 30 TABLET | Refills: 2 | Status: ON HOLD | OUTPATIENT
Start: 2024-12-21 | End: 2025-03-21

## 2024-12-20 RX ORDER — GABAPENTIN 100 MG/1
100 CAPSULE ORAL 2 TIMES DAILY
Qty: 28 CAPSULE | Refills: 0 | Status: ON HOLD | OUTPATIENT
Start: 2024-12-20 | End: 2025-01-03

## 2024-12-20 RX ORDER — NAPROXEN SODIUM 220 MG/1
81 TABLET, FILM COATED ORAL DAILY
Qty: 90 TABLET | Refills: 0 | Status: ON HOLD | OUTPATIENT
Start: 2024-12-21 | End: 2025-03-21

## 2024-12-20 RX ORDER — HYDRALAZINE HYDROCHLORIDE 25 MG/1
25 TABLET, FILM COATED ORAL 3 TIMES DAILY
Qty: 90 TABLET | Refills: 2 | Status: ON HOLD | OUTPATIENT
Start: 2024-12-20 | End: 2025-03-20

## 2024-12-20 ASSESSMENT — PAIN SCALES - GENERAL
PAINLEVEL_OUTOF10: 1
PAINLEVEL_OUTOF10: 0 - NO PAIN
PAINLEVEL_OUTOF10: 5 - MODERATE PAIN
PAINLEVEL_OUTOF10: 7
PAINLEVEL_OUTOF10: 0 - NO PAIN

## 2024-12-20 ASSESSMENT — PAIN - FUNCTIONAL ASSESSMENT
PAIN_FUNCTIONAL_ASSESSMENT: 0-10

## 2024-12-20 ASSESSMENT — PAIN DESCRIPTION - DESCRIPTORS
DESCRIPTORS: ACHING
DESCRIPTORS: ACHING

## 2024-12-20 ASSESSMENT — ENCOUNTER SYMPTOMS
CHILLS: 1
ALTERED MENTAL STATUS: 0
VOMITING: 0
SHORTNESS OF BREATH: 0
DECREASED APPETITE: 0
DYSPNEA ON EXERTION: 0
MUSCLE CRAMPS: 0
DIZZINESS: 0
COUGH: 0
NAUSEA: 0
BLURRED VISION: 0
WEAKNESS: 1
MYALGIAS: 0

## 2024-12-20 ASSESSMENT — PAIN DESCRIPTION - LOCATION: LOCATION: CHEST

## 2024-12-20 NOTE — PROGRESS NOTES
Nader Zazueta is a 59 y.o. male on day 8 of admission presenting with NSTEMI (non-ST elevated myocardial infarction) (Multi).  Record reviewed.  Patient is planned to go home with [facility].  Care team updated.  Care Transitions will continue to follow.     Take over the counter pain medication Prescription called to pharmacy/Take over the counter pain medication

## 2024-12-20 NOTE — PROGRESS NOTES
Cardiology Progress    Impression:  Non-STEMI.    Postop Chills/low-grade fever: no evidence of infection.  Questionable atelectasis  Postop CABG x 4.  LIMA-LAD, free INDIGO-D1, SVG-OM1, SVG-RPDA  EF 50 to 55%.  Hypertension  Hyperlipidemia  Diabetes HbA1c 7.9  CKD.  Status post nephrectomy for renal cell carcinoma.  Plan:  Continue DAPT, statin, beta-blocker.  Continue diuresis  Ambulate  Continue workup for infectious process and source of infection.  Encouraged incentive parameter for atelectasis.  HPI:  Chills/low-grade.  Denies chest pain shortness of breath palpitation lightheadedness.  Ambulated well.  Meds:  Scheduled medications  acetaminophen, 650 mg, oral, q6h  amLODIPine, 10 mg, oral, Daily  aspirin, 81 mg, oral, Daily  atorvastatin, 80 mg, oral, Nightly  clopidogrel, 75 mg, oral, Daily  cyclobenzaprine, 5 mg, oral, q8h  enoxaparin, 40 mg, subcutaneous, Daily  furosemide, 20 mg, intravenous, BID  hydrALAZINE, 25 mg, oral, TID  insulin glargine, 10 Units, subcutaneous, Daily  insulin lispro, 0-15 Units, subcutaneous, TID AC  lidocaine, 1 patch, transdermal, q24h  lisinopril, 5 mg, oral, Daily  magnesium oxide, 400 mg, oral, Daily  metoprolol tartrate, 50 mg, oral, BID  mupirocin, , Topical, TID  oxygen, , inhalation, Continuous - Inhalation  psyllium, 1 packet, oral, Daily  sennosides-docusate sodium, 2 tablet, oral, BID      Continuous medications     PRN medications  PRN medications: albumin human, alteplase, calcium chloride, calcium chloride, dextrose, dextrose, fentaNYL **OR** fentaNYL, glucagon, glucagon, hydrALAZINE, lactated Ringer's, magnesium sulfate, magnesium sulfate, naloxone, ondansetron, oxyCODONE, oxyCODONE, potassium chloride CR **OR** potassium chloride, potassium chloride CR **OR** potassium chloride, potassium chloride, potassium chloride    Physical exam:  Vitals:    12/20/24 0900   BP: 136/87   Pulse: 85   Resp:    Temp:    SpO2: 94%      Wake alert oriented not in acute distress  regular rhythm and rate normal S1-S2 no JV D.  Good bilateral air entry bilateral rales at the bases.  Abdomen soft bowel sounds positive.  Lower extremity edema.  1+.  Peripheral pulse positive  EKG:  Sinus rhythm/sinus tach  Echo:  No results found for this or any previous visit.   Labs:  Lab Results   Component Value Date    WBC 15.8 (H) 12/20/2024    HGB 14.6 12/20/2024    HCT 42.0 12/20/2024     12/20/2024    CHOL 174 12/13/2024    TRIG 195 (H) 12/13/2024    HDL 29.3 12/13/2024    ALT 38 12/12/2024    AST 22 12/12/2024     (L) 12/20/2024    K 4.0 12/20/2024    CL 97 (L) 12/20/2024    CREATININE 1.37 (H) 12/20/2024    BUN 37 (H) 12/20/2024    CO2 24 12/20/2024    TSH 1.96 12/13/2024    PSA 0.30 07/12/2022    INR 1.2 (H) 12/16/2024    HGBA1C 8.7 (H) 12/13/2024     par

## 2024-12-20 NOTE — PROGRESS NOTES
Physical Therapy                 Therapy Communication Note    Patient Name: Nader Zazueta  MRN: [id number]  Department: [facility]  Room: 181/[facility]-A  Today's Date: 12/20/2024     Discipline: Physical Therapy    PT Missed Visit: Yes     Missed Visit Reason:  (pt politely declining therapy at this time 2/2 not feeling well, c/o chills, fatigue;  states he has only been able to sleep 1-2 hours a night since his surgery.  will continue to follow and re-attempt as able/appropriate.)    Missed Time: Attempt

## 2024-12-20 NOTE — PROGRESS NOTES
"Nader Zazueta is a 59 y.o. male on day 8 of admission presenting with NSTEMI (non-ST elevated myocardial infarction) (Multi).    Subjective     Post op day 4 he is he is very sweaty and chills overall feeling well.    Objective     Physical Exam  Constitutional:       Appearance: Normal appearance.   HENT:      Head: Normocephalic and atraumatic.      Right Ear: Tympanic membrane and ear canal normal.      Left Ear: Tympanic membrane and ear canal normal.      Mouth/Throat:      Mouth: Mucous membranes are moist.      Pharynx: Oropharynx is clear.   Eyes:      Extraocular Movements: Extraocular movements intact.      Conjunctiva/sclera: Conjunctivae normal.      Pupils: Pupils are equal, round, and reactive to light.   Cardiovascular:      Rate and Rhythm: Normal rate and regular rhythm.      Pulses: Normal pulses.      Heart sounds: Normal heart sounds.   Pulmonary:      Effort: Pulmonary effort is normal.      Breath sounds: Normal breath sounds.   Abdominal:      General: Abdomen is flat. Bowel sounds are normal.      Palpations: Abdomen is soft.   Musculoskeletal:         General: Normal range of motion.      Cervical back: Normal range of motion and neck supple.   Skin:     General: Skin is warm and dry.      Capillary Refill: Capillary refill takes 2 to 3 seconds.   Neurological:      General: No focal deficit present.      Mental Status: He is alert and oriented to person, place, and time. Mental status is at baseline.   Psychiatric:         Mood and Affect: Mood normal.         Behavior: Behavior normal.         Thought Content: Thought content normal.         Judgment: Judgment normal.         Last Recorded Vitals  Blood pressure (!) 141/93, pulse 77, temperature 36.5 °C (97.7 °F), temperature source Temporal, resp. rate 20, height 1.753 m (5' 9\"), weight 95.4 kg (210 lb 6.4 oz), SpO2 94%.  Intake/Output last 3 Shifts:  No intake/output data recorded.    Relevant Results                        "       Assessment/Plan   Assessment & Plan  NSTEMI (non-ST elevated myocardial infarction) (Multi)    Chronic coronary microvascular dysfunction    Coronary artery disease involving coronary bypass graft with unstable angina pectoris (Multi)    NSTEMI  Hypertension  Hyperlipidemia  -Cardiology consult, recommendations appreciated  -EKG, per ED physician, sinus bradycardia with a rate of 59 bpm, normal AK, QRS, normal QTc duration.  No ST segment or T wave pathology.  Good R wave progression throughout the precordial leads  -Chest x-ray shows no acute cardiopulmonary process  -Patient initiated on heparin drip in the ED, continue  -Patient administered 324 mg aspirin by EMS, continue 81 mg aspirin daily  -Will keep patient n.p.o. for now until evaluated by cardio  -Hemoglobin A1c, lipid panel, TSH, echo added on  -Continue home medications when home med rec complete, n.p.o. for now     Hx of incisional hernia  -Patient tender to palpation over site of his incisional hernia repair (RLQ), he states he would like to be evaluated by Dr. Covarrubias  -CT A/P added on (w/out contrast due to pt's solitary kidney)     Type 2 diabetes mellitus  -Sliding scale insulin and diabetic diet  -Hypoglycemic protocol     CKD  Hyponatremia, 135  -Creatinine 1.41 and BUN 25, 1.53 and 25 point 8/7/2023  -Monitor with BMP, avoid nephrotoxic medications     DVT prophylaxis  -Patient currently on heparin drip  -SCDs    12/12: spoke with cardioloyg cath shows multivessel disease will need CABG, CT surgery consulted    12/13: doing well, monitor labs, work up underway, tentative schedule cabg on Monday 12/14:no issues doing well    12/15: family at bedside, surgery today.    12/16: doing well contineu post op car     12/17: doing wlel, progressing as expected    12/18: stable, no issues, dc planning in next few days    12/20: Overall doing well and chills episode were negative is felt per cardiothoracic surgery.       I spent 35 minutes in the  professional and overall care of this patient.      Mustapha Singer, DO

## 2024-12-20 NOTE — DISCHARGE SUMMARY
Discharge Diagnosis  NSTEMI (non-ST elevated myocardial infarction) (Multi)    Issues Requiring Follow-Up  Blood glucose control - patient needs to follow up with endocrinology.     Test Results Pending At Discharge  Pending Labs       Order Current Status    Procalcitonin In process            Hospital Course     History of Present Illness: Nader Zazueta is a 59 y.o. male with a PMH significant for HTN, HLD, and CKD (solitary kidney secondary to RCC) who presents to [facility] on 12/12/2024 with complaints of sternal chest discomfort.     ED course: On arrival, patient's /102 (now 158/86), heart rate 55, respirations 18, afebrile, saturating 98% on room air. Labs and imaging form, revealing glucose 210, sodium 135, Cr 1.41 and BUN 25 (1.53 and 25 on 8/7/23), initial troponin 27, delta troponin 77. No white count or anemia. Platelets WNL. Flu A/B/COVID-negative. Chest x-ray shows no acute cardiopulmonary process. EKG, per ED physician, sinus bradycardia with a rate of 59 bpm, normal [place], QRS, normal QTc duration. No ST segment or T wave pathology. Good R wave progression throughout the precordial leads. Patient was administered 324 mg aspirin in the ambulance. Patient started on heparin drip, given Nitropaste and nitro tablet in the ED.      He was seen in consult by cardiologist, Dr. Thor Keyes and went for a [facility]. His coronary anatomy revealed diffuse MV-CAD with a  of the mid LAD (with L to L collateralizations). Cardiac surgery was consulted for bypass grafting evaluation.      Daily Events     12/13: No acute events overnight; patient continues on nitroglycerin gtt for HTN and angina, however endorsing headache so will try to wean down and use PO nitrate.      12/15: Patient with worsening headache overnight as we were utilizing IV nitroglycerine to treat his angina & BP. The patient was seen by cardiology, and his nitrates stopped and he was subsequently started on IV  nicardipine for better BP control.      Throughout the day, the patient has continued with ongoing L shoulder pain and gets relief with rest and morphine. Suspect that this is related to his CAD vs musculoskeletal.      Discussed with cardiac surgeons and cardiology; will reprioritization patient as he needs a more urgent surgical revascularization.       Continue on IV nicardipine for tight BP control, PRN morphine, ECGs and heparin gtt.      12/16: This morning, the patient experienced recurrent angina, however it was unrelieved by morphine and a normotensive BP; ECG was without ischemia. Given this, his surgical time was moved up from this afternoon to this morning with Dr. Mendenhall.      12/16 (Post-op): Patient arrived to the ICU in critically ill but stable condition; s/p CABG x4. Currently remains intubated with sedation; on norepinephrine 0.05mg/kg/min while being V paced at 80 BPM. However, after dialing back pacer allowing patient to be in his own rhythm (Sinus 70s), his BP improved allowing      - Vital Signs: HR 80, /64, SpO2 100%   - Hemodynamics: CVP 12, CO 4.9, CI 2.3, SVR 1050  - Ventilator Settings: PRVC-AC / FiO2 60% / PEEP 5 / RR 16      - Pump time: 94 Minutes      Intake & Output:   - Initial R Pleural Chest Tube: 10mL   - Initial L Pleural Chest Tube: 10mL   - Initial Mediastinal Chest Tube: 20mL      12/17: No acute events overnight; patient was extubated in the immediate postop period.  Was on norepinephrine for a short period of time, this has subsequently been weaned off this morning.  Patient is normotensive, so we will start on beta-blockade and light diuresis.  Mediastinal chest tube with moderate output, so will maintain this today, however okay to remove bilateral pleural chest tubes     - Current O2 Requirements: 2L NC      12/18/24: No acute events overnight. Remains off of vasoactive infusions. Diuresing well with scheduled IV lasix. Will plan to remove lines, ingram and chest  tube today and increase beta-blockade.      - Current O2 Requirements: 1-2L NC     12/19/24: Continuing with no acute events overnight; continues on supplemental O2. Plan to give one time dose of metolazone with IV furosemide. Otherwise, plan for likely discharge to home tomorrow once off of O2.   - Current O2 Requirements: 1-2L NC      12/20/24: Overnight, feeling chills; however, non toxic/ill appearing. Respiratory panel was sent and negative. UA negative for infection. No overnight events. On room air. Plan to DC home with home health later today.       Assessment & Plan     MV-CAD  - Patient admitted to [facility] on 12/12/24 with anginal complaints   --> HS Troponin I: 27 -> 77 -> 4446  - [facility] on 12/12 revealed diffuse L system CAD with coronary anatomy favoring surgical revascularization   - S/p CABG x 4 on 12/16/24 with Dr. Anjali Mendenhall   --> LIMA-LAD, Free INDIGO - D1, SVG-OM1, SVG-rPDA  - Continue on ASA 81mg and Atorvastatin 80  - Continue on clopidogrel 75mg every day for 1 year given NSTEMI  --> Started on 12/18  - Continue on metoprolol to 50 mg BID   - Daily CXR         Mediastinal Incision  - SUNDAY, well approximated, C/D/I         Acute Post-Op Pain  - As expected   - Multimodal pain control   --> Scheduled: Acetaminophen, magnesium oxide 400mg QD, gabapentin 100mg TID, flexiril 5mg q8h   --> PRN: oxycodone  - Bowel regimen while taking narcotics         Risk for Post-Op Arrhythmia  - Ventricular wires placed  - V wires cut today 12/20  - Telemetry until discharged        Acute Postoperative Respiratory Insufficiency   - As expected following CABG with post-op atelectasis  - Current O2 Requirements: RA  - Coughing and deep breathing exercises with Incentive spirometry  - Out of bed, early and aggressive mobilization with assistance  - Oxygen as needed, wean to keep saturation above 92%  - ICU intensivist/pulmonologist consulted  - Albuterol nebulizers as needed        Risk for Fluid Volume Overload  -  Pre-Op Weight: 99.2 kg     12/17: 105 kg (net positive 3742mL) --> IV furosemide 20mgBID       12/18: 99.8 kg ---> IV lasix 20 mg BID     12/19: 101 kg (net positive 1362mL) --> IV furosemide 20mg BID + metolazone 2.5mg x1       12/20: 95.4kg (unclear I/Os due to not documenting urine output)  - Strict I& Os and daily weights          Acute Post-Op Blood-Loss Anemia  - Pre-Op H/H: 16/44.7     12/17: 16.6/31.2     12/18: 13.3/39.3     12/19: 14.3/41.2     12/20: 14.6 / 42.0  - Monitor h/h in the initial post op period  - Multivitamin/iron tablet   - Daily CBC while in hospital        Post-Op Leukocytosis  - Pre-Op WBC: 12.3     12/17:11.7     12/18: 15.0     12/19:14.8     12/20: 15.8 (respiratory panel + UA negative)  - Afebrile, consider elevations as reactive to surgery   - Daily cbc while in hospital         Essential HTN  - Home Medications: Amlodipine 10mg, lisinopril 10mg  - Continue on amlodipine 10mg + lisinopril 5mg daily        Type 2 DM  - Home Medications: Metformin 1g BID, glimepiride 2 mg BID  - HbA1c: 8.7  - Goal for BG <150 in the post-operative period  - SSI plus Lantus 10u HS   - In the setting of T2DM with CAD, patient would benefit from addition of GLP-1   --> Ozempic 0.25mg qWeekly for 4 weeks, then increase to 0.5mg qWeekly therafter   --> Follow up labs in 3 months         Dyslipidemia  - Home Medications: None  - Started on high intensity statin therapy this admission   - Continue on statin therapy as above       Pertinent Physical Exam At Time of Discharge  Physical Exam  Constitutional:       General: He is not in acute distress.     Appearance: Normal appearance. He is not ill-appearing.   Cardiovascular:      Rate and Rhythm: Normal rate and regular rhythm.   Pulmonary:      Effort: Pulmonary effort is normal. No respiratory distress.   Abdominal:      General: Bowel sounds are normal. There is no distension.      Palpations: Abdomen is soft.      Tenderness: There is no abdominal  tenderness.   Musculoskeletal:         General: No swelling or tenderness.   Skin:     General: Skin is warm and dry.      Capillary Refill: Capillary refill takes less than 2 seconds.   Neurological:      General: No focal deficit present.      Mental Status: He is alert and oriented to person, place, and time.   Psychiatric:         Mood and Affect: Mood normal.         Behavior: Behavior normal.         Home Medications     Medication List      START taking these medications     acetaminophen 325 mg tablet; Commonly known as: TylenoL; Take 2 tablets   (650 mg) by mouth every 6 hours if needed for mild pain (1 - 3).   aspirin 81 mg chewable tablet; Chew 1 tablet (81 mg) once daily.; Start   taking on: December 21, 2024   atorvastatin 80 mg tablet; Commonly known as: Lipitor; Take 1 tablet (80   mg) by mouth once daily at bedtime.   clopidogrel 75 mg tablet; Commonly known as: Plavix; Take 1 tablet (75   mg) by mouth once daily.; Start taking on: December 21, 2024   gabapentin 100 mg capsule; Commonly known as: Neurontin; Take 1 capsule   (100 mg) by mouth 2 times a day for 14 days.   hydrALAZINE 25 mg tablet; Commonly known as: Apresoline; Take 1 tablet   (25 mg) by mouth 3 times a day.   methocarbamol 500 mg tablet; Commonly known as: Robaxin; Take 1 tablet   (500 mg) by mouth 4 times a day.   metoprolol tartrate 50 mg tablet; Commonly known as: Lopressor; Take 1   tablet by mouth 2 times a day.   oxyCODONE 5 mg immediate release tablet; Commonly known as: Roxicodone;   Take 1 tablet (5 mg) by mouth every 6 hours if needed for moderate pain (4   - 6) or severe pain (7 - 10) for up to 7 days.   Ozempic 0.25 mg or 0.5 mg (2 mg/3 mL) pen injector; Generic drug:   semaglutide; Inject 0.25 mg under the skin 1 (one) time per week for 28   days, THEN 0.5 mg 1 (one) time per week.; Start taking on: December 19, 2024     CHANGE how you take these medications     lisinopril 5 mg tablet; Take 1 tablet (5 mg) by mouth once  daily.; Start   taking on: December 21, 2024; What changed: medication strength, how much   to take, when to take this     CONTINUE taking these medications     Accu-Chek Yamini Plus test strp strip; Generic drug: blood sugar   diagnostic   amLODIPine 10 mg tablet; Commonly known as: Norvasc; TAKE 1 TABLET (10   MG) BY MOUTH ONCE DAILY.   Dexcom G7  misc; Generic drug: blood-glucose meter,continuous;   Use as instructed   Dexcom G7 Sensor device; Generic drug: blood-glucose sensor; Place on   arm replace every 10 days   metFORMIN 1,000 mg tablet; Commonly known as: Glucophage; TAKE 1 TABLET   BY MOUTH TWO TIMES A DAY WITH MEALS     STOP taking these medications     amoxicillin-pot clavulanate 500-125 mg tablet; Commonly known as:   Augmentin   cyclobenzaprine 5 mg tablet; Commonly known as: Flexeril   glimepiride 2 mg tablet; Commonly known as: Amaryl       Outpatient Follow-Up  No future appointments.  Cardiac surgery office will call with follow up appointment date/time.    LILLIAN Oliver-CNP

## 2024-12-20 NOTE — PROGRESS NOTES
"Cardiac Surgery  Progress Note     Nader Zazueta is a 59 y.o. male on day 8 of admission presenting with NSTEMI (non-ST elevated myocardial infarction) (Multi).    Subjective     Interval HPI:   Patient seen at bedside this AM. Reports feeling \"chills\" overnight; however, also reports that the air conditioner vent is right above his head. Respiratory panel was sent overnight, negative. Denies SOB, sputum production, or chest pain. Up and walking well in his room/hallway. Plan to DC patient home later today with home health care.    Review of Systems   Constitutional: Positive for chills and malaise/fatigue. Negative for decreased appetite.   HENT:  Negative for congestion.    Eyes:  Negative for blurred vision.   Cardiovascular:  Negative for chest pain and dyspnea on exertion.   Respiratory:  Negative for cough and shortness of breath.    Endocrine: Negative for cold intolerance and heat intolerance.   Musculoskeletal:  Negative for arthritis, muscle cramps, muscle weakness and myalgias.   Gastrointestinal:  Negative for nausea and vomiting.   Genitourinary:  Negative for urgency.   Neurological:  Positive for weakness. Negative for dizziness.   Psychiatric/Behavioral:  Negative for altered mental status.    Allergic/Immunologic: Negative for environmental allergies.         Objective   Physical Exam  Physical Exam  Vitals and nursing note reviewed.   Constitutional:       General: He is awake. He is not in acute distress.     Appearance: Normal appearance. He is not ill-appearing.      Interventions: Nasal cannula in place.   HENT:      Head: Normocephalic.      Comments:         Nose: Nose normal.   Cardiovascular:      Rate and Rhythm: Normal rate and regular rhythm.      Pulses: Normal pulses.      Heart sounds: Normal heart sounds, S1 normal and S2 normal.      No systolic murmur is present.      Comments: A-Ventricular Wires - Cut prior to DC  Pulmonary:      Effort: Pulmonary effort is normal. No " tachypnea, bradypnea, accessory muscle usage, prolonged expiration or respiratory distress.      Breath sounds: Examination of the right-lower field reveals decreased breath sounds. Examination of the left-lower field reveals decreased breath sounds. Decreased breath sounds present.   Abdominal:      General: Abdomen is flat. Bowel sounds are normal. There is no distension.      Palpations: Abdomen is soft.   Musculoskeletal:         General: Normal range of motion.      Cervical back: Normal range of motion.      Right lower leg: No edema.      Left lower leg: No edema.   Skin:     General: Skin is warm and dry.      Capillary Refill: Capillary refill takes less than 2 seconds.      Findings: No lesion.      Nails: There is no clubbing.      Comments: MS incision SUNDAY- clean,dry,intact     Neurological:      General: No focal deficit present.      Mental Status: He is alert and oriented to person, place, and time. Mental status is at baseline.      GCS: GCS eye subscore is 4. GCS verbal subscore is 5. GCS motor subscore is 6.      Cranial Nerves: Cranial nerves 2-12 are intact.      Sensory: Sensation is intact.      Motor: Motor function is intact.   Psychiatric:         Attention and Perception: Attention and perception normal.         Mood and Affect: Mood normal.         Speech: Speech normal.         Behavior: Behavior normal.         Cognition and Memory: Cognition and memory normal.         Last Recorded Vitals  Vitals:    12/20/24 1100 12/20/24 1200 12/20/24 1300 12/20/24 1400   BP: 131/83 145/76     Pulse: 73 66 73 75   Resp: (!) 34 19     Temp:  35.1 °C (95.2 °F)     TempSrc:  Temporal     SpO2:       Weight:       Height:            Intake/Output last 3 Shifts:  No intake/output data recorded.    Inpatient Medications  acetaminophen, 650 mg, oral, q6h  amLODIPine, 10 mg, oral, Daily  aspirin, 81 mg, oral, Daily  atorvastatin, 80 mg, oral, Nightly  clopidogrel, 75 mg, oral, Daily  cyclobenzaprine, 5 mg,  oral, q8h  enoxaparin, 40 mg, subcutaneous, Daily  furosemide, 20 mg, intravenous, BID  hydrALAZINE, 25 mg, oral, TID  insulin glargine, 10 Units, subcutaneous, Daily  insulin lispro, 0-15 Units, subcutaneous, TID AC  lidocaine, 1 patch, transdermal, q24h  lisinopril, 5 mg, oral, Daily  magnesium oxide, 400 mg, oral, Daily  metoprolol tartrate, 50 mg, oral, BID  mupirocin, , Topical, TID  oxygen, , inhalation, Continuous - Inhalation  psyllium, 1 packet, oral, Daily  sennosides-docusate sodium, 2 tablet, oral, BID      Continuous medications       PRN medications  PRN medications: albumin human, alteplase, calcium chloride, calcium chloride, dextrose, dextrose, fentaNYL **OR** fentaNYL, glucagon, glucagon, hydrALAZINE, lactated Ringer's, magnesium sulfate, magnesium sulfate, naloxone, ondansetron, oxyCODONE, oxyCODONE, potassium chloride CR **OR** potassium chloride, potassium chloride CR **OR** potassium chloride, potassium chloride, potassium chloride     LDA:       Relevant Results  Lab Review  Results from last 7 days   Lab Units 12/20/24  0523   WBC AUTO x10*3/uL 15.8*   HEMOGLOBIN g/dL 14.6   HEMATOCRIT % 42.0   PLATELETS AUTO x10*3/uL 294     Results from last 7 days   Lab Units 12/20/24  0523   SODIUM mmol/L 134*   POTASSIUM mmol/L 4.0   CHLORIDE mmol/L 97*   CO2 mmol/L 24   BUN mg/dL 37*   CREATININE mg/dL 1.37*   CALCIUM mg/dL 9.6   GLUCOSE mg/dL 197*     Results from last 7 days   Lab Units 12/20/24  0523   MAGNESIUM mg/dL 2.03     Results from last 7 days   Lab Units 12/17/24  0459   POCT PH, ARTERIAL pH 7.43*   POCT PCO2, ARTERIAL mm Hg 37*   POCT PO2, ARTERIAL mm Hg 88   POCT HCO3 CALCULATED, ARTERIAL mmol/L 24.6   POCT BASE EXCESS, ARTERIAL mmol/L 0.4         Cardiology Tests     Echo:  Transthoracic echo (TTE) complete 12/12/2024  PHYSICIAN INTERPRETATION:  Left Ventricle: Left ventricular ejection fraction is low normal, by visual estimate at 50-55%. There are no regional left ventricular wall  motion abnormalities. The left ventricular cavity size is normal. There is mildly increased septal and normal posterior left ventricular wall thickness. There is left ventricular concentric remodeling. Spectral Doppler shows a normal pattern of left ventricular diastolic filling.  Left Atrium: The left atrium is normal in size.  Right Ventricle: The right ventricle is normal in size. There is normal right ventricular global systolic function.  Right Atrium: The right atrium is normal in size.  Aortic Valve: The aortic valve is trileaflet. There is mild aortic valve cusp calcification. The aortic valve dimensionless index is 0.85. There is no evidence of aortic valve regurgitation. The peak instantaneous gradient of the aortic valve is 11 mmHg. The mean gradient of the aortic valve is 4 mmHg.  Mitral Valve: The mitral valve is normal in structure. There is trace mitral valve regurgitation.  Tricuspid Valve: The tricuspid valve is structurally normal. There is trace tricuspid regurgitation. The right ventricular systolic pressure is unable to be estimated.  Pulmonic Valve: The pulmonic valve is not well visualized. The pulmonic valve regurgitation was not well visualized.  Pericardium: There is no pericardial effusion noted.  Aorta: The aortic root is normal.        CONCLUSIONS:   1. Left ventricular ejection fraction is low normal, by visual estimate at 50-55%.     Ejection Fractions:        EF   Date/Time Value Ref Range Status   12/12/2024 11:07 AM 53 %        Cath:  Cardiac Catheterization Procedure 12/12/2024  Coronary Angiography:  The coronary circulation is right dominant.     Coronary Angiography Comments:  60-year-old man with history of hypertension, hyperlipidemia, diabetes presents with non-STEMI. Urgent heart catheter recommended.     Fluoroscopy demonstrated mild calcification of the coronary tree.     Left main: Large vessel. No significant disease.     LAD: Moderate-sized vessel. LAD is occluded mid  vessel. The distal half of the LAD fills via left to left collaterals. There is a large first diagonal which has a proximal 75% stenosis. There is a large bifurcating second diagonal which is a 80% stenosis in it.     LCx: Moderate-sized vessel. Mid vessel 95% stenosis. The circumflex then gives rise to a moderate-sized first obtuse marginal which has mild disease. The ongoing circumflex is a very small, rudimentary vessel.     RCA: Large dominant vessel. Mild to moderate diffuse disease. Mid RCA there is a focal 50% stenosis.     Left heart catheterization demonstrates an LVEDP of 15 mmHg. Left ventricular angiography shows hypokinesis of the anterior wall. EF 45 to 50%. No mitral regurgitation. No gradient across aortic valve upon pullback.     CONCLUSIONS:   1. Critical three-vessel disease including LAD  with left to left collaterals, severe disease in D1 and D2, subtotal occlusion of moderate-sized circumflex.   2. Mild LV dysfunction, EF estimated 45 to 50%.   3. Patient will be referred for surgical revascularization.         History of Present Illness: Nader Zazueta is a 59 y.o. male with a PMH significant for HTN, HLD, and CKD (solitary kidney secondary to RCC) who presents to [facility] on 12/12/2024 with complaints of sternal chest discomfort.     ED course: On arrival, patient's /102 (now 158/86), heart rate 55, respirations 18, afebrile, saturating 98% on room air. Labs and imaging form, revealing glucose 210, sodium 135, Cr 1.41 and BUN 25 (1.53 and 25 on 8/7/23), initial troponin 27, delta troponin 77. No white count or anemia. Platelets WNL. Flu A/B/COVID-negative. Chest x-ray shows no acute cardiopulmonary process. EKG, per ED physician, sinus bradycardia with a rate of 59 bpm, normal OR, QRS, normal QTc duration. No ST segment or T wave pathology. Good R wave progression throughout the precordial leads. Patient was administered 324 mg aspirin in the  ambulance. Patient started on heparin drip, given Nitropaste and nitro tablet in the ED.      He was seen in consult by cardiologist, Dr. Thor Keyes and went for a [facility]. His coronary anatomy revealed diffuse MV-CAD with a  of the mid LAD (with L to L collateralizations). Cardiac surgery was consulted for bypass grafting evaluation.     Daily Events    12/13: No acute events overnight; patient continues on nitroglycerin gtt for HTN and angina, however endorsing headache so will try to wean down and use PO nitrate.     12/15: Patient with worsening headache overnight as we were utilizing IV nitroglycerine to treat his angina & BP. The patient was seen by cardiology, and his nitrates stopped and he was subsequently started on IV nicardipine for better BP control.     Throughout the day, the patient has continued with ongoing L shoulder pain and gets relief with rest and morphine. Suspect that this is related to his CAD vs musculoskeletal.     Discussed with cardiac surgeons and cardiology; will reprioritization patient as he needs a more urgent surgical revascularization.      Continue on IV nicardipine for tight BP control, PRN morphine, ECGs and heparin gtt.     12/16: This morning, the patient experienced recurrent angina, however it was unrelieved by morphine and a normotensive BP; ECG was without ischemia. Given this, his surgical time was moved up from this afternoon to this morning with Dr. Mendenhall.     12/16 (Post-op): Patient arrived to the ICU in critically ill but stable condition; s/p CABG x4. Currently remains intubated with sedation; on norepinephrine 0.05mg/kg/min while being V paced at 80 BPM. However, after dialing back pacer allowing patient to be in his own rhythm (Sinus 70s), his BP improved allowing     - Vital Signs: HR 80, /64, SpO2 100%   - Hemodynamics: CVP 12, CO 4.9, CI 2.3, SVR 1050  - Ventilator Settings: PRVC-AC / FiO2 60% / PEEP 5 / RR 16     - Pump time: 94 Minutes     Intake &  Output:   - Initial R Pleural Chest Tube: 10mL   - Initial L Pleural Chest Tube: 10mL   - Initial Mediastinal Chest Tube: 20mL     12/17: No acute events overnight; patient was extubated in the immediate postop period.  Was on norepinephrine for a short period of time, this has subsequently been weaned off this morning.  Patient is normotensive, so we will start on beta-blockade and light diuresis.  Mediastinal chest tube with moderate output, so will maintain this today, however okay to remove bilateral pleural chest tubes    - Current O2 Requirements: 2L NC     12/18/24: No acute events overnight. Remains off of vasoactive infusions. Diuresing well with scheduled IV lasix. Will plan to remove lines, ingram and chest tube today and increase beta-blockade.     - Current O2 Requirements: 1-2L NC    12/19/24: Continuing with no acute events overnight; continues on supplemental O2. Plan to give one time dose of metolazone with IV furosemide. Otherwise, plan for likely discharge to home tomorrow once off of O2.   - Current O2 Requirements: 1-2L NC     12/20/24: Overnight, feeling chills; however, non toxic/ill appearing. Respiratory panel was sent and negative. UA negative for infection. No overnight events. On room air. Plan to DC home with home health later today.    Assessment & Plan        MV-CAD  - Patient admitted to [facility] on 12/12/24 with anginal complaints   --> HS Troponin I: 27 -> 77 -> 4446  - [facility] on 12/12 revealed diffuse L system CAD with coronary anatomy favoring surgical revascularization   - S/p CABG x 4 on 12/16/24 with Dr. Anjali Mendenhall   --> LIMA-LAD, Free INDIGO - D1, SVG-OM1, SVG-rPDA  - Continue on ASA 81mg and Atorvastatin 80  - Continue on clopidogrel 75mg every day for 1 year given NSTEMI  --> Started on 12/18  - Continue on metoprolol to 50 mg BID   - Daily CXR        Mediastinal Incision  - SUNDAY, well approximated, C/D/I        Acute Post-Op Pain  - As expected   - Multimodal pain control   -->  Scheduled: Acetaminophen, magnesium oxide 400mg QD, gabapentin 100mg TID, flexiril 5mg q8h   --> PRN: oxycodone  - Bowel regimen while taking narcotics        Risk for Post-Op Arrhythmia  - Ventricular wires placed  -V wires cut today 12/20  - Telemetry until discharged       Acute Postoperative Respiratory Insufficiency   - As expected following CABG with post-op atelectasis  - Current O2 Requirements: RA  - Coughing and deep breathing exercises with Incentive spirometry  - Out of bed, early and aggressive mobilization with assistance  - Oxygen as needed, wean to keep saturation above 92%  - ICU intensivist/pulmonologist consulted  - Albuterol nebulizers as needed       Risk for Fluid Volume Overload  - Pre-Op Weight: 99.2 kg     12/17: 105 kg (net positive 3742mL) --> IV furosemide 20mgBID       12/18: 99.8 kg ---> IV lasix 20 mg BID     12/19: 101 kg (net positive 1362mL) --> IV furosemide 20mg BID + metolazone 2.5mg x1       12/20: 95.4kg (unclear I/Os due to not documenting urine output)  - Strict I& Os and daily weights         Acute Post-Op Blood-Loss Anemia  - Pre-Op H/H: 16/44.7     12/17: 16.6/31.2     12/18: 13.3/39.3     12/19: 14.3/41.2     12/20: 14.6 / 42.0  - Monitor h/h in the initial post op period  - Multivitamin/iron tablet   - Daily CBC while in hospital       Post-Op Leukocytosis  - Pre-Op WBC: 12.3     12/17:11.7     12/18: 15.0     12/19:14.8     12/20: 15.8 (respiratory panel + UA negative)  - Afebrile, consider elevations as reactive to surgery   - Daily cbc while in hospital        Essential HTN  - Home Medications: Amlodipine 10mg, lisinopril 10mg  - Continue on amlodipine 10mg + lisinopril 5mg daily       Type 2 DM  - Home Medications: Metformin 1g BID, glimepiride 2 mg BID  - HbA1c: 8.7  - Goal for BG <150 in the post-operative period  - SSI plus Lantus 10u HS   - In the setting of T2DM with CAD, patient would benefit from addition of GLP-1   --> Ozempic 0.25mg qWeekly for 4 weeks, then  increase to 0.5mg qWeekly therafter   --> Follow up labs in 3 months        Dyslipidemia  - Home Medications: None  - Started on high intensity statin therapy this admission   - Continue on statin therapy as above     Risk for Electrolyte Disturbances  - Optimize electrolytes per [facility] protocol      Bowel Regimen  - Senna-S BID   - PRN suppositories and miralax    Prophylaxis  - GI: PPI  - DVT: Rolf-Hose & starting enoxaparin  - MRSA: Positive (12/12)-- ancef, vanc and mupirocin   - PT/OT     Disposition  - CVICU (stepdown status)     Patient seen and plan discussed with Dr. Anjali Mendenhall & Dr. Mukul Root.     Sandi Nj, APRN-CNP

## 2024-12-20 NOTE — NURSING NOTE
Cardiac and CHF discharge instructions reviewed with patient and his wife.      Discussed wound care to area: No concern for infection present upon visual nursing assessment. Aware to leave SUNDAY and cleanse with mild soap and water daily, pat dry. Discussed monitoring daily weights, BP, and HR. adequate nutrition and hydration intake. Also discussed utilizing incentive spirometer and increasing protein and vitamin C intake. Recommended High Protein Ensure. Encouraged the use of pulse oximeter as well and discussed importance of deep breathing while maintaining an SPO2 level of 95% or greater. Medication assessment performed. States pain currently well controlled with regimen ordered. Advised to call me as needed with questions or concerns. Patient verbalized understanding.     Other Topics reviewed:   Incision care: discussed signs and symptoms of infection  Medication Education : Plavix, BB, ASA, Statin   Driving restrictions: No driving for 30 days (or until cleared by Dr. Mendenhall)   Cardiac Rehab: To be arranged by Cardiology once surgeon clears patient from surgery.   Home Health- advised to call me if HC does not call after discharge.  Doctor appointments: Aware of CXR needed prior office visit with surgeon.   Diet: Advised to adhere to a heart healthy diet, regular exercise habits, avoidance of tobacco products, and maintenance of a healthy weight as they are crucial components to heart disease risk reduction.   BP/WT/HR: Daily monitoring of weight and BP. Made aware of when to call.      Epicardial wires removed by Sandi GALVIN CNP  Cardiac Rehab information given to patient.   Recovery after coronary artery bypass surgery  - Heart attack recovery

## 2024-12-20 NOTE — HH CARE COORDINATION
Home Care received a Referral for Nursing, Physical Therapy, and Occupational Therapy. We have processed the referral for a Start of Care on 12.21 OR 12.22.     If you have any questions or concerns, please feel free to contact us at [phone]. Follow the prompts, enter your five digit zip code, and you will be directed to your care team on WEST 3.

## 2024-12-20 NOTE — CARE PLAN
The patient's goals for the shift include  remain hemodynamically stable throughout shift    The clinical goals for the shift include progressing    Over the shift, the patient did not make progress toward the following goals. Barriers to progression include obese, pain, weakness and fatigue. Recommendations to address these barriers include   Problem: Pain - Adult  Goal: Verbalizes/displays adequate comfort level or baseline comfort level  Outcome: Progressing     Problem: Safety - Adult  Goal: Free from fall injury  Outcome: Progressing     Problem: Discharge Planning  Goal: Discharge to home or other facility with appropriate resources  Outcome: Progressing     Problem: Chronic Conditions and Co-morbidities  Goal: Patient's chronic conditions and co-morbidity symptoms are monitored and maintained or improved  Outcome: Progressing     Problem: Fall/Injury  Goal: Not fall by end of shift  Outcome: Progressing  Goal: Be free from injury by end of the shift  Outcome: Progressing  Goal: Verbalize understanding of personal risk factors for fall in the hospital  Outcome: Progressing  Goal: Verbalize understanding of risk factor reduction measures to prevent injury from fall in the home  Outcome: Progressing  Goal: Pace activities to prevent fatigue by end of the shift  Outcome: Progressing  Goal: Use assistive devices by end of the shift  Outcome: Progressing     Problem: Diabetes  Goal: Increase stability of blood glucose readings by end of shift  Outcome: Progressing  Goal: Maintain glucose levels >70mg/dl to <250mg/dl throughout shift  Outcome: Progressing  Goal: Learn about and adhere to nutrition recommendations by end of shift  Outcome: Progressing  Goal: Increase self care and/or family involovement by end of shift  Outcome: Progressing     Problem: Nutrition  Goal: Oral intake greater 75%  Outcome: Progressing  Goal: Adequate PO fluid intake  Outcome: Progressing  Goal: Electrolytes WNL  Outcome: Progressing      Problem: Pain  Goal: Takes deep breaths with improved pain control throughout the shift  Outcome: Progressing  Goal: Performs ADL's with improved pain control throughout shift  Outcome: Progressing  Goal: Participates in PT with improved pain control throughout the shift  Outcome: Progressing  Goal: Free from opioid side effects throughout the shift  Outcome: Progressing  Goal: Free from acute confusion related to pain meds throughout the shift  Outcome: Progressing     Problem: ACS/CP/NSTEMI/STEMI  Goal: Chest pain managed (free from pain or at acceptable level)  Outcome: Progressing  Goal: Serial ECG will return to baseline  Outcome: Progressing  Goal: Verbalize understanding of procedures/devices  Outcome: Progressing     Problem: DVT/VTE Prevention/Activity  Goal: No decrease in circulation/sensation  Outcome: Progressing  Goal: Prevent skin breakdown  Outcome: Progressing  Goal: Return to preop oxygenation status  Outcome: Progressing  Goal: Tolerates optimal activity  Outcome: Progressing  Goal: Increase self care and/or family involvement in 24 hrs.  Outcome: Progressing     Problem: Diet/fluid balance  Goal: Adequate urinary output  Outcome: Progressing  Goal: Free from nausea/vomiting  Outcome: Progressing  Goal: Return in bowel function  Outcome: Progressing  Goal: Tolerates prescribed diet  Outcome: Progressing     Problem: Skin  Goal: Decreased wound size/increased tissue granulation at next dressing change  Outcome: Progressing  Flowsheets (Taken 12/17/2024 0947 by Gina Rosado, RN)  Decreased wound size/increased tissue granulation at next dressing change: Promote sleep for wound healing  Goal: Participates in plan/prevention/treatment measures  Outcome: Progressing  Flowsheets (Taken 12/17/2024 0947 by iGna Rosado, RN)  Participates in plan/prevention/treatment measures: Discuss with provider PT/OT consult  Goal: Prevent/manage excess moisture  Outcome: Progressing  Flowsheets (Taken 12/17/2024  0947 by Gina Rosado RN)  Prevent/manage excess moisture: Moisturize dry skin  Goal: Prevent/minimize sheer/friction injuries  Outcome: Progressing  Goal: Promote/optimize nutrition  Outcome: Progressing  Goal: Promote skin healing  Outcome: Progressing     Problem: Skin  Goal: Decreased wound size/increased tissue granulation at next dressing change  Outcome: Progressing  Flowsheets (Taken 12/17/2024 0947 by Gina Rosado RN)  Decreased wound size/increased tissue granulation at next dressing change: Promote sleep for wound healing  Goal: Participates in plan/prevention/treatment measures  Outcome: Progressing  Flowsheets (Taken 12/17/2024 0947 by Gina Rosado RN)  Participates in plan/prevention/treatment measures: Discuss with provider PT/OT consult  Goal: Prevent/manage excess moisture  Outcome: Progressing  Flowsheets (Taken 12/17/2024 0947 by Gina Rosado RN)  Prevent/manage excess moisture: Moisturize dry skin  Goal: Prevent/minimize sheer/friction injuries  Outcome: Progressing  Goal: Promote/optimize nutrition  Outcome: Progressing  Goal: Promote skin healing  Outcome: Progressing   .

## 2024-12-20 NOTE — NURSING NOTE
12/20/24 0800 Patient Navigator  I introduced myself to the patient and explained my role. He states he isn't feeling well however would talk with me for a moment. He states he was diagnosed with diabetes over 15 years ago. He takes Metformin and Glimepiride both BID. I reviewed mechanism of action, potential side effects, and when to take each med. He states he was a  and was very active. I informed him of the recommendations of the ADA of exercising 30 minutes 5 days a week: Of note, I have informed the patient to receive medical clearance prior to doing any exercise. We discussed healthy food options and he states he monitors his blood sugars with a glucometer.   He states he has his eyes examined with an Ophthalmologist with pupil dilation yearly. I stressed the importance of having his feet examined by a health professional frequently and discussed proper foot care. He declined any handouts and appreciated my visit. He verbalized understanding of the above note. I have updated the patient's RN of my visit.  Francia RAMAN, RN  Patient Navigator  Diabetes Care &   Stroke Educator

## 2024-12-23 ENCOUNTER — PATIENT OUTREACH (OUTPATIENT)
Dept: CARE COORDINATION | Facility: CLINIC | Age: 59
End: 2024-12-23
Payer: COMMERCIAL

## 2024-12-23 ENCOUNTER — HOME CARE VISIT (OUTPATIENT)
Dept: HOME HEALTH SERVICES | Facility: HOME HEALTH | Age: 59
End: 2024-12-23
Payer: COMMERCIAL

## 2024-12-23 VITALS
RESPIRATION RATE: 18 BRPM | TEMPERATURE: 97.9 F | SYSTOLIC BLOOD PRESSURE: 126 MMHG | HEART RATE: 66 BPM | OXYGEN SATURATION: 99 % | DIASTOLIC BLOOD PRESSURE: 68 MMHG

## 2024-12-23 DIAGNOSIS — Z95.1 S/P CABG X 4: ICD-10-CM

## 2024-12-23 PROCEDURE — G0299 HHS/HOSPICE OF RN EA 15 MIN: HCPCS

## 2024-12-23 SDOH — ECONOMIC STABILITY: GENERAL: WOULD YOU LIKE HELP WITH ANY OF THE FOLLOWING NEEDS?: I DONT NEED HELP WITH ANY OF THESE

## 2024-12-23 SDOH — ECONOMIC STABILITY: FOOD INSECURITY
ARE ANY OF YOUR NEEDS URGENT? FOR EXAMPLE, UNCERTAINTY OF WHERE YOU WILL GET YOUR NEXT MEAL OR NOT HAVING THE MEDICATIONS YOU NEED TO TAKE TOMORROW.: NO

## 2024-12-23 ASSESSMENT — ENCOUNTER SYMPTOMS
CHANGE IN APPETITE: UNCHANGED
COUGH CHARACTERISTICS: NON-PRODUCTIVE
DENIES PAIN: 1
APPETITE LEVEL: GOOD
PERSON REPORTING PAIN: PATIENT
COUGH: 1

## 2024-12-23 ASSESSMENT — ACTIVITIES OF DAILY LIVING (ADL)
OASIS_M1830: 03
ENTERING_EXITING_HOME: STAND BY ASSIST

## 2024-12-23 NOTE — PROGRESS NOTES
Reviewed chart prior to patient outreach. Outreach call to patient to support a smooth transition of care from recent admission.    Discharge facility: [facility]  Discharge diagnosis: NSTEMI (non-ST elevated myocardial infarction)   Admission date: 12/12/24  Discharge date:  12/20/24    PCP Appointment Date: pending scheduling  Specialist Appointment Date:  cardiology and endocrinology pending scheduling    Issues Requiring Follow-Up  Blood glucose control - patient needs to follow up with endocrinology.      Test Results Pending At Discharge  Pending Labs        Order Current Status    Procalcitonin In process      Spoke with patient, reviewed discharge medications, discharge instructions, assessed social needs, and provided education on importance of follow-up appointment with provider.     See  Hospital Encounter and Summary, and Discharge assessment below for further details. Information obtained from discharge summary from EMR.    Hospital Course  History of Present Illness: Nader Zazueta is a 59 y.o. 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     ED course: On arrival, patient's /102 (now 158/86), heart rate 55, respirations 18, afebrile, saturating 98% on room air. Labs and imaging form, revealing glucose 210, sodium 135, Cr 1.41 and BUN 25 (1.53 and 25 on 8/7/23), initial troponin 27, delta troponin 77. No white count or anemia. Platelets WNL. Flu A/B/COVID-negative. Chest x-ray shows no acute cardiopulmonary process. EKG, per ED physician, sinus bradycardia with a rate of 59 bpm, normal MO, QRS, normal QTc duration. No ST segment or T wave pathology. Good R wave progression throughout the precordial leads. Patient was administered 324 mg aspirin in the ambulance. Patient started on heparin drip, given Nitropaste and nitro tablet in the ED.       He was seen in consult by cardiologist, Dr. Thor Keyes and went for a [facility]. His coronary anatomy revealed diffuse MV-CAD with a  of the mid LAD (with L to L collateralizations). Cardiac surgery was consulted for bypass grafting evaluation.      Daily Events     12/13: No acute events overnight; patient continues on nitroglycerin gtt for HTN and angina, however endorsing headache so will try to wean down and use PO nitrate.      12/15: Patient with worsening headache overnight as we were utilizing IV nitroglycerine to treat his angina & BP. The patient was seen by cardiology, and his nitrates stopped and he was subsequently started on IV nicardipine for better BP control.      Throughout the day, the patient has continued with ongoing L shoulder pain and gets relief with rest and morphine. Suspect that this is related to his CAD vs musculoskeletal.      Discussed with cardiac surgeons and cardiology; will reprioritization patient as he needs a more urgent surgical revascularization.       Continue on IV nicardipine for tight BP control, PRN morphine, ECGs and heparin gtt.      12/16: This morning, the patient experienced recurrent angina, however it was unrelieved by morphine and a normotensive BP; ECG was without ischemia. Given this, his surgical time was moved up from this afternoon to this morning with Dr. Mendenhall.      12/16 (Post-op): Patient arrived to the ICU in critically ill but stable condition; s/p CABG x4. Currently remains intubated with sedation; on norepinephrine 0.05mg/kg/min while being V paced at 80 BPM. However, after dialing back pacer allowing patient to be in his own rhythm (Sinus 70s), his BP improved allowing      - Vital Signs: HR 80, /64, SpO2 100%   - Hemodynamics: CVP 12, CO 4.9, CI 2.3, SVR 1050  - Ventilator Settings: PRVC-AC / FiO2 60% / PEEP 5 / RR 16      - Pump time: 94 Minutes      Intake & Output:   - Initial R Pleural Chest Tube: 10mL   - Initial L Pleural Chest Tube:  10mL   - Initial Mediastinal Chest Tube: 20mL      12/17: No acute events overnight; patient was extubated in the immediate postop period.  Was on norepinephrine for a short period of time, this has subsequently been weaned off this morning.  Patient is normotensive, so we will start on beta-blockade and light diuresis.  Mediastinal chest tube with moderate output, so will maintain this today, however okay to remove bilateral pleural chest tubes     - Current O2 Requirements: 2L NC      12/18/24: No acute events overnight. Remains off of vasoactive infusions. Diuresing well with scheduled IV lasix. Will plan to remove lines, ingram and chest tube today and increase beta-blockade.      - Current O2 Requirements: 1-2L NC     12/19/24: Continuing with no acute events overnight; continues on supplemental O2. Plan to give one time dose of metolazone with IV furosemide. Otherwise, plan for likely discharge to home tomorrow once off of O2.   - Current O2 Requirements: 1-2L NC      12/20/24: Overnight, feeling chills; however, non toxic/ill appearing. Respiratory panel was sent and negative. UA negative for infection. No overnight events. On room air. Plan to DC home with home health later today.        Assessment & Plan     MV-CAD  - Patient admitted [email or web address] on 12/12/24 with anginal complaints   --> HS Troponin I: 27 -> 77 -> 4446  - [facility] on 12/12 revealed diffuse L system CAD with coronary anatomy favoring surgical revascularization   - S/p CABG x 4 on 12/16/24 with Dr. Anjali Mendenhall   --> LIMA-LAD, Free INDIGO - D1, SVG-OM1, SVG-rPDA  - Continue on ASA 81mg and Atorvastatin 80  - Continue on clopidogrel 75mg every day for 1 year given NSTEMI  --> Started on 12/18  - Continue on metoprolol to 50 mg BID   - Daily CXR         Mediastinal Incision  - SUNDAY, well approximated, C/D/I         Acute Post-Op Pain  - As expected   - Multimodal pain control   --> Scheduled: Acetaminophen, magnesium oxide 400mg QD, gabapentin 100mg TID,  flexiril 5mg q8h   --> PRN: oxycodone  - Bowel regimen while taking narcotics         Risk for Post-Op Arrhythmia  - Ventricular wires placed  - V wires cut today 12/20  - Telemetry until discharged        Acute Postoperative Respiratory Insufficiency   - As expected following CABG with post-op atelectasis  - Current O2 Requirements: RA  - Coughing and deep breathing exercises with Incentive spirometry  - Out of bed, early and aggressive mobilization with assistance  - Oxygen as needed, wean to keep saturation above 92%  - ICU intensivist/pulmonologist consulted  - Albuterol nebulizers as needed        Risk for Fluid Volume Overload  - Pre-Op Weight: 99.2 kg     12/17: 105 kg (net positive 3742mL) --> IV furosemide 20mgBID       12/18: 99.8 kg ---> IV lasix 20 mg BID     12/19: 101 kg (net positive 1362mL) --> IV furosemide 20mg BID + metolazone 2.5mg x1       12/20: 95.4kg (unclear I/Os due to not documenting urine output)  - Strict I& Os and daily weights          Acute Post-Op Blood-Loss Anemia  - Pre-Op H/H: 16/44.7     12/17: 16.6/31.2     12/18: 13.3/39.3     12/19: 14.3/41.2     12/20: 14.6 / 42.0  - Monitor h/h in the initial post op period  - Multivitamin/iron tablet   - Daily CBC while in hospital        Post-Op Leukocytosis  - Pre-Op WBC: 12.3     12/17:11.7     12/18: 15.0     12/19:14.8     12/20: 15.8 (respiratory panel + UA negative)  - Afebrile, consider elevations as reactive to surgery   - Daily cbc while in hospital         Essential HTN  - Home Medications: Amlodipine 10mg, lisinopril 10mg  - Continue on amlodipine 10mg + lisinopril 5mg daily        Type 2 DM  - Home Medications: Metformin 1g BID, glimepiride 2 mg BID  - HbA1c: 8.7  - Goal for BG <150 in the post-operative period  - SSI plus Lantus 10u HS   - In the setting of T2DM with CAD, patient would benefit from addition of GLP-1   --> Ozempic 0.25mg qWeekly for 4 weeks, then increase to 0.5mg qWeekly therafter   --> Follow up labs in 3  months         Dyslipidemia  - Home Medications: None  - Started on high intensity statin therapy this admission   - Continue on statin therapy as above       CCM outreach:  Medications  Medications reviewed with patient/caregiver?: Yes (12/23/2024 10:40 AM)  Is the patient having any side effects they believe may be caused by any medication additions or changes?: No (12/23/2024 10:40 AM)  Does the patient have all medications ordered at discharge?: Yes (12/23/2024 10:40 AM)  Care Management Interventions: No intervention needed (12/23/2024 10:40 AM)  Is the patient taking all medications as directed (includes completed medication regime)?: No (has not started Ozempic yet, plans to soon) (12/23/2024 10:40 AM)    Self Management  What is the home health agency?:  Home Care (12/23/2024 10:40 AM)  Has home health visited the patient within 72 hours of discharge?: Yes (12/23/2024 10:40 AM)    Patient Teaching  Does the patient have access to their discharge instructions?: Yes (12/23/2024 10:40 AM)  Care Management Interventions: Reviewed instructions with patient (12/23/2024 10:40 AM)  What is the patient's perception of their health status since discharge?: Improving (12/23/2024 10:40 AM)  Is the patient/caregiver able to teach back the hierarchy of who to call/visit for symptoms/problems? PCP, Specialist, Home Health nurse, Urgent Care, ED, 911: Yes (12/23/2024 10:40 AM)    Wrap Up  Wrap Up Additional Comments: Patient reports that he has aches and pains from healing, takes Oxycodone as needed. He will start cardiac rehab in 1 month. Patient is taking daily weight. Dexcom not covered, reached out to O pharmacist. Patient is open to diabetic milestones program, reached out to DM educator. (12/23/2024 10:40 AM)       Will continue to monitor through transition period. Provided patient with my contact information for potential needs.    Jeri Tierney RN BSN  ACO Care Manager  [phone]

## 2024-12-24 LAB
ATRIAL RATE: 78 BPM
DIASTOLIC BLOOD PRESSURE: 76 MMHG
P AXIS: 78 DEGREES
P OFFSET: 176 MS
P ONSET: 121 MS
PR INTERVAL: 174 MS
Q ONSET: 208 MS
QRS COUNT: 12 BEATS
QRS DURATION: 104 MS
QT INTERVAL: 426 MS
QTC CALCULATION(BAZETT): 485 MS
QTC FREDERICIA: 465 MS
R AXIS: 194 DEGREES
SYSTOLIC BLOOD PRESSURE: 145 MMHG
T AXIS: 65 DEGREES
T OFFSET: 421 MS
VENTRICULAR RATE: 78 BPM

## 2024-12-25 ENCOUNTER — HOME CARE VISIT (OUTPATIENT)
Dept: HOME HEALTH SERVICES | Facility: HOME HEALTH | Age: 59
End: 2024-12-25
Payer: COMMERCIAL

## 2024-12-25 VITALS
SYSTOLIC BLOOD PRESSURE: 102 MMHG | DIASTOLIC BLOOD PRESSURE: 70 MMHG | TEMPERATURE: 96.7 F | HEART RATE: 54 BPM | OXYGEN SATURATION: 99 %

## 2024-12-25 PROCEDURE — G0151 HHCP-SERV OF PT,EA 15 MIN: HCPCS

## 2024-12-25 ASSESSMENT — ENCOUNTER SYMPTOMS
PAIN LOCATION: CHEST
PAIN: 1
PERSON REPORTING PAIN: PATIENT
PAIN LOCATION - PAIN SEVERITY: 2/10

## 2024-12-25 ASSESSMENT — ACTIVITIES OF DAILY LIVING (ADL)
AMBULATION_DISTANCE/DURATION_TOLERATED: 50 FT
AMBULATION ASSISTANCE ON FLAT SURFACES: 1

## 2024-12-26 ENCOUNTER — HOME CARE VISIT (OUTPATIENT)
Dept: HOME HEALTH SERVICES | Facility: HOME HEALTH | Age: 59
End: 2024-12-26
Payer: COMMERCIAL

## 2024-12-29 ENCOUNTER — APPOINTMENT (OUTPATIENT)
Dept: CARDIOLOGY | Facility: HOSPITAL | Age: 59
End: 2024-12-29
Payer: COMMERCIAL

## 2024-12-29 ENCOUNTER — APPOINTMENT (OUTPATIENT)
Dept: RADIOLOGY | Facility: HOSPITAL | Age: 59
End: 2024-12-29
Payer: COMMERCIAL

## 2024-12-29 ENCOUNTER — HOSPITAL ENCOUNTER (OUTPATIENT)
Facility: HOSPITAL | Age: 59
Setting detail: OBSERVATION
End: 2024-12-29
Attending: GENERAL PRACTICE | Admitting: NURSE PRACTITIONER
Payer: COMMERCIAL

## 2024-12-29 VITALS
DIASTOLIC BLOOD PRESSURE: 64 MMHG | WEIGHT: 207.23 LBS | OXYGEN SATURATION: 94 % | HEIGHT: 69 IN | SYSTOLIC BLOOD PRESSURE: 124 MMHG | HEART RATE: 59 BPM | TEMPERATURE: 99.1 F | RESPIRATION RATE: 18 BRPM | BODY MASS INDEX: 30.69 KG/M2

## 2024-12-29 DIAGNOSIS — R55 SYNCOPE WITH NORMAL NEUROLOGIC EXAMINATION: Primary | ICD-10-CM

## 2024-12-29 DIAGNOSIS — R55 SYNCOPE, UNSPECIFIED SYNCOPE TYPE: ICD-10-CM

## 2024-12-29 DIAGNOSIS — I10 HYPERTENSION, UNSPECIFIED TYPE: ICD-10-CM

## 2024-12-29 DIAGNOSIS — E11.9 TYPE 2 DIABETES MELLITUS WITHOUT COMPLICATION, WITH LONG-TERM CURRENT USE OF INSULIN (MULTI): ICD-10-CM

## 2024-12-29 DIAGNOSIS — G89.18 ACUTE POST-OPERATIVE PAIN: ICD-10-CM

## 2024-12-29 DIAGNOSIS — I25.700 CORONARY ARTERY DISEASE INVOLVING CORONARY BYPASS GRAFT OF NATIVE HEART WITH UNSTABLE ANGINA PECTORIS: ICD-10-CM

## 2024-12-29 DIAGNOSIS — R11.2 NAUSEA AND VOMITING, UNSPECIFIED VOMITING TYPE: ICD-10-CM

## 2024-12-29 DIAGNOSIS — Z79.4 TYPE 2 DIABETES MELLITUS WITHOUT COMPLICATION, WITH LONG-TERM CURRENT USE OF INSULIN (MULTI): ICD-10-CM

## 2024-12-29 DIAGNOSIS — N28.9 RENAL INSUFFICIENCY: ICD-10-CM

## 2024-12-29 DIAGNOSIS — N18.9 CHRONIC KIDNEY DISEASE, UNSPECIFIED CKD STAGE: ICD-10-CM

## 2024-12-29 DIAGNOSIS — Z95.1 S/P CABG X 4: ICD-10-CM

## 2024-12-29 DIAGNOSIS — I21.4 NSTEMI (NON-ST ELEVATED MYOCARDIAL INFARCTION) (MULTI): ICD-10-CM

## 2024-12-29 PROBLEM — R07.9 CHEST PAIN: Status: ACTIVE | Noted: 2024-12-29

## 2024-12-29 LAB
ALBUMIN SERPL BCP-MCNC: 4 G/DL (ref 3.4–5)
ALP SERPL-CCNC: 68 U/L (ref 33–120)
ALT SERPL W P-5'-P-CCNC: 21 U/L (ref 10–52)
ANION GAP SERPL CALC-SCNC: 13 MMOL/L (ref 10–20)
ANION GAP SERPL CALC-SCNC: 14 MMOL/L (ref 10–20)
AST SERPL W P-5'-P-CCNC: 21 U/L (ref 9–39)
BASOPHILS # BLD AUTO: 0.1 X10*3/UL (ref 0–0.1)
BASOPHILS NFR BLD AUTO: 0.8 %
BILIRUB SERPL-MCNC: 0.5 MG/DL (ref 0–1.2)
BUN SERPL-MCNC: 32 MG/DL (ref 6–23)
BUN SERPL-MCNC: 38 MG/DL (ref 6–23)
CALCIUM SERPL-MCNC: 9.2 MG/DL (ref 8.6–10.3)
CALCIUM SERPL-MCNC: 9.7 MG/DL (ref 8.6–10.3)
CARDIAC TROPONIN I PNL SERPL HS: 19 NG/L (ref 0–20)
CARDIAC TROPONIN I PNL SERPL HS: 21 NG/L (ref 0–20)
CHLORIDE SERPL-SCNC: 100 MMOL/L (ref 98–107)
CHLORIDE SERPL-SCNC: 100 MMOL/L (ref 98–107)
CO2 SERPL-SCNC: 22 MMOL/L (ref 21–32)
CO2 SERPL-SCNC: 26 MMOL/L (ref 21–32)
CREAT SERPL-MCNC: 1.29 MG/DL (ref 0.5–1.3)
CREAT SERPL-MCNC: 1.43 MG/DL (ref 0.5–1.3)
EGFRCR SERPLBLD CKD-EPI 2021: 57 ML/MIN/1.73M*2
EGFRCR SERPLBLD CKD-EPI 2021: 64 ML/MIN/1.73M*2
EOSINOPHIL # BLD AUTO: 0.37 X10*3/UL (ref 0–0.7)
EOSINOPHIL NFR BLD AUTO: 2.9 %
ERYTHROCYTE [DISTWIDTH] IN BLOOD BY AUTOMATED COUNT: 12 % (ref 11.5–14.5)
GLUCOSE BLD MANUAL STRIP-MCNC: 186 MG/DL (ref 74–99)
GLUCOSE BLD MANUAL STRIP-MCNC: 196 MG/DL (ref 74–99)
GLUCOSE BLD MANUAL STRIP-MCNC: 271 MG/DL (ref 74–99)
GLUCOSE SERPL-MCNC: 218 MG/DL (ref 74–99)
GLUCOSE SERPL-MCNC: 262 MG/DL (ref 74–99)
HCT VFR BLD AUTO: 34 % (ref 41–52)
HGB BLD-MCNC: 11.9 G/DL (ref 13.5–17.5)
IMM GRANULOCYTES # BLD AUTO: 0.12 X10*3/UL (ref 0–0.7)
IMM GRANULOCYTES NFR BLD AUTO: 1 % (ref 0–0.9)
INR PPP: 1.1 (ref 0.9–1.1)
LYMPHOCYTES # BLD AUTO: 2.51 X10*3/UL (ref 1.2–4.8)
LYMPHOCYTES NFR BLD AUTO: 19.9 %
MAGNESIUM SERPL-MCNC: 1.82 MG/DL (ref 1.6–2.4)
MCH RBC QN AUTO: 31.7 PG (ref 26–34)
MCHC RBC AUTO-ENTMCNC: 35 G/DL (ref 32–36)
MCV RBC AUTO: 91 FL (ref 80–100)
MONOCYTES # BLD AUTO: 0.72 X10*3/UL (ref 0.1–1)
MONOCYTES NFR BLD AUTO: 5.7 %
NEUTROPHILS # BLD AUTO: 8.78 X10*3/UL (ref 1.2–7.7)
NEUTROPHILS NFR BLD AUTO: 69.7 %
NRBC BLD-RTO: 0 /100 WBCS (ref 0–0)
PLATELET # BLD AUTO: 460 X10*3/UL (ref 150–450)
POTASSIUM SERPL-SCNC: 5.1 MMOL/L (ref 3.5–5.3)
POTASSIUM SERPL-SCNC: 5.6 MMOL/L (ref 3.5–5.3)
PROT SERPL-MCNC: 7.3 G/DL (ref 6.4–8.2)
PROTHROMBIN TIME: 12.8 SECONDS (ref 9.8–12.8)
RBC # BLD AUTO: 3.75 X10*6/UL (ref 4.5–5.9)
SODIUM SERPL-SCNC: 130 MMOL/L (ref 136–145)
SODIUM SERPL-SCNC: 134 MMOL/L (ref 136–145)
WBC # BLD AUTO: 12.6 X10*3/UL (ref 4.4–11.3)

## 2024-12-29 PROCEDURE — 93005 ELECTROCARDIOGRAM TRACING: CPT

## 2024-12-29 PROCEDURE — 36415 COLL VENOUS BLD VENIPUNCTURE: CPT | Performed by: GENERAL PRACTICE

## 2024-12-29 PROCEDURE — 96361 HYDRATE IV INFUSION ADD-ON: CPT

## 2024-12-29 PROCEDURE — 99223 1ST HOSP IP/OBS HIGH 75: CPT | Performed by: NURSE PRACTITIONER

## 2024-12-29 PROCEDURE — 83735 ASSAY OF MAGNESIUM: CPT | Performed by: GENERAL PRACTICE

## 2024-12-29 PROCEDURE — 99285 EMERGENCY DEPT VISIT HI MDM: CPT | Performed by: GENERAL PRACTICE

## 2024-12-29 PROCEDURE — 2500000004 HC RX 250 GENERAL PHARMACY W/ HCPCS (ALT 636 FOR OP/ED)

## 2024-12-29 PROCEDURE — 2500000001 HC RX 250 WO HCPCS SELF ADMINISTERED DRUGS (ALT 637 FOR MEDICARE OP)

## 2024-12-29 PROCEDURE — 84075 ASSAY ALKALINE PHOSPHATASE: CPT | Performed by: GENERAL PRACTICE

## 2024-12-29 PROCEDURE — 71045 X-RAY EXAM CHEST 1 VIEW: CPT

## 2024-12-29 PROCEDURE — 80048 BASIC METABOLIC PNL TOTAL CA: CPT | Mod: CCI | Performed by: NURSE PRACTITIONER

## 2024-12-29 PROCEDURE — 2500000004 HC RX 250 GENERAL PHARMACY W/ HCPCS (ALT 636 FOR OP/ED): Performed by: GENERAL PRACTICE

## 2024-12-29 PROCEDURE — 85025 COMPLETE CBC W/AUTO DIFF WBC: CPT | Performed by: GENERAL PRACTICE

## 2024-12-29 PROCEDURE — 96372 THER/PROPH/DIAG INJ SC/IM: CPT

## 2024-12-29 PROCEDURE — G0378 HOSPITAL OBSERVATION PER HR: HCPCS

## 2024-12-29 PROCEDURE — 85610 PROTHROMBIN TIME: CPT | Performed by: GENERAL PRACTICE

## 2024-12-29 PROCEDURE — 71045 X-RAY EXAM CHEST 1 VIEW: CPT | Mod: FOREIGN READ | Performed by: RADIOLOGY

## 2024-12-29 PROCEDURE — 82947 ASSAY GLUCOSE BLOOD QUANT: CPT

## 2024-12-29 PROCEDURE — 84484 ASSAY OF TROPONIN QUANT: CPT | Performed by: GENERAL PRACTICE

## 2024-12-29 PROCEDURE — 96374 THER/PROPH/DIAG INJ IV PUSH: CPT

## 2024-12-29 RX ORDER — CALCIUM CARBONATE 200(500)MG
500 TABLET,CHEWABLE ORAL DAILY
Status: DISCONTINUED | OUTPATIENT
Start: 2024-12-30 | End: 2024-12-29

## 2024-12-29 RX ORDER — METHOCARBAMOL 500 MG/1
500 TABLET, FILM COATED ORAL 4 TIMES DAILY
Status: DISCONTINUED | OUTPATIENT
Start: 2024-12-29 | End: 2024-12-30

## 2024-12-29 RX ORDER — ACETAMINOPHEN 325 MG/1
650 TABLET ORAL EVERY 4 HOURS PRN
Status: DISCONTINUED | OUTPATIENT
Start: 2024-12-29 | End: 2024-12-31 | Stop reason: HOSPADM

## 2024-12-29 RX ORDER — ENOXAPARIN SODIUM 100 MG/ML
40 INJECTION SUBCUTANEOUS EVERY 24 HOURS
Status: DISCONTINUED | OUTPATIENT
Start: 2024-12-29 | End: 2024-12-31 | Stop reason: HOSPADM

## 2024-12-29 RX ORDER — ONDANSETRON HYDROCHLORIDE 2 MG/ML
4 INJECTION, SOLUTION INTRAVENOUS ONCE
Status: COMPLETED | OUTPATIENT
Start: 2024-12-29 | End: 2024-12-29

## 2024-12-29 RX ORDER — ACETAMINOPHEN 650 MG/1
650 SUPPOSITORY RECTAL EVERY 4 HOURS PRN
Status: DISCONTINUED | OUTPATIENT
Start: 2024-12-29 | End: 2024-12-31 | Stop reason: HOSPADM

## 2024-12-29 RX ORDER — CALCIUM CARBONATE 200(500)MG
500 TABLET,CHEWABLE ORAL 4 TIMES DAILY PRN
Status: DISCONTINUED | OUTPATIENT
Start: 2024-12-29 | End: 2024-12-31 | Stop reason: HOSPADM

## 2024-12-29 RX ORDER — GABAPENTIN 100 MG/1
100 CAPSULE ORAL 2 TIMES DAILY
Status: DISCONTINUED | OUTPATIENT
Start: 2024-12-29 | End: 2024-12-30

## 2024-12-29 RX ORDER — INSULIN LISPRO 100 [IU]/ML
0-10 INJECTION, SOLUTION INTRAVENOUS; SUBCUTANEOUS
Status: DISCONTINUED | OUTPATIENT
Start: 2024-12-29 | End: 2024-12-31 | Stop reason: HOSPADM

## 2024-12-29 RX ORDER — NAPROXEN SODIUM 220 MG/1
81 TABLET, FILM COATED ORAL DAILY
Status: DISCONTINUED | OUTPATIENT
Start: 2024-12-30 | End: 2024-12-31 | Stop reason: HOSPADM

## 2024-12-29 RX ORDER — ACETAMINOPHEN 160 MG/5ML
650 SOLUTION ORAL EVERY 4 HOURS PRN
Status: DISCONTINUED | OUTPATIENT
Start: 2024-12-29 | End: 2024-12-31 | Stop reason: HOSPADM

## 2024-12-29 RX ORDER — METFORMIN HYDROCHLORIDE 500 MG/1
1000 TABLET ORAL
Status: DISCONTINUED | OUTPATIENT
Start: 2024-12-29 | End: 2024-12-31 | Stop reason: HOSPADM

## 2024-12-29 RX ORDER — AMLODIPINE BESYLATE 10 MG/1
10 TABLET ORAL DAILY
Status: DISCONTINUED | OUTPATIENT
Start: 2024-12-30 | End: 2024-12-31 | Stop reason: HOSPADM

## 2024-12-29 RX ORDER — LISINOPRIL 5 MG/1
5 TABLET ORAL DAILY
Status: DISCONTINUED | OUTPATIENT
Start: 2024-12-30 | End: 2024-12-31 | Stop reason: HOSPADM

## 2024-12-29 RX ORDER — CLOPIDOGREL BISULFATE 75 MG/1
75 TABLET ORAL DAILY
Status: DISCONTINUED | OUTPATIENT
Start: 2024-12-30 | End: 2024-12-31 | Stop reason: HOSPADM

## 2024-12-29 RX ORDER — METOPROLOL TARTRATE 50 MG/1
50 TABLET ORAL 2 TIMES DAILY
Status: DISCONTINUED | OUTPATIENT
Start: 2024-12-29 | End: 2024-12-31 | Stop reason: HOSPADM

## 2024-12-29 RX ORDER — HYDRALAZINE HYDROCHLORIDE 25 MG/1
25 TABLET, FILM COATED ORAL 3 TIMES DAILY
Status: DISCONTINUED | OUTPATIENT
Start: 2024-12-29 | End: 2024-12-31 | Stop reason: HOSPADM

## 2024-12-29 RX ORDER — ATORVASTATIN CALCIUM 80 MG/1
80 TABLET, FILM COATED ORAL NIGHTLY
Status: DISCONTINUED | OUTPATIENT
Start: 2024-12-29 | End: 2024-12-31 | Stop reason: HOSPADM

## 2024-12-29 RX ADMIN — ACETAMINOPHEN 650 MG: 325 TABLET, FILM COATED ORAL at 20:37

## 2024-12-29 RX ADMIN — SODIUM CHLORIDE, POTASSIUM CHLORIDE, SODIUM LACTATE AND CALCIUM CHLORIDE 1000 ML: 600; 310; 30; 20 INJECTION, SOLUTION INTRAVENOUS at 09:48

## 2024-12-29 RX ADMIN — ATORVASTATIN CALCIUM 80 MG: 80 TABLET, FILM COATED ORAL at 20:38

## 2024-12-29 RX ADMIN — CALCIUM CARBONATE (ANTACID) CHEW TAB 500 MG 500 MG: 500 CHEW TAB at 23:40

## 2024-12-29 RX ADMIN — ONDANSETRON 4 MG: 2 INJECTION INTRAMUSCULAR; INTRAVENOUS at 09:48

## 2024-12-29 RX ADMIN — ENOXAPARIN SODIUM 40 MG: 40 INJECTION SUBCUTANEOUS at 20:38

## 2024-12-29 SDOH — SOCIAL STABILITY: SOCIAL INSECURITY: HAVE YOU HAD ANY THOUGHTS OF HARMING ANYONE ELSE?: NO

## 2024-12-29 SDOH — SOCIAL STABILITY: SOCIAL INSECURITY: WITHIN THE LAST YEAR, HAVE YOU BEEN AFRAID OF YOUR PARTNER OR EX-PARTNER?: NO

## 2024-12-29 SDOH — SOCIAL STABILITY: SOCIAL INSECURITY: WERE YOU ABLE TO COMPLETE ALL THE BEHAVIORAL HEALTH SCREENINGS?: YES

## 2024-12-29 SDOH — ECONOMIC STABILITY: FOOD INSECURITY: WITHIN THE PAST 12 MONTHS, THE FOOD YOU BOUGHT JUST DIDN'T LAST AND YOU DIDN'T HAVE MONEY TO GET MORE.: NEVER TRUE

## 2024-12-29 SDOH — SOCIAL STABILITY: SOCIAL INSECURITY: HAS ANYONE EVER THREATENED TO HURT YOUR FAMILY OR YOUR PETS?: NO

## 2024-12-29 SDOH — ECONOMIC STABILITY: HOUSING INSECURITY: AT ANY TIME IN THE PAST 12 MONTHS, WERE YOU HOMELESS OR LIVING IN A SHELTER (INCLUDING NOW)?: NO

## 2024-12-29 SDOH — ECONOMIC STABILITY: FOOD INSECURITY: HOW HARD IS IT FOR YOU TO PAY FOR THE VERY BASICS LIKE FOOD, HOUSING, MEDICAL CARE, AND HEATING?: NOT HARD AT ALL

## 2024-12-29 SDOH — SOCIAL STABILITY: SOCIAL INSECURITY: ARE YOU OR HAVE YOU BEEN THREATENED OR ABUSED PHYSICALLY, EMOTIONALLY, OR SEXUALLY BY ANYONE?: NO

## 2024-12-29 SDOH — SOCIAL STABILITY: SOCIAL INSECURITY: WITHIN THE LAST YEAR, HAVE YOU BEEN HUMILIATED OR EMOTIONALLY ABUSED IN OTHER WAYS BY YOUR PARTNER OR EX-PARTNER?: NO

## 2024-12-29 SDOH — ECONOMIC STABILITY: HOUSING INSECURITY: IN THE LAST 12 MONTHS, WAS THERE A TIME WHEN YOU WERE NOT ABLE TO PAY THE MORTGAGE OR RENT ON TIME?: NO

## 2024-12-29 SDOH — ECONOMIC STABILITY: INCOME INSECURITY: IN THE PAST 12 MONTHS HAS THE ELECTRIC, GAS, OIL, OR WATER COMPANY THREATENED TO SHUT OFF SERVICES IN YOUR HOME?: NO

## 2024-12-29 SDOH — ECONOMIC STABILITY: FOOD INSECURITY: WITHIN THE PAST 12 MONTHS, YOU WORRIED THAT YOUR FOOD WOULD RUN OUT BEFORE YOU GOT THE MONEY TO BUY MORE.: NEVER TRUE

## 2024-12-29 SDOH — SOCIAL STABILITY: SOCIAL INSECURITY: HAVE YOU HAD THOUGHTS OF HARMING ANYONE ELSE?: NO

## 2024-12-29 SDOH — SOCIAL STABILITY: SOCIAL INSECURITY: ABUSE: ADULT

## 2024-12-29 SDOH — SOCIAL STABILITY: SOCIAL INSECURITY: DOES ANYONE TRY TO KEEP YOU FROM HAVING/CONTACTING OTHER FRIENDS OR DOING THINGS OUTSIDE YOUR HOME?: NO

## 2024-12-29 SDOH — SOCIAL STABILITY: SOCIAL INSECURITY: DO YOU FEEL UNSAFE GOING BACK TO THE PLACE WHERE YOU ARE LIVING?: NO

## 2024-12-29 SDOH — SOCIAL STABILITY: SOCIAL INSECURITY: ARE THERE ANY APPARENT SIGNS OF INJURIES/BEHAVIORS THAT COULD BE RELATED TO ABUSE/NEGLECT?: NO

## 2024-12-29 SDOH — SOCIAL STABILITY: SOCIAL INSECURITY: DO YOU FEEL ANYONE HAS EXPLOITED OR TAKEN ADVANTAGE OF YOU FINANCIALLY OR OF YOUR PERSONAL PROPERTY?: NO

## 2024-12-29 SDOH — ECONOMIC STABILITY: TRANSPORTATION INSECURITY: IN THE PAST 12 MONTHS, HAS LACK OF TRANSPORTATION KEPT YOU FROM MEDICAL APPOINTMENTS OR FROM GETTING MEDICATIONS?: NO

## 2024-12-29 SDOH — ECONOMIC STABILITY: HOUSING INSECURITY: IN THE PAST 12 MONTHS, HOW MANY TIMES HAVE YOU MOVED WHERE YOU WERE LIVING?: 0

## 2024-12-29 ASSESSMENT — ACTIVITIES OF DAILY LIVING (ADL)
GROOMING: INDEPENDENT
PATIENT'S MEMORY ADEQUATE TO SAFELY COMPLETE DAILY ACTIVITIES?: YES
DRESSING YOURSELF: INDEPENDENT
FEEDING YOURSELF: INDEPENDENT
TOILETING: INDEPENDENT
LACK_OF_TRANSPORTATION: NO
ADEQUATE_TO_COMPLETE_ADL: YES
HEARING - LEFT EAR: FUNCTIONAL
BATHING: INDEPENDENT
WALKS IN HOME: INDEPENDENT
JUDGMENT_ADEQUATE_SAFELY_COMPLETE_DAILY_ACTIVITIES: YES
HEARING - RIGHT EAR: FUNCTIONAL
LACK_OF_TRANSPORTATION: NO

## 2024-12-29 ASSESSMENT — COGNITIVE AND FUNCTIONAL STATUS - GENERAL
DAILY ACTIVITIY SCORE: 24
MOBILITY SCORE: 24
CLIMB 3 TO 5 STEPS WITH RAILING: A LITTLE
DAILY ACTIVITIY SCORE: 24
WALKING IN HOSPITAL ROOM: A LITTLE
PATIENT BASELINE BEDBOUND: NO
MOBILITY SCORE: 22

## 2024-12-29 ASSESSMENT — ENCOUNTER SYMPTOMS
COUGH: 0
DYSPNEA ON EXERTION: 0
SHORTNESS OF BREATH: 0
ORTHOPNEA: 0
DOUBLE VISION: 0
PALPITATIONS: 0
NAUSEA: 0
DIZZINESS: 0
FREQUENCY: 0
BLURRED VISION: 0
LIGHT-HEADEDNESS: 0
MYALGIAS: 0
VOMITING: 0
IRREGULAR HEARTBEAT: 0
ALTERED MENTAL STATUS: 0
NAIL CHANGES: 0
STIFFNESS: 0
WEAKNESS: 0
BLOATING: 0
MUSCLE CRAMPS: 0
POOR WOUND HEALING: 0
DECREASED APPETITE: 0
FOCAL WEAKNESS: 0

## 2024-12-29 ASSESSMENT — PAIN SCALES - GENERAL
PAINLEVEL_OUTOF10: 4
PAINLEVEL_OUTOF10: 3
PAINLEVEL_OUTOF10: 0 - NO PAIN
PAINLEVEL_OUTOF10: 0 - NO PAIN

## 2024-12-29 ASSESSMENT — LIFESTYLE VARIABLES
PRESCIPTION_ABUSE_PAST_12_MONTHS: NO
AUDIT-C TOTAL SCORE: 0
HOW MANY STANDARD DRINKS CONTAINING ALCOHOL DO YOU HAVE ON A TYPICAL DAY: PATIENT DOES NOT DRINK
HOW OFTEN DO YOU HAVE 6 OR MORE DRINKS ON ONE OCCASION: NEVER
SKIP TO QUESTIONS 9-10: 1
SUBSTANCE_ABUSE_PAST_12_MONTHS: NO
AUDIT-C TOTAL SCORE: 0
HOW OFTEN DO YOU HAVE A DRINK CONTAINING ALCOHOL: NEVER

## 2024-12-29 ASSESSMENT — COLUMBIA-SUICIDE SEVERITY RATING SCALE - C-SSRS
1. IN THE PAST MONTH, HAVE YOU WISHED YOU WERE DEAD OR WISHED YOU COULD GO TO SLEEP AND NOT WAKE UP?: NO
6. HAVE YOU EVER DONE ANYTHING, STARTED TO DO ANYTHING, OR PREPARED TO DO ANYTHING TO END YOUR LIFE?: NO
2. HAVE YOU ACTUALLY HAD ANY THOUGHTS OF KILLING YOURSELF?: NO
2. HAVE YOU ACTUALLY HAD ANY THOUGHTS OF KILLING YOURSELF?: NO
1. IN THE PAST MONTH, HAVE YOU WISHED YOU WERE DEAD OR WISHED YOU COULD GO TO SLEEP AND NOT WAKE UP?: NO
6. HAVE YOU EVER DONE ANYTHING, STARTED TO DO ANYTHING, OR PREPARED TO DO ANYTHING TO END YOUR LIFE?: NO

## 2024-12-29 ASSESSMENT — PATIENT HEALTH QUESTIONNAIRE - PHQ9
1. LITTLE INTEREST OR PLEASURE IN DOING THINGS: NOT AT ALL
SUM OF ALL RESPONSES TO PHQ9 QUESTIONS 1 & 2: 0
2. FEELING DOWN, DEPRESSED OR HOPELESS: NOT AT ALL

## 2024-12-29 ASSESSMENT — PAIN DESCRIPTION - ORIENTATION: ORIENTATION: OTHER (COMMENT)

## 2024-12-29 ASSESSMENT — PAIN - FUNCTIONAL ASSESSMENT
PAIN_FUNCTIONAL_ASSESSMENT: 0-10

## 2024-12-29 ASSESSMENT — PAIN DESCRIPTION - LOCATION: LOCATION: HEAD

## 2024-12-29 ASSESSMENT — PAIN DESCRIPTION - DESCRIPTORS: DESCRIPTORS: ACHING

## 2024-12-29 NOTE — ED PROVIDER NOTES
HPI   Chief Complaint   Patient presents with    Syncope       HPI: 59-year-old male who is status post CABG x 4 on 12/16/2024 presents for syncope.  As per the patient's wife the patient was still in bed today when he had 2 brief syncopal episodes.  She reports mild seizure-like activity during one of the episodes.  The patient has no history of seizure and reportedly was not postictal when he regained consciousness.  On arrival the patient reports nausea and fatigue but is denying any chest pain.  He has otherwise been recovering well from his CABG.      Limitations to history: None  Independent Historians: Patient, wife  External Records Reviewed: HIE, outpatient notes, inpatient notes  ------------------------------------------------------------------------------------------------------------------------------------------  ROS: a ten point review of systems was performed and was negative except as per HPI.  ------------------------------------------------------------------------------------------------------------------------------------------  PMH / PSH: as per HPI, otherwise reviewed in EMR  MEDS: as per HPI, otherwise reviewed in EMR  ALLERGIES: as per HPI, otherwise reviewed in EMR  SocH:  as per HPI, otherwise reviewed in EMR  FH:  as per HPI, otherwise reviewed in EMR  ------------------------------------------------------------------------------------------------------------------------------------------  Physical Exam:  VS: As documented in the triage note and EMR flowsheet from this visit was reviewed  General: Well appearing. No acute distress.   Eyes:  Extraocular movements grossly intact. No scleral icterus. No discharge  HEENT:  Normocephalic.  Atraumatic  Neck: Moves neck freely. No gross masses  CV: Regular rhythm. No murmurs, rubs or gallops   Resp: Clear to auscultation bilaterally. No respiratory distress.    GI: Soft, no masses, nontender. No rebound tenderness or guarding  MSK: Symmetric  muscle bulk. No deformities. No lower extremity edema.    Skin: Warm, dry, intact.  Median sternotomy scar appears well-healing with no surrounding erythema or discharge  Neuro: No focal deficits.  A&O x3.   Psych: Appropriate for situation  ------------------------------------------------------------------------------------------------------------------------------------------  Hospital Course / Medical Decision Making:  Independent Interpretations: Chest x-ray  EKG as interpreted by me: Sinus bradycardia 49 bpm with a normal axis, no bundle branch block no signs of acute ischemia    MDM: 59-year-old male who is status post CABG x 4 presents for syncope.  He is bradycardic but alert and oriented in the ED.  Troponin is mildly elevated but stable.  Chest x-ray shows no acute cardiopulmonary process.  EKG is nonischemic.  Due to the repeat syncopal episodes and recent surgery I feel the patient should be admitted.  I did update his surgeon, Dr. Onur Mendenhall, and explained that the patient is currently in the ED and would be admitted.  The patient was admitted to the hospitalist service for further evaluation.    Discussion of Management with Other Providers:   I discussed the patient/results with: Emergency medicine team    Final diagnosis and disposition as below.    Results for orders placed or performed during the hospital encounter of 12/29/24  -CBC and Auto Differential:   Collection Time: 12/29/24  9:46 AM       Result                      Value             Ref Range           WBC                         12.6 (H)          4.4 - 11.3 x*       nRBC                        0.0               0.0 - 0.0 /1*       RBC                         3.75 (L)          4.50 - 5.90 *       Hemoglobin                  11.9 (L)          13.5 - 17.5 *       Hematocrit                  34.0 (L)          41.0 - 52.0 %       MCV                         91                80 - 100 fL         MCH                         31.7               26.0 - 34.0 *       MCHC                        35.0              32.0 - 36.0 *       RDW                         12.0              11.5 - 14.5 %       Platelets                   460 (H)           150 - 450 x1*       Neutrophils %               69.7              40.0 - 80.0 %       Immature Granulocytes *     1.0 (H)           0.0 - 0.9 %         Lymphocytes %               19.9              13.0 - 44.0 %       Monocytes %                 5.7               2.0 - 10.0 %        Eosinophils %               2.9               0.0 - 6.0 %         Basophils %                 0.8               0.0 - 2.0 %         Neutrophils Absolute        8.78 (H)          1.20 - 7.70 *       Immature Granulocytes *     0.12              0.00 - 0.70 *       Lymphocytes Absolute        2.51              1.20 - 4.80 *       Monocytes Absolute          0.72              0.10 - 1.00 *       Eosinophils Absolute        0.37              0.00 - 0.70 *       Basophils Absolute          0.10              0.00 - 0.10 *  -Comprehensive metabolic panel:   Collection Time: 12/29/24  9:46 AM       Result                      Value             Ref Range           Glucose                     218 (H)           74 - 99 mg/dL       Sodium                      130 (L)           136 - 145 mm*       Potassium                   5.6 (H)           3.5 - 5.3 mm*       Chloride                    100               98 - 107 mmo*       Bicarbonate                 22                21 - 32 mmol*       Anion Gap                   14                10 - 20 mmol*       Urea Nitrogen               32 (H)            6 - 23 mg/dL        Creatinine                  1.29              0.50 - 1.30 *       eGFR                        64                >60 mL/min/1*       Calcium                     9.7               8.6 - 10.3 m*       Albumin                     4.0               3.4 - 5.0 g/*       Alkaline Phosphatase        68                33 - 120 U/L        Total  Protein               7.3               6.4 - 8.2 g/*       AST                         21                9 - 39 U/L          Bilirubin, Total            0.5               0.0 - 1.2 mg*       ALT                         21                10 - 52 U/L    -Magnesium:   Collection Time: 12/29/24  9:46 AM       Result                      Value             Ref Range           Magnesium                   1.82              1.60 - 2.40 *  -Troponin I, High Sensitivity:   Collection Time: 12/29/24  9:46 AM       Result                      Value             Ref Range           Troponin I, High Sensi*     21 (H)            0 - 20 ng/L    -Protime-INR:   Collection Time: 12/29/24  9:46 AM       Result                      Value             Ref Range           Protime                     12.8              9.8 - 12.8 s*       INR                         1.1               0.9 - 1.1      -Troponin I, High Sensitivity:   Collection Time: 12/29/24 12:36 PM       Result                      Value             Ref Range           Troponin I, High Sensi*     19                0 - 20 ng/L    -POCT GLUCOSE:   Collection Time: 12/29/24  4:02 PM       Result                      Value             Ref Range           POCT Glucose                196 (H)           74 - 99 mg/dL  XR chest 1 view   Final Result    No acute cardiopulmonary disease..    Signed by José Rubin MD                   Patient History   Past Medical History:   Diagnosis Date    Incisional hernia without obstruction or gangrene 06/10/2020    Incisional hernia    Other specified disorders of kidney and ureter 01/13/2020    Renal mass, right    Personal history of other diseases of the circulatory system     History of hypertension    Personal history of other diseases of the musculoskeletal system and connective tissue     History of osteomyelitis    Personal history of other endocrine, nutritional and metabolic disease     History of diabetes mellitus    Personal history  of other specified conditions     History of abdominal pain    Personal history of other specified conditions 05/15/2020    History of postoperative nausea and vomiting    Right lower quadrant pain 02/07/2020    RLQ cramping     Past Surgical History:   Procedure Laterality Date    CARDIAC CATHETERIZATION N/A 12/12/2024    Procedure: Left Heart Cath, With LV;  Surgeon: Thor Keyes MD;  Location: [facility] Cardiac Cath Lab;  Service: Cardiovascular;  Laterality: N/A;    CT GUIDED ABSCESS FLUID COLLECTION DRAINAGE  7/21/2021    CT GUIDED ABSCESS FLUID COLLECTION DRAINAGE 7/21/2021 [facility]    CT GUIDED PERCUTANEOUS BIOPSY MEDIASTINUM  8/16/2019    CT GUIDED PERCUTANEOUS BIOPSY MEDIASTINUM 8/16/2019 [facility]INICAL [facility]GAC[facility]    OTHER SURGICAL HISTORY  08/22/2019    Back surgery     Family History   Problem Relation Name Age of Onset    Diabetes Mother      COPD Father       Social History     Tobacco Use    Smoking status: Never    Smokeless tobacco: Never   Vaping Use    Vaping status: Never Used   Substance Use Topics    Alcohol use: Not Currently    Drug use: Never       Physical Exam   ED Triage Vitals [12/29/24 0910]   Temperature Heart Rate Respirations BP   36 °C (96.8 °F) 50 14 97/61      Pulse Ox Temp Source Heart Rate Source Patient Position   96 % Temporal Monitor Lying      BP Location FiO2 (%)     Right arm --       Physical Exam      ED Course & MDM   Diagnoses as of 12/29/24 1652   Syncope with normal neurologic examination                 No data recorded     Anitra Coma Scale Score: 15 (12/29/24 0922 : Blanka Cao RN)                           Medical Decision Making      Procedure  Procedures     Reji Morelos, DO  12/29/24 1655

## 2024-12-29 NOTE — ED TRIAGE NOTES
Patient BIBA from for syncope with LOC. Per EMS patient sat up in bed began feeling dizzy and passed out. Patient can not recall episode. Patient states he had an episode of N/V/D yesterday and has been endorsing chills since CABG roughly 10 days ago. Patient has a PMH of DM, HTN and is currently on ozempic. EMS states patient had x2 hypotensive episodes in route.

## 2024-12-29 NOTE — PROGRESS NOTES
Pharmacy Medication History Review    Nader Zazueta is a 59 y.o. male admitted for No Principal Problem: There is no principal problem currently on the Problem List. Please update the Problem List and refresh.. Pharmacy reviewed the patient's [id number] medications and allergies for accuracy.    The list below reflectives the updated PTA list. Please review each medication in order reconciliation for additional clarification and justification.  Prior to Admission medications    Medication Sig Start Date End Date Authorizing Provider   acetaminophen (TylenoL) 325 mg tablet Take 2 tablets (650 mg) by mouth every 6 hours if needed for mild pain (1 - 3). 12/20/24  LILLIAN Oliver-CNP   amLODIPine (Norvasc) 10 mg tablet TAKE 1 TABLET (10 MG) BY MOUTH ONCE DAILY. 8/15/24 8/15/25 Mustapha Ice, DO   aspirin 81 mg chewable tablet Chew 1 tablet (81 mg) once daily. 12/21/24 3/21/25 LILLIAN Oliver-CNP   atorvastatin (Lipitor) 80 mg tablet Take 1 tablet (80 mg) by mouth once daily at bedtime. 12/20/24 3/20/25 LILLIAN Oliver-CNP   clopidogrel (Plavix) 75 mg tablet Take 1 tablet (75 mg) by mouth once daily. 12/21/24 3/21/25 LILLIAN Oliver-CNP   gabapentin (Neurontin) 100 mg capsule Take 1 capsule (100 mg) by mouth 2 times a day for 14 days. 12/20/24 1/3/25 LILLIAN Oliver-CNP   hydrALAZINE (Apresoline) 25 mg tablet Take 1 tablet (25 mg) by mouth 3 times a day. 12/20/24 3/20/25 LILLIAN Oliver-CNP   lisinopril 5 mg tablet Take 1 tablet (5 mg) by mouth once daily. 12/21/24 2/19/25 LILLIAN Oliver-CNP   metFORMIN (Glucophage) 1,000 mg tablet TAKE 1 TABLET BY MOUTH TWO TIMES A DAY WITH MEALS 9/18/24 9/18/25 Mustapha Ice, DO   methocarbamol (Robaxin) 500 mg tablet Take 1 tablet (500 mg) by mouth 4 times a day. 12/20/24  ZO Oliver   metoprolol tartrate (Lopressor) 50 mg tablet Take 1 tablet by mouth 2 times a day. 12/20/24 3/20/25 ZO Oliver   oxyCODONE  (Roxicodone) 5 mg immediate release tablet Take 1 tablet (5 mg) by mouth every 6 hours if needed for moderate pain (4 - 6) or severe pain (7 - 10) for up to 7 days. 12/20/24 1/31/25 LILLIAN Oliver-CNP   semaglutide (Ozempic) 0.25 mg or 0.5 mg (2 mg/3 mL) pen injector Inject 0.25 mg under the skin 1 (one) time per week for 28 days, THEN 0.5 mg 1 (one) time per week. 12/19/24 2/15/25 LILLIAN Estrada-CNP        The list below reflectives the updated allergy list. Please review each documented allergy for additional clarification and justification.  Allergies  Reviewed by Blanka Cao RN on 12/29/2024   No Known Allergies         Below are additional concerns with the patient's PTA list.      Gillian Reilly

## 2024-12-29 NOTE — H&P
"                                                              Internal Medicine History & Physical             PATIENT NAME: Nader Zazueta  MRN: [id number]    SERVICE DATE:  12/29/2024  SERVICE TIME:  2:01 PM  HPI  Nader Zazueta is a 59 y.o. male on day 0 of admission presenting with Syncope with normal neurologic examination.    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  Event was witnessed by patient's wife at bedside who noted that patient was sitting in his recliner and noted that he suddenly fell backwards and \"blacked out\" for which she was concerned might have been a seizure versus stroke and therefore called 911.  Patient denies any confusion after the event noted that he was feeling dizzy/lightheaded which she thinks could have prompted the current presentation.  Patient denies any fevers, chills, nausea, vomiting, chest pain, shortness of breath.  Of note patient had a CABG 12/16 and at that time clinical course was complicated by postoperative hypotension.  He was discharged with Lopressor, hydralazine, lisinopril, amlodipine at that time which may have contributed to his syncopal event.    In the emergency department, patient was hypertensive and bradycardic.  EKG showed sinus bradycardia.  Due to patient's syncopal episode as well as recent cardiac surgery patient will be admitted to general medical team for further evaluation.  I was asked to admit the patient for Dr. Mustapha gunter.    Past Medical History: As mentioned above  Surgical History: As mentioned above  Social History: Lives with his wife.  Denies smoking, alcohol use, illicit drug use  Family History: Maternal history of diabetes, paternal history of COPD    ROS  Complete 12 point review of systems was obtained.  Negative aside from " "HPI.    OBJECTIVE  Vitals:    12/29/24 0910   BP: 97/61   BP Location: Right arm   Patient Position: Lying   Pulse: 50   Resp: 14   Temp: 36 °C (96.8 °F)   TempSrc: Temporal   SpO2: 96%   Weight: 94 kg (207 lb 3.7 oz)   Height: 1.753 m (5' 9.02\")      Results from last 7 days   Lab Units 12/29/24  0946   WBC AUTO x10*3/uL 12.6*   HEMOGLOBIN g/dL 11.9*   HEMATOCRIT % 34.0*   PLATELETS AUTO x10*3/uL 460*   NEUTROS PCT AUTO % 69.7   LYMPHS PCT AUTO % 19.9   MONOS PCT AUTO % 5.7   EOS PCT AUTO % 2.9      Results from last 7 days   Lab Units 12/29/24  0946   SODIUM mmol/L 130*   POTASSIUM mmol/L 5.6*   CHLORIDE mmol/L 100   CO2 mmol/L 22   BUN mg/dL 32*   CREATININE mg/dL 1.29   CALCIUM mg/dL 9.7   PROTEIN TOTAL g/dL 7.3   BILIRUBIN TOTAL mg/dL 0.5   ALK PHOS U/L 68   ALT U/L 21   AST U/L 21   GLUCOSE mg/dL 218*       Physical Exam  Physical Exam:  General:  Pleasant and cooperative. No apparent distress.  HEENT:  Normocephalic, atraumatic, mucus membranes moist.   Neck:  Trachea midline.  No JVD.    Chest:  Clear to auscultation bilaterally. No wheezes, rales, or rhonchi.  Well-healing postsurgical scar  CV:  Regular rate and rhythm.  Positive S1/S2.   Abdomen: Bowel sounds present in all four quadrants, abdomen is soft, non-tender, non-distended.  Extremities:  No lower extremity edema or cyanosis.   Neurological:  AAOx3. No focal deficits.  Skin:  Warm and dry.       ASSESSMENT & PLAN    Patient is a 59-year-old male with medical history significant for hypertension, hyperlipidemia, CKD (solitary kidney secondary to renal cell carcinoma), status post CABG x 4 12/16 presenting to [facility] due to concern for syncopal event.     Hypotension with Syncope  Bradycardia suspect secondary to medications versus bradyarrhythmia  Concern for orthostatic hypotension  S/P CABG 12/16  Hx of HTN   HLD    PLAN:  -Obtain orthostatic vitals  -Patient on telemetry.  Cardiology consulted  -Given patient's bradycardic and hypotensive " episodes we will hold antihypertensive medications at this time and beta-blocker until evaluated by cardiology team  -Encourage oral fluid intake  -Obtain orthostatic vitals      Type 2 DM    PLAN:  -Insulin sliding scale while patient is currently inpatient.  Maintain blood glucose between 140 and 180    Chronic Conditions    CKD with Solitary kidney 2/2 RCC      DVT ppx: Lovenox  GI ppx: Protonix  IVF: None  Diet: Cardiac  Consults: Cardiology  CODE STATUS: Full code    Soto Goff, DO  Please SecureChat for any further questions  This is a preliminary note, please await attending attestation for final A/P

## 2024-12-30 ENCOUNTER — APPOINTMENT (OUTPATIENT)
Dept: CARDIOLOGY | Facility: HOSPITAL | Age: 59
End: 2024-12-30
Payer: COMMERCIAL

## 2024-12-30 ENCOUNTER — HOME CARE VISIT (OUTPATIENT)
Dept: HOME HEALTH SERVICES | Facility: HOME HEALTH | Age: 59
End: 2024-12-30
Payer: COMMERCIAL

## 2024-12-30 LAB
ALBUMIN SERPL BCP-MCNC: 3.7 G/DL (ref 3.4–5)
ANION GAP SERPL CALC-SCNC: 12 MMOL/L (ref 10–20)
ATRIAL RATE: 49 BPM
ATRIAL RATE: 57 BPM
BODY SURFACE AREA: 2.14 M2
BUN SERPL-MCNC: 32 MG/DL (ref 6–23)
CALCIUM SERPL-MCNC: 9.4 MG/DL (ref 8.6–10.3)
CHLORIDE SERPL-SCNC: 102 MMOL/L (ref 98–107)
CO2 SERPL-SCNC: 23 MMOL/L (ref 21–32)
CREAT SERPL-MCNC: 1.21 MG/DL (ref 0.5–1.3)
EGFRCR SERPLBLD CKD-EPI 2021: 69 ML/MIN/1.73M*2
ERYTHROCYTE [DISTWIDTH] IN BLOOD BY AUTOMATED COUNT: 12 % (ref 11.5–14.5)
GLUCOSE BLD MANUAL STRIP-MCNC: 153 MG/DL (ref 74–99)
GLUCOSE BLD MANUAL STRIP-MCNC: 158 MG/DL (ref 74–99)
GLUCOSE BLD MANUAL STRIP-MCNC: 212 MG/DL (ref 74–99)
GLUCOSE BLD MANUAL STRIP-MCNC: 214 MG/DL (ref 74–99)
GLUCOSE SERPL-MCNC: 174 MG/DL (ref 74–99)
HCT VFR BLD AUTO: 32.9 % (ref 41–52)
HGB BLD-MCNC: 11.3 G/DL (ref 13.5–17.5)
MAGNESIUM SERPL-MCNC: 1.8 MG/DL (ref 1.6–2.4)
MCH RBC QN AUTO: 31.8 PG (ref 26–34)
MCHC RBC AUTO-ENTMCNC: 34.3 G/DL (ref 32–36)
MCV RBC AUTO: 93 FL (ref 80–100)
NRBC BLD-RTO: 0 /100 WBCS (ref 0–0)
P AXIS: 38 DEGREES
P AXIS: 54 DEGREES
P OFFSET: 185 MS
P ONSET: 133 MS
PHOSPHATE SERPL-MCNC: 3.8 MG/DL (ref 2.5–4.9)
PLATELET # BLD AUTO: 447 X10*3/UL (ref 150–450)
POTASSIUM SERPL-SCNC: 4.3 MMOL/L (ref 3.5–5.3)
PR INTERVAL: 177 MS
PR INTERVAL: 184 MS
Q ONSET: 225 MS
Q ONSET: 251 MS
QRS COUNT: 10 BEATS
QRS COUNT: 8 BEATS
QRS DURATION: 106 MS
QRS DURATION: 111 MS
QT INTERVAL: 445 MS
QT INTERVAL: 454 MS
QTC CALCULATION(BAZETT): 402 MS
QTC CALCULATION(BAZETT): 441 MS
QTC FREDERICIA: 416 MS
QTC FREDERICIA: 446 MS
R AXIS: 46 DEGREES
R AXIS: 96 DEGREES
RBC # BLD AUTO: 3.55 X10*6/UL (ref 4.5–5.9)
SODIUM SERPL-SCNC: 133 MMOL/L (ref 136–145)
T AXIS: 71 DEGREES
T AXIS: 76 DEGREES
T OFFSET: 452 MS
T OFFSET: 473 MS
VENTRICULAR RATE: 49 BPM
VENTRICULAR RATE: 57 BPM
WBC # BLD AUTO: 11.4 X10*3/UL (ref 4.4–11.3)

## 2024-12-30 PROCEDURE — G0378 HOSPITAL OBSERVATION PER HR: HCPCS

## 2024-12-30 PROCEDURE — 93321 DOPPLER ECHO F-UP/LMTD STD: CPT | Performed by: STUDENT IN AN ORGANIZED HEALTH CARE EDUCATION/TRAINING PROGRAM

## 2024-12-30 PROCEDURE — 99232 SBSQ HOSP IP/OBS MODERATE 35: CPT

## 2024-12-30 PROCEDURE — 96376 TX/PRO/DX INJ SAME DRUG ADON: CPT | Mod: 59

## 2024-12-30 PROCEDURE — 2500000002 HC RX 250 W HCPCS SELF ADMINISTERED DRUGS (ALT 637 FOR MEDICARE OP, ALT 636 FOR OP/ED)

## 2024-12-30 PROCEDURE — 96372 THER/PROPH/DIAG INJ SC/IM: CPT

## 2024-12-30 PROCEDURE — 85027 COMPLETE CBC AUTOMATED: CPT

## 2024-12-30 PROCEDURE — 82947 ASSAY GLUCOSE BLOOD QUANT: CPT

## 2024-12-30 PROCEDURE — 93270 REMOTE 30 DAY ECG REV/REPORT: CPT

## 2024-12-30 PROCEDURE — 83735 ASSAY OF MAGNESIUM: CPT

## 2024-12-30 PROCEDURE — 80069 RENAL FUNCTION PANEL: CPT

## 2024-12-30 PROCEDURE — 2500000001 HC RX 250 WO HCPCS SELF ADMINISTERED DRUGS (ALT 637 FOR MEDICARE OP)

## 2024-12-30 PROCEDURE — 97161 PT EVAL LOW COMPLEX 20 MIN: CPT | Mod: GP

## 2024-12-30 PROCEDURE — 96375 TX/PRO/DX INJ NEW DRUG ADDON: CPT | Mod: 59

## 2024-12-30 PROCEDURE — 2500000004 HC RX 250 GENERAL PHARMACY W/ HCPCS (ALT 636 FOR OP/ED): Performed by: STUDENT IN AN ORGANIZED HEALTH CARE EDUCATION/TRAINING PROGRAM

## 2024-12-30 PROCEDURE — 2500000004 HC RX 250 GENERAL PHARMACY W/ HCPCS (ALT 636 FOR OP/ED)

## 2024-12-30 PROCEDURE — 93308 TTE F-UP OR LMTD: CPT | Performed by: STUDENT IN AN ORGANIZED HEALTH CARE EDUCATION/TRAINING PROGRAM

## 2024-12-30 PROCEDURE — 36415 COLL VENOUS BLD VENIPUNCTURE: CPT

## 2024-12-30 PROCEDURE — 2500000002 HC RX 250 W HCPCS SELF ADMINISTERED DRUGS (ALT 637 FOR MEDICARE OP, ALT 636 FOR OP/ED): Performed by: NURSE PRACTITIONER

## 2024-12-30 PROCEDURE — 93321 DOPPLER ECHO F-UP/LMTD STD: CPT

## 2024-12-30 RX ORDER — ONDANSETRON HYDROCHLORIDE 2 MG/ML
4 INJECTION, SOLUTION INTRAVENOUS EVERY 8 HOURS PRN
Status: DISCONTINUED | OUTPATIENT
Start: 2024-12-30 | End: 2024-12-31 | Stop reason: HOSPADM

## 2024-12-30 RX ORDER — FUROSEMIDE 10 MG/ML
20 INJECTION INTRAMUSCULAR; INTRAVENOUS ONCE
Status: COMPLETED | OUTPATIENT
Start: 2024-12-30 | End: 2024-12-30

## 2024-12-30 RX ORDER — ACETAMINOPHEN 500 MG
5 TABLET ORAL NIGHTLY PRN
Status: DISCONTINUED | OUTPATIENT
Start: 2024-12-30 | End: 2024-12-31 | Stop reason: HOSPADM

## 2024-12-30 RX ORDER — ONDANSETRON 4 MG/1
4 TABLET, ORALLY DISINTEGRATING ORAL EVERY 8 HOURS PRN
Qty: 20 TABLET | Refills: 0 | Status: SHIPPED | OUTPATIENT
Start: 2024-12-30

## 2024-12-30 RX ADMIN — ATORVASTATIN CALCIUM 80 MG: 80 TABLET, FILM COATED ORAL at 20:46

## 2024-12-30 RX ADMIN — INSULIN LISPRO 4 UNITS: 100 INJECTION, SOLUTION INTRAVENOUS; SUBCUTANEOUS at 11:57

## 2024-12-30 RX ADMIN — INSULIN LISPRO 2 UNITS: 100 INJECTION, SOLUTION INTRAVENOUS; SUBCUTANEOUS at 08:05

## 2024-12-30 RX ADMIN — FUROSEMIDE 20 MG: 10 INJECTION, SOLUTION INTRAMUSCULAR; INTRAVENOUS at 16:22

## 2024-12-30 RX ADMIN — CLOPIDOGREL BISULFATE 75 MG: 75 TABLET ORAL at 08:04

## 2024-12-30 RX ADMIN — ONDANSETRON 4 MG: 2 INJECTION INTRAMUSCULAR; INTRAVENOUS at 21:11

## 2024-12-30 RX ADMIN — INSULIN LISPRO 4 UNITS: 100 INJECTION, SOLUTION INTRAVENOUS; SUBCUTANEOUS at 18:06

## 2024-12-30 RX ADMIN — ONDANSETRON 4 MG: 2 INJECTION INTRAMUSCULAR; INTRAVENOUS at 10:12

## 2024-12-30 RX ADMIN — Medication 5 MG: at 20:46

## 2024-12-30 RX ADMIN — ASPIRIN 81 MG 81 MG: 81 TABLET ORAL at 08:04

## 2024-12-30 RX ADMIN — ENOXAPARIN SODIUM 40 MG: 40 INJECTION SUBCUTANEOUS at 15:30

## 2024-12-30 RX ADMIN — METFORMIN HYDROCHLORIDE 1000 MG: 500 TABLET ORAL at 16:22

## 2024-12-30 ASSESSMENT — COGNITIVE AND FUNCTIONAL STATUS - GENERAL
WALKING IN HOSPITAL ROOM: A LITTLE
CLIMB 3 TO 5 STEPS WITH RAILING: A LITTLE
DRESSING REGULAR LOWER BODY CLOTHING: A LITTLE
DAILY ACTIVITIY SCORE: 23
CLIMB 3 TO 5 STEPS WITH RAILING: A LITTLE
MOBILITY SCORE: 23
MOBILITY SCORE: 22

## 2024-12-30 ASSESSMENT — PAIN SCALES - GENERAL: PAINLEVEL_OUTOF10: 2

## 2024-12-30 ASSESSMENT — PAIN - FUNCTIONAL ASSESSMENT: PAIN_FUNCTIONAL_ASSESSMENT: 0-10

## 2024-12-30 NOTE — PROGRESS NOTES
12/30/24 1024   Discharge Planning   Living Arrangements Spouse/significant other   Support Systems Spouse/significant other;Children   Assistance Needed minimal assistance, no assistive devices used, not driving yet after surgery.   Type of Residence Private residence   Number of Stairs to Enter Residence 3   Do you have animals or pets at home? Yes   Type of Animals or Pets 2 cats, 1 dog   Home or Post Acute Services In home services   Type of Home Care Services Home PT   Expected Discharge Disposition Home Health   Does the patient need discharge transport arranged? No   Patient Choice   Provider Choice list and CMS website (https://medicare.gov/care-compare#search) for post-acute Quality and Resource Measure Data were provided and reviewed with: Other (Comment)  (plans to resume with [facility])   Patient / Family choosing to utilize agency / facility established prior to hospitalization Yes  (plans to resume with [facility])   Stroke Family Assessment   Stroke Family Assessment Needed No   Intensity of Service   Intensity of Service 0-30 min     This TCC met with patient at bedside, introduced self and explained role.  Demographic information and insurance verified.  Patient is from home with spouse, needs minimal assistance with adls, no assistive devices, not driving yet since surgery.  Patient endorses having diabetes, checking blood sugar twice daily.  Patient indicated he would like new equipment.  Patient advised to discuss with provider.  Nursing updated.  Denies SW needs at this time.  Patient plans to return home at discharge, and resume with [facility].  Patient's spouse is planned to provide transportation home.  Care Transitions will continue to follow.

## 2024-12-30 NOTE — CONSULTS
"Nader Zazueta is a 59 y.o. male on day 0 of admission presenting with Syncope with normal neurologic examination.     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  Event was witnessed by patient's wife at bedside who noted that patient was sitting in his recliner and noted that he suddenly fell backwards and \"blacked out\" for which she was concerned might have been a seizure versus stroke and therefore called 911.  Patient denies any confusion after the event noted that he was feeling dizzy/lightheaded which she thinks could have prompted the current presentation.  Patient denies any fevers, chills, nausea, vomiting, chest pain, shortness of breath.  Of note patient had a CABG 12/16 and at that time clinical course was complicated by postoperative hypotension.  He was discharged with Lopressor, hydralazine, lisinopril, amlodipine at that time which may have contributed to his syncopal event.     In the emergency department, patient was hypertensive and bradycardic.  EKG showed sinus bradycardia.  Due to patient's syncopal episode as well as recent cardiac surgery patient will be admitted to general medical team for further evaluation.  I was asked to admit the patient for Dr. Mustapha gunter.     Past Medical History: As mentioned above  Surgical History: As mentioned above  Social History: Lives with his wife.  Denies smoking, alcohol use, illicit drug use  Family History: Maternal history of diabetes, paternal history of COPD     ROS  Complete 12 point review of systems was obtained.  Negative aside from HPI.     OBJECTIVE  Vitals       Vitals:     12/29/24 0910   BP: 97/61   BP Location: Right arm   Patient Position: Lying   Pulse: 50   Resp: 14   Temp: 36 °C (96.8 °F)   TempSrc: Temporal   SpO2: 96%   Weight: 94 kg (207 lb 3.7 " "oz)   Height: 1.753 m (5' 9.02\")              Results from last 7 days   Lab Units 12/29/24  0946   WBC AUTO x10*3/uL 12.6*   HEMOGLOBIN g/dL 11.9*   HEMATOCRIT % 34.0*   PLATELETS AUTO x10*3/uL 460*   NEUTROS PCT AUTO % 69.7   LYMPHS PCT AUTO % 19.9   MONOS PCT AUTO % 5.7   EOS PCT AUTO % 2.9           Results from last 7 days   Lab Units 12/29/24  0946   SODIUM mmol/L 130*   POTASSIUM mmol/L 5.6*   CHLORIDE mmol/L 100   CO2 mmol/L 22   BUN mg/dL 32*   CREATININE mg/dL 1.29   CALCIUM mg/dL 9.7   PROTEIN TOTAL g/dL 7.3   BILIRUBIN TOTAL mg/dL 0.5   ALK PHOS U/L 68   ALT U/L 21   AST U/L 21   GLUCOSE mg/dL 218*         Physical Exam  Physical Exam:  General:  Pleasant and cooperative. No apparent distress.  HEENT:  Normocephalic, atraumatic, mucus membranes moist.   Neck:  Trachea midline.  No JVD.    Chest:  Clear to auscultation bilaterally. No wheezes, rales, or rhonchi.  Well-healing postsurgical scar  CV:  Regular rate and rhythm.  Positive S1/S2.   Abdomen: Bowel sounds present in all four quadrants, abdomen is soft, non-tender, non-distended.  Extremities:  No lower extremity edema or cyanosis.   Neurological:  AAOx3. No focal deficits.  Skin:  Warm and dry.         ASSESSMENT & PLAN     Patient is a 59-year-old male with medical history significant for hypertension, hyperlipidemia, CKD (solitary kidney secondary to renal cell carcinoma), status post CABG x 4 12/16 presenting to [facility] due to concern for syncopal event.      Hypotension with Syncope  Bradycardia suspect secondary to medications versus bradyarrhythmia  Concern for orthostatic hypotension  S/P CABG 12/16  Hx of HTN   HLD     PLAN:  -Obtain orthostatic vitals  -Patient on telemetry.  Cardiology consulted  -Given patient's bradycardic and hypotensive episodes we will hold antihypertensive medications at this time and beta-blocker until evaluated by cardiology team  -Encourage oral fluid intake  -Obtain orthostatic vitals        Type 2 DM   "   PLAN:  -Insulin sliding scale while patient is currently inpatient.  Maintain blood glucose between 140 and 180     Chronic Conditions     CKD with Solitary kidney 2/2 RCC        DVT ppx: Lovenox  GI ppx: Protonix  IVF: None  Diet: Cardiac  Consults: Cardiology  CODE STATUS: Full code            Mustapha Singer DO

## 2024-12-30 NOTE — PROGRESS NOTES
Physical Therapy    Physical Therapy Evaluation    Patient Name: Nader Zazueta  MRN: [id number]  Department: [facility]  Room: [address] [place] [address]  Today's Date: 12/30/2024   Time Calculation  Start Time: 1117  Stop Time: 1130  Time Calculation (min): 13 min    Assessment/Plan   PT Assessment  PT Assessment Results: Decreased mobility, Impaired balance  Evaluation/Treatment Tolerance: Patient tolerated treatment well  Strengths: Attitude of self, Housing layout  Barriers to Participation: Support of Caregivers  End of Session Patient Position: Bed, 2 rail up, Alarm off, not on at start of session (family in room)  IP OR SWING BED PT PLAN  Inpatient or Swing Bed: Inpatient  PT Plan  Treatment/Interventions: Stair training, Gait training  PT Plan: Ongoing PT  PT Frequency: 2 times per week  PT Discharge Recommendations: Low intensity level of continued care (Pt could benefit from HH vs OP PT for mobility progression as needed)  Equipment Recommended upon Discharge: Straight cane (if initially needed for added stability at home)  PT Recommended Transfer Status: Stand by assist  PT - OK to Discharge: Yes (When medically cleared)    Subjective   General Visit Information:  General  Reason for Referral: Pt eval/tx;impaired mobility. Pt had syncopal event at home.  Referred By: Dr. Alba  Past Medical History Relevant to Rehab: hypertension, hyperlipidemia, CKD (solitary kidney secondary to renal cell carcinoma), status post CABG x 4 12/16  Family/Caregiver Present: Yes (Wife, 6 yo dtr and mother in law)  Prior to Session Communication: Bedside nurse  Patient Position Received: Bed, 2 rail up, Alarm off, not on at start of session  General Comment: Pt is pleasant and cooperative with therapy.  Home Living:  Home Living  Home Living Comments: Pt lives with wife and 6yo dtr. Wife works at  [employer] and is off til wednesday and then mother in law can stay over with pt remainder of this week. Pt has 1+2 DIVYA w/ HR to first floor setup  with bed/bath, also has recliner. Walk in tub with grab bars and hand held shower hose. Owns shower chair. Basement laundry.  Prior Level of Function:  Prior Function Per Pt/Caregiver Report  Prior Function Comments: IND I/ADLs, IND with ambulation w/o AD, working in construction, driving.  Precautions:  Precautions  Precautions Comment: CABG 12/16, orthostatics, fall               Objective   Pain:  Pain Assessment  Pain Assessment:  (Pt did not rate but c/o back stiffness which he experiences when laying too long since back sx in 80s.)  Cognition:  Cognition  Overall Cognitive Status: Within Functional Limits    General Assessments:     Activity Tolerance  Endurance: Endurance does not limit participation in activity    Sensation  Sensation Comment: Chronic RLE N/T from back sx in 80s    Strength  Strength Comments: BLE strength and AROM grossly WFL based on observed functional mobility  Functional Assessments:  Bed Mobility  Bed Mobility:  (Mod I for supine<>sit transfers in bed)    Transfers  Transfer:  (Mod I for sit<>stand transfers at EOB w/o AD. Recommended pt make sure he's not feeling dizzy prior to ambulating away from bed.)    Ambulation/Gait Training  Ambulation/Gait Training Performed:  (Pt amb ~50 ft into hallway w/o AD at SBA, demo slow, reciprocal, guarded gait with cues to relax body and allow for reciprocal arm swing. No overt LOB noted. Pt denied dizziness. Pt will trial cane at home if initially needed.)      Outcome Measures:  [facility] Basic Mobility  Turning from your back to your side while in a flat bed without using bedrails: None  Moving from lying on your back to sitting on the side of a flat bed without using bedrails: None  Moving to and from bed to chair (including a wheelchair): None  Standing up from a chair using your arms (e.g. wheelchair or bedside chair): None  To walk in hospital room: A little  Climbing 3-5 steps with railing: A little  Basic Mobility - Total Score:  22    Encounter Problems       Encounter Problems (Active)       Mobility       STG - Patient will ambulate 100 ft at Mod I (Progressing)       Start:  12/30/24    Expected End:  01/06/25            STG - Patient will negotiate 3 steps using single HR and CGA (Progressing)       Start:  12/30/24    Expected End:  01/06/25                            Education Documentation  Home Exercise Program, taught by Luis Kim PT at 12/30/2024 12:28 PM.  Learner: Patient, Family  Readiness: Acceptance  Method: Explanation, Demonstration, Teach-back  Response: Verbalizes Understanding, Needs Reinforcement    Precautions, taught by Luis Kim PT at 12/30/2024 12:28 PM.  Learner: Patient, Family  Readiness: Acceptance  Method: Explanation, Demonstration, Teach-back  Response: Verbalizes Understanding, Needs Reinforcement    Mobility Training, taught by Luis Kim PT at 12/30/2024 12:28 PM.  Learner: Patient, Family  Readiness: Acceptance  Method: Explanation, Demonstration, Teach-back  Response: Verbalizes Understanding, Needs Reinforcement    Education Comments  No comments found.

## 2024-12-30 NOTE — PROGRESS NOTES
CARDIAC SURGERY DAILY PROGRESS NOTE    Nader Zazueta is a 59 y.o. male, with a PMH of CAD (NSTEMI, s/p CABG x4 on 12/16), HTN, HLD, and CKD (solitary kidney secondary to RCC), who presented to [facility] on 12/29/2024 for syncopal episode at home.     ED course: On arrival, patient's /102 (now 158/86), heart rate 55, respirations 18, afebrile, saturating 98% on room air. Labs and imaging form, revealing glucose 210, sodium 135, Cr 1.41 and BUN 25 (1.53 and 25 on 8/7/23), initial troponin 27, delta troponin 77. No white count or anemia. Platelets WNL. Flu A/B/COVID-negative. Chest x-ray shows no acute cardiopulmonary process. EKG, per ED physician, sinus bradycardia with a rate of 59 bpm, normal NC, QRS, normal QTc duration. No ST segment or T wave pathology. Good R wave progression throughout the precordial leads. Patient was administered 324 mg aspirin in the ambulance. Patient started on heparin drip, given Nitropaste and nitro tablet in the ED.      He was seen in consult by cardiologist, Dr. Thor Keyes and went for a [facility]. His coronary anatomy revealed diffuse MV-CAD with a  of the mid LAD (with L to L collateralizations). Cardiac surgery was consulted for bypass grafting evaluation. Ultimately, the patient underwent a CABG x4 on 12/16/24 with Dr. Anjali Mendenhall. He had an uneventful post-op course, and was discharged to home on 12/20.      While at home, the patient was with a usual recovery; however on 12/29 in the AM, he experienced a syncopal episode with LOC. EMS was called and the patient was transferred to [facility].      In the ED, the patient was found to be hypotensive & bradycardic. ECG was without sings of ischemia. Given the patient's recent bypass grafting, will admit to cardiac surgery under observation.         Interval History:   No acute events, or pre-syncopal episodes.     SUBJECTIVE:  Intermittent episodes of post operative surgical pain, well controlled.     Objective  "  /78   Pulse 63   Temp 35.9 °C (96.6 °F)   Resp 18   Ht 1.753 m (5' 9.02\")   Wt 97 kg (213 lb 12.8 oz)   SpO2 95%   BMI 31.56 kg/m²   0-10 (Numeric) Pain Score: 0 - No pain   3 Day Weight Change: Unable to Calculate    Intake and Output    Intake/Output Summary (Last 24 hours) at 12/30/2024 1748  Last data filed at 12/30/2024 0300  Gross per 24 hour   Intake 60 ml   Output 900 ml   Net -840 ml       Physical Exam  Physical Exam  Vitals reviewed.   Constitutional:       Appearance: Normal appearance. He is normal weight.   Cardiovascular:      Rate and Rhythm: Normal rate and regular rhythm.      Comments: SB 50-60s  Pulmonary:      Effort: Pulmonary effort is normal. No respiratory distress.      Breath sounds: Normal breath sounds.   Abdominal:      General: There is no distension.      Palpations: Abdomen is soft.   Skin:     General: Skin is warm and dry.      Capillary Refill: Capillary refill takes less than 2 seconds.   Neurological:      General: No focal deficit present.      Mental Status: He is alert.           Medications  Scheduled medications  [Held by provider] amLODIPine, 10 mg, oral, Daily  aspirin, 81 mg, oral, Daily  atorvastatin, 80 mg, oral, Nightly  clopidogrel, 75 mg, oral, Daily  enoxaparin, 40 mg, subcutaneous, q24h  [Held by provider] hydrALAZINE, 25 mg, oral, TID  insulin lispro, 0-10 Units, subcutaneous, TID AC  [Held by provider] lisinopril, 5 mg, oral, Daily  metFORMIN, 1,000 mg, oral, BID  [Held by provider] metoprolol tartrate, 50 mg, oral, BID  psyllium, 1 packet, oral, Daily    Continuous medications   PRN medications  PRN medications: acetaminophen **OR** acetaminophen **OR** acetaminophen, acetaminophen **OR** acetaminophen **OR** acetaminophen, calcium carbonate, ondansetron    Labs  Results for orders placed or performed during the hospital encounter of 12/29/24 (from the past 24 hours)   POCT GLUCOSE   Result Value Ref Range    POCT Glucose 186 (H) 74 - 99 mg/dL "   POCT GLUCOSE   Result Value Ref Range    POCT Glucose 271 (H) 74 - 99 mg/dL   Basic Metabolic Panel   Result Value Ref Range    Glucose 262 (H) 74 - 99 mg/dL    Sodium 134 (L) 136 - 145 mmol/L    Potassium 5.1 3.5 - 5.3 mmol/L    Chloride 100 98 - 107 mmol/L    Bicarbonate 26 21 - 32 mmol/L    Anion Gap 13 10 - 20 mmol/L    Urea Nitrogen 38 (H) 6 - 23 mg/dL    Creatinine 1.43 (H) 0.50 - 1.30 mg/dL    eGFR 57 (L) >60 mL/min/1.73m*2    Calcium 9.2 8.6 - 10.3 mg/dL   CBC   Result Value Ref Range    WBC 11.4 (H) 4.4 - 11.3 x10*3/uL    nRBC 0.0 0.0 - 0.0 /100 WBCs    RBC 3.55 (L) 4.50 - 5.90 x10*6/uL    Hemoglobin 11.3 (L) 13.5 - 17.5 g/dL    Hematocrit 32.9 (L) 41.0 - 52.0 %    MCV 93 80 - 100 fL    MCH 31.8 26.0 - 34.0 pg    MCHC 34.3 32.0 - 36.0 g/dL    RDW 12.0 11.5 - 14.5 %    Platelets 447 150 - 450 x10*3/uL   Magnesium   Result Value Ref Range    Magnesium 1.80 1.60 - 2.40 mg/dL   Renal Function Panel   Result Value Ref Range    Glucose 174 (H) 74 - 99 mg/dL    Sodium 133 (L) 136 - 145 mmol/L    Potassium 4.3 3.5 - 5.3 mmol/L    Chloride 102 98 - 107 mmol/L    Bicarbonate 23 21 - 32 mmol/L    Anion Gap 12 10 - 20 mmol/L    Urea Nitrogen 32 (H) 6 - 23 mg/dL    Creatinine 1.21 0.50 - 1.30 mg/dL    eGFR 69 >60 mL/min/1.73m*2    Calcium 9.4 8.6 - 10.3 mg/dL    Phosphorus 3.8 2.5 - 4.9 mg/dL    Albumin 3.7 3.4 - 5.0 g/dL   POCT GLUCOSE   Result Value Ref Range    POCT Glucose 158 (H) 74 - 99 mg/dL   POCT GLUCOSE   Result Value Ref Range    POCT Glucose 212 (H) 74 - 99 mg/dL   Holter Or Event Cardiac Monitor   Result Value Ref Range    BSA 2.14 m2   Transthoracic Echo (TTE) Complete   Result Value Ref Range    BSA 2.14 m2   POCT GLUCOSE   Result Value Ref Range    POCT Glucose 214 (H) 74 - 99 mg/dL               IMAGING/ DIAGNOSTIC TESTING:  I have personally reviewed the following test result(s):                  Assessment & Plan        Bradycardia     Syncopal Episode  - Patient had syncopal episode at home on  12/29 with associated LOC  --> Eval in the ED revealed patient was bradycardic & Hypotensive   - Suspect component of dehydration in conjunction with his antihypertensive regimen   - Hold antihypertensives  - Repeat TTE         MV-CAD  - Patient admitted to [facility] on 12/12/24 with anginal complaints   --> HS Troponin I: 27 -> 77 -> 4446  - [facility] on 12/12 revealed diffuse L system CAD with coronary anatomy favoring surgical revascularization   - S/p CABG x 4 on 12/16/24 with Dr. Anjali Mendenhall   --> LIMA-LAD, Free INDIGO - D1, SVG-OM1, SVG-rPDA  - Continue on ASA 81mg and Atorvastatin 80  - Continue on clopidogrel 75mg every day for 1 year given NSTEMI  --> Started on 12/18  - Hold beta blockade for now-> Do not resume at discharge    Syncopal episode  - Do not resume beta blocker  - TTE -> waiting on results  - Discharge with event monitor to follow up with cardiologist        Mediastinal Incision  - SUNDAY, well approximated, C/D/I         Acute Postoperative Respiratory Insufficiency   - As expected following CABG with post-op atelectasis  - Current O2 Requirements: RA  - Coughing and deep breathing exercises with Incentive spirometry        Risk for Fluid Volume Overload  - Pre-Op Weight: 99.2 kg     12/17: 105 kg (net positive 3742mL) --> IV furosemide 20mgBID       12/18: 99.8 kg ---> IV lasix 20 mg BID     12/19: 101 kg (net positive 1362mL) --> IV furosemide 20mg BID + metolazone 2.5mg x1      12/20: 95.4kg (unclear I/Os due to not documenting urine output)     12/29: 94 kg      12/30: 97 kg -> IV Lasix 20 mg  - Strict I& Os and daily weights          Acute Post-Op Blood-Loss Anemia  - Pre-Op H/H: 16/44.7     12/17: 16.6/31.2     12/18: 13.3/39.3     12/19: 14.3/41.2     12/20: 14.6 / 42.0     12/30: 11.3/32.9  - Daily CBC while in hospital        Post-Op Leukocytosis; Resolved  - Pre-Op WBC: 12.3     12/17:11.7     12/18: 15.0     12/19:14.8     12/20: 15.8 (respiratory panel + UA negative)     12/29: 12.6      12/30: 11.4  - Afebrile  - Daily cbc while in hospital         Hyperkalemia; Resolved  - Follow up daily RFP        Essential HTN  - Home Medications: Amlodipine 10mg, lisinopril 10mg  - Hold antihypertensives         Type 2 DM  - Home Medications: Metformin 1g BID, glimepiride 2 mg BID  - HbA1c: 8.7  - Goal for BG <150 in the post-operative period  - SSI   - Resume home metformin        Dyslipidemia  - Home Medications: None  - Started on high intensity statin therapy this admission   - Continue on statin therapy as above      Prophylaxis  - GI: PPI  - DVT: Rolf-Hose & enoxaparin   - PT/OT      Disposition  - Stepdown unit      Patient seen and plan discussed with Dr. Anjali Mendenhall.        VTE Prophylaxis: SCDs/TEDs, ambulation, SQ heparin  Code Status: Full Code    Dispo  - PT/OT recs   - Will resume home health at dischargbe  - Anticipate discharge 24 hours, pending TTE results          Evi Bahena, APRN-CNP  Cardiac Surgery BRAIN  [facility]      12/30/2024  5:48 PM

## 2024-12-30 NOTE — CARE PLAN
Problem: Pain - Adult  Goal: Verbalizes/displays adequate comfort level or baseline comfort level  Outcome: Progressing     Problem: Safety - Adult  Goal: Free from fall injury  Outcome: Progressing   The patient's goals for the shift include      The clinical goals for the shift include pt will have no syncopal episodes during the shift    Over the shift, the patient did not make progress toward the following goals. Barriers to progression include . Recommendations to address these barriers include .

## 2024-12-30 NOTE — H&P
Cardiac Surgery   History of Presenting Illness       History Of Present Illness  Nader Zazueta is a 59 y.o. male with a PMH significant for CAD (NSTEMI, s/p CABG x4 on 12/16), HTN, HLD, and CKD (solitary kidney secondary to RCC)  who presents to [facility] on 12/29/24 after a syncopal episode at home.     ED course: On arrival, patient's /102 (now 158/86), heart rate 55, respirations 18, afebrile, saturating 98% on room air. Labs and imaging form, revealing glucose 210, sodium 135, Cr 1.41 and BUN 25 (1.53 and 25 on 8/7/23), initial troponin 27, delta troponin 77. No white count or anemia. Platelets WNL. Flu A/B/COVID-negative. Chest x-ray shows no acute cardiopulmonary process. EKG, per ED physician, sinus bradycardia with a rate of 59 bpm, normal WI, QRS, normal QTc duration. No ST segment or T wave pathology. Good R wave progression throughout the precordial leads. Patient was administered 324 mg aspirin in the ambulance. Patient started on heparin drip, given Nitropaste and nitro tablet in the ED.     He was seen in consult by cardiologist, Dr. Thor Keyes and went for a [facility]. His coronary anatomy revealed diffuse MV-CAD with a  of the mid LAD (with L to L collateralizations). Cardiac surgery was consulted for bypass grafting evaluation. Ultimately, the patient underwent a CABG x4 on 12/16/24 with Dr. Anjali Mendenhall. He had an uneventful post-op course, and was discharged to home on 12/20.     While at home, the patient was with a usual recovery; however on 12/29 in the AM, he experienced a syncopal episode with LOC. EMS was called and the patient was transferred to [facility].     In the ED, the patient was found to be hypotensive & bradycardic. ECG was without sings of ischemia. Given the patient's recent bypass grafting, will admit to cardiac surgery under observation.      Subjective    Past Medical History  He has a past medical history of Incisional hernia without  obstruction or gangrene (06/10/2020), Other specified disorders of kidney and ureter (01/13/2020), Personal history of other diseases of the circulatory system, Personal history of other diseases of the musculoskeletal system and connective tissue, Personal history of other endocrine, nutritional and metabolic disease, Personal history of other specified conditions, Personal history of other specified conditions (05/15/2020), and Right lower quadrant pain (02/07/2020).    Surgical History  He has a past surgical history that includes Other surgical history (08/22/2019); CT guided percutaneous biopsy mediastinum (8/16/2019); CT guided abscess fluid collection drainage visceral Perq (7/21/2021); and Cardiac catheterization (N/A, 12/12/2024).     Social History  He reports that he has never smoked. He has never used smokeless tobacco. He reports that he does not currently use alcohol. He reports that he does not use drugs.    Family History  Family History   Problem Relation Name Age of Onset    Diabetes Mother      COPD Father          Allergies  Patient has no known allergies.    Review of Systems   Constitutional: Negative for decreased appetite and malaise/fatigue.   HENT:  Negative for congestion.    Eyes:  Negative for blurred vision and double vision.   Cardiovascular:  Negative for chest pain, dyspnea on exertion, irregular heartbeat, leg swelling, orthopnea and palpitations.   Respiratory:  Negative for cough and shortness of breath.    Endocrine: Negative for cold intolerance and heat intolerance.   Skin:  Negative for nail changes, poor wound healing and rash.   Musculoskeletal:  Negative for arthritis, muscle cramps, muscle weakness, myalgias and stiffness.   Gastrointestinal:  Negative for bloating, nausea and vomiting.   Genitourinary:  Negative for frequency, hesitancy and urgency.   Neurological:  Negative for dizziness, focal weakness, light-headedness and weakness.   Psychiatric/Behavioral:  Negative  for altered mental status.    Allergic/Immunologic: Negative for environmental allergies.          Objective    Physical Exam  Constitutional:       General: He is not in acute distress.     Appearance: Normal appearance. He is not ill-appearing.   HENT:      Head: Normocephalic and atraumatic.      Nose: Nose normal.      Mouth/Throat:      Mouth: Mucous membranes are moist.      Pharynx: Oropharynx is clear.   Eyes:      Extraocular Movements: Extraocular movements intact.      Conjunctiva/sclera: Conjunctivae normal.      Pupils: Pupils are equal, round, and reactive to light.   Cardiovascular:      Rate and Rhythm: Normal rate and regular rhythm.      Pulses: Normal pulses.      Heart sounds: Normal heart sounds, S1 normal and S2 normal. No murmur heard.     No systolic murmur is present.      No friction rub. No gallop.   Pulmonary:      Effort: Pulmonary effort is normal.      Breath sounds: Normal breath sounds.   Abdominal:      General: Abdomen is flat. Bowel sounds are normal. There is no distension.      Palpations: Abdomen is soft.   Musculoskeletal:         General: Normal range of motion.      Cervical back: Normal range of motion and neck supple.      Right lower leg: No edema.      Left lower leg: No edema.   Skin:     General: Skin is warm and dry.      Capillary Refill: Capillary refill takes less than 2 seconds.      Findings: No lesion.      Nails: There is no clubbing.      Comments: Mediastinal Incision well approximated, C/D/I    Neurological:      General: No focal deficit present.      Mental Status: He is alert and oriented to person, place, and time. Mental status is at baseline.      Cranial Nerves: Cranial nerves 2-12 are intact.      Sensory: Sensation is intact.      Motor: Motor function is intact.   Psychiatric:         Attention and Perception: Attention and perception normal.         Mood and Affect: Mood normal.         Speech: Speech normal.         Behavior: Behavior normal.          Thought Content: Thought content normal.         Cognition and Memory: Cognition and memory normal.         Judgment: Judgment normal.         Home Medications  Current Outpatient Medications   Medication Instructions    acetaminophen (TYLENOL) 650 mg, oral, Every 6 hours PRN    amLODIPine (Norvasc) 10 mg tablet TAKE 1 TABLET (10 MG) BY MOUTH ONCE DAILY.    aspirin 81 mg, oral, Daily    atorvastatin (LIPITOR) 80 mg, oral, Nightly    clopidogrel (PLAVIX) 75 mg, oral, Daily    Dexcom G7  misc Use as instructed    Dexcom G7 Sensor device Place on arm replace every 10 days    gabapentin (NEURONTIN) 100 mg, oral, 2 times daily    hydrALAZINE (APRESOLINE) 25 mg, oral, 3 times daily    lisinopril 5 mg, oral, Daily    metFORMIN (Glucophage) 1,000 mg tablet TAKE 1 TABLET BY MOUTH TWO TIMES A DAY WITH MEALS    methocarbamol (ROBAXIN) 500 mg, oral, 4 times daily    metoprolol tartrate (LOPRESSOR) 50 mg, oral, 2 times daily    oxyCODONE (ROXICODONE) 5 mg, oral, Every 6 hours PRN    semaglutide (Ozempic) 0.25 mg or 0.5 mg (2 mg/3 mL) pen injector Inject 0.25 mg under the skin 1 (one) time per week for 28 days, THEN 0.5 mg 1 (one) time per week.        Inpatient Medications  Scheduled medications  [Held by provider] amLODIPine, 10 mg, oral, Daily  [START ON 12/30/2024] aspirin, 81 mg, oral, Daily  atorvastatin, 80 mg, oral, Nightly  [START ON 12/30/2024] clopidogrel, 75 mg, oral, Daily  enoxaparin, 40 mg, subcutaneous, q24h  [Held by provider] gabapentin, 100 mg, oral, BID  [Held by provider] hydrALAZINE, 25 mg, oral, TID  insulin lispro, 0-10 Units, subcutaneous, TID AC  [Held by provider] lisinopril, 5 mg, oral, Daily  [Held by provider] metFORMIN, 1,000 mg, oral, BID  [Held by provider] methocarbamol, 500 mg, oral, 4x daily  [Held by provider] metoprolol tartrate, 50 mg, oral, BID  psyllium, 1 packet, oral, Daily      Continuous medications     PRN medications  PRN medications: acetaminophen **OR** acetaminophen  **OR** acetaminophen, acetaminophen **OR** acetaminophen **OR** acetaminophen     LDA:        Relevant Results  Vitals  Vitals:    12/29/24 1820   BP: 112/66   Pulse: 58   Resp: 18   Temp: 36.7 °C (98.1 °F)   SpO2: 93%       Lab Review  Results from last 7 days   Lab Units 12/29/24  0946   WBC AUTO x10*3/uL 12.6*   HEMOGLOBIN g/dL 11.9*   HEMATOCRIT % 34.0*   PLATELETS AUTO x10*3/uL 460*     Results from last 7 days   Lab Units 12/29/24  0946   SODIUM mmol/L 130*   POTASSIUM mmol/L 5.6*   CHLORIDE mmol/L 100   CO2 mmol/L 22   BUN mg/dL 32*   CREATININE mg/dL 1.29   CALCIUM mg/dL 9.7   PROTEIN TOTAL g/dL 7.3   BILIRUBIN TOTAL mg/dL 0.5   ALK PHOS U/L 68   ALT U/L 21   AST U/L 21   GLUCOSE mg/dL 218*     Results from last 7 days   Lab Units 12/29/24  0946   MAGNESIUM mg/dL 1.82           I/O:  I/O last 2 completed shifts:  In: 1000 (10.6 mL/kg) [IV Piggyback:1000]  Out: - (0 mL/kg)   Weight: 94 kg         Cardiology Tests     Echo:  Transthoracic echo (TTE) complete 12/12/2024  PHYSICIAN INTERPRETATION:  Left Ventricle: Left ventricular ejection fraction is low normal, by visual estimate at 50-55%. There are no regional left ventricular wall motion abnormalities. The left ventricular cavity size is normal. There is mildly increased septal and normal posterior left ventricular wall thickness. There is left ventricular concentric remodeling. Spectral Doppler shows a normal pattern of left ventricular diastolic filling.  Left Atrium: The left atrium is normal in size.  Right Ventricle: The right ventricle is normal in size. There is normal right ventricular global systolic function.  Right Atrium: The right atrium is normal in size.  Aortic Valve: The aortic valve is trileaflet. There is mild aortic valve cusp calcification. The aortic valve dimensionless index is 0.85. There is no evidence of aortic valve regurgitation. The peak instantaneous gradient of the aortic valve is 11 mmHg. The mean gradient of the aortic valve  is 4 mmHg.  Mitral Valve: The mitral valve is normal in structure. There is trace mitral valve regurgitation.  Tricuspid Valve: The tricuspid valve is structurally normal. There is trace tricuspid regurgitation. The right ventricular systolic pressure is unable to be estimated.  Pulmonic Valve: The pulmonic valve is not well visualized. The pulmonic valve regurgitation was not well visualized.  Pericardium: There is no pericardial effusion noted.  Aorta: The aortic root is normal.        CONCLUSIONS:   1. Left ventricular ejection fraction is low normal, by visual estimate at 50-55%.     Ejection Fractions:            EF   Date/Time Value Ref Range Status   12/12/2024 11:07 AM 53 %        Cath:  Cardiac Catheterization Procedure 12/12/2024  Coronary Angiography:  The coronary circulation is right dominant.     Coronary Angiography Comments:  60-year-old man with history of hypertension, hyperlipidemia, diabetes presents with non-STEMI. Urgent heart catheter recommended.     Fluoroscopy demonstrated mild calcification of the coronary tree.     Left main: Large vessel. No significant disease.     LAD: Moderate-sized vessel. LAD is occluded mid vessel. The distal half of the LAD fills via left to left collaterals. There is a large first diagonal which has a proximal 75% stenosis. There is a large bifurcating second diagonal which is a 80% stenosis in it.     LCx: Moderate-sized vessel. Mid vessel 95% stenosis. The circumflex then gives rise to a moderate-sized first obtuse marginal which has mild disease. The ongoing circumflex is a very small, rudimentary vessel.     RCA: Large dominant vessel. Mild to moderate diffuse disease. Mid RCA there is a focal 50% stenosis.     Left heart catheterization demonstrates an LVEDP of 15 mmHg. Left ventricular angiography shows hypokinesis of the anterior wall. EF 45 to 50%. No mitral regurgitation. No gradient across aortic valve upon pullback.     CONCLUSIONS:   1. Critical  three-vessel disease including LAD  with left to left collaterals, severe disease in D1 and D2, subtotal occlusion of moderate-sized circumflex.   2. Mild LV dysfunction, EF estimated 45 to 50%.   3. Patient will be referred for surgical revascularization.    Chest Imaging:  XR chest 1 view   Final Result   No acute cardiopulmonary disease..   Signed by José Rubin MD                 Assessment & Plan       Bradycardia     Syncopal Episode  - Patient had syncopal episode at home on 12/29 with associated LOC  --> Eval in the ED revealed patient was bradycardic & Hypotensive   - Suspect component of dehydration in conjunction with his antihypertensive regimen   - Hold antihypertensives  - Repeat TTE        MV-CAD  - Patient admitted to [facility] on 12/12/24 with anginal complaints   --> HS Troponin I: 27 -> 77 -> 4446  - [facility] on 12/12 revealed diffuse L system CAD with coronary anatomy favoring surgical revascularization   - S/p CABG x 4 on 12/16/24 with Dr. Anjali Mendenhall   --> LIMA-LAD, Free INDIGO - D1, SVG-OM1, SVG-rPDA  - Continue on ASA 81mg and Atorvastatin 80  - Continue on clopidogrel 75mg every day for 1 year given NSTEMI  --> Started on 12/18  - Hold beta blockade for now        Mediastinal Incision  - SUNDAY, well approximated, C/D/I         Acute Postoperative Respiratory Insufficiency   - As expected following CABG with post-op atelectasis  - Current O2 Requirements: RA  - Coughing and deep breathing exercises with Incentive spirometry        Risk for Fluid Volume Overload  - Pre-Op Weight: 99.2 kg     12/17: 105 kg (net positive 3742mL) --> IV furosemide 20mgBID       12/18: 99.8 kg ---> IV lasix 20 mg BID     12/19: 101 kg (net positive 1362mL) --> IV furosemide 20mg BID + metolazone 2.5mg x1      12/20: 95.4kg (unclear I/Os due to not documenting urine output)     12/29: 94 kg   - Hold diuretic therapy  --> Patient received 1L Fluid bolus in ED   - Strict I& Os and daily weights          Acute Post-Op  Blood-Loss Anemia  - Pre-Op H/H: 16/44.7     12/17: 16.6/31.2     12/18: 13.3/39.3     12/19: 14.3/41.2     12/20: 14.6 / 42.0  - Monitor h/h in the initial post op period  - Multivitamin/iron tablet   - Daily CBC while in hospital        Post-Op Leukocytosis  - Pre-Op WBC: 12.3     12/17:11.7     12/18: 15.0     12/19:14.8     12/20: 15.8 (respiratory panel + UA negative)     12/29: 12.6  - Afebrile  - Daily cbc while in hospital        Hyperkalemia  - Initial K 5.6  - Follow up BMP         Essential HTN  - Home Medications: Amlodipine 10mg, lisinopril 10mg  - Hold antihypertensives         Type 2 DM  - Home Medications: Metformin 1g BID, glimepiride 2 mg BID  - HbA1c: 8.7  - Goal for BG <150 in the post-operative period  - SSI         Dyslipidemia  - Home Medications: None  - Started on high intensity statin therapy this admission   - Continue on statin therapy as above     Prophylaxis  - GI: PPI  - DVT: Rolf-Hose & enoxaparin   - PT/OT     Disposition  - Stepdown unit     Patient seen and plan discussed with Dr. Anjali Mendenhall.        LILLIAN Estrada-CNP

## 2024-12-31 ENCOUNTER — PHARMACY VISIT (OUTPATIENT)
Dept: PHARMACY | Facility: CLINIC | Age: 59
End: 2024-12-31
Payer: COMMERCIAL

## 2024-12-31 VITALS
HEIGHT: 69 IN | TEMPERATURE: 96.8 F | DIASTOLIC BLOOD PRESSURE: 78 MMHG | HEART RATE: 67 BPM | WEIGHT: 213.8 LBS | RESPIRATION RATE: 18 BRPM | BODY MASS INDEX: 31.67 KG/M2 | SYSTOLIC BLOOD PRESSURE: 151 MMHG | OXYGEN SATURATION: 94 %

## 2024-12-31 PROBLEM — R55 SYNCOPE WITH NORMAL NEUROLOGIC EXAMINATION: Status: RESOLVED | Noted: 2024-12-29 | Resolved: 2024-12-31

## 2024-12-31 PROBLEM — R07.9 CHEST PAIN: Status: RESOLVED | Noted: 2024-12-29 | Resolved: 2024-12-31

## 2024-12-31 LAB
ALBUMIN SERPL BCP-MCNC: 3.9 G/DL (ref 3.4–5)
ANION GAP SERPL CALC-SCNC: 15 MMOL/L (ref 10–20)
BUN SERPL-MCNC: 31 MG/DL (ref 6–23)
CALCIUM SERPL-MCNC: 9.5 MG/DL (ref 8.6–10.3)
CHLORIDE SERPL-SCNC: 100 MMOL/L (ref 98–107)
CO2 SERPL-SCNC: 23 MMOL/L (ref 21–32)
CREAT SERPL-MCNC: 1.25 MG/DL (ref 0.5–1.3)
EGFRCR SERPLBLD CKD-EPI 2021: 67 ML/MIN/1.73M*2
EJECTION FRACTION APICAL 4 CHAMBER: 63.9
EJECTION FRACTION: 55 %
ERYTHROCYTE [DISTWIDTH] IN BLOOD BY AUTOMATED COUNT: 12.1 % (ref 11.5–14.5)
GLUCOSE BLD MANUAL STRIP-MCNC: 178 MG/DL (ref 74–99)
GLUCOSE BLD MANUAL STRIP-MCNC: 184 MG/DL (ref 74–99)
GLUCOSE SERPL-MCNC: 188 MG/DL (ref 74–99)
HCT VFR BLD AUTO: 35.8 % (ref 41–52)
HGB BLD-MCNC: 12.2 G/DL (ref 13.5–17.5)
LEFT VENTRICLE INTERNAL DIMENSION DIASTOLE: 5.2 CM (ref 3.5–6)
MAGNESIUM SERPL-MCNC: 1.61 MG/DL (ref 1.6–2.4)
MCH RBC QN AUTO: 31.5 PG (ref 26–34)
MCHC RBC AUTO-ENTMCNC: 34.1 G/DL (ref 32–36)
MCV RBC AUTO: 93 FL (ref 80–100)
NRBC BLD-RTO: 0 /100 WBCS (ref 0–0)
PHOSPHATE SERPL-MCNC: 4 MG/DL (ref 2.5–4.9)
PLATELET # BLD AUTO: 504 X10*3/UL (ref 150–450)
POTASSIUM SERPL-SCNC: 4.5 MMOL/L (ref 3.5–5.3)
RBC # BLD AUTO: 3.87 X10*6/UL (ref 4.5–5.9)
SODIUM SERPL-SCNC: 133 MMOL/L (ref 136–145)
WBC # BLD AUTO: 13.7 X10*3/UL (ref 4.4–11.3)

## 2024-12-31 PROCEDURE — 36415 COLL VENOUS BLD VENIPUNCTURE: CPT

## 2024-12-31 PROCEDURE — 83735 ASSAY OF MAGNESIUM: CPT

## 2024-12-31 PROCEDURE — 2500000004 HC RX 250 GENERAL PHARMACY W/ HCPCS (ALT 636 FOR OP/ED): Performed by: STUDENT IN AN ORGANIZED HEALTH CARE EDUCATION/TRAINING PROGRAM

## 2024-12-31 PROCEDURE — 80069 RENAL FUNCTION PANEL: CPT

## 2024-12-31 PROCEDURE — 2500000001 HC RX 250 WO HCPCS SELF ADMINISTERED DRUGS (ALT 637 FOR MEDICARE OP)

## 2024-12-31 PROCEDURE — 96376 TX/PRO/DX INJ SAME DRUG ADON: CPT

## 2024-12-31 PROCEDURE — 82947 ASSAY GLUCOSE BLOOD QUANT: CPT | Mod: 59

## 2024-12-31 PROCEDURE — G0378 HOSPITAL OBSERVATION PER HR: HCPCS

## 2024-12-31 PROCEDURE — 85027 COMPLETE CBC AUTOMATED: CPT

## 2024-12-31 PROCEDURE — 2500000002 HC RX 250 W HCPCS SELF ADMINISTERED DRUGS (ALT 637 FOR MEDICARE OP, ALT 636 FOR OP/ED)

## 2024-12-31 PROCEDURE — 2500000004 HC RX 250 GENERAL PHARMACY W/ HCPCS (ALT 636 FOR OP/ED)

## 2024-12-31 PROCEDURE — 2500000002 HC RX 250 W HCPCS SELF ADMINISTERED DRUGS (ALT 637 FOR MEDICARE OP, ALT 636 FOR OP/ED): Performed by: NURSE PRACTITIONER

## 2024-12-31 PROCEDURE — RXMED WILLOW AMBULATORY MEDICATION CHARGE

## 2024-12-31 PROCEDURE — 99239 HOSP IP/OBS DSCHRG MGMT >30: CPT

## 2024-12-31 RX ORDER — ACETAMINOPHEN 325 MG/1
650 TABLET ORAL EVERY 4 HOURS PRN
Qty: 30 TABLET | Refills: 0 | Status: SHIPPED | OUTPATIENT
Start: 2024-12-31

## 2024-12-31 RX ORDER — METHOCARBAMOL 500 MG/1
500 TABLET, FILM COATED ORAL 4 TIMES DAILY PRN
Start: 2024-12-31

## 2024-12-31 RX ORDER — FUROSEMIDE 10 MG/ML
20 INJECTION INTRAMUSCULAR; INTRAVENOUS ONCE
Status: COMPLETED | OUTPATIENT
Start: 2024-12-31 | End: 2024-12-31

## 2024-12-31 RX ADMIN — METFORMIN HYDROCHLORIDE 1000 MG: 500 TABLET ORAL at 07:48

## 2024-12-31 RX ADMIN — ASPIRIN 81 MG 81 MG: 81 TABLET ORAL at 08:38

## 2024-12-31 RX ADMIN — INSULIN LISPRO 2 UNITS: 100 INJECTION, SOLUTION INTRAVENOUS; SUBCUTANEOUS at 07:48

## 2024-12-31 RX ADMIN — CLOPIDOGREL BISULFATE 75 MG: 75 TABLET ORAL at 08:37

## 2024-12-31 RX ADMIN — INSULIN LISPRO 2 UNITS: 100 INJECTION, SOLUTION INTRAVENOUS; SUBCUTANEOUS at 11:36

## 2024-12-31 RX ADMIN — FUROSEMIDE 20 MG: 10 INJECTION, SOLUTION INTRAMUSCULAR; INTRAVENOUS at 10:29

## 2024-12-31 RX ADMIN — ONDANSETRON 4 MG: 2 INJECTION INTRAMUSCULAR; INTRAVENOUS at 12:09

## 2024-12-31 ASSESSMENT — PAIN SCALES - GENERAL: PAINLEVEL_OUTOF10: 0 - NO PAIN

## 2024-12-31 ASSESSMENT — COGNITIVE AND FUNCTIONAL STATUS - GENERAL
DAILY ACTIVITIY SCORE: 23
MOBILITY SCORE: 23
DRESSING REGULAR LOWER BODY CLOTHING: A LITTLE
CLIMB 3 TO 5 STEPS WITH RAILING: A LITTLE

## 2024-12-31 NOTE — CARE PLAN
The patient's goals for the shift include      The clinical goals for the shift include safety, comfort and hemodynamic stability      Problem: Pain - Adult  Goal: Verbalizes/displays adequate comfort level or baseline comfort level  Outcome: Progressing     Problem: Safety - Adult  Goal: Free from fall injury  Outcome: Progressing     Problem: Discharge Planning  Goal: Discharge to home or other facility with appropriate resources  Outcome: Progressing     Problem: Chronic Conditions and Co-morbidities  Goal: Patient's chronic conditions and co-morbidity symptoms are monitored and maintained or improved  Outcome: Progressing

## 2024-12-31 NOTE — DISCHARGE SUMMARY
Cardiac Surgery Inpatient Discharge Summary    BRIEF OVERVIEW  Admitting Provider: Jovanni Arriaga, APRN-CNP  Discharge Provider: Anjali Mendenhall Mo*  Primary Care Physician at Discharge: Mustapha Singer [phone]   Cardiologist at Discharge: Dr. Thor Keyes    Admission Date: 12/29/2024     Discharge Date: 12/31/2024    Primary Discharge Diagnosis  Syncope    Discharge Disposition  Home Health Care - New  Code Status at Discharge: Full Code    Active Issues Requiring Follow-up  Issue: Follow up with Dr. Keyes with Event monitor results  Responsible Individual: Dr. Keyes  What is Needed: Call Dr. Barreto to schedule appointment  Follow-up Appointments Arranged:  Requested    Outpatient Follow-Up  Future Appointments   Date Time Provider Department Center   1/2/2025 To Be Determined Tawny Cole [facility]   1/6/2025 To Be Determined Angie Blackwell RN [facility]   1/8/2025 To Be Determined Tawny Cole [facility]   1/9/2025 10:00 AM [address] NURSE [address]   1/14/2025 To Be Determined Lorena العراقي, RODOLFO [facility]   1/17/2025 To Be Determined Angie Blackwell RN [facility]   1/20/2025 To Be Determined Angie Blackwell RN [facility]   1/20/2025 12:00 PM Anjali Alba MD [address]       Test Results Pending at Discharge  Pending Labs       No current pending labs.          DETAILS OF HOSPITAL STAY    Presenting Problem/History of Present Illness  Chest pain [R07.9]  Syncope with normal neurologic examination [R55]  Nader Zazueta is a 59 y.o. male, with a PMH of CAD (NSTEMI, s/p CABG x4 on 12/16), HTN, HLD, and CKD (solitary kidney secondary to RCC), who presented to [facility] on 12/29/2024 for syncopal episode at home.      ED course: On arrival, patient's /102 (now 158/86), heart rate 55, respirations 18, afebrile, saturating 98% on room air. Labs and imaging form, revealing glucose 210, sodium 135, Cr 1.41 and BUN 25 (1.53  and 25 on 8/7/23), initial troponin 27, delta troponin 77. No white count or anemia. Platelets WNL. Flu A/B/COVID-negative. Chest x-ray shows no acute cardiopulmonary process. EKG, per ED physician, sinus bradycardia with a rate of 59 bpm, normal WI, QRS, normal QTc duration. No ST segment or T wave pathology. Good R wave progression throughout the precordial leads. Patient was administered 324 mg aspirin in the ambulance. Patient started on heparin drip, given Nitropaste and nitro tablet in the ED.      He was seen in consult by cardiologist, Dr. Thor Keyes and went for a LH. His coronary anatomy revealed diffuse MV-CAD with a  of the mid LAD (with L to L collateralizations). Cardiac surgery was consulted for bypass grafting evaluation. Ultimately, the patient underwent a CABG x4 on 12/16/24 with Dr. Anjali Mendenhall. He had an uneventful post-op course, and was discharged to home on 12/20.      While at home, the patient was with a usual recovery; however on 12/29 in the AM, he experienced a syncopal episode with LOC. EMS was called and the patient was transferred to [facility].      In the ED, the patient was found to be hypotensive & bradycardic. ECG was without sings of ischemia. Given the patient's recent bypass grafting, will admit to cardiac surgery under observation.     Hospital Course      Assessment & Plan        Bradycardia     Syncopal Episode  - Patient had syncopal episode at home on 12/29 with associated LOC  --> Eval in the ED revealed patient was bradycardic & Hypotensive   - Suspect component of dehydration in conjunction with his antihypertensive regimen   - Hold antihypertensives  - Repeat TTE   - Discharge with Event monitor-> To follow up with Cardiologist        MV-CAD  - Patient admitted to [facility] on 12/12/24 with anginal complaints   --> HS Troponin I: 27 -> 77 -> 4446  - LHC on 12/12 revealed diffuse L system CAD with coronary anatomy favoring surgical revascularization   - S/p CABG x 4 on  12/16/24 with Dr. Anjali Mendenhall   --> LIMA-LAD, Free INDIGO - D1, SVG-OM1, SVG-rPDA  - Continue on ASA 81mg and Atorvastatin 80  - Continue on clopidogrel 75mg every day for 1 year given NSTEMI  --> Started on 12/18  - Hold beta blockade for now-> Do not resume at discharge  - SR 50-60s     Syncopal episode  - Do not resume beta blocker  - TTE -> LV 55% with normal RV  - Discharge with event monitor to follow up with cardiologist        Mediastinal Incision  - SUNDAY, well approximated, C/D/I         Acute Postoperative Respiratory Insufficiency   - As expected following CABG with post-op atelectasis  - Current O2 Requirements: RA  - Coughing and deep breathing exercises with Incentive spirometry        Risk for Fluid Volume Overload  - Pre-Op Weight: 99.2 kg     12/17: 105 kg (net positive 3742mL) --> IV furosemide 20mgBID       12/18: 99.8 kg ---> IV lasix 20 mg BID     12/19: 101 kg (net positive 1362mL) --> IV furosemide 20mg BID + metolazone 2.5mg x1      12/20: 95.4kg (unclear I/Os due to not documenting urine output)     12/29: 94 kg      12/30: 97 kg -> IV Lasix 20 mg  - Strict I& Os and daily weights          Acute Post-Op Blood-Loss Anemia  - Pre-Op H/H: 16/44.7     12/17: 16.6/31.2     12/18: 13.3/39.3     12/19: 14.3/41.2     12/20: 14.6 / 42.0     12/30: 11.3/32.9  - Daily CBC while in hospital        Post-Op Leukocytosis; Resolved  - Pre-Op WBC: 12.3     12/17:11.7     12/18: 15.0     12/19:14.8     12/20: 15.8 (respiratory panel + UA negative)     12/29: 12.6     12/30: 11.4  - Afebrile  - Daily cbc while in hospital         Hyperkalemia; Resolved  - Follow up daily RFP        Essential HTN  - Home Medications: Amlodipine 10mg, lisinopril 10mg  - Hold antihypertensives         Type 2 DM  - Home Medications: Metformin 1g BID, glimepiride 2 mg BID  - HbA1c: 8.7  - Goal for BG <150 in the post-operative period  - SSI   - Resume home metformin        Dyslipidemia  - Home Medications: None  - Started on high  intensity statin therapy this admission   - Continue on statin therapy as above           Physical Exam at Discharge  Discharge Condition: good  Heart Rate: 67  Resp: 18  BP: 151/78  Temp: 36 °C (96.8 °F)  Weight: 97 kg (213 lb 12.8 oz)  Physical Exam  Constitutional:       Appearance: Normal appearance. He is normal weight.   Cardiovascular:      Rate and Rhythm: Normal rate and regular rhythm.   Pulmonary:      Effort: Pulmonary effort is normal. No respiratory distress.      Breath sounds: Normal breath sounds.   Abdominal:      General: There is no distension.      Palpations: Abdomen is soft.   Musculoskeletal:      Right lower leg: No edema.      Left lower leg: No edema.   Skin:     General: Skin is warm and dry.      Capillary Refill: Capillary refill takes less than 2 seconds.   Neurological:      General: No focal deficit present.      Mental Status: He is alert and oriented to person, place, and time. Mental status is at baseline.         Discharge Medication List     Medication List      START taking these medications     ondansetron ODT 4 mg disintegrating tablet; Commonly known as:   Zofran-ODT; Dissolve 1 tablet (4 mg) in the mouth every 8 hours if needed   for nausea or vomiting.     CONTINUE taking these medications     Dexcom G7  misc; Generic drug: blood-glucose meter,continuous;   Use as instructed   Dexcom G7 Sensor device; Generic drug: blood-glucose sensor; Place on   arm replace every 10 days     ASK your doctor about these medications     acetaminophen 325 mg tablet; Commonly known as: TylenoL; Take 2 tablets   (650 mg) by mouth every 6 hours if needed for mild pain (1 - 3).   amLODIPine 10 mg tablet; Commonly known as: Norvasc; TAKE 1 TABLET (10   MG) BY MOUTH ONCE DAILY.   aspirin 81 mg chewable tablet; Chew 1 tablet (81 mg) once daily.   atorvastatin 80 mg tablet; Commonly known as: Lipitor; Take 1 tablet (80   mg) by mouth once daily at bedtime.   clopidogrel 75 mg tablet;  Commonly known as: Plavix; Take 1 tablet (75   mg) by mouth once daily.   gabapentin 100 mg capsule; Commonly known as: Neurontin; Take 1 capsule   (100 mg) by mouth 2 times a day for 14 days.   hydrALAZINE 25 mg tablet; Commonly known as: Apresoline; Take 1 tablet   (25 mg) by mouth 3 times a day.   lisinopril 5 mg tablet; Take 1 tablet (5 mg) by mouth once daily.   metFORMIN 1,000 mg tablet; Commonly known as: Glucophage; TAKE 1 TABLET   BY MOUTH TWO TIMES A DAY WITH MEALS   methocarbamol 500 mg tablet; Commonly known as: Robaxin; Take 1 tablet   (500 mg) by mouth 4 times a day.   metoprolol tartrate 50 mg tablet; Commonly known as: Lopressor; Take 1   tablet by mouth 2 times a day.   oxyCODONE 5 mg immediate release tablet; Commonly known as: Roxicodone;   Take 1 tablet (5 mg) by mouth every 6 hours if needed for moderate pain (4   - 6) or severe pain (7 - 10) for up to 7 days.   Ozempic 0.25 mg or 0.5 mg (2 mg/3 mL) pen injector; Generic drug:   semaglutide; Inject 0.25 mg under the skin 1 (one) time per week for 28   days, THEN 0.5 mg 1 (one) time per week.; Start taking on: December 19, 2024        On day of discharge, vital signs were stable and no acute distress was noted. All questions were and answered and much support was given. After VS and labs were reviewed it was determined the patient was stable for discharge. Hospital day of discharge management- spent >30 minutes coordinating the discharge and counseling/educating patient and family regarding discharge instructions.

## 2024-12-31 NOTE — PROGRESS NOTES
12/31/24 1327   Discharge Planning   Home or Post Acute Services In home services   Type of Home Care Services Home nursing visits;Home OT;Home PT   Expected Discharge Disposition Home Health     Patient has discharge order.  [facility] updated on discharge for today.

## 2024-12-31 NOTE — PROGRESS NOTES
Occupational Therapy                 Therapy Communication Note    Patient Name: Nader Zazueta  MRN: [id number]  Department: [facility] 8  Room: [address] [place], [place] [address]  Today's Date: 12/31/2024     Discipline: Occupational Therapy    OT Missed Visit: Yes     Missed Visit Reason:  Chart reviewed and case conference with RN.  Patient seen bedside in room 804 at 10:36 am for OT screen as patient at/close to baseline function:  indep including to bathroom and managing toileting needs.  No further OT skilled needs.  Plan:  Discharge patient from OT; patient agreed.      Missed Time: Attempt

## 2025-01-02 ENCOUNTER — HOME CARE VISIT (OUTPATIENT)
Dept: HOME HEALTH SERVICES | Facility: HOME HEALTH | Age: 60
End: 2025-01-02
Payer: COMMERCIAL

## 2025-01-02 ENCOUNTER — PATIENT OUTREACH (OUTPATIENT)
Dept: CARE COORDINATION | Facility: CLINIC | Age: 60
End: 2025-01-02
Payer: COMMERCIAL

## 2025-01-02 VITALS — SYSTOLIC BLOOD PRESSURE: 122 MMHG | OXYGEN SATURATION: 96 % | DIASTOLIC BLOOD PRESSURE: 78 MMHG

## 2025-01-02 PROCEDURE — G0157 HHC PT ASSISTANT EA 15: HCPCS

## 2025-01-02 ASSESSMENT — ENCOUNTER SYMPTOMS
PAIN LOCATION: CHEST
HIGHEST PAIN SEVERITY IN PAST 24 HOURS: 2/10
PAIN: 1
PERSON REPORTING PAIN: PATIENT

## 2025-01-02 NOTE — PROGRESS NOTES
Called Nader in regard to  [employer] [facility] program. Explained how the program works and he would like a call back in one week to set up first visit. Will reach out to him then for scheduling visit 1.

## 2025-01-03 ENCOUNTER — PATIENT OUTREACH (OUTPATIENT)
Dept: CARE COORDINATION | Facility: CLINIC | Age: 60
End: 2025-01-03
Payer: COMMERCIAL

## 2025-01-03 ENCOUNTER — TELEPHONE (OUTPATIENT)
Dept: CARDIAC SURGERY | Facility: CLINIC | Age: 60
End: 2025-01-03
Payer: COMMERCIAL

## 2025-01-03 NOTE — PROGRESS NOTES
"Reviewed chart prior to patient outreach. Outreach call to patient following up after hospitalization.  Patient was in hospital observation from 12/29/24 to 12/31/24 after episode of syncope with LOC. Patient reports no further episodes since taking off of all BP medications. Patient is checking BP, last reading was 130/84 with HR of 83. Currently wearing an event monitor for 2 more days, and pushes the button to document when he is fatigued. Patient states he is doing his 100 steps every hour, and has to sit down and rest after completing. He has resumed home health services. Pain in sternum is manageable, does not need medication. His incision is healing, where drains where is scabbed, steri-strips did not adhere well initially. Patient reports his appetite is still not back to baseline, having bowel movements. Patient reports a \"thump/twitching\" sensation when he first lays down, but does not last long, and is able to go to sleep without issue. Reviewed upcoming appointments with patient. Patient with no additional questions at this time. Will continue to follow.      Jeri Tierney RN BSN  [facility] Care Manager  [phone]   "

## 2025-01-03 NOTE — TELEPHONE ENCOUNTER
I spoke with Mr. Zazueta who states he doing well at home. He denies dizziness, or near syncopal episodes. He is monitoring his BP/HR/Wt at home. On average VS are: 120/65/ 88/and continues to lose wt.     He denies any concerns or questions at this time. Will call me if needs change.

## 2025-01-06 ENCOUNTER — OFFICE VISIT (OUTPATIENT)
Dept: CARDIOLOGY | Facility: CLINIC | Age: 60
End: 2025-01-06
Payer: COMMERCIAL

## 2025-01-06 VITALS
HEART RATE: 90 BPM | SYSTOLIC BLOOD PRESSURE: 114 MMHG | BODY MASS INDEX: 30.78 KG/M2 | HEIGHT: 69 IN | DIASTOLIC BLOOD PRESSURE: 68 MMHG | OXYGEN SATURATION: 97 % | WEIGHT: 207.8 LBS

## 2025-01-06 DIAGNOSIS — R55 SYNCOPE, UNSPECIFIED SYNCOPE TYPE: ICD-10-CM

## 2025-01-06 DIAGNOSIS — I15.9 SECONDARY HYPERTENSION: ICD-10-CM

## 2025-01-06 DIAGNOSIS — I25.700 CORONARY ARTERY DISEASE INVOLVING CORONARY BYPASS GRAFT OF NATIVE HEART WITH UNSTABLE ANGINA PECTORIS: Primary | ICD-10-CM

## 2025-01-06 DIAGNOSIS — E78.2 MIXED HYPERLIPIDEMIA: ICD-10-CM

## 2025-01-06 PROBLEM — T63.441A BEE STING: Status: RESOLVED | Noted: 2023-07-24 | Resolved: 2025-01-06

## 2025-01-06 PROBLEM — N28.9 RENAL INSUFFICIENCY: Status: RESOLVED | Noted: 2023-04-06 | Resolved: 2025-01-06

## 2025-01-06 PROBLEM — H61.20 CERUMEN IMPACTION: Status: RESOLVED | Noted: 2023-04-06 | Resolved: 2025-01-06

## 2025-01-06 PROBLEM — L03.119 CELLULITIS OF FOREARM: Status: ACTIVE | Noted: 2025-01-06

## 2025-01-06 PROBLEM — E78.5 HYPERLIPIDEMIA: Status: ACTIVE | Noted: 2025-01-06

## 2025-01-06 LAB
ATRIAL RATE: 57 BPM
ATRIAL RATE: 78 BPM
DIASTOLIC BLOOD PRESSURE: 76 MMHG
P AXIS: 38 DEGREES
P AXIS: 78 DEGREES
P OFFSET: 176 MS
P OFFSET: 185 MS
P ONSET: 121 MS
P ONSET: 133 MS
PR INTERVAL: 174 MS
PR INTERVAL: 184 MS
Q ONSET: 208 MS
Q ONSET: 225 MS
QRS COUNT: 10 BEATS
QRS COUNT: 12 BEATS
QRS DURATION: 104 MS
QRS DURATION: 106 MS
QT INTERVAL: 426 MS
QT INTERVAL: 454 MS
QTC CALCULATION(BAZETT): 441 MS
QTC CALCULATION(BAZETT): 485 MS
QTC FREDERICIA: 446 MS
QTC FREDERICIA: 465 MS
R AXIS: 194 DEGREES
R AXIS: 46 DEGREES
SYSTOLIC BLOOD PRESSURE: 145 MMHG
T AXIS: 65 DEGREES
T AXIS: 71 DEGREES
T OFFSET: 421 MS
T OFFSET: 452 MS
VENTRICULAR RATE: 57 BPM
VENTRICULAR RATE: 78 BPM

## 2025-01-06 PROCEDURE — 3074F SYST BP LT 130 MM HG: CPT | Performed by: PHYSICIAN ASSISTANT

## 2025-01-06 PROCEDURE — 3008F BODY MASS INDEX DOCD: CPT | Performed by: PHYSICIAN ASSISTANT

## 2025-01-06 PROCEDURE — 99214 OFFICE O/P EST MOD 30 MIN: CPT | Performed by: PHYSICIAN ASSISTANT

## 2025-01-06 PROCEDURE — 3078F DIAST BP <80 MM HG: CPT | Performed by: PHYSICIAN ASSISTANT

## 2025-01-06 NOTE — PROGRESS NOTES
"Chief Complaint:   Hypertension, CAD s/p CABG, hypotension     History Of Present Illness:    Harvey Zazueta is a 59 y.o. male with PMH of HTN, HLD who presents after hospitalization for syncope and NSTEMI eventually undergoing CABG.  Patient originally presented following a syncopal episode, and was found to have markedly elevated troponin levels peaking at 4446.  Eventual LHC displayed 3-vessel CAD, he subsequently underwent CABG x 4 via LIMA->LAD, free INDIGO from LIMA->DIAG 1, SVG->OM1->PDA.  Patient denies chest pain, chest pressure, palpitations, dyspnea on exertion, shortness of breath at rest, diaphoresis, nausea/vomiting, back pain, headache, lightheadedness, dizziness, syncope or presyncopal episodes, active bleeding signs or symptoms, excessive weight gain, muscle or joint pain, claudication.       Last Recorded Vitals:  Vitals:    01/06/25 1358   BP: 114/68   BP Location: Left arm   Patient Position: Sitting   BP Cuff Size: Large adult   Pulse: 90   SpO2: 97%   Weight: 94.3 kg (207 lb 12.8 oz)   Height: 1.753 m (5' 9\")       Past Medical History:  He has a past medical history of Incisional hernia without obstruction or gangrene (06/10/2020), Other specified disorders of kidney and ureter (01/13/2020), Personal history of other diseases of the circulatory system, Personal history of other diseases of the musculoskeletal system and connective tissue, Personal history of other endocrine, nutritional and metabolic disease, Personal history of other specified conditions, Personal history of other specified conditions (05/15/2020), and Right lower quadrant pain (02/07/2020).    Past Surgical History:  He has a past surgical history that includes Other surgical history (08/22/2019); CT guided percutaneous biopsy mediastinum (8/16/2019); CT guided abscess fluid collection drainage visceral Perq (7/21/2021); and Cardiac catheterization (N/A, 12/12/2024).      Social History:  He reports that he has never smoked. He " has never used smokeless tobacco. He reports that he does not currently use alcohol. He reports that he does not use drugs.    Family History:  Family History   Problem Relation Name Age of Onset    Diabetes Mother      COPD Father          Allergies:  Patient has no known allergies.    Outpatient Medications:  Current Outpatient Medications   Medication Instructions    acetaminophen (TYLENOL) 650 mg, oral, Every 6 hours PRN    acetaminophen (TYLENOL) 650 mg, oral, Every 4 hours PRN    aspirin 81 mg, oral, Daily    atorvastatin (LIPITOR) 80 mg, oral, Nightly    clopidogrel (PLAVIX) 75 mg, oral, Daily    Dexcom G7  misc Use as instructed    Dexcom G7 Sensor device Place on arm replace every 10 days    gabapentin (NEURONTIN) 100 mg, oral, 2 times daily    metFORMIN (Glucophage) 1,000 mg tablet TAKE 1 TABLET BY MOUTH TWO TIMES A DAY WITH MEALS    methocarbamol (ROBAXIN) 500 mg, oral, 4 times daily PRN    ondansetron ODT (ZOFRAN-ODT) 4 mg, oral, Every 8 hours PRN    oxyCODONE (ROXICODONE) 5 mg, oral, Every 6 hours PRN    semaglutide (Ozempic) 0.25 mg or 0.5 mg (2 mg/3 mL) pen injector Inject 0.25 mg under the skin 1 (one) time per week for 28 days, THEN 0.5 mg 1 (one) time per week.       Physical Exam:  Constitutional: awake and alert, oriented ×3, no apparent distress  Skin: warm, dry, good turgor no obvious lesions  Eyes: pupils equal, round, reactive to light, conjunctiva pink and noninjected, no discharge  HENT: normocephalic and atraumatic, mucous membranes moist, trachea midline with no masses/goiter  Cardiovascular: S1/S2 regular, no murmur no rubs/gallops, no carotid bruits, no JVD  Pulmonary: symmetrical chest expansion, lungs are clear to auscultation bilaterally, no wheezes/rales/rhonchi, normal effort  Abdomen: nontender, nondistended, active bowel sounds, no ascites  Extremities: no cyanosis, clubbing, no LE edema no lesions; palpable pedal pulses  Neurologic: cranial nerves II - XII grossly  intact, stable gait, no tremor       Last Labs:  CBC -  Lab Results   Component Value Date    WBC 13.7 (H) 12/31/2024    HGB 12.2 (L) 12/31/2024    HCT 35.8 (L) 12/31/2024    MCV 93 12/31/2024     (H) 12/31/2024       CMP -  Lab Results   Component Value Date    CALCIUM 9.5 12/31/2024    PHOS 4.0 12/31/2024    PROT 7.3 12/29/2024    ALBUMIN 3.9 12/31/2024    AST 21 12/29/2024    ALT 21 12/29/2024    ALKPHOS 68 12/29/2024    BILITOT 0.5 12/29/2024       LIPID PANEL -   Lab Results   Component Value Date    CHOL 174 12/13/2024    TRIG 195 (H) 12/13/2024    HDL 29.3 12/13/2024    CHHDL 5.9 12/13/2024    LDLF 85 07/12/2022    VLDL 39 12/13/2024    NHDL 145 12/13/2024       RENAL FUNCTION PANEL -   Lab Results   Component Value Date    GLUCOSE 188 (H) 12/31/2024     (L) 12/31/2024    K 4.5 12/31/2024     12/31/2024    CO2 23 12/31/2024    ANIONGAP 15 12/31/2024    BUN 31 (H) 12/31/2024    CREATININE 1.25 12/31/2024    GFRMALE 53 (A) 08/07/2023    CALCIUM 9.5 12/31/2024    PHOS 4.0 12/31/2024    ALBUMIN 3.9 12/31/2024        Lab Results   Component Value Date    HGBA1C 8.7 (H) 12/13/2024    HGBA1C 7.9 (H) 07/12/2022       Last Cardiology Tests:  ECG:  ECG 12 lead 12/29/2024      Echo:  Transthoracic Echo (TTE) Complete 12/30/2024      Ejection Fractions:  EF   Date/Time Value Ref Range Status   12/30/2024 02:37 PM 55 %        Cath:  Cardiac Catheterization Procedure 12/12/2024      Stress Test:  No results found for this or any previous visit from the past 1095 days.      Cardiac Imaging:  No results found for this or any previous visit from the past 1095 days.      Assessment/Plan   Problem List Items Addressed This Visit             ICD-10-CM       Cardiac and Vasculature    Hypertension I10    Coronary artery disease involving coronary bypass graft with unstable angina pectoris (Multi) - Primary I25.700    Relevant Orders    Lipid panel    Hyperlipidemia E78.5       Symptoms and Signs    Syncope R55        -S/P CABG x 4 by means of LIMA->LAD, free INDIGO from LIMA->DIAG 1, SVG->OM1->PDA    -Remains off all antihypertensive therapy in the setting of hypotensive readings    -Await results of extended Holter for further arrhythmic evaluation    -F/U 3 months    Parviz Haley PA-C

## 2025-01-07 LAB
ATRIAL RATE: 49 BPM
P AXIS: 54 DEGREES
PR INTERVAL: 177 MS
Q ONSET: 251 MS
QRS COUNT: 8 BEATS
QRS DURATION: 111 MS
QT INTERVAL: 445 MS
QTC CALCULATION(BAZETT): 402 MS
QTC FREDERICIA: 416 MS
R AXIS: 96 DEGREES
T AXIS: 76 DEGREES
T OFFSET: 473 MS
VENTRICULAR RATE: 49 BPM

## 2025-01-08 ENCOUNTER — HOME CARE VISIT (OUTPATIENT)
Dept: HOME HEALTH SERVICES | Facility: HOME HEALTH | Age: 60
End: 2025-01-08
Payer: COMMERCIAL

## 2025-01-08 ENCOUNTER — PATIENT OUTREACH (OUTPATIENT)
Dept: CARE COORDINATION | Facility: CLINIC | Age: 60
End: 2025-01-08
Payer: COMMERCIAL

## 2025-01-08 VITALS
HEART RATE: 80 BPM | BODY MASS INDEX: 30.27 KG/M2 | RESPIRATION RATE: 18 BRPM | SYSTOLIC BLOOD PRESSURE: 158 MMHG | TEMPERATURE: 98.2 F | DIASTOLIC BLOOD PRESSURE: 80 MMHG | WEIGHT: 205 LBS | OXYGEN SATURATION: 98 %

## 2025-01-08 PROCEDURE — G0300 HHS/HOSPICE OF LPN EA 15 MIN: HCPCS

## 2025-01-08 SDOH — ECONOMIC STABILITY: GENERAL

## 2025-01-08 ASSESSMENT — ACTIVITIES OF DAILY LIVING (ADL): MONEY MANAGEMENT (EXPENSES/BILLS): INDEPENDENT

## 2025-01-08 ASSESSMENT — ENCOUNTER SYMPTOMS
DENIES PAIN: 1
APPETITE LEVEL: GOOD
LAST BOWEL MOVEMENT: 67213
CHANGE IN APPETITE: UNCHANGED

## 2025-01-08 NOTE — PROGRESS NOTES
Called Nader to see if he would like to join the [employer] program and he asked for a few more weeks as he wants to feel better first. Will call him back then.

## 2025-01-09 ENCOUNTER — HOME CARE VISIT (OUTPATIENT)
Dept: HOME HEALTH SERVICES | Facility: HOME HEALTH | Age: 60
End: 2025-01-09
Payer: COMMERCIAL

## 2025-01-09 ENCOUNTER — TELEMEDICINE CLINICAL SUPPORT (OUTPATIENT)
Dept: CARDIAC SURGERY | Facility: CLINIC | Age: 60
End: 2025-01-09
Payer: COMMERCIAL

## 2025-01-09 VITALS — OXYGEN SATURATION: 96 %

## 2025-01-09 PROCEDURE — G0157 HHC PT ASSISTANT EA 15: HCPCS

## 2025-01-09 ASSESSMENT — ENCOUNTER SYMPTOMS
PAIN: 1
PERSON REPORTING PAIN: PATIENT
PAIN LOCATION: CHEST
HIGHEST PAIN SEVERITY IN PAST 24 HOURS: 2/10

## 2025-01-09 NOTE — PROGRESS NOTES
Virtual visit with Nader MADDI Zazueta this morning who states he's progressing well at home and denies any chest discomfort, dyspnea, palpitations, orthopnea.    S/p CABG x 4 on 12/16/24 with Dr. Anjali Mendenhall   --> LIMA-LAD, Free INDIGO - D1, SVG-OM1, SVG-rPDA     Virtual topics discussed:     Nuero: On interview, patient is alert, conversant, in no acute distress, appears to be in good spirits.   Respiratory: denies sob, continues to use IS  Surgical incision: SUNDAY, well approximated, C/D/I. Sternal stability, denies pain, MITT.   Medications: taking all medications as prescribed.   Homecare: weekly visits thus far.   Physical activity: he continues to increase his walking distance. Walking a minimum of 100 steps few times xday.   On average: 143//60; HR 77-82; Wt stable at 205lbs. Denies wt gain.   Questions: Discussed cardiac rehab, driving privileges and dietary guidelines.    Patient's main complaint is that: Pt states that he has small wires protruding through his chest, advised to not pull. Pt states they are not bothering him. Also, he mentioned that SVG site is noted to have a stitch that is causing some discomfort. He was made aware that these concerns will be taken care of at his post op f/up. Pt agreed with plan.      Patient encouraged to call me with any questions/concerns or if needs arise. Patient verbalized understanding.         Lily Ceja RN

## 2025-01-13 ENCOUNTER — PHARMACY VISIT (OUTPATIENT)
Dept: PHARMACY | Facility: CLINIC | Age: 60
End: 2025-01-13
Payer: COMMERCIAL

## 2025-01-13 PROCEDURE — RXMED WILLOW AMBULATORY MEDICATION CHARGE

## 2025-01-14 ENCOUNTER — HOME CARE VISIT (OUTPATIENT)
Dept: HOME HEALTH SERVICES | Facility: HOME HEALTH | Age: 60
End: 2025-01-14
Payer: COMMERCIAL

## 2025-01-14 VITALS
HEART RATE: 72 BPM | DIASTOLIC BLOOD PRESSURE: 80 MMHG | SYSTOLIC BLOOD PRESSURE: 130 MMHG | OXYGEN SATURATION: 98 % | TEMPERATURE: 97.8 F

## 2025-01-14 PROCEDURE — G0151 HHCP-SERV OF PT,EA 15 MIN: HCPCS

## 2025-01-14 ASSESSMENT — ACTIVITIES OF DAILY LIVING (ADL)
AMBULATION ASSISTANCE ON FLAT SURFACES: 1
AMBULATION_DISTANCE/DURATION_TOLERATED: 150 FT

## 2025-01-14 ASSESSMENT — ENCOUNTER SYMPTOMS
PERSON REPORTING PAIN: PATIENT
DENIES PAIN: 1

## 2025-01-17 ENCOUNTER — HOME CARE VISIT (OUTPATIENT)
Dept: HOME HEALTH SERVICES | Facility: HOME HEALTH | Age: 60
End: 2025-01-17
Payer: COMMERCIAL

## 2025-01-17 VITALS
SYSTOLIC BLOOD PRESSURE: 140 MMHG | HEART RATE: 83 BPM | RESPIRATION RATE: 18 BRPM | DIASTOLIC BLOOD PRESSURE: 80 MMHG | TEMPERATURE: 98.2 F | OXYGEN SATURATION: 99 %

## 2025-01-17 PROCEDURE — G0300 HHS/HOSPICE OF LPN EA 15 MIN: HCPCS

## 2025-01-17 ASSESSMENT — ENCOUNTER SYMPTOMS
CHANGE IN APPETITE: UNCHANGED
APPETITE LEVEL: GOOD
SUBJECTIVE PAIN PROGRESSION: UNCHANGED
HIGHEST PAIN SEVERITY IN PAST 24 HOURS: 0/10
PERSON REPORTING PAIN: PATIENT
PAIN: 1
PAIN SEVERITY GOAL: 0/10
LOWEST PAIN SEVERITY IN PAST 24 HOURS: 0/10

## 2025-01-20 ENCOUNTER — HOME CARE VISIT (OUTPATIENT)
Dept: HOME HEALTH SERVICES | Facility: HOME HEALTH | Age: 60
End: 2025-01-20
Payer: COMMERCIAL

## 2025-01-20 ENCOUNTER — APPOINTMENT (OUTPATIENT)
Dept: CARDIAC SURGERY | Facility: CLINIC | Age: 60
End: 2025-01-20
Payer: COMMERCIAL

## 2025-01-20 VITALS
DIASTOLIC BLOOD PRESSURE: 76 MMHG | OXYGEN SATURATION: 100 % | HEART RATE: 75 BPM | RESPIRATION RATE: 18 BRPM | TEMPERATURE: 96.7 F | SYSTOLIC BLOOD PRESSURE: 120 MMHG

## 2025-01-20 PROCEDURE — G0299 HHS/HOSPICE OF RN EA 15 MIN: HCPCS

## 2025-01-20 ASSESSMENT — ENCOUNTER SYMPTOMS
DENIES PAIN: 1
PERSON REPORTING PAIN: PATIENT

## 2025-01-20 ASSESSMENT — ACTIVITIES OF DAILY LIVING (ADL)
HOME_HEALTH_OASIS: 01
OASIS_M1830: 03

## 2025-01-23 ENCOUNTER — PATIENT OUTREACH (OUTPATIENT)
Dept: CARE COORDINATION | Facility: CLINIC | Age: 60
End: 2025-01-23
Payer: COMMERCIAL

## 2025-01-23 NOTE — PROGRESS NOTES
Called Nader to see if things have settled down for him to join [employer] now. He states he is still very busy with a lot going on. He states he will give this educator a call next week when things are more settled. He does have the contact number [phone].

## 2025-01-24 ENCOUNTER — PHARMACY VISIT (OUTPATIENT)
Dept: PHARMACY | Facility: CLINIC | Age: 60
End: 2025-01-24
Payer: COMMERCIAL

## 2025-01-24 PROCEDURE — RXMED WILLOW AMBULATORY MEDICATION CHARGE

## 2025-01-30 ENCOUNTER — PATIENT OUTREACH (OUTPATIENT)
Dept: CARE COORDINATION | Facility: CLINIC | Age: 60
End: 2025-01-30
Payer: COMMERCIAL

## 2025-01-30 NOTE — PROGRESS NOTES
Outreach call to patient to support a smooth transition of care from recent admission.  Left voicemail message for patient with my contact information.    Jeri Tierney RN BSN  [employer] Care Manager  [phone]      Pt is a 62 yo presenting to ED w/ SOB 2/2 diastolic HF exacerbation and noted to have KEITH and hyperkalemia, PMH CAD s/p CABG and PCI, PAD s/p baloon angioplasty, DM2, HTN, HLD, hx of cardiac arrest, recent GI bleed last month, Diastolic CHF, legally blind, pleural effusion requiring thoracentesis in Dec 2018.     1) Lower GI Bleed  - s/p 1 unit of PRBCs  - Aspirin on hold   - GI Consult appreciated  - s/p Colonoscopy   ~ Poor prep throughout the colon. Diverticulosis seen. Unable to visualize colon sufficiently for poor prep  2) Hyperkalemia  - Hold Losartan  3) Acute on Chronic Diastolic Heart Failure  - Improved   - Lasix Infusion switched to Demadex 20 mg BID    - Hold Losartan due to hyperkalemia   - Cardiology input appreciated   - ECHO shows EF of > 75%. Moderate to Severe TR  4) Acute on Chronic Hypercapnic Respiratory Failure  - H/O OHS/KYREE  - Continue NIV (AVAPS) Nocturnal and PRN (will need on discharge)  - ICU Consult appreciated.   - Pulmonary input appreciated  - Avoid Narcotics for pain  5) Acute on chronic kidney injury  - Improved  - Avoid nephrotoxic medications  - Renal input appreciated  6) CAD / PAD / HLD / HTN  - Continue Aspirin, Imdur, Lipitor, Coreg and Norvasc   - Plavix on hold since last admission due to GI Bleed and now Aspirin on hold as well (resume once cleared by GI)  7) DM 2  - HbA1c 5.6  - Accu checks and ISS  - Increase Lantus to 9 units at bedtime  8) Anemia  - Multifactorial (chronic, now complicated with acute bleeding)  - Plan as above  - s/p Venofer   - Continue Procrit  9) Transaminitis  - Likely secondary to liver congestion  - Improved  10) History of Depression  - Continue Duloxetine 60 mg   11) IV infiltration LUE  - Arm elevation  - Warm compresses   - No DVT     DVT Prophylaxis -- IPC     Dispo: SINGH. Will need NIV on discharge (CM & SW aware). GI Clearance to resume Aspirin and Plavix.

## 2025-01-31 ENCOUNTER — TELEPHONE (OUTPATIENT)
Dept: CARDIAC SURGERY | Facility: CLINIC | Age: 60
End: 2025-01-31
Payer: COMMERCIAL

## 2025-01-31 ENCOUNTER — HOSPITAL ENCOUNTER (EMERGENCY)
Facility: HOSPITAL | Age: 60
Discharge: HOME | End: 2025-01-31
Attending: STUDENT IN AN ORGANIZED HEALTH CARE EDUCATION/TRAINING PROGRAM
Payer: COMMERCIAL

## 2025-01-31 ENCOUNTER — HOSPITAL ENCOUNTER (OUTPATIENT)
Dept: CARDIOLOGY | Facility: HOSPITAL | Age: 60
Discharge: HOME | End: 2025-01-31
Payer: COMMERCIAL

## 2025-01-31 ENCOUNTER — APPOINTMENT (OUTPATIENT)
Dept: RADIOLOGY | Facility: HOSPITAL | Age: 60
End: 2025-01-31
Payer: COMMERCIAL

## 2025-01-31 VITALS
HEART RATE: 65 BPM | RESPIRATION RATE: 13 BRPM | DIASTOLIC BLOOD PRESSURE: 91 MMHG | HEIGHT: 69 IN | TEMPERATURE: 97.3 F | WEIGHT: 207.2 LBS | OXYGEN SATURATION: 93 % | SYSTOLIC BLOOD PRESSURE: 166 MMHG | BODY MASS INDEX: 30.69 KG/M2

## 2025-01-31 DIAGNOSIS — I10 ESSENTIAL (PRIMARY) HYPERTENSION: ICD-10-CM

## 2025-01-31 DIAGNOSIS — I10 PRIMARY HYPERTENSION: Primary | ICD-10-CM

## 2025-01-31 LAB
ALBUMIN SERPL BCP-MCNC: 4.6 G/DL (ref 3.4–5)
ALP SERPL-CCNC: 87 U/L (ref 33–120)
ALT SERPL W P-5'-P-CCNC: 25 U/L (ref 10–52)
ANION GAP SERPL CALC-SCNC: 16 MMOL/L (ref 10–20)
AST SERPL W P-5'-P-CCNC: 16 U/L (ref 9–39)
BASOPHILS # BLD AUTO: 0.08 X10*3/UL (ref 0–0.1)
BASOPHILS NFR BLD AUTO: 0.8 %
BILIRUB SERPL-MCNC: 0.7 MG/DL (ref 0–1.2)
BUN SERPL-MCNC: 26 MG/DL (ref 6–23)
CALCIUM SERPL-MCNC: 9.7 MG/DL (ref 8.6–10.3)
CARDIAC TROPONIN I PNL SERPL HS: 13 NG/L (ref 0–20)
CARDIAC TROPONIN I PNL SERPL HS: 15 NG/L (ref 0–20)
CHLORIDE SERPL-SCNC: 102 MMOL/L (ref 98–107)
CO2 SERPL-SCNC: 21 MMOL/L (ref 21–32)
CREAT SERPL-MCNC: 1.29 MG/DL (ref 0.5–1.3)
EGFRCR SERPLBLD CKD-EPI 2021: 64 ML/MIN/1.73M*2
EOSINOPHIL # BLD AUTO: 0.51 X10*3/UL (ref 0–0.7)
EOSINOPHIL NFR BLD AUTO: 5 %
ERYTHROCYTE [DISTWIDTH] IN BLOOD BY AUTOMATED COUNT: 13.4 % (ref 11.5–14.5)
GLUCOSE SERPL-MCNC: 187 MG/DL (ref 74–99)
HCT VFR BLD AUTO: 40.3 % (ref 41–52)
HGB BLD-MCNC: 13.5 G/DL (ref 13.5–17.5)
IMM GRANULOCYTES # BLD AUTO: 0.07 X10*3/UL (ref 0–0.7)
IMM GRANULOCYTES NFR BLD AUTO: 0.7 % (ref 0–0.9)
LYMPHOCYTES # BLD AUTO: 2.19 X10*3/UL (ref 1.2–4.8)
LYMPHOCYTES NFR BLD AUTO: 21.4 %
MAGNESIUM SERPL-MCNC: 1.83 MG/DL (ref 1.6–2.4)
MCH RBC QN AUTO: 31.4 PG (ref 26–34)
MCHC RBC AUTO-ENTMCNC: 33.5 G/DL (ref 32–36)
MCV RBC AUTO: 94 FL (ref 80–100)
MONOCYTES # BLD AUTO: 0.7 X10*3/UL (ref 0.1–1)
MONOCYTES NFR BLD AUTO: 6.9 %
NEUTROPHILS # BLD AUTO: 6.66 X10*3/UL (ref 1.2–7.7)
NEUTROPHILS NFR BLD AUTO: 65.2 %
NRBC BLD-RTO: 0 /100 WBCS (ref 0–0)
PLATELET # BLD AUTO: 293 X10*3/UL (ref 150–450)
POTASSIUM SERPL-SCNC: 4.2 MMOL/L (ref 3.5–5.3)
PROT SERPL-MCNC: 7.7 G/DL (ref 6.4–8.2)
RBC # BLD AUTO: 4.3 X10*6/UL (ref 4.5–5.9)
SODIUM SERPL-SCNC: 135 MMOL/L (ref 136–145)
WBC # BLD AUTO: 10.2 X10*3/UL (ref 4.4–11.3)

## 2025-01-31 PROCEDURE — 71046 X-RAY EXAM CHEST 2 VIEWS: CPT | Performed by: RADIOLOGY

## 2025-01-31 PROCEDURE — 36415 COLL VENOUS BLD VENIPUNCTURE: CPT

## 2025-01-31 PROCEDURE — 84484 ASSAY OF TROPONIN QUANT: CPT

## 2025-01-31 PROCEDURE — 2500000001 HC RX 250 WO HCPCS SELF ADMINISTERED DRUGS (ALT 637 FOR MEDICARE OP)

## 2025-01-31 PROCEDURE — 93005 ELECTROCARDIOGRAM TRACING: CPT

## 2025-01-31 PROCEDURE — 99284 EMERGENCY DEPT VISIT MOD MDM: CPT | Mod: 25 | Performed by: STUDENT IN AN ORGANIZED HEALTH CARE EDUCATION/TRAINING PROGRAM

## 2025-01-31 PROCEDURE — 85025 COMPLETE CBC W/AUTO DIFF WBC: CPT

## 2025-01-31 PROCEDURE — 80053 COMPREHEN METABOLIC PANEL: CPT

## 2025-01-31 PROCEDURE — 71046 X-RAY EXAM CHEST 2 VIEWS: CPT

## 2025-01-31 PROCEDURE — 83735 ASSAY OF MAGNESIUM: CPT

## 2025-01-31 RX ORDER — AMLODIPINE BESYLATE 10 MG/1
10 TABLET ORAL ONCE
Status: COMPLETED | OUTPATIENT
Start: 2025-01-31 | End: 2025-01-31

## 2025-01-31 RX ORDER — LISINOPRIL 10 MG/1
10 TABLET ORAL ONCE
Status: COMPLETED | OUTPATIENT
Start: 2025-01-31 | End: 2025-01-31

## 2025-01-31 RX ADMIN — LISINOPRIL 10 MG: 10 TABLET ORAL at 10:21

## 2025-01-31 RX ADMIN — AMLODIPINE BESYLATE 10 MG: 10 TABLET ORAL at 10:21

## 2025-01-31 ASSESSMENT — COLUMBIA-SUICIDE SEVERITY RATING SCALE - C-SSRS
6. HAVE YOU EVER DONE ANYTHING, STARTED TO DO ANYTHING, OR PREPARED TO DO ANYTHING TO END YOUR LIFE?: NO
2. HAVE YOU ACTUALLY HAD ANY THOUGHTS OF KILLING YOURSELF?: NO
1. IN THE PAST MONTH, HAVE YOU WISHED YOU WERE DEAD OR WISHED YOU COULD GO TO SLEEP AND NOT WAKE UP?: NO

## 2025-01-31 ASSESSMENT — LIFESTYLE VARIABLES
HAVE YOU EVER FELT YOU SHOULD CUT DOWN ON YOUR DRINKING: NO
EVER HAD A DRINK FIRST THING IN THE MORNING TO STEADY YOUR NERVES TO GET RID OF A HANGOVER: NO
EVER FELT BAD OR GUILTY ABOUT YOUR DRINKING: NO
HAVE PEOPLE ANNOYED YOU BY CRITICIZING YOUR DRINKING: NO
TOTAL SCORE: 0

## 2025-01-31 ASSESSMENT — PAIN - FUNCTIONAL ASSESSMENT: PAIN_FUNCTIONAL_ASSESSMENT: 0-10

## 2025-01-31 ASSESSMENT — PAIN SCALES - GENERAL
PAINLEVEL_OUTOF10: 0 - NO PAIN
PAINLEVEL_OUTOF10: 0 - NO PAIN

## 2025-01-31 NOTE — ED TRIAGE NOTES
Patient present to the emergency room c/o high blood pressure on the left arm. Patient states he had a triple bypass 1/16/2025 and is c.o of right arm tingling and hypertension since yesterday. Denies CP, n/v, endorses lightheaded

## 2025-01-31 NOTE — ED PROVIDER NOTES
Emergency Department Provider Note        History of Present Illness     History provided by: Patient  Limitations to History: None    HPI:  Patient is a 59-year-old male the past medical history of CAD status post CABG, type 2 diabetes, hypertension presenting to the ED with hypertension.  Patient states morning he took his blood pressure and states that he noticed it was high with systolic in the 200s.  Patient states he is been off his blood pressure medications the past few days following his CABG.  Patient states*developing left arm tingling and numbness and states that he felt this way when he had his heart attack.  Patient states shortness of breath however states that this has been for the past few days following congestion and states that his family has similar symptoms.  Patient is denying any chest pain, dizziness, back pain, lightheadedness  Physical Exam   Triage vitals:  T 36.1 °C (96.9 °F)  HR 82  /77  RR 16  O2 96 % None (Room air)    Physical Exam  Constitutional:       Appearance: Normal appearance.   HENT:      Head: Normocephalic.   Eyes:      Extraocular Movements: Extraocular movements intact.   Cardiovascular:      Rate and Rhythm: Normal rate.   Pulmonary:      Effort: Pulmonary effort is normal.   Abdominal:      General: Abdomen is flat.   Musculoskeletal:         General: Normal range of motion.      Cervical back: Normal range of motion.   Skin:     General: Skin is warm.   Neurological:      Mental Status: He is alert. Mental status is at baseline.   Psychiatric:         Mood and Affect: Mood normal.         Behavior: Behavior normal.          Medical Decision Making & ED Course   Medical Decision Making:  Patient is a 31-year-old male presenting to the ED for hypertension.  Patient states that he does blood pressure at home and states that he had a systolic in the 200s.  Patient states has been off his medication for the past few days following his CABG.  Patient states that  this morning he developed left arm tingling and states that similar to his last heart attack.  Patient also states shortness of breath and states that it has been progressive since having viral-like illness states he also has congestion and yellow mucus states that his family has similar symptoms.  Patient states that he does not want any viral testing at this time.  Patient is denying any chest pain, dizziness, lightheadedness, back pain.  Concern for ACS versus hypertensive emergency/urgency/pneumonia.  Will obtain an EKG, troponin, labs, chest x-ray.  Low concern with PE patient is nontachycardic satting well on room air no unilateral leg swelling or pain in the back of the legs.  Low concern for aortic dissection is not having any chest pain or back pain patient has 2+ radial and DP pulses sitting comfortably.  Please see ED course for further details  ----           EKG Independent Interpretation: EKG interpreted by myself. Please see ED Course for full interpretation.        The patient was discussed with the following consultants/services: Cardiology      ED Course as of 02/01/25 1825 Fri Jan 31, 2025   1001 EKG normal sinus rhythm , heart rate 85, QTc 447, T wave inversions in lead V1, V2, V3 this is similar to previous EKGs [TS]   1005 Patient ordered amlodipine, lisinopril for his hypertension for his at home dose [TS]   1110 Patient troponin 13, electrolytes nonconcerning, no leukocytosis, chest x-ray was negative for any widened mediastinum, pleural effusion, pulmonary edema, pneumonia. [TS]   1251 Spoke with cardiology who states that the patient can follow-up with his appointment on Monday and before then can restart on the lisinopril.  Patient was discharged with prescription for lisinopril. [TS]      ED Course User Index  [TS] Brandy Arboleda MD         Diagnoses as of 02/01/25 1825   Primary hypertension          Disposition   As a result of the work-up, the patient was discharged home.  he was  informed of his diagnosis and instructed to come back with any concerns or worsening of condition.  he and was agreeable to the plan as discussed above.  he was given the opportunity to ask questions.  All of the patient's questions were answered.    Procedures   Procedures    Patient seen and discussed with ED attending physician.    Brandy Arboleda MD  Emergency Medicine     Brandy Arboleda MD  Resident  02/01/25 0923

## 2025-01-31 NOTE — TELEPHONE ENCOUNTER
Voicemail left for patient regarding his upcoming post op appointment on Monday 2/3.  Patient reminded to have his Cxr done prior to appointment.  Address to main radiology department left along with letting the patient know to arrive 1 hr prior to appointment time.  Office number left on voicemail should he have any further questions for office staff.

## 2025-02-03 ENCOUNTER — OFFICE VISIT (OUTPATIENT)
Dept: CARDIAC SURGERY | Facility: CLINIC | Age: 60
End: 2025-02-03
Payer: COMMERCIAL

## 2025-02-03 VITALS
WEIGHT: 203.8 LBS | TEMPERATURE: 97.3 F | DIASTOLIC BLOOD PRESSURE: 98 MMHG | OXYGEN SATURATION: 98 % | BODY MASS INDEX: 30.18 KG/M2 | SYSTOLIC BLOOD PRESSURE: 145 MMHG | HEART RATE: 75 BPM | HEIGHT: 69 IN

## 2025-02-03 DIAGNOSIS — Z95.1 S/P CABG X 4: ICD-10-CM

## 2025-02-03 PROCEDURE — 3008F BODY MASS INDEX DOCD: CPT | Performed by: THORACIC SURGERY (CARDIOTHORACIC VASCULAR SURGERY)

## 2025-02-03 PROCEDURE — RXMED WILLOW AMBULATORY MEDICATION CHARGE

## 2025-02-03 PROCEDURE — 1036F TOBACCO NON-USER: CPT | Performed by: THORACIC SURGERY (CARDIOTHORACIC VASCULAR SURGERY)

## 2025-02-03 PROCEDURE — 3077F SYST BP >= 140 MM HG: CPT | Performed by: THORACIC SURGERY (CARDIOTHORACIC VASCULAR SURGERY)

## 2025-02-03 PROCEDURE — 99211 OFF/OP EST MAY X REQ PHY/QHP: CPT | Performed by: THORACIC SURGERY (CARDIOTHORACIC VASCULAR SURGERY)

## 2025-02-03 PROCEDURE — 3080F DIAST BP >= 90 MM HG: CPT | Performed by: THORACIC SURGERY (CARDIOTHORACIC VASCULAR SURGERY)

## 2025-02-03 RX ORDER — CEPHALEXIN 500 MG/1
500 CAPSULE ORAL 4 TIMES DAILY
Qty: 20 CAPSULE | Refills: 0 | Status: ON HOLD | OUTPATIENT
Start: 2025-02-03 | End: 2025-02-09

## 2025-02-03 ASSESSMENT — ENCOUNTER SYMPTOMS
OCCASIONAL FEELINGS OF UNSTEADINESS: 1
DEPRESSION: 0
LOSS OF SENSATION IN FEET: 0

## 2025-02-03 ASSESSMENT — PAIN SCALES - GENERAL: PAINLEVEL_OUTOF10: 1

## 2025-02-03 NOTE — PROGRESS NOTES
Chief Complaint      HPI:   Mr. Nader Zazueta is a 59 y.o. male, who presents for post-operative evaluation.   He is now s/p CABG x 4 Dec 16th, and is recovering nicely.  He has resumed normal activities and his appetite is returned to normal.  He has no chest pain, no shortness of breath and denies palpitations, dizziness, or syncope.     Past Medical History:   Diagnosis Date    Incisional hernia without obstruction or gangrene 06/10/2020    Incisional hernia    Other specified disorders of kidney and ureter 01/13/2020    Renal mass, right    Personal history of other diseases of the circulatory system     History of hypertension    Personal history of other diseases of the musculoskeletal system and connective tissue     History of osteomyelitis    Personal history of other endocrine, nutritional and metabolic disease     History of diabetes mellitus    Personal history of other specified conditions     History of abdominal pain    Personal history of other specified conditions 05/15/2020    History of postoperative nausea and vomiting    Right lower quadrant pain 02/07/2020    RLQ cramping       Past Surgical History:   Procedure Laterality Date    CARDIAC CATHETERIZATION N/A 12/12/2024    Procedure: Left Heart Cath, With LV;  Surgeon: Thor Keyes MD;  Location: [facility] Cardiac Cath Lab;  Service: Cardiovascular;  Laterality: N/A;    CT GUIDED ABSCESS FLUID COLLECTION DRAINAGE  7/21/2021    CT GUIDED ABSCESS FLUID COLLECTION DRAINAGE 7/21/2021 [facility]    CT GUIDED PERCUTANEOUS BIOPSY MEDIASTINUM  8/16/2019    CT GUIDED PERCUTANEOUS BIOPSY MEDIASTINUM 8/16/2019 [facility] CLINICAL LEGACY    OTHER SURGICAL HISTORY  08/22/2019    Back surgery       Family History   Problem Relation Name Age of Onset    Diabetes Mother      COPD Father         Social History     Socioeconomic History    Marital status:      Spouse name: Not on file    Number of children: Not on file    Years of education: Not on file     Highest education level: Not on file   Occupational History    Not on file   Tobacco Use    Smoking status: Never    Smokeless tobacco: Never   Vaping Use    Vaping status: Never Used   Substance and Sexual Activity    Alcohol use: Not Currently    Drug use: Not Currently     Types: Marijuana    Sexual activity: Not on file   Other Topics Concern    Not on file   Social History Narrative    Not on file     Social Drivers of Health     Financial Resource Strain: Low Risk  (12/29/2024)    Overall Financial Resource Strain (CARDIA)     Difficulty of Paying Living Expenses: Not hard at all   Food Insecurity: No Food Insecurity (12/29/2024)    Hunger Vital Sign     Worried About Running Out of Food in the Last Year: Never true     Ran Out of Food in the Last Year: Never true   Transportation Needs: No Transportation Needs (1/20/2025)    OASIS : Transportation     Lack of Transportation (Medical): No     Lack of Transportation (Non-Medical): No     Patient Unable or Declines to Respond: No   Physical Activity: Not on file   Stress: Not on file   Social Connections: Feeling Socially Integrated (1/20/2025)    OASIS : Social Isolation     Frequency of experiencing loneliness or isolation: Never   Intimate Partner Violence: Not At Risk (12/29/2024)    Humiliation, Afraid, Rape, and Kick questionnaire     Fear of Current or Ex-Partner: No     Emotionally Abused: No     Physically Abused: No     Sexually Abused: No   Housing Stability: Low Risk  (12/29/2024)    Housing Stability Vital Sign     Unable to Pay for Housing in the Last Year: No     Number of Times Moved in the Last Year: 0     Homeless in the Last Year: No       Allergies   Allergen Reactions    Ozempic [Semaglutide] GI Upset and Nausea/vomiting       Outpatient Encounter Medications as of 2/3/2025   Medication Sig Dispense Refill    acetaminophen (TylenoL) 325 mg tablet Take 2 tablets (650 mg) by mouth every 6 hours if needed for mild pain (1 - 3). 30  tablet 0    acetaminophen (Tylenol) 325 mg tablet Take 2 tablets (650 mg) by mouth every 4 hours if needed for mild pain (1 - 3) or moderate pain (4 - 6). 30 tablet 0    aspirin 81 mg chewable tablet Chew 1 tablet (81 mg) once daily. 90 tablet 0    atorvastatin (Lipitor) 80 mg tablet Take 1 tablet (80 mg) by mouth once daily at bedtime. 30 tablet 2    clopidogrel (Plavix) 75 mg tablet Take 1 tablet (75 mg) by mouth once daily. 30 tablet 2    Dexcom G7  misc Use as instructed 1 each 0    Dexcom G7 Sensor device Place on arm replace every 10 days 5 each 3    metFORMIN (Glucophage) 1,000 mg tablet TAKE 1 TABLET BY MOUTH TWO TIMES A DAY WITH MEALS 180 tablet 1    multivit-mins/iron/folic/lycop (CENTRUM MEN ORAL) Take 1 tablet by mouth once daily.      ondansetron ODT (Zofran-ODT) 4 mg disintegrating tablet Dissolve 1 tablet (4 mg) in the mouth every 8 hours if needed for nausea or vomiting. 20 tablet 0    cephalexin (Keflex) 500 mg capsule Take 1 capsule (500 mg) by mouth 4 times a day for 5 days. 20 capsule 0    gabapentin (Neurontin) 100 mg capsule Take 1 capsule (100 mg) by mouth 2 times a day for 14 days. (Patient not taking: Reported on 2025) 28 capsule 0    methocarbamol (Robaxin) 500 mg tablet Take 1 tablet (500 mg) by mouth 4 times a day as needed for muscle spasms. (Patient not taking: Reported on 2/3/2025)      [] oxyCODONE (Roxicodone) 5 mg immediate release tablet Take 1 tablet (5 mg) by mouth every 6 hours if needed for moderate pain (4 - 6) or severe pain (7 - 10) for up to 7 days. 15 tablet 0    semaglutide (Ozempic) 0.25 mg or 0.5 mg (2 mg/3 mL) pen injector Inject 0.25 mg under the skin 1 (one) time per week for 28 days, THEN 0.5 mg 1 (one) time per week. (Patient not taking: Reported on 2/3/2025) 3 mL 3     No facility-administered encounter medications on file as of 2/3/2025.     TTE 2024:  CONCLUSIONS:   1. Left ventricular ejection fraction is normal, by visual estimate at  55%.   2. Small pericardial effusion is present (primarily adjacent to RV); no raven tamponade (no significant respiratory variation across tricuspid or mitral valves; IVC normal size).      Assessment and Plan:    Mr. Nader Zazueta is a 59 y.o. male, who is recovering well after surgery.   Sternal wound looks great.   Dry scab on his right lower limb saphenectomy wound site that he mentions to occasionally have yellowish discharge therefore Keflex added for 5 days    Referral in place for cardiac rehab  Ok to drive, ok to return to normal activities   Continue to follow up with cardiology   Does not need to return to clinic, but was instructed to call if any issues arise       Anjali Alba MD  02/03/25  11:17 AM

## 2025-02-04 ENCOUNTER — PHARMACY VISIT (OUTPATIENT)
Dept: PHARMACY | Facility: CLINIC | Age: 60
End: 2025-02-04
Payer: COMMERCIAL

## 2025-02-04 ENCOUNTER — APPOINTMENT (OUTPATIENT)
Dept: PRIMARY CARE | Facility: CLINIC | Age: 60
End: 2025-02-04
Payer: COMMERCIAL

## 2025-02-04 ENCOUNTER — PATIENT OUTREACH (OUTPATIENT)
Dept: CARE COORDINATION | Facility: CLINIC | Age: 60
End: 2025-02-04

## 2025-02-04 NOTE — PROGRESS NOTES
Reviewed chart prior to patient outreach. Outreach call to patient to check in 30 days after hospital discharge to support smooth transition of care. Patient went to ED end of January due to hypertension. Patient reports that his blood pressures have been elevated, 150s/90s. He is currently off of blood pressure medication, has an appointment with PCP on 2/6/25 to evaluate. He has a small place on his incision where the stitch came out that looks infected, prescribed Keflex yesterday. Patient reports an improvement in his energy level and appetite. Denies futher syncope or chest pain. Patient started driving again this morning, no issues noted. Patient with no additional needs noted. No additional outreach needed at this time.      Jeri Tierney RN BSN  [facility] Care Manager  [phone]

## 2025-02-06 ENCOUNTER — OFFICE VISIT (OUTPATIENT)
Dept: PRIMARY CARE | Facility: CLINIC | Age: 60
End: 2025-02-06
Payer: COMMERCIAL

## 2025-02-06 VITALS
HEIGHT: 69 IN | BODY MASS INDEX: 29.62 KG/M2 | OXYGEN SATURATION: 96 % | HEART RATE: 83 BPM | SYSTOLIC BLOOD PRESSURE: 147 MMHG | DIASTOLIC BLOOD PRESSURE: 105 MMHG | WEIGHT: 200 LBS

## 2025-02-06 DIAGNOSIS — I25.700 CORONARY ARTERY DISEASE INVOLVING CORONARY BYPASS GRAFT OF NATIVE HEART WITH UNSTABLE ANGINA PECTORIS: ICD-10-CM

## 2025-02-06 DIAGNOSIS — E11.9 TYPE 2 DIABETES MELLITUS WITHOUT COMPLICATION, WITHOUT LONG-TERM CURRENT USE OF INSULIN (MULTI): Primary | ICD-10-CM

## 2025-02-06 PROBLEM — G95.9 MYELOPATHY (MULTI): Status: RESOLVED | Noted: 2023-04-06 | Resolved: 2025-02-06

## 2025-02-06 PROCEDURE — 3077F SYST BP >= 140 MM HG: CPT | Performed by: STUDENT IN AN ORGANIZED HEALTH CARE EDUCATION/TRAINING PROGRAM

## 2025-02-06 PROCEDURE — RXMED WILLOW AMBULATORY MEDICATION CHARGE

## 2025-02-06 PROCEDURE — 3008F BODY MASS INDEX DOCD: CPT | Performed by: STUDENT IN AN ORGANIZED HEALTH CARE EDUCATION/TRAINING PROGRAM

## 2025-02-06 PROCEDURE — 99214 OFFICE O/P EST MOD 30 MIN: CPT | Performed by: STUDENT IN AN ORGANIZED HEALTH CARE EDUCATION/TRAINING PROGRAM

## 2025-02-06 PROCEDURE — 1036F TOBACCO NON-USER: CPT | Performed by: STUDENT IN AN ORGANIZED HEALTH CARE EDUCATION/TRAINING PROGRAM

## 2025-02-06 PROCEDURE — 3080F DIAST BP >= 90 MM HG: CPT | Performed by: STUDENT IN AN ORGANIZED HEALTH CARE EDUCATION/TRAINING PROGRAM

## 2025-02-06 ASSESSMENT — PATIENT HEALTH QUESTIONNAIRE - PHQ9
SUM OF ALL RESPONSES TO PHQ9 QUESTIONS 1 AND 2: 0
1. LITTLE INTEREST OR PLEASURE IN DOING THINGS: NOT AT ALL
2. FEELING DOWN, DEPRESSED OR HOPELESS: NOT AT ALL

## 2025-02-06 ASSESSMENT — ENCOUNTER SYMPTOMS: DEPRESSION: 0

## 2025-02-06 NOTE — PROGRESS NOTES
"Subjective   Patient ID: Harvey Zazueta is a 59 y.o. male who presents for Annual Exam and Follow-up (Says he just had open heart sx in December ).    HPI     CAD: Patient had a recent CABG surgery last month at [facility] bypass is doing well with this.  Has had low blood pressure at the time and is weaned off of his blood pressure meds.  He is doing well the past month he went to the ER because the blood pressure was high from his blood pressure at home was over 200 the ER is at 117 is at all with cardiology and blood pressure is a little elevated 140s.  Otherwise no chest pain or shortness of breath dysarthric rehab next month.  Overall doing well    Diabetes: Last A1c in the hospital 8.7 the metformin glimepiride given lightheadedness and lumbar issues stop his glimepiride.  He continues metformin and his heart disease with starting Jardiance well.    Hypertension: Recommend starting on lisinopril low-dose he has been pressure better check he does follow with cardiology next month as well.    Review of Systems   All other systems reviewed and are negative.      Objective   BP (!) 147/105 (BP Location: Right arm, Patient Position: Sitting, BP Cuff Size: Small adult)   Pulse 83   Ht 1.753 m (5' 9\")   Wt 90.7 kg (200 lb)   SpO2 96%   BMI 29.53 kg/m²     Physical Exam  Constitutional:       Appearance: Normal appearance.   HENT:      Head: Normocephalic and atraumatic.      Right Ear: Tympanic membrane and ear canal normal.      Left Ear: Tympanic membrane and ear canal normal.      Mouth/Throat:      Mouth: Mucous membranes are moist.      Pharynx: Oropharynx is clear.   Eyes:      Extraocular Movements: Extraocular movements intact.      Conjunctiva/sclera: Conjunctivae normal.      Pupils: Pupils are equal, round, and reactive to light.   Cardiovascular:      Rate and Rhythm: Normal rate and regular rhythm.      Pulses: Normal pulses.      Heart sounds: Normal heart sounds.   Pulmonary:      Effort: Pulmonary " effort is normal.      Breath sounds: Normal breath sounds.   Abdominal:      General: Abdomen is flat. Bowel sounds are normal.      Palpations: Abdomen is soft.   Musculoskeletal:         General: Normal range of motion.      Cervical back: Normal range of motion and neck supple.   Skin:     General: Skin is warm and dry.      Capillary Refill: Capillary refill takes 2 to 3 seconds.   Neurological:      General: No focal deficit present.      Mental Status: He is alert and oriented to person, place, and time. Mental status is at baseline.   Psychiatric:         Mood and Affect: Mood normal.         Behavior: Behavior normal.         Thought Content: Thought content normal.         Judgment: Judgment normal.       Assessment/Plan   1. Type 2 diabetes mellitus without complication, without long-term current use of insulin (Multi) (Primary)  d continue metformin last A1c was 8.7 recheck next month  Doing well sugars are running a bit high overall doing better.  Monitor A1c next month changes.  - empagliflozin (Jardiance) 10 mg; Take 1 tablet (10 mg) by mouth once daily.  Dispense: 100 tablet; Refill: 3    2. Coronary artery disease involving coronary bypass graft of native heart with unstable angina pectoris  Stable follows with cardiac surgery has a small amount infection and incision he is on antibiotics for this.  Cariology is following for this.  Has follow-up cardiology next month  Start lisinopril 10 mg he has at home and we started  No lightheaded no dizziness  *Current rehab next month  Overall doing well recovering well.

## 2025-02-07 ENCOUNTER — PHARMACY VISIT (OUTPATIENT)
Dept: PHARMACY | Facility: CLINIC | Age: 60
End: 2025-02-07
Payer: COMMERCIAL

## 2025-02-07 LAB
ATRIAL RATE: 85 BPM
P AXIS: 84 DEGREES
P OFFSET: 198 MS
P ONSET: 142 MS
PR INTERVAL: 164 MS
Q ONSET: 224 MS
QRS COUNT: 13 BEATS
QRS DURATION: 100 MS
QT INTERVAL: 376 MS
QTC CALCULATION(BAZETT): 447 MS
QTC FREDERICIA: 422 MS
R AXIS: 88 DEGREES
T AXIS: 62 DEGREES
T OFFSET: 412 MS
VENTRICULAR RATE: 85 BPM

## 2025-02-07 PROCEDURE — 93005 ELECTROCARDIOGRAM TRACING: CPT

## 2025-02-08 ENCOUNTER — HOSPITAL ENCOUNTER (OUTPATIENT)
Facility: HOSPITAL | Age: 60
Setting detail: OBSERVATION
End: 2025-02-08
Attending: STUDENT IN AN ORGANIZED HEALTH CARE EDUCATION/TRAINING PROGRAM | Admitting: STUDENT IN AN ORGANIZED HEALTH CARE EDUCATION/TRAINING PROGRAM
Payer: COMMERCIAL

## 2025-02-08 ENCOUNTER — APPOINTMENT (OUTPATIENT)
Dept: CARDIOLOGY | Facility: HOSPITAL | Age: 60
End: 2025-02-08
Payer: COMMERCIAL

## 2025-02-08 DIAGNOSIS — E11.9 TYPE 2 DIABETES MELLITUS WITHOUT COMPLICATION, WITHOUT LONG-TERM CURRENT USE OF INSULIN (MULTI): ICD-10-CM

## 2025-02-08 DIAGNOSIS — I15.9 SECONDARY HYPERTENSION: ICD-10-CM

## 2025-02-08 DIAGNOSIS — R55 SYNCOPE, UNSPECIFIED SYNCOPE TYPE: Primary | ICD-10-CM

## 2025-02-08 LAB
ANION GAP SERPL CALC-SCNC: 17 MMOL/L (ref 10–20)
BASOPHILS # BLD AUTO: 0.06 X10*3/UL (ref 0–0.1)
BASOPHILS NFR BLD AUTO: 0.8 %
BNP SERPL-MCNC: 31 PG/ML (ref 0–99)
BUN SERPL-MCNC: 23 MG/DL (ref 6–23)
CALCIUM SERPL-MCNC: 9.1 MG/DL (ref 8.6–10.3)
CARDIAC TROPONIN I PNL SERPL HS: 10 NG/L (ref 0–20)
CARDIAC TROPONIN I PNL SERPL HS: 8 NG/L (ref 0–20)
CHLORIDE SERPL-SCNC: 104 MMOL/L (ref 98–107)
CO2 SERPL-SCNC: 19 MMOL/L (ref 21–32)
CREAT SERPL-MCNC: 1.42 MG/DL (ref 0.5–1.3)
EGFRCR SERPLBLD CKD-EPI 2021: 57 ML/MIN/1.73M*2
EOSINOPHIL # BLD AUTO: 0.34 X10*3/UL (ref 0–0.7)
EOSINOPHIL NFR BLD AUTO: 4.3 %
ERYTHROCYTE [DISTWIDTH] IN BLOOD BY AUTOMATED COUNT: 13 % (ref 11.5–14.5)
GLUCOSE SERPL-MCNC: 187 MG/DL (ref 74–99)
HCT VFR BLD AUTO: 38 % (ref 41–52)
HGB BLD-MCNC: 12.7 G/DL (ref 13.5–17.5)
HOLD SPECIMEN: NORMAL
HOLD SPECIMEN: NORMAL
IMM GRANULOCYTES # BLD AUTO: 0.04 X10*3/UL (ref 0–0.7)
IMM GRANULOCYTES NFR BLD AUTO: 0.5 % (ref 0–0.9)
LYMPHOCYTES # BLD AUTO: 2.6 X10*3/UL (ref 1.2–4.8)
LYMPHOCYTES NFR BLD AUTO: 32.7 %
MAGNESIUM SERPL-MCNC: 1.57 MG/DL (ref 1.6–2.4)
MCH RBC QN AUTO: 31.7 PG (ref 26–34)
MCHC RBC AUTO-ENTMCNC: 33.4 G/DL (ref 32–36)
MCV RBC AUTO: 95 FL (ref 80–100)
MONOCYTES # BLD AUTO: 0.61 X10*3/UL (ref 0.1–1)
MONOCYTES NFR BLD AUTO: 7.7 %
NEUTROPHILS # BLD AUTO: 4.31 X10*3/UL (ref 1.2–7.7)
NEUTROPHILS NFR BLD AUTO: 54 %
NRBC BLD-RTO: 0 /100 WBCS (ref 0–0)
PLATELET # BLD AUTO: 264 X10*3/UL (ref 150–450)
POTASSIUM SERPL-SCNC: 4.3 MMOL/L (ref 3.5–5.3)
RBC # BLD AUTO: 4.01 X10*6/UL (ref 4.5–5.9)
SODIUM SERPL-SCNC: 136 MMOL/L (ref 136–145)
WBC # BLD AUTO: 8 X10*3/UL (ref 4.4–11.3)

## 2025-02-08 PROCEDURE — G0378 HOSPITAL OBSERVATION PER HR: HCPCS

## 2025-02-08 PROCEDURE — 36415 COLL VENOUS BLD VENIPUNCTURE: CPT | Performed by: STUDENT IN AN ORGANIZED HEALTH CARE EDUCATION/TRAINING PROGRAM

## 2025-02-08 PROCEDURE — 83880 ASSAY OF NATRIURETIC PEPTIDE: CPT | Performed by: STUDENT IN AN ORGANIZED HEALTH CARE EDUCATION/TRAINING PROGRAM

## 2025-02-08 PROCEDURE — 2500000001 HC RX 250 WO HCPCS SELF ADMINISTERED DRUGS (ALT 637 FOR MEDICARE OP): Performed by: NURSE PRACTITIONER

## 2025-02-08 PROCEDURE — 80048 BASIC METABOLIC PNL TOTAL CA: CPT | Performed by: STUDENT IN AN ORGANIZED HEALTH CARE EDUCATION/TRAINING PROGRAM

## 2025-02-08 PROCEDURE — 84484 ASSAY OF TROPONIN QUANT: CPT | Performed by: STUDENT IN AN ORGANIZED HEALTH CARE EDUCATION/TRAINING PROGRAM

## 2025-02-08 PROCEDURE — 2500000004 HC RX 250 GENERAL PHARMACY W/ HCPCS (ALT 636 FOR OP/ED): Performed by: NURSE PRACTITIONER

## 2025-02-08 PROCEDURE — 96366 THER/PROPH/DIAG IV INF ADDON: CPT | Mod: 59

## 2025-02-08 PROCEDURE — 93005 ELECTROCARDIOGRAM TRACING: CPT

## 2025-02-08 PROCEDURE — 83735 ASSAY OF MAGNESIUM: CPT | Performed by: STUDENT IN AN ORGANIZED HEALTH CARE EDUCATION/TRAINING PROGRAM

## 2025-02-08 PROCEDURE — 96365 THER/PROPH/DIAG IV INF INIT: CPT | Mod: 59

## 2025-02-08 PROCEDURE — 85025 COMPLETE CBC W/AUTO DIFF WBC: CPT | Performed by: STUDENT IN AN ORGANIZED HEALTH CARE EDUCATION/TRAINING PROGRAM

## 2025-02-08 PROCEDURE — 99285 EMERGENCY DEPT VISIT HI MDM: CPT | Performed by: STUDENT IN AN ORGANIZED HEALTH CARE EDUCATION/TRAINING PROGRAM

## 2025-02-08 RX ORDER — ACETAMINOPHEN 325 MG/1
650 TABLET ORAL EVERY 4 HOURS PRN
Status: ACTIVE | OUTPATIENT
Start: 2025-02-08

## 2025-02-08 RX ORDER — SODIUM CHLORIDE 9 MG/ML
100 INJECTION, SOLUTION INTRAVENOUS CONTINUOUS
Status: ACTIVE | OUTPATIENT
Start: 2025-02-08 | End: 2025-02-09

## 2025-02-08 RX ORDER — MAGNESIUM SULFATE HEPTAHYDRATE 40 MG/ML
2 INJECTION, SOLUTION INTRAVENOUS ONCE
Status: COMPLETED | OUTPATIENT
Start: 2025-02-08 | End: 2025-02-08

## 2025-02-08 RX ORDER — NAPROXEN SODIUM 220 MG/1
81 TABLET, FILM COATED ORAL DAILY
Status: DISPENSED | OUTPATIENT
Start: 2025-02-09

## 2025-02-08 RX ORDER — SODIUM CHLORIDE 9 MG/ML
100 INJECTION, SOLUTION INTRAVENOUS CONTINUOUS
Status: DISCONTINUED | OUTPATIENT
Start: 2025-02-08 | End: 2025-02-08

## 2025-02-08 RX ORDER — CLOPIDOGREL BISULFATE 75 MG/1
75 TABLET ORAL DAILY
Status: DISPENSED | OUTPATIENT
Start: 2025-02-09

## 2025-02-08 RX ORDER — ATORVASTATIN CALCIUM 80 MG/1
80 TABLET, FILM COATED ORAL NIGHTLY
Status: DISPENSED | OUTPATIENT
Start: 2025-02-08

## 2025-02-08 RX ORDER — LISINOPRIL 10 MG/1
10 TABLET ORAL DAILY
Status: ON HOLD | COMMUNITY

## 2025-02-08 RX ORDER — CEPHALEXIN 500 MG/1
500 CAPSULE ORAL 4 TIMES DAILY
Status: DISPENSED | OUTPATIENT
Start: 2025-02-08 | End: 2025-02-10

## 2025-02-08 RX ADMIN — MAGNESIUM SULFATE IN WATER 2 G: 40 INJECTION, SOLUTION INTRAVENOUS at 21:07

## 2025-02-08 RX ADMIN — CEPHALEXIN 500 MG: 500 CAPSULE ORAL at 21:04

## 2025-02-08 RX ADMIN — ATORVASTATIN CALCIUM 80 MG: 80 TABLET, FILM COATED ORAL at 21:04

## 2025-02-08 RX ADMIN — SODIUM CHLORIDE 100 ML/HR: 9 INJECTION, SOLUTION INTRAVENOUS at 21:05

## 2025-02-08 SDOH — HEALTH STABILITY: MENTAL HEALTH: HOW OFTEN DO YOU HAVE A DRINK CONTAINING ALCOHOL?: NEVER

## 2025-02-08 SDOH — SOCIAL STABILITY: SOCIAL INSECURITY: WITHIN THE LAST YEAR, HAVE YOU BEEN HUMILIATED OR EMOTIONALLY ABUSED IN OTHER WAYS BY YOUR PARTNER OR EX-PARTNER?: NO

## 2025-02-08 SDOH — SOCIAL STABILITY: SOCIAL INSECURITY: DO YOU FEEL ANYONE HAS EXPLOITED OR TAKEN ADVANTAGE OF YOU FINANCIALLY OR OF YOUR PERSONAL PROPERTY?: NO

## 2025-02-08 SDOH — HEALTH STABILITY: MENTAL HEALTH: HOW OFTEN DO YOU HAVE SIX OR MORE DRINKS ON ONE OCCASION?: NEVER

## 2025-02-08 SDOH — ECONOMIC STABILITY: FOOD INSECURITY: HOW HARD IS IT FOR YOU TO PAY FOR THE VERY BASICS LIKE FOOD, HOUSING, MEDICAL CARE, AND HEATING?: NOT HARD AT ALL

## 2025-02-08 SDOH — ECONOMIC STABILITY: FOOD INSECURITY: WITHIN THE PAST 12 MONTHS, THE FOOD YOU BOUGHT JUST DIDN'T LAST AND YOU DIDN'T HAVE MONEY TO GET MORE.: NEVER TRUE

## 2025-02-08 SDOH — SOCIAL STABILITY: SOCIAL INSECURITY: WERE YOU ABLE TO COMPLETE ALL THE BEHAVIORAL HEALTH SCREENINGS?: YES

## 2025-02-08 SDOH — SOCIAL STABILITY: SOCIAL INSECURITY: DOES ANYONE TRY TO KEEP YOU FROM HAVING/CONTACTING OTHER FRIENDS OR DOING THINGS OUTSIDE YOUR HOME?: NO

## 2025-02-08 SDOH — SOCIAL STABILITY: SOCIAL INSECURITY: ARE THERE ANY APPARENT SIGNS OF INJURIES/BEHAVIORS THAT COULD BE RELATED TO ABUSE/NEGLECT?: NO

## 2025-02-08 SDOH — ECONOMIC STABILITY: FOOD INSECURITY: WITHIN THE PAST 12 MONTHS, YOU WORRIED THAT YOUR FOOD WOULD RUN OUT BEFORE YOU GOT THE MONEY TO BUY MORE.: NEVER TRUE

## 2025-02-08 SDOH — ECONOMIC STABILITY: HOUSING INSECURITY: AT ANY TIME IN THE PAST 12 MONTHS, WERE YOU HOMELESS OR LIVING IN A SHELTER (INCLUDING NOW)?: NO

## 2025-02-08 SDOH — ECONOMIC STABILITY: TRANSPORTATION INSECURITY: IN THE PAST 12 MONTHS, HAS LACK OF TRANSPORTATION KEPT YOU FROM MEDICAL APPOINTMENTS OR FROM GETTING MEDICATIONS?: NO

## 2025-02-08 SDOH — SOCIAL STABILITY: SOCIAL INSECURITY: ABUSE: ADULT

## 2025-02-08 SDOH — ECONOMIC STABILITY: HOUSING INSECURITY: IN THE LAST 12 MONTHS, WAS THERE A TIME WHEN YOU WERE NOT ABLE TO PAY THE MORTGAGE OR RENT ON TIME?: NO

## 2025-02-08 SDOH — ECONOMIC STABILITY: INCOME INSECURITY: IN THE PAST 12 MONTHS HAS THE ELECTRIC, GAS, OIL, OR WATER COMPANY THREATENED TO SHUT OFF SERVICES IN YOUR HOME?: NO

## 2025-02-08 SDOH — SOCIAL STABILITY: SOCIAL INSECURITY: DO YOU FEEL UNSAFE GOING BACK TO THE PLACE WHERE YOU ARE LIVING?: NO

## 2025-02-08 SDOH — SOCIAL STABILITY: SOCIAL INSECURITY: WITHIN THE LAST YEAR, HAVE YOU BEEN AFRAID OF YOUR PARTNER OR EX-PARTNER?: NO

## 2025-02-08 SDOH — SOCIAL STABILITY: SOCIAL INSECURITY: ARE YOU OR HAVE YOU BEEN THREATENED OR ABUSED PHYSICALLY, EMOTIONALLY, OR SEXUALLY BY ANYONE?: NO

## 2025-02-08 SDOH — ECONOMIC STABILITY: HOUSING INSECURITY: IN THE PAST 12 MONTHS, HOW MANY TIMES HAVE YOU MOVED WHERE YOU WERE LIVING?: 0

## 2025-02-08 SDOH — HEALTH STABILITY: MENTAL HEALTH: HOW MANY DRINKS CONTAINING ALCOHOL DO YOU HAVE ON A TYPICAL DAY WHEN YOU ARE DRINKING?: PATIENT DOES NOT DRINK

## 2025-02-08 SDOH — SOCIAL STABILITY: SOCIAL INSECURITY: HAS ANYONE EVER THREATENED TO HURT YOUR FAMILY OR YOUR PETS?: NO

## 2025-02-08 SDOH — SOCIAL STABILITY: SOCIAL INSECURITY: HAVE YOU HAD THOUGHTS OF HARMING ANYONE ELSE?: NO

## 2025-02-08 SDOH — SOCIAL STABILITY: SOCIAL INSECURITY: HAVE YOU HAD ANY THOUGHTS OF HARMING ANYONE ELSE?: NO

## 2025-02-08 ASSESSMENT — ACTIVITIES OF DAILY LIVING (ADL)
TOILETING: INDEPENDENT
WALKS IN HOME: INDEPENDENT
ADEQUATE_TO_COMPLETE_ADL: YES
LACK_OF_TRANSPORTATION: NO
DRESSING YOURSELF: INDEPENDENT
HEARING - LEFT EAR: FUNCTIONAL
PATIENT'S MEMORY ADEQUATE TO SAFELY COMPLETE DAILY ACTIVITIES?: YES
BATHING: INDEPENDENT
HEARING - RIGHT EAR: FUNCTIONAL
GROOMING: INDEPENDENT
JUDGMENT_ADEQUATE_SAFELY_COMPLETE_DAILY_ACTIVITIES: YES
FEEDING YOURSELF: INDEPENDENT

## 2025-02-08 ASSESSMENT — LIFESTYLE VARIABLES
EVER FELT BAD OR GUILTY ABOUT YOUR DRINKING: NO
HAVE PEOPLE ANNOYED YOU BY CRITICIZING YOUR DRINKING: NO
HOW MANY STANDARD DRINKS CONTAINING ALCOHOL DO YOU HAVE ON A TYPICAL DAY: PATIENT DOES NOT DRINK
HAVE YOU EVER FELT YOU SHOULD CUT DOWN ON YOUR DRINKING: NO
AUDIT-C TOTAL SCORE: 0
EVER HAD A DRINK FIRST THING IN THE MORNING TO STEADY YOUR NERVES TO GET RID OF A HANGOVER: NO
SUBSTANCE_ABUSE_PAST_12_MONTHS: NO
TOTAL SCORE: 0
PRESCIPTION_ABUSE_PAST_12_MONTHS: NO
AUDIT-C TOTAL SCORE: 0
HOW OFTEN DO YOU HAVE 6 OR MORE DRINKS ON ONE OCCASION: NEVER
SKIP TO QUESTIONS 9-10: 1
AUDIT-C TOTAL SCORE: 0
SKIP TO QUESTIONS 9-10: 1
HOW OFTEN DO YOU HAVE A DRINK CONTAINING ALCOHOL: NEVER

## 2025-02-08 ASSESSMENT — COGNITIVE AND FUNCTIONAL STATUS - GENERAL
PATIENT BASELINE BEDBOUND: NO
DAILY ACTIVITIY SCORE: 24
MOBILITY SCORE: 24

## 2025-02-08 ASSESSMENT — PAIN SCALES - GENERAL: PAINLEVEL_OUTOF10: 0 - NO PAIN

## 2025-02-08 ASSESSMENT — PATIENT HEALTH QUESTIONNAIRE - PHQ9
2. FEELING DOWN, DEPRESSED OR HOPELESS: NOT AT ALL
1. LITTLE INTEREST OR PLEASURE IN DOING THINGS: NOT AT ALL
SUM OF ALL RESPONSES TO PHQ9 QUESTIONS 1 & 2: 0

## 2025-02-08 ASSESSMENT — PAIN - FUNCTIONAL ASSESSMENT: PAIN_FUNCTIONAL_ASSESSMENT: 0-10

## 2025-02-08 NOTE — ED PROVIDER NOTES
[facility] ED Note    Date of Service: 2/8/2025  Reason for Visit: No chief complaint on file.      Patient History     HPI  Nader Zazueta is a 59 y.o. male with past medical history of CKD, diabetes, hypertension, CAD (s/p CABG x 1 on 12/16/2024) and previous syncopal episode on 12/29 who presents the emergency department for a syncopal episode.  Patient states he was sitting and talking to his friend when he passed out.  Patient denies any preceding chest pain, shortness of breath or preceding symptoms.  He states he just felt off and then syncopized.  He was passed out for approximately 30 seconds and came back.  No seizure-like activity.  No symptoms now, states he otherwise feels well.  He states he was taken off his blood pressure medications the last time he presented for syncope.    Per chart review: Patient had a syncopal episode, was admitted, evaluated with a TTE, had hypertensives held and he was discharged with an event monitor.      Past Medical History:   Diagnosis Date    Incisional hernia without obstruction or gangrene 06/10/2020    Incisional hernia    Other specified disorders of kidney and ureter 01/13/2020    Renal mass, right    Personal history of other diseases of the circulatory system     History of hypertension    Personal history of other diseases of the musculoskeletal system and connective tissue     History of osteomyelitis    Personal history of other endocrine, nutritional and metabolic disease     History of diabetes mellitus    Personal history of other specified conditions     History of abdominal pain    Personal history of other specified conditions 05/15/2020    History of postoperative nausea and vomiting    Right lower quadrant pain 02/07/2020    RLQ cramping     Past Surgical History:   Procedure Laterality Date    CARDIAC CATHETERIZATION N/A 12/12/2024    Procedure: Left Heart Cath, With LV;  Surgeon: Thor Keyes MD;  Location: [facility] Cardiac Cath Lab;   "Service: Cardiovascular;  Laterality: N/A;    CT GUIDED ABSCESS FLUID COLLECTION DRAINAGE  7/21/2021    CT GUIDED ABSCESS FLUID COLLECTION DRAINAGE 7/21/2021 [facility]    CT GUIDED PERCUTANEOUS BIOPSY MEDIASTINUM  8/16/2019    CT GUIDED PERCUTANEOUS BIOPSY MEDIASTINUM 8/16/2019 [facility] CLINICAL LEGACY    OTHER SURGICAL HISTORY  08/22/2019    Back surgery         Physical Exam     Vitals:    02/08/25 1315 02/08/25 1329 02/08/25 1330   BP: 178/79 178/79    Pulse: 73 71 74   Resp: 17 18 (!) 24   SpO2: 96% 97% 97%   Weight:  91.2 kg (201 lb)    Height:  1.753 m (5' 9\")      General: On my assessment, patient is well-appearing, sitting comfortably in the gurney no acute distress.  Pulmonary:   Non-labored breathing. Breath sounds clear bilaterally  Cardiac:  Regular rate   Abdomen:  Soft. Non-tender. Non-distended  Musculoskeletal:  No long bone deformity. No peripheral edema.  Patient has a sternotomy scar that is well-healing on the chest without surrounding erythema, edema, or warmth.  No discharge appreciated.  Skin:  Dry, no rashes  Neuro: Alert and orient x 4.  Moves all 4 extremity spontaneously.    Diagnostic Studies     Labs:  Please see EMR for labs obtained during this patient encounter.    Radiology:  Please see EMR for imaging obtained during this patient encounter.    EKG:  Normal sinus rhythm, ventricular to 73.  Right axis deviation.  Normal MA interval of 174.  Ventricular conduction in a nonspecific pattern with a wide QRS duration of 110 and a normal QTc interval of 427.  No Q waves appreciated, normal ST segments, T wave flattening seen in lead aVL.  Improved T wave inversions in V2 and V3 compared to prior EKG on 1/31/2025.  Otherwise unchanged.      ED Course and MDM     Nader Zazueta is a 59 y.o. male with a history and presentation as described above in HPI.      Upon presentation, the patient was afebrile, well-appearing, with unremarkable vital signs.  Patient with a history of CAD s/p CABG " secondary to syncopal episode who presents to the emergency department for a recurrent episode of syncope.  Differential diagnosis includes cardiac arrhythmia, vasovagal syncope, neurogenic syncope, or possible orthostatic hypotension.  Patient is off his antihypertensives, the syncope episode happened while patient was sitting down and not mobile, therefore lower suspicion for orthostatic hypotension at this time.  I had a nonfocal neurological examination, no reported history of seizure activity, no episode of postictal confusion, have low suspicion for seizure precipitating patient's symptoms today.  Patient's BMP is notable for creatinine slightly above baseline, remainder unremarkable, the remainder of his lab work is unremarkable.  EKG does not show any obvious signs of ischemia without any changes from prior, no signs of arrhythmia with a negative troponin initially, repeat pending.  Patient was just recently admitted for syncope last month, I did review patient's chart and previous visits including outpatient cardiology visit.  They are still awaiting the Holter monitor results, therefore patient will require admission for 24-hour period of telemetry given his high risk nature with ongoing workup for his syncope.      Impression     Diagnoses as of 02/08/25 1510   Syncope, unspecified syncope type        Plan       Admit to medicine, as observation status for further evaluation and management of syncope      Doni Rehman MD  [facility] Emergency Medicine         Doni Rehman MD  02/08/25 1510

## 2025-02-08 NOTE — PROGRESS NOTES
"Pharmacy Medication History Review    Nader Zazueta \"Harvey\" is a 59 y.o. male admitted for No Principal Problem: There is no principal problem currently on the Problem List. Please update the Problem List and refresh.. Pharmacy reviewed the patient's [id number] medications and allergies for accuracy.    The list below reflectives the updated PTA list. Please review each medication in order reconciliation for additional clarification and justification.  Prior to Admission medications    Medication Sig Start Date End Date Authorizing Provider   aspirin 81 mg chewable tablet Chew 1 tablet (81 mg) once daily.   Sandi Nj APRN-CNP   atorvastatin (Lipitor) 80 mg tablet Take 1 tablet (80 mg) by mouth once daily at bedtime.   Sandi Nj APRN-CNP   cephalexin (Keflex) 500 mg capsule Take 1 capsule (500 mg) by mouth 4 times a day for 5 days. 2/3/25 2/9/25 Anjali Alba MD   clopidogrel (Plavix) 75 mg tablet Take 1 tablet (75 mg) by mouth once daily.   Sandi Nj APRN-CNP   empagliflozin (Jardiance) 10 mg Take 1 tablet (10 mg) by mouth once daily.   Mustapha Singer, DO   lisinopril 10 mg tablet Take 1 tablet (10 mg) by mouth once daily.   Historical Provider, MD   metFORMIN (Glucophage) 1,000 mg tablet TAKE 1 TABLET BY MOUTH TWO TIMES A DAY WITH MEALS   Mustapha Ice, DO   multivit-mins/iron/folic/lycop (CENTRUM MEN ORAL) Take 1 tablet by mouth once daily.   Historical Provider, MD   acetaminophen (Tylenol) 325 mg tablet Take 2 tablets (650 mg) by mouth every 4 hours if needed for mild pain (1 - 3) or moderate pain (4 - 6).   Evi Bahena, APRN-CNP        The list below reflectives the updated allergy list. Please review each documented allergy for additional clarification and justification.  Allergies  Reviewed by Mariusz Ellis MA on 2/6/2025        Severity Reactions Comments    Ozempic [semaglutide] Medium GI Upset, Nausea/vomiting             Below are additional concerns with the " patient's PTA list.      Gillian Reilly

## 2025-02-08 NOTE — H&P
"History Of Present Illness  Nader Zazueta \"Harvey\" is a 59-year-old male with past medical history of hypertension, hyperlipidemia, type 2 diabetes mellitus, CKD, renal cell carcinoma s/p R nephrectomy, coronary artery disease s/p CABG x 4 on 12/16/2024.  Patient presents with syncopal episode.  He was recently hospitalized 12/29/2024 to 12/31/2024 with similar symptoms and admitted by cardiac surgery due to recent open heart surgery.  He was found to be bradycardic and hypotensive which was thought to be secondary to dehydration and his antihypertensive regimen.  Medications were adjusted and he was discharged home with plan for event monitor and follow-up with Tera MATIAS.  Patient had follow-up with Dr. Singer on 2/6/2025 and at that time was restarted on Lisinopril 10 mg once daily and he also started Jardiance. He has been on the 2 new medications for 2 days. This morning he was doing gardening with a jeanie and reports that he passed out for about 30 seconds. His friend caught him and he had no injuries. Denies any associated symptoms. No seizure like activity, confusion, incontinence. He did have a stomach bug last week with nausea/vomiting and some diarrhea. All have resolved.  He currently is on a course of antibiotics for a small incisional infection, reports it is healing up, just a couple more days of Keflex remaining.     ER course: Hypertensive on presentation.  170/79 otherwise has been hemodynamically stable.  SpO2 96% on room air.  EKG per ER provider interpretation normal sinus rhythm, ventricular to 73.  Right axis deviation.  Normal W[place] interval of 174.  Ventricular conduction in a nonspecific pattern with a wide QRS duration of 110 and a normal QTc interval of 427.  No Q waves appreciated, normal ST segments, T wave flattening seen in lead aVL.  Improved T wave inversions in V2 and V3 compared to prior EKG on 1/31/2025.  Otherwise unchanged. WBC 8.0, hemoglobin 12.7/hematocrit 38, platelets 264.  " Glucose 187, COVID-19, creatinine 1.42 otherwise BMP is normal.  Magnesium 1.57.  BNP 31.  High-sensitivity troponin 8, repeat 10. CXR found no acute findings.  He received no medications while in the ED.     Past Medical History  Past Medical History:   Diagnosis Date    Incisional hernia without obstruction or gangrene 06/10/2020    Incisional hernia    Other specified disorders of kidney and ureter 01/13/2020    Renal mass, right    Personal history of other diseases of the circulatory system     History of hypertension    Personal history of other diseases of the musculoskeletal system and connective tissue     History of osteomyelitis    Personal history of other endocrine, nutritional and metabolic disease     History of diabetes mellitus    Personal history of other specified conditions     History of abdominal pain    Personal history of other specified conditions 05/15/2020    History of postoperative nausea and vomiting    Right lower quadrant pain 02/07/2020    RLQ cramping       Surgical History  Past Surgical History:   Procedure Laterality Date    CARDIAC CATHETERIZATION N/A 12/12/2024    Procedure: Left Heart Cath, With LV;  Surgeon: Thor Keyes MD;  Location: [facility] Cardiac Cath Lab;  Service: Cardiovascular;  Laterality: N/A;    CT GUIDED ABSCESS FLUID COLLECTION DRAINAGE  7/21/2021    CT GUIDED ABSCESS FLUID COLLECTION DRAINAGE 7/21/2021 [facility]    CT GUIDED PERCUTANEOUS BIOPSY MEDIASTINUM  8/16/2019    CT GUIDED PERCUTANEOUS BIOPSY MEDIASTINUM 8/16/2019 [facility] CL[facility]ICAL [facility]GAC[facility]    OTHER SURGICAL HISTORY  08/22/2019    Back surgery        Social History  He reports that he has never smoked. He has never used smokeless tobacco. He reports that he does not currently use alcohol. He reports that he does not currently use drugs after having used the following drugs: Marijuana.    Family History  Family History   Problem Relation Name Age of Onset    Diabetes Mother      COPD Father         "  Allergies  Ozempic [semaglutide]    Review of Systems    10 point ROS negative except as noted above in HPI      Physical Exam  Constitutional:       General: He is awake. He is not in acute distress.     Appearance: Normal appearance. He is not toxic-appearing.   HENT:      Head: Atraumatic.      Nose: Nose normal.      Mouth/Throat:      Mouth: Mucous membranes are moist.   Eyes:      Extraocular Movements: Extraocular movements intact.      Conjunctiva/sclera: Conjunctivae normal.      Pupils: Pupils are equal, round, and reactive to light.   Cardiovascular:      Rate and Rhythm: Normal rate and regular rhythm.      Pulses: Normal pulses.      Heart sounds: No murmur heard.     Comments: Midline sternotomy incision well-approximated small area of erythema, no drainage  Pulmonary:      Effort: Pulmonary effort is normal. No respiratory distress.      Breath sounds: Normal breath sounds.   Abdominal:      General: Bowel sounds are normal. There is no distension.      Palpations: Abdomen is soft.      Tenderness: There is no abdominal tenderness. There is no guarding.   Musculoskeletal:         General: No swelling, deformity or signs of injury. Normal range of motion.      Cervical back: Neck supple.      Right lower leg: No edema.      Left lower leg: No edema.   Skin:     General: Skin is warm and dry.      Capillary Refill: Capillary refill takes less than 2 seconds.      Findings: No ecchymosis, erythema or wound.   Neurological:      General: No focal deficit present.      Mental Status: He is alert and oriented to person, place, and time.   Psychiatric:         Mood and Affect: Mood normal.         Behavior: Behavior is cooperative.          Last Recorded Vitals  Blood pressure 110/69, pulse 63, resp. rate 18, height 1.753 m (5' 9\"), weight 91.2 kg (201 lb), SpO2 95%.    Relevant Results        ECG 12 lead    Result Date: 2/7/2025  Normal sinus rhythm Possible Left atrial enlargement Incomplete right bundle " branch block Nonspecific T wave abnormality Abnormal ECG When compared with ECG of 29-DEC-2024 18:32, Vent. rate has increased BY  28 BPM    XR chest 2 views    Result Date: 1/31/2025  Interpreted By:  Bonifacio Tillman, STUDY: XR CHEST 2 VIEWS; ;  1/31/2025 10:17 am   INDICATION: Signs/Symptoms:SOB.     COMPARISON: 12/29/2024 chest radiograph and 12/13/2024 chest CT   ACCESSION NUMBER(S): [id number]   ORDERING CLINICIAN: NENA RAMOS   TECHNIQUE: Upright PA and lateral chest radiographs   FINDINGS: Cardiac silhouette size is normal, unchanged. Sternal wires are present.   No acute pulmonary infiltrates are noted.       No acute pulmonary disease.     MACRO: None   Signed by: Bonifacio Tillman 1/31/2025 10:50 AM Dictation workstation:   [id number]     ECG 12 lead    Result Date: 2/7/2025  Normal sinus rhythm Possible Left atrial enlargement Incomplete right bundle branch block Nonspecific T wave abnormality Abnormal ECG When compared with ECG of 29-DEC-2024 18:32, Vent. rate has increased BY  28 BPM    XR chest 2 views    Result Date: 1/31/2025  Interpreted By:  Bonifacio Tillman, STUDY: XR CHEST 2 VIEWS; ;  1/31/2025 10:17 am   INDICATION: Signs/Symptoms:SOB.     COMPARISON: 12/29/2024 chest radiograph and 12/13/2024 chest CT   ACCESSION NUMBER(S): [id number]   ORDERING CLINICIAN: NENA RAMOS   TECHNIQUE: Upright PA and lateral chest radiographs   FINDINGS: Cardiac silhouette size is normal, unchanged. Sternal wires are present.   No acute pulmonary infiltrates are noted.       No acute pulmonary disease.     MACRO: None   Signed by: Bonifacio Tillman 1/31/2025 10:50 AM Dictation workstation:   [id number]        Assessment/Plan   Assessment & Plan  Syncope, unspecified syncope type      59-year-old male with past medical history of hypertension, hyperlipidemia, type 2 diabetes mellitus, CKD, renal cell carcinoma s/p R nephrectomy, coronary artery disease s/p CABG x 4 on 12/16/2024.  Patient presents with syncopal episode.  He  was recently hospitalized 12/29/2024 to 12/31/2024 with similar symptoms and admitted by cardiac surgery due to recent open heart surgery.  He was found to be bradycardic and hypotensive which was thought to be secondary to dehydration and his antihypertensive regimen.  Medications were adjusted and he was discharged home with plan for event monitor and follow-up with Tera MATIAS.  Patient had follow-up with Dr. Singer on 2/6/2025 and at that time was restarted on Lisinopril 10 mg once daily and he also started Jardiance.     #Syncope  #Hx  coronary artery disease s/p CABG x 4 on 12/16/2024    Likely due to hypotension/dehydration as patient was recently started on lisinopril and Jardiance 2 days ago. Will hold both  medications  IV fluids overnight  Echocardiogram recently completed, will defer repeat  Monitor overnight for any arrhythmia   Check orthostatic vitals.  Consult cardiology  Patient waiting on results form event monitor.  Continue Keflex for superficial incisional infection    Chronic issues  #Type 2 DM  #CKD 2/2 solitary kidney  #Renal cell carcinom s/p r nephrectomy   #HLD    Continue home medications as appropriate  SSI  Hypoglycemia protocol    DVT Ppx  SCD  Heparin SQ            Karthik Lomeli, APRN-CNP

## 2025-02-09 VITALS
SYSTOLIC BLOOD PRESSURE: 159 MMHG | BODY MASS INDEX: 29.77 KG/M2 | OXYGEN SATURATION: 97 % | WEIGHT: 201 LBS | HEART RATE: 67 BPM | HEIGHT: 69 IN | RESPIRATION RATE: 18 BRPM | DIASTOLIC BLOOD PRESSURE: 88 MMHG | TEMPERATURE: 96.8 F

## 2025-02-09 LAB
ANION GAP SERPL CALC-SCNC: 12 MMOL/L (ref 10–20)
BUN SERPL-MCNC: 20 MG/DL (ref 6–23)
CALCIUM SERPL-MCNC: 8.6 MG/DL (ref 8.6–10.3)
CHLORIDE SERPL-SCNC: 106 MMOL/L (ref 98–107)
CO2 SERPL-SCNC: 23 MMOL/L (ref 21–32)
CREAT SERPL-MCNC: 1.1 MG/DL (ref 0.5–1.3)
EGFRCR SERPLBLD CKD-EPI 2021: 78 ML/MIN/1.73M*2
ERYTHROCYTE [DISTWIDTH] IN BLOOD BY AUTOMATED COUNT: 12.9 % (ref 11.5–14.5)
GLUCOSE SERPL-MCNC: 145 MG/DL (ref 74–99)
HCT VFR BLD AUTO: 37.2 % (ref 41–52)
HGB BLD-MCNC: 12.5 G/DL (ref 13.5–17.5)
MAGNESIUM SERPL-MCNC: 1.98 MG/DL (ref 1.6–2.4)
MCH RBC QN AUTO: 32 PG (ref 26–34)
MCHC RBC AUTO-ENTMCNC: 33.6 G/DL (ref 32–36)
MCV RBC AUTO: 95 FL (ref 80–100)
NRBC BLD-RTO: 0 /100 WBCS (ref 0–0)
PLATELET # BLD AUTO: 238 X10*3/UL (ref 150–450)
POTASSIUM SERPL-SCNC: 4.2 MMOL/L (ref 3.5–5.3)
RBC # BLD AUTO: 3.91 X10*6/UL (ref 4.5–5.9)
SODIUM SERPL-SCNC: 137 MMOL/L (ref 136–145)
WBC # BLD AUTO: 6.8 X10*3/UL (ref 4.4–11.3)

## 2025-02-09 PROCEDURE — 2500000001 HC RX 250 WO HCPCS SELF ADMINISTERED DRUGS (ALT 637 FOR MEDICARE OP): Performed by: NURSE PRACTITIONER

## 2025-02-09 PROCEDURE — G0378 HOSPITAL OBSERVATION PER HR: HCPCS

## 2025-02-09 PROCEDURE — 99244 OFF/OP CNSLTJ NEW/EST MOD 40: CPT | Performed by: INTERNAL MEDICINE

## 2025-02-09 PROCEDURE — 83735 ASSAY OF MAGNESIUM: CPT | Performed by: NURSE PRACTITIONER

## 2025-02-09 PROCEDURE — 96361 HYDRATE IV INFUSION ADD-ON: CPT | Mod: 59

## 2025-02-09 PROCEDURE — 2500000004 HC RX 250 GENERAL PHARMACY W/ HCPCS (ALT 636 FOR OP/ED): Performed by: NURSE PRACTITIONER

## 2025-02-09 PROCEDURE — 80048 BASIC METABOLIC PNL TOTAL CA: CPT | Performed by: NURSE PRACTITIONER

## 2025-02-09 PROCEDURE — 99223 1ST HOSP IP/OBS HIGH 75: CPT | Performed by: STUDENT IN AN ORGANIZED HEALTH CARE EDUCATION/TRAINING PROGRAM

## 2025-02-09 PROCEDURE — 85027 COMPLETE CBC AUTOMATED: CPT | Performed by: NURSE PRACTITIONER

## 2025-02-09 PROCEDURE — 36415 COLL VENOUS BLD VENIPUNCTURE: CPT | Performed by: NURSE PRACTITIONER

## 2025-02-09 RX ADMIN — ASPIRIN 81 MG CHEWABLE TABLET 81 MG: 81 TABLET CHEWABLE at 08:56

## 2025-02-09 RX ADMIN — CEPHALEXIN 500 MG: 500 CAPSULE ORAL at 06:05

## 2025-02-09 RX ADMIN — CEPHALEXIN 500 MG: 500 CAPSULE ORAL at 13:03

## 2025-02-09 RX ADMIN — CEPHALEXIN 500 MG: 500 CAPSULE ORAL at 17:38

## 2025-02-09 RX ADMIN — CEPHALEXIN 500 MG: 500 CAPSULE ORAL at 20:57

## 2025-02-09 RX ADMIN — CLOPIDOGREL BISULFATE 75 MG: 75 TABLET ORAL at 08:56

## 2025-02-09 RX ADMIN — SODIUM CHLORIDE 100 ML/HR: 9 INJECTION, SOLUTION INTRAVENOUS at 08:56

## 2025-02-09 RX ADMIN — ATORVASTATIN CALCIUM 80 MG: 80 TABLET, FILM COATED ORAL at 20:57

## 2025-02-09 ASSESSMENT — PAIN - FUNCTIONAL ASSESSMENT: PAIN_FUNCTIONAL_ASSESSMENT: 0-10

## 2025-02-09 ASSESSMENT — PAIN SCALES - GENERAL: PAINLEVEL_OUTOF10: 0 - NO PAIN

## 2025-02-09 NOTE — H&P
"History Of Present Illness  Nader Zazueta \"Harvey\" is a 59-year-old male with past medical history of hypertension, hyperlipidemia, type 2 diabetes mellitus, CKD, renal cell carcinoma s/p R nephrectomy, coronary artery disease s/p CABG x 4 on 12/16/2024.  Patient presents with syncopal episode.  He was recently hospitalized 12/29/2024 to 12/31/2024 with similar symptoms and admitted by cardiac surgery due to recent open heart surgery.  He was found to be bradycardic and hypotensive which was thought to be secondary to dehydration and his antihypertensive regimen.  Medications were adjusted and he was discharged home with plan for event monitor and follow-up with Tera MATIAS.  Patient had follow-up with Dr. Singer on 2/6/2025 and at that time was restarted on Lisinopril 10 mg once daily and he also started Jardiance. He has been on the 2 new medications for 2 days. This morning he was doing gardening with a jeanie and reports that he passed out for about 30 seconds. His friend caught him and he had no injuries. Denies any associated symptoms. No seizure like activity, confusion, incontinence. He did have a stomach bug last week with nausea/vomiting and some diarrhea. All have resolved.  He currently is on a course of antibiotics for a small incisional infection, reports it is healing up, just a couple more days of Keflex remaining.     ER course: Hypertensive on presentation.  170/79 otherwise has been hemodynamically stable.  SpO2 96% on room air.  EKG per ER provider interpretation normal sinus rhythm, ventricular to 73.  Right axis deviation.  Normal [place] interval of 174.  Ventricular conduction in a nonspecific pattern with a wide QRS duration of 110 and a normal QTc interval of 427.  No Q waves appreciated, normal ST segments, T wave flattening seen in lead aVL.  Improved T wave inversions in V2 and V3 compared to prior EKG on 1/31/2025.  Otherwise unchanged. WBC 8.0, hemoglobin 12.7/hematocrit 38, platelets 264.  " Glucose 187, COVID-19, creatinine 1.42 otherwise BMP is normal.  Magnesium 1.57.  BNP 31.  High-sensitivity troponin 8, repeat 10. CXR found no acute findings.  He received no medications while in the ED.     Past Medical History  Past Medical History:   Diagnosis Date    Incisional hernia without obstruction or gangrene 06/10/2020    Incisional hernia    Other specified disorders of kidney and ureter 01/13/2020    Renal mass, right    Personal history of other diseases of the circulatory system     History of hypertension    Personal history of other diseases of the musculoskeletal system and connective tissue     History of osteomyelitis    Personal history of other endocrine, nutritional and metabolic disease     History of diabetes mellitus    Personal history of other specified conditions     History of abdominal pain    Personal history of other specified conditions 05/15/2020    History of postoperative nausea and vomiting    Right lower quadrant pain 02/07/2020    RLQ cramping       Surgical History  Past Surgical History:   Procedure Laterality Date    CARDIAC CATHETERIZATION N/A 12/12/2024    Procedure: Left Heart Cath, With LV;  Surgeon: Thro Keyes MD;  Location: [facility] Cardiac Cath Lab;  Service: Cardiovascular;  Laterality: N/A;    CT GUIDED ABSCESS FLUID COLLECTION DRAINAGE  7/21/2021    CT GUIDED ABSCESS FLUID COLLECTION DRAINAGE 7/21/2021 [facility]    CT GUIDED PERCUTANEOUS BIOPSY MEDIASTINUM  8/16/2019    CT GUIDED PERCUTANEOUS BIOPSY MEDIASTINUM 8/16/2019 [facility] CLINICAL [facility]GAC[facility]    OTHER SURGICAL HISTORY  08/22/2019    Back surgery        Social History  He reports that he has never smoked. He has never used smokeless tobacco. He reports that he does not currently use alcohol. He reports that he does not currently use drugs after having used the following drugs: Marijuana.    Family History  Family History   Problem Relation Name Age of Onset    Diabetes Mother      COPD Father         "  Allergies  Ozempic [semaglutide]    Review of Systems    10 point ROS negative except as noted above in HPI      Physical Exam  Constitutional:       General: He is awake. He is not in acute distress.     Appearance: Normal appearance. He is not toxic-appearing.   HENT:      Head: Atraumatic.      Nose: Nose normal.      Mouth/Throat:      Mouth: Mucous membranes are moist.   Eyes:      Extraocular Movements: Extraocular movements intact.      Conjunctiva/sclera: Conjunctivae normal.      Pupils: Pupils are equal, round, and reactive to light.   Cardiovascular:      Rate and Rhythm: Normal rate and regular rhythm.      Pulses: Normal pulses.      Heart sounds: No murmur heard.     Comments: Midline sternotomy incision well-approximated small area of erythema, no drainage  Pulmonary:      Effort: Pulmonary effort is normal. No respiratory distress.      Breath sounds: Normal breath sounds.   Abdominal:      General: Bowel sounds are normal. There is no distension.      Palpations: Abdomen is soft.      Tenderness: There is no abdominal tenderness. There is no guarding.   Musculoskeletal:         General: No swelling, deformity or signs of injury. Normal range of motion.      Cervical back: Neck supple.      Right lower leg: No edema.      Left lower leg: No edema.   Skin:     General: Skin is warm and dry.      Capillary Refill: Capillary refill takes less than 2 seconds.      Findings: No ecchymosis, erythema or wound.   Neurological:      General: No focal deficit present.      Mental Status: He is alert and oriented to person, place, and time.   Psychiatric:         Mood and Affect: Mood normal.         Behavior: Behavior is cooperative.          Last Recorded Vitals  Blood pressure 137/79, pulse 62, temperature 35.9 °C (96.6 °F), temperature source Temporal, resp. rate 16, height 1.753 m (5' 9\"), weight 91.2 kg (201 lb), SpO2 94%.    Relevant Results        ECG 12 lead    Result Date: 2/7/2025  Normal sinus " rhythm Possible Left atrial enlargement Incomplete right bundle branch block Nonspecific T wave abnormality Abnormal ECG When compared with ECG of 29-DEC-2024 18:32, Vent. rate has increased BY  28 BPM    XR chest 2 views    Result Date: 1/31/2025  Interpreted By:  Bonifacio Tillman, STUDY: XR CHEST 2 VIEWS; ;  1/31/2025 10:17 am   INDICATION: Signs/Symptoms:SOB.     COMPARISON: 12/29/2024 chest radiograph and 12/13/2024 chest CT   ACCESSION NUMBER(S): [id number]   ORDERING CLINICIAN: NENA RAMOS   TECHNIQUE: Upright PA and lateral chest radiographs   FINDINGS: Cardiac silhouette size is normal, unchanged. Sternal wires are present.   No acute pulmonary infiltrates are noted.       No acute pulmonary disease.     MACRO: None   Signed by: Bonifacio Tillman 1/31/2025 10:50 AM Dictation workstation:   [id number]     ECG 12 lead    Result Date: 2/7/2025  Normal sinus rhythm Possible Left atrial enlargement Incomplete right bundle branch block Nonspecific T wave abnormality Abnormal ECG When compared with ECG of 29-DEC-2024 18:32, Vent. rate has increased BY  28 BPM    XR chest 2 views    Result Date: 1/31/2025  Interpreted By:  Bonifacio Tillman, STUDY: XR CHEST 2 VIEWS; ;  1/31/2025 10:17 am   INDICATION: Signs/Symptoms:SOB.     COMPARISON: 12/29/2024 chest radiograph and 12/13/2024 chest CT   ACCESSION NUMBER(S): [id number]   ORDERING CLINICIAN: NENA RAMOS   TECHNIQUE: Upright PA and lateral chest radiographs   FINDINGS: Cardiac silhouette size is normal, unchanged. Sternal wires are present.   No acute pulmonary infiltrates are noted.       No acute pulmonary disease.     MACRO: None   Signed by: Bonifacio Tillman 1/31/2025 10:50 AM Dictation workstation:   [id number]        Assessment/Plan   Assessment & Plan  Syncope, unspecified syncope type      59-year-old male with past medical history of hypertension, hyperlipidemia, type 2 diabetes mellitus, CKD, renal cell carcinoma s/p R nephrectomy, coronary artery disease s/p CABG  x 4 on 12/16/2024.  Patient presents with syncopal episode.  He was recently hospitalized 12/29/2024 to 12/31/2024 with similar symptoms and admitted by cardiac surgery due to recent open heart surgery.  He was found to be bradycardic and hypotensive which was thought to be secondary to dehydration and his antihypertensive regimen.  Medications were adjusted and he was discharged home with plan for event monitor and follow-up with Tera MATIAS.  Patient had follow-up with Dr. Singer on 2/6/2025 and at that time was restarted on Lisinopril 10 mg once daily and he also started Jardiance.     #Syncope  #Hx  coronary artery disease s/p CABG x 4 on 12/16/2024    Likely due to hypotension/dehydration as patient was recently started on lisinopril and Jardiance 2 days ago. Will hold both  medications  IV fluids overnight  Echocardiogram recently completed, will defer repeat  Monitor overnight for any arrhythmia   Check orthostatic vitals.  Consult cardiology  Patient waiting on results form event monitor.  Continue Keflex for superficial incisional infection    Chronic issues  #Type 2 DM  #CKD 2/2 solitary kidney  #Renal cell carcinom s/p r nephrectomy   #HLD    Continue home medications as appropriate  SSI  Hypoglycemia protocol    DVT Ppx  SCD  Heparin SQ            Mustapha Singer DO

## 2025-02-09 NOTE — CARE PLAN
Problem: Pain - Adult  Goal: Verbalizes/displays adequate comfort level or baseline comfort level  Outcome: Progressing  Flowsheets (Taken 2/9/2025 0446)  Verbalizes/displays adequate comfort level or baseline comfort level: Encourage patient to monitor pain and request assistance     Problem: Safety - Adult  Goal: Free from fall injury  Outcome: Progressing  Flowsheets (Taken 2/9/2025 0446)  Free from fall injury: Instruct family/caregiver on patient safety     Problem: Skin  Goal: Promote/optimize nutrition  Outcome: Progressing  Flowsheets (Taken 2/9/2025 0446)  Promote/optimize nutrition: Offer water/supplements/favorite foods     Problem: Diabetes  Goal: Achieve decreasing blood glucose levels by end of shift  Outcome: Progressing  Flowsheets (Taken 2/9/2025 0446)  Achieve decreasing blood glucose levels by end of shift: Med administration/monitoring of effect            The clinical goals for the shift include pt will remain free from fall throughout the shift

## 2025-02-09 NOTE — CARE PLAN
The patient's goals for the shift include      The clinical goals for the shift include   Problem: Pain - Adult  Goal: Verbalizes/displays adequate comfort level or baseline comfort level  Outcome: Progressing     Problem: Safety - Adult  Goal: Free from fall injury  Outcome: Progressing     Problem: Discharge Planning  Goal: Discharge to home or other facility with appropriate resources  Outcome: Progressing     Problem: Chronic Conditions and Co-morbidities  Goal: Patient's chronic conditions and co-morbidity symptoms are monitored and maintained or improved  Outcome: Progressing     Problem: Nutrition  Goal: Nutrient intake appropriate for maintaining nutritional needs  Outcome: Progressing     Problem: Skin  Goal: Decreased wound size/increased tissue granulation at next dressing change  Outcome: Progressing  Goal: Participates in plan/prevention/treatment measures  Outcome: Progressing  Goal: Prevent/manage excess moisture  Outcome: Progressing  Goal: Prevent/minimize sheer/friction injuries  Outcome: Progressing  Goal: Promote/optimize nutrition  Outcome: Progressing  Goal: Promote skin healing  Outcome: Progressing     Problem: Diabetes  Goal: Achieve decreasing blood glucose levels by end of shift  Outcome: Progressing  Goal: Increase stability of blood glucose readings by end of shift  Outcome: Progressing  Goal: Decrease in ketones present in urine by end of shift  Outcome: Progressing  Goal: Maintain electrolyte levels within acceptable range throughout shift  Outcome: Progressing  Goal: Maintain glucose levels >70mg/dl to <250mg/dl throughout shift  Outcome: Progressing  Goal: No changes in neurological exam by end of shift  Outcome: Progressing  Goal: Learn about and adhere to nutrition recommendations by end of shift  Outcome: Progressing  Goal: Vital signs within normal range for age by end of shift  Outcome: Progressing  Goal: Increase self care and/or family involovement by end of shift  Outcome:  Progressing  Goal: Receive DSME education by end of shift  Outcome: Progressing     Problem: Fall/Injury  Goal: Not fall by end of shift  Outcome: Progressing  Goal: Be free from injury by end of the shift  Outcome: Progressing  Goal: Verbalize understanding of personal risk factors for fall in the hospital  Outcome: Progressing  Goal: Verbalize understanding of risk factor reduction measures to prevent injury from fall in the home  Outcome: Progressing  Goal: Use assistive devices by end of the shift  Outcome: Progressing  Goal: Pace activities to prevent fatigue by end of the shift  Outcome: Progressing

## 2025-02-09 NOTE — CONSULTS
Inpatient consult to Cardiology  Consult performed by: James C Ramicone, DO  Consult ordered by: ZO Crowley  Reason for consult: Syncope          Reason for consultation:    Syncope    History Of Present Illness:      This is a 59-year-old male with coronary artery disease.  He is being seen today in consultation for evaluation of syncope.  The patient was in a seated position yesterday talking with a friend when the syncopal episode occurred.  He believes he was unconscious for about 30 seconds.  He began to feel fatigued and then had a brief loss of consciousness.  This episode occurred at about 12 PM yesterday afternoon.    Past medical history:    Hypertension  Hyperlipidemia  CAD with history of CABG x 4 December 2024    Social history: Non-smoker    Family history: Negative for premature CAD    Review of Systems    Other review of systems negative    Social History:  He reports that he has never smoked. He has never used smokeless tobacco. He reports that he does not currently use alcohol. He reports that he does not currently use drugs after having used the following drugs: Marijuana.    Family History:  Family History   Problem Relation Name Age of Onset    Diabetes Mother      COPD Father          Allergies:  Ozempic [semaglutide]    Medications:  Current Facility-Administered Medications   Medication Dose Route Frequency Provider Last Rate Last Admin    acetaminophen (Tylenol) tablet 650 mg  650 mg oral q4h PRN LILLIAN Crowley-CNP        aspirin chewable tablet 81 mg  81 mg oral Daily LILLIAN Crowley-CNP   81 mg at 02/09/25 0856    atorvastatin (Lipitor) tablet 80 mg  80 mg oral Nightly LILLIAN Crowley-CNP   80 mg at 02/08/25 2104    cephalexin (Keflex) capsule 500 mg  500 mg oral 4x daily PAOLO CrowleyCNP   500 mg at 02/09/25 0605    clopidogrel (Plavix) tablet 75 mg  75 mg oral Daily PAOLO CrowleyCNP   75 mg at 02/09/25 0856    sodium chloride 0.9% infusion  100  mL/hr intravenous Continuous ZO Crowley 100 mL/hr at 02/09/25 1132 100 mL/hr at 02/09/25 1132         Last Recorded Vitals:  Vitals:    02/08/25 1715 02/08/25 2039 02/09/25 0414 02/09/25 0800   BP:  137/71 148/70 137/79   BP Location:   Left arm Left arm   Patient Position:   Lying Lying   Pulse: 63 57 62 62   Resp: 18 20 18 16   Temp:  36.1 °C (97 °F) 35.9 °C (96.6 °F) 35.9 °C (96.6 °F)   TempSrc:  Temporal Temporal Temporal   SpO2: 95% (!) 93% 96% 94%   Weight:       Height:           Physical Exam:    General Appearance:  Alert, oriented, no distress  Skin:  Warm and dry  Head and Neck:  No elevation of JVP, no carotid bruits  Cardiac Exam:  Rhythm is regular, S1 and S2 are normal, no murmur S3 or S4  Lungs:  Clear to auscultation  Extremities:  no edema  Neurologic:  No focal deficits  Psychiatric:  Appropriate mood and behavior    Laboratory data:    CMP:  Recent Labs     02/09/25  0657 02/08/25  1333 01/31/25  1007 12/31/24  0536 12/30/24  0531 12/29/24 2008 12/29/24  0946 12/20/24  0523 12/19/24  0541    136 135* 133* 133* 134* 130* 134* 134*   K 4.2 4.3 4.2 4.5 4.3 5.1 5.6* 4.0 4.1    104 102 100 102 100 100 97* 97*   CO2 23 19* 21 23 23 26 22 24 27   ANIONGAP 12 17 16 15 12 13 14 17 14   BUN 20 23 26* 31* 32* 38* 32* 37* 30*   CREATININE 1.10 1.42* 1.29 1.25 1.21 1.43* 1.29 1.37* 1.31*   EGFR 78 57* 64 67 69 57* 64 60* 63   MG 1.98 1.57* 1.83 1.61 1.80  --  1.82 2.03 1.89     Recent Labs     01/31/25  1007 12/31/24  0536 12/30/24  0531 12/29/24  0946 12/20/24  0523 12/16/24  1650 12/12/24  0453 08/07/23  1329 04/10/23  1035   ALBUMIN 4.6 3.9 3.7 4.0 4.0   < > 4.5 4.6 4.3   ALKPHOS 87  --   --  68  --   --  40 43 45   ALT 25  --   --  21  --   --  38 34 47   AST 16  --   --  21  --   --  22 18 22   BILITOT 0.7  --   --  0.5  --   --  0.6 0.3 0.4    < > = values in this interval not displayed.     CBC:  Recent Labs     02/09/25  0657 02/08/25  1333 01/31/25  1007 12/31/24  0536  12/30/24  0530 12/29/24  0946 12/20/24  0523 12/19/24  0541   WBC 6.8 8.0 10.2 13.7* 11.4* 12.6* 15.8* 14.8*   HGB 12.5* 12.7* 13.5 12.2* 11.3* 11.9* 14.6 14.3   HCT 37.2* 38.0* 40.3* 35.8* 32.9* 34.0* 42.0 41.2    264 293 504* 447 460* 294 209   MCV 95 95 94 93 93 91 90 92     COAG:   Recent Labs     12/29/24  0946 12/16/24  1650 12/16/24  1430 12/13/24  2255 12/13/24  1848 12/13/24  1408 12/13/24  0934 12/13/24  0350 12/12/24  2230 12/12/24  1801 06/14/21  0557 08/16/19  0630 08/15/19  0941   INR 1.1 1.2* 1.5*  --   --   --   --  1.0  --   --  1.3* 1.3* 1.3*   HAUF  --   --   --  0.3 0.4 0.2 0.3 0.2 0.2 0.1  --   --   --    FIBRINOGEN  --  263 233  --   --   --   --   --   --   --   --   --   --      HEME/ENDO:  Recent Labs     12/13/24  0349 12/12/24  0529 12/12/24  0453 09/01/22  1018 07/12/22  1309 07/12/22  0825 08/19/19  0625   FERRITIN 244  --   --   --   --   --   --    TSH 1.96 1.43  --  1.12 0.80  --   --    HGBA1C 8.7*  --  8.7*  --   --  7.9* 9.4      CARDIAC:   Recent Labs     02/08/25  1433 02/08/25  1333 01/31/25  1113 01/31/25  1007 12/29/24  1236 12/29/24  0946 12/16/24  0733 12/12/24  1149   TROPHS 10 8 15 13 19 21* 206* 4,446*   BNP  --  31  --   --   --   --   --   --      Recent Labs     12/13/24  0349 12/12/24  1149 07/12/22  1309 03/09/22  0742 02/06/20  0748   CHOL 174 192 193 175 159   LDLF  --   --  85 109* 105*   HDL 29.3 37.5 36.5* 30.3* 29.2*   TRIG 195* 188* 359* 178* 124       Test Review:  I have personally review the diagnostic testing:  EKG: Normal sinus rhythm, right bundle branch block    Event monitor December 2024: Sinus rhythm, 1 episode of atrial fibrillation with RVR    Echocardiogram December 2024: Ejection fraction 55%    Assessment/Plan:    1.  Syncope: The syncopal episode is most likely due to hypotension based on the clinical presentation.  He does have a chronic right bundle branch block but did wear an event monitor in December.  The event monitor did not show  any episodes of bradycardia or high-grade AV block.  Review of his telemetry tracings during this hospitalization have shown sinus rhythm with no arrhythmias.  I do not feel that bradycardia contributed to this syncopal episode.  Agree with plan to hold lisinopril and Jardiance for now.      James C Ramicone, DO

## 2025-02-10 LAB
ATRIAL RATE: 73 BPM
P AXIS: 80 DEGREES
P OFFSET: 197 MS
P ONSET: 138 MS
PR INTERVAL: 174 MS
Q ONSET: 225 MS
QRS COUNT: 12 BEATS
QRS DURATION: 110 MS
QT INTERVAL: 388 MS
QTC CALCULATION(BAZETT): 427 MS
QTC FREDERICIA: 414 MS
R AXIS: 194 DEGREES
T AXIS: 61 DEGREES
T OFFSET: 419 MS
VENTRICULAR RATE: 73 BPM

## 2025-02-10 PROCEDURE — G0378 HOSPITAL OBSERVATION PER HR: HCPCS

## 2025-02-10 PROCEDURE — 2500000001 HC RX 250 WO HCPCS SELF ADMINISTERED DRUGS (ALT 637 FOR MEDICARE OP): Performed by: NURSE PRACTITIONER

## 2025-02-10 PROCEDURE — 99238 HOSP IP/OBS DSCHRG MGMT 30/<: CPT | Performed by: STUDENT IN AN ORGANIZED HEALTH CARE EDUCATION/TRAINING PROGRAM

## 2025-02-10 RX ORDER — LISINOPRIL 5 MG/1
10 TABLET ORAL DAILY
Start: 2025-02-10

## 2025-02-10 RX ADMIN — CEPHALEXIN 500 MG: 500 CAPSULE ORAL at 06:42

## 2025-02-10 RX ADMIN — CLOPIDOGREL BISULFATE 75 MG: 75 TABLET ORAL at 09:12

## 2025-02-10 RX ADMIN — ASPIRIN 81 MG CHEWABLE TABLET 81 MG: 81 TABLET CHEWABLE at 09:12

## 2025-02-10 ASSESSMENT — COGNITIVE AND FUNCTIONAL STATUS - GENERAL
DAILY ACTIVITIY SCORE: 24
MOBILITY SCORE: 24

## 2025-02-10 ASSESSMENT — PAIN SCALES - GENERAL: PAINLEVEL_OUTOF10: 0 - NO PAIN

## 2025-02-10 NOTE — CARE PLAN
The clinical goals for the shift include pt. remain hemodynamically stable      Problem: Pain - Adult  Goal: Verbalizes/displays adequate comfort level or baseline comfort level  Outcome: Progressing  Flowsheets (Taken 2/10/2025 0351)  Verbalizes/displays adequate comfort level or baseline comfort level: Encourage patient to monitor pain and request assistance     Problem: Safety - Adult  Goal: Free from fall injury  Outcome: Progressing  Flowsheets (Taken 2/10/2025 0351)  Free from fall injury: Instruct family/caregiver on patient safety

## 2025-02-10 NOTE — DISCHARGE SUMMARY
Discharge Diagnosis  Syncope, unspecified syncope type    Issues Requiring Follow-Up  syncope    Test Results Pending At Discharge  Pending Labs       No current pending labs.            Hospital Course   59-year-old male with past medical history of hypertension, hyperlipidemia, type 2 diabetes mellitus, CKD, renal cell carcinoma s/p R nephrectomy, coronary artery disease s/p CABG x 4 on 12/16/2024.  Patient presents with syncopal episode.  He was recently hospitalized 12/29/2024 to 12/31/2024 with similar symptoms and admitted by cardiac surgery due to recent open heart surgery.  He was found to be bradycardic and hypotensive which was thought to be secondary to dehydration and his antihypertensive regimen.  Medications were adjusted and he was discharged home with plan for event monitor and follow-up with Tera MATIAS.  Patient had follow-up with Dr. Singer on 2/6/2025 and at that time was restarted on Lisinopril 10 mg once daily and he also started Jardiance.      #Syncope  #Hx  coronary artery disease s/p CABG x 4 on 12/16/2024     Likely due to hypotension/dehydration as patient was recently started on lisinopril and Jardiance 2 days ago. Will hold both  medications  IV fluids overnight  Echocardiogram recently completed, will defer repeat  Monitor overnight for any arrhythmia   Check orthostatic vitals.  Consult cardiology  Patient waiting on results form event monitor.  Continue Keflex for superficial incisional infection     Chronic issues  #Type 2 DM  #CKD 2/2 solitary kidney  #Renal cell carcinom s/p r nephrectomy   #HLD     Continue home medications as appropriate  SSI  Hypoglycemia protocol     DVT Ppx  SCD  Heparin SQ    Pertinent Physical Exam At Time of Discharge  Physical Exam  Constitutional:       Appearance: Normal appearance.   HENT:      Head: Normocephalic and atraumatic.      Right Ear: Tympanic membrane and ear canal normal.      Left Ear: Tympanic membrane and ear canal normal.       Mouth/Throat:      Mouth: Mucous membranes are moist.      Pharynx: Oropharynx is clear.   Eyes:      Extraocular Movements: Extraocular movements intact.      Conjunctiva/sclera: Conjunctivae normal.      Pupils: Pupils are equal, round, and reactive to light.   Cardiovascular:      Rate and Rhythm: Normal rate and regular rhythm.      Pulses: Normal pulses.      Heart sounds: Normal heart sounds.   Pulmonary:      Effort: Pulmonary effort is normal.      Breath sounds: Normal breath sounds.   Abdominal:      General: Abdomen is flat. Bowel sounds are normal.      Palpations: Abdomen is soft.   Musculoskeletal:         General: Normal range of motion.      Cervical back: Normal range of motion and neck supple.   Skin:     General: Skin is warm and dry.      Capillary Refill: Capillary refill takes 2 to 3 seconds.   Neurological:      General: No focal deficit present.      Mental Status: He is alert and oriented to person, place, and time. Mental status is at baseline.   Psychiatric:         Mood and Affect: Mood normal.         Behavior: Behavior normal.         Thought Content: Thought content normal.         Judgment: Judgment normal.         Home Medications     Medication List      CHANGE how you take these medications     empagliflozin 10 mg; Commonly known as: Jardiance; Take 1 tablet (10 mg)   by mouth once daily. Start 2/17; What changed: additional instructions   lisinopril 5 mg tablet; Take 2 tablets (10 mg) by mouth once daily.;   What changed: medication strength     CONTINUE taking these medications     aspirin 81 mg chewable tablet; Chew 1 tablet (81 mg) once daily.   atorvastatin 80 mg tablet; Commonly known as: Lipitor; Take 1 tablet (80   mg) by mouth once daily at bedtime.   CENTRUM MEN ORAL   clopidogrel 75 mg tablet; Commonly known as: Plavix; Take 1 tablet (75   mg) by mouth once daily.   Dexcom G7  misc; Generic drug: blood-glucose meter,continuous;   Use as instructed   Dexcom G7  Sensor device; Generic drug: blood-glucose sensor; Place on   arm replace every 10 days   metFORMIN 1,000 mg tablet; Commonly known as: Glucophage; TAKE 1 TABLET   BY MOUTH TWO TIMES A DAY WITH MEALS     STOP taking these medications     cephalexin 500 mg capsule; Commonly known as: Keflex   gabapentin 100 mg capsule; Commonly known as: Neurontin       Outpatient Follow-Up  Future Appointments   Date Time Provider Department Center   3/4/2025  9:00 AM CARDIAC REHAB [facility] ROOM 1 [facility]   3/20/2025 10:15 AM DO Ector [id number] [place]   4/4/2025 11:00 AM Parviz Haley PA-C [id number] [place]       Mustapha Singer DO

## 2025-02-10 NOTE — NURSING NOTE
Met with patient at the bedside. Discharge Planning discussed. AVS updated with follow-ups, education, and medication information. Verified/ entered a PCP and pharmacy. New medications and side effects discussed. All questions answered.  Updated nurse on this info. AVS printed and hi-lighted. IV and tele removed.  Transportation to arrive at 1100.

## 2025-02-11 PROCEDURE — RXMED WILLOW AMBULATORY MEDICATION CHARGE

## 2025-02-12 ENCOUNTER — PHARMACY VISIT (OUTPATIENT)
Dept: PHARMACY | Facility: CLINIC | Age: 60
End: 2025-02-12
Payer: COMMERCIAL

## 2025-02-12 ENCOUNTER — PATIENT OUTREACH (OUTPATIENT)
Dept: CARE COORDINATION | Facility: CLINIC | Age: 60
End: 2025-02-12
Payer: COMMERCIAL

## 2025-02-12 NOTE — PROGRESS NOTES
Reviewed chart prior to patient outreach. Outreach call to patient to support a smooth transition of care from recent admission.    Discharge facility: [facility]  Discharge diagnosis: syncope, unspecified syncope type  Admission date: 2/8/25  Discharge date:  2/10/25    PCP Appointment Date:  3/20/25 (patient calling to obtain earlier)  Specialist Appointment Date:  cardiology appt pending scheduling    Spoke with patient, reviewed discharge medications, discharge instructions, assessed social needs, and provided education on importance of follow-up appointment with provider.     See  Hospital Encounter and Summary, and Discharge assessment below for further details. Information obtained from discharge summary from EMR.    Hospital Course   59-year-old male with past medical history of hypertension, hyperlipidemia, type 2 diabetes mellitus, CKD, renal cell carcinoma s/p R nephrectomy, coronary artery disease s/p CABG x 4 on 12/16/2024.  Patient presents with syncopal episode.  He was recently hospitalized 12/29/2024 to 12/31/2024 with similar symptoms and admitted by cardiac surgery due to recent open heart surgery.  He was found to be bradycardic and hypotensive which was thought to be secondary to dehydration and his antihypertensive regimen.  Medications were adjusted and he was discharged home with plan for event monitor and follow-up with Tera MATIAS.  Patient had follow-up with Dr. Singer on 2/6/2025 and at that time was restarted on Lisinopril 10 mg once daily and he also started Jardiance.      #Syncope  #Hx  coronary artery disease s/p CABG x 4 on 12/16/2024     Likely due to hypotension/dehydration as patient was recently started on lisinopril and Jardiance 2 days ago. Will hold both  medications  IV fluids overnight  Echocardiogram recently completed, will defer repeat  Monitor overnight for any arrhythmia   Check orthostatic vitals.  Consult cardiology  Patient waiting on results form event  monitor.  Continue Keflex for superficial incisional infection     Chronic issues  #Type 2 DM  #CKD 2/2 solitary kidney  #Renal cell carcinom s/p r nephrectomy   #HLD     Continue home medications as appropriate  SSI  Hypoglycemia protocol     DVT Ppx  SCD  Heparin SQ    CM outreach:  Wrap Up  Wrap Up Additional Comments: Patient denies dizziness. Reports blood pressure this morning was 147/98, Heart rate 87. Patient states that event monitor was discussed but not ordered or arranged. Patient plans to discuss at cardiology appointment. (2/12/2025  4:11 PM)    Medications  Medications reviewed with patient/caregiver?: Yes (2/12/2025  4:11 PM)  Is the patient having any side effects they believe may be caused by any medication additions or changes?: (!) Yes (Patient is holding jardiance until 2/17/25 due to dehydration. Patient stopped ozempic due to GI upset/emesis.) (2/12/2025  4:11 PM)  Does the patient have all medications ordered at discharge?: Not applicable (2/12/2025  4:11 PM)  Care Management Interventions: No intervention needed (2/12/2025  4:11 PM)  Is the patient taking all medications as directed (includes completed medication regime)?: Yes (2/12/2025  4:11 PM)    Appointments  Does the patient have a primary care provider?: Yes (2/12/2025  4:11 PM)  Care Management Interventions: Verified appointment date/time/provider (2/12/2025  4:11 PM)  Has the patient kept scheduled appointments due by today?: Yes (2/12/2025  4:11 PM)    Patient Teaching  Does the patient have access to their discharge instructions?: Yes (2/12/2025  4:11 PM)  Care Management Interventions: Reviewed instructions with patient (2/12/2025  4:11 PM)  What is the patient's perception of their health status since discharge?: Improving (2/12/2025  4:11 PM)  Is the patient/caregiver able to teach back the hierarchy of who to call/visit for symptoms/problems? PCP, Specialist, Home Health nurse, Urgent Care, ED, 911: Yes (2/12/2025  4:11  PM)    Will continue to monitor through transition period. Provided patient with my contact information for potential needs.    Jeri Tierney RN BSN  [facility] Care Manager  [phone]

## 2025-02-18 DIAGNOSIS — E11.9 TYPE 2 DIABETES MELLITUS WITHOUT COMPLICATION, WITHOUT LONG-TERM CURRENT USE OF INSULIN (MULTI): Primary | ICD-10-CM

## 2025-03-04 ENCOUNTER — CLINICAL SUPPORT (OUTPATIENT)
Dept: CARDIAC REHAB | Facility: HOSPITAL | Age: 60
End: 2025-03-04
Payer: COMMERCIAL

## 2025-03-04 DIAGNOSIS — Z95.1 S/P CABG X 4: Primary | ICD-10-CM

## 2025-03-04 DIAGNOSIS — Z95.1 S/P CABG X 4: ICD-10-CM

## 2025-03-04 NOTE — PROGRESS NOTES
Cardiac Rehabilitation Initial Treatment Plan    Name: Nader Zazueta  Medical Record Number: [id number]  YOB: 1965  Age: 59 y.o.    Today’s Date: 3/4/2025  Primary Care Physician: Mustapha Singer DO  Referring Physician: Mat Sorenson*  Program Location: [facility] CARDIAC REHAB     General  Primary Diagnosis:   1. S/P CABG x 4  Referral to Cardiac Rehab         Onset/Date of Diagnosis: 12/16/24    Initial Assessment, not yet started program.    AACVPR Risk Stratification:   LOW    Falls Risk: Low  Psychosocial Assessment       Sent PH-Q 9 to MD if score > 20: Survey not yet completed.    Pt reported/currently experiencing stress: No  Patient uses stress management skills: Yes   History of: no history of anxiety or depression  Currently seeing a mental health provider: No  Social Support: Yes, Whom: Wife   Quality of Life Survey: SF-36       SF-36 Pre Post   Physical Functioning Score TBD TBD     Role-Physical Score TBD TBD   General Health Score TBD TBD     Learning Assessment:  Learning assessment/barriers: None  Preferred learning method: Auditory, Visual, Reading handout, and Writing handout  Barriers: None  Comments:    Stages of Change: Preparation    Psychosocial Plan    Goal Status: Initial Assessment; goals not yet started    Psychosocial Goals:   Maintain or improve PHQ-9 Survey score by discharge.  Maintain or improve Post SF-36 Quality of Life Survey by discharge.        Psychosocial Interventions/Education:   TO BE COMPLETED DURING CARDIAC REHAB PROGRAM      Initial Assessment: 03/04/25    Nutrition Assessment:    Hyperlipidemia: Yes     Lipids:   Lab Results   Component Value Date    CHOL 174 12/13/2024    HDL 29.3 12/13/2024    LDLF 85 07/12/2022    TRIG 195 (H) 12/13/2024       Current Dietary Guidelines: Regular Diet  Barriers to dietary change: no    Diet Habit Survey: : [place] Dietary Assessment  Pre: Initial survey given. Pending completion and return from patient.  Post: To  "be done at discharge.    Diabetes Assessment    Lab Results   Component Value Date    HGBA1C 8.7 (H) 12/13/2024       History of Diabetes: Yes Pt monitors BS at home: Yes   Frequency: Q other /day  FBS range: 180's  Hypoglycemic Episodes: No     Weight Management  Wt:  201 LB         Ht:  69   inches         BMI:     29.6        Waist Cir:  TBD upon completion first session              Nutrition Plan    Goal Status:   Initial Assessment; goals not yet started    Nutrition Goals:   Achieve a score of 80% or greater on Post [place] Dietary Assessment.  Attend at least 50% or more of Education classes provided by completion of program.    Meet with dietitian for Outpatient Nutrition Therapy by completion of program.      Nutrition Interventions/Education:   AHA handout \"What is [place]ina\"? And \"What is Cardiac Rehabilitation\"?  Lipid profile reviewed with patient during initial phone interview.    Patient to be provided referral for Outpatient Nutrition Therapy on first day.      Initial Assessment: 03/04/25    Exercise Assessment    No  Mode: NA  Frequency: NA  Duration: NA    Exercise Prescription     Exercise Prescription based on: [place] Activity Status Index (DASI)    DASI Score:   15.45  MET Score:  2.3   Frequency:  3 days/week   Mode: Treadmill, NuStep /NuStep (T), Airdyne, Arm Ergometer   Duration: 30-45 total aerobic minutes   Intensity: RPE 11-15  Target HR:  To be calculated after 6 attended sessions.  MET Level: 2.3  Patient wears supplemental O2: No     Modality Workload METs Duration (minutes)   1   Pre-Exercise/ Rest -- -- 5:00   2   NuStep 4000 44 olvera, load 2 2.3 10 :00   3   NuStep T5 24 olvera, load 2  2.3 10 :00   4   Treadmill Walk 1.7 mph  2.3 10 :00   5   Airdyne 0.6 load, 20-25 Rpm  1.9 10 :00   6   Arm Ergometer 14watts, level 1 1.8 10:00   7  Recumbant Bike  Load 5, 40RPM 1.9 5:00   8            Rest -- -- 5:00       Resistance Training: No   Home Exercise Prescription given: To be given prior " to discharge from program.    Exercise Plan    Goal Status: Initial Assessment; goals not yet started    Exercise Goals:   150 min/week of moderate intensity aerobic exercise  Exercise Frequency 5-7 days a week    Exercise Interventions/Education:   150 min/week of moderate intensity aerobic exercise  Exercise Frequency 5-7 days a week    Initial Assessment: 03/04/25    Other Core Components/Risk Factor Assessment:    Medication adherence  Current Medications:   Medication Documentation Review Audit       Reviewed by Gillian Reilly (Technician) on 02/08/25 at 1411      Medication Order Taking? Sig Documenting Provider Last Dose Status   acetaminophen (TylenoL) 325 mg tablet [id number] No Take 2 tablets (650 mg) by mouth every 6 hours if needed for mild pain (1 - 3).   Patient not taking: Reported on 2/8/2025    LILLIAN Oliver-CNP Not Taking Active   acetaminophen (Tylenol) 325 mg tablet [id number] No Take 2 tablets (650 mg) by mouth every 4 hours if needed for mild pain (1 - 3) or moderate pain (4 - 6). Evi Bahena APRN-CNP Unknown Active   aspirin 81 mg chewable tablet [id number] Yes Chew 1 tablet (81 mg) once daily. LILLIAN Oliver-CNP 2/8/2025 Morning Active   atorvastatin (Lipitor) 80 mg tablet [id number] Yes Take 1 tablet (80 mg) by mouth once daily at bedtime. LILLIAN Oliver-CNP 2/7/2025 Bedtime Active   cephalexin (Keflex) 500 mg capsule [id number] Yes Take 1 capsule (500 mg) by mouth 4 times a day for 5 days. Anjali Alba MD 2/8/2025 Morning Active   clopidogrel (Plavix) 75 mg tablet [id number] Yes Take 1 tablet (75 mg) by mouth once daily. LILLIAN Oliver-CNP 2/8/2025 Morning Active   Dexcom G7  misc [id number]  Use as instructed Mustapha Singer DO  Active   Dexcom G7 Sensor device [id number]  Place on arm replace every 10 days Mustapha Singer DO  Active   empagliflozin (Jardiance) 10 mg [id number] Yes Take 1 tablet (10 mg) by mouth once daily. Mustapha Singer DO 2/8/2025  Morning Active   gabapentin (Neurontin) 100 mg capsule [id number]  Take 1 capsule (100 mg) by mouth 2 times a day for 14 days.   Patient not taking: Reported on 1/31/2025    ZO Oliver  Active   lisinopril 10 mg tablet [id number] Yes Take 1 tablet (10 mg) by mouth once daily. Historical Provider, MD 2/8/2025 Morning Active   metFORMIN (Glucophage) 1,000 mg tablet [id number] Yes TAKE 1 TABLET BY MOUTH TWO TIMES A DAY WITH MEALS Mustapha Singer,  2/8/2025 Morning Active   methocarbamol (Robaxin) 500 mg tablet [id number] No Take 1 tablet (500 mg) by mouth 4 times a day as needed for muscle spasms.   Patient not taking: Reported on 2/8/2025    ZO Delgado Not Taking Flag for Review   multivit-mins/iron/folic/lycop (CENTRUM MEN ORAL) [id number] Yes Take 1 tablet by mouth once daily. Historical Provider, MD 2/7/2025 Bedtime Active   ondansetron ODT (Zofran-ODT) 4 mg disintegrating tablet [id number] No Dissolve 1 tablet (4 mg) in the mouth every 8 hours if needed for nausea or vomiting.   Patient not taking: Reported on 2/8/2025    ZO Delgado Not Taking Flag for Review   semaglutide (Ozempic) 0.25 mg or 0.5 mg (2 mg/3 mL) pen injector [id number] No Inject 0.25 mg under the skin 1 (one) time per week for 28 days, THEN 0.5 mg 1 (one) time per week.   Patient not taking: Reported on 2/8/2025    ZO Estrada Not Taking Active                                 Medication compliance: Yes   Uses pill box/organizer: Yes   Carries medication list: No     Blood Pressure Management  History of Hypertension: Yes   Medication Changes: No   Resting BP:  There were no vitals taken for this visit.     Heart Failure Management  Hx of Heart Failure: No    Smoking/Tobacco Assessment  Social History     Tobacco Use   Smoking Status Never   Smokeless Tobacco Never       Other Core Component Plan    Goal Status: Initial Assessment; goals not yet started    Other Core Component Goals:     Medication  "compliance established during initial phone interview.  Pt carries updated medication list with them at all times  Resting BP <130/80    Pt verbalized knowledge on medication usage and actions    Knowledge on medication usage and actions by completion of program.    Other Core Component Interventions/Education:     Patient given AHA educational handout \"How Do I Manage My Medications\"?  Resting BP readings taken pre and post exercise at each session.    Initial Assessment: 03/04/25    Individual Patient Goals:    Establish exercise routine 3-5 days/week, 30-60 minutes/day by completion of program.  To improve stamina and endurance by completion of the program.    Goal Status: Initial Assessment; goals not yet started    Rehab Staff Signature: Fan Burden RRT        "

## 2025-03-10 ENCOUNTER — APPOINTMENT (OUTPATIENT)
Dept: PRIMARY CARE | Facility: CLINIC | Age: 60
End: 2025-03-10
Payer: COMMERCIAL

## 2025-03-12 ENCOUNTER — CLINICAL SUPPORT (OUTPATIENT)
Dept: CARDIAC REHAB | Facility: HOSPITAL | Age: 60
End: 2025-03-12
Payer: COMMERCIAL

## 2025-03-12 VITALS
WEIGHT: 211.2 LBS | OXYGEN SATURATION: 98 % | SYSTOLIC BLOOD PRESSURE: 119 MMHG | DIASTOLIC BLOOD PRESSURE: 77 MMHG | BODY MASS INDEX: 31.28 KG/M2 | HEIGHT: 69 IN

## 2025-03-12 DIAGNOSIS — Z95.1 S/P CABG X 4: ICD-10-CM

## 2025-03-12 PROCEDURE — 93798 PHYS/QHP OP CAR RHAB W/ECG: CPT | Performed by: INTERNAL MEDICINE

## 2025-03-13 DIAGNOSIS — I15.9 SECONDARY HYPERTENSION: ICD-10-CM

## 2025-03-13 PROBLEM — E66.811 CLASS 1 OBESITY WITH BODY MASS INDEX (BMI) OF 31.0 TO 31.9 IN ADULT: Status: ACTIVE | Noted: 2025-03-13

## 2025-03-13 PROBLEM — M86.9 OSTEOMYELITIS: Status: RESOLVED | Noted: 2023-04-06 | Resolved: 2025-03-13

## 2025-03-13 PROBLEM — L03.90 CELLULITIS: Status: RESOLVED | Noted: 2023-10-12 | Resolved: 2025-03-13

## 2025-03-13 PROBLEM — R10.31 ABDOMINAL PAIN, CHRONIC, RIGHT LOWER QUADRANT: Status: RESOLVED | Noted: 2023-04-06 | Resolved: 2025-03-13

## 2025-03-13 PROBLEM — G89.29 ABDOMINAL PAIN, CHRONIC, RIGHT LOWER QUADRANT: Status: RESOLVED | Noted: 2023-04-06 | Resolved: 2025-03-13

## 2025-03-13 PROCEDURE — RXMED WILLOW AMBULATORY MEDICATION CHARGE

## 2025-03-13 RX ORDER — LISINOPRIL 5 MG/1
10 TABLET ORAL DAILY
Qty: 90 TABLET | Refills: 3 | Status: CANCELLED | OUTPATIENT
Start: 2025-03-13

## 2025-03-13 RX ORDER — LISINOPRIL 10 MG/1
10 TABLET ORAL DAILY
Qty: 90 TABLET | Refills: 3 | Status: SHIPPED | OUTPATIENT
Start: 2025-03-13 | End: 2026-03-13

## 2025-03-14 ENCOUNTER — PATIENT OUTREACH (OUTPATIENT)
Dept: CARE COORDINATION | Facility: CLINIC | Age: 60
End: 2025-03-14
Payer: COMMERCIAL

## 2025-03-14 ENCOUNTER — CLINICAL SUPPORT (OUTPATIENT)
Dept: CARDIAC REHAB | Facility: HOSPITAL | Age: 60
End: 2025-03-14
Payer: COMMERCIAL

## 2025-03-14 ENCOUNTER — PHARMACY VISIT (OUTPATIENT)
Dept: PHARMACY | Facility: CLINIC | Age: 60
End: 2025-03-14
Payer: COMMERCIAL

## 2025-03-14 DIAGNOSIS — Z95.1 S/P CABG X 4: ICD-10-CM

## 2025-03-14 NOTE — PROGRESS NOTES
Outreach call to patient to support a smooth transition of care from recent admission.  Left voicemail message for patient with my contact information.    Jeri Tierney RN BSN  [employer] Care Manager  [phone]

## 2025-03-17 ENCOUNTER — PATIENT OUTREACH (OUTPATIENT)
Dept: CARE COORDINATION | Facility: CLINIC | Age: 60
End: 2025-03-17
Payer: COMMERCIAL

## 2025-03-17 NOTE — PROGRESS NOTES
Reviewed chart prior to patient outreach. Outreach call to patient to check in 30 days after hospital discharge to support smooth transition of care. Denies further dizziness. Patient is routinely going to cardiac rehab. Blood pressure is back to baseline 120-125 systolic. Patient with no additional needs noted. No additional outreach needed at this time.    Jeri Tierney RN BSN  [employer] Care Manager  [phone]

## 2025-03-18 DIAGNOSIS — E11.9 TYPE 2 DIABETES MELLITUS WITHOUT COMPLICATION, WITHOUT LONG-TERM CURRENT USE OF INSULIN (MULTI): ICD-10-CM

## 2025-03-18 RX ORDER — METFORMIN HYDROCHLORIDE 1000 MG/1
1000 TABLET ORAL
Qty: 180 TABLET | Refills: 1 | Status: SHIPPED | OUTPATIENT
Start: 2025-03-18 | End: 2026-03-18

## 2025-03-19 ENCOUNTER — CLINICAL SUPPORT (OUTPATIENT)
Dept: CARDIAC REHAB | Facility: HOSPITAL | Age: 60
End: 2025-03-19
Payer: COMMERCIAL

## 2025-03-19 DIAGNOSIS — Z95.1 S/P CABG X 4: ICD-10-CM

## 2025-03-19 PROCEDURE — 93798 PHYS/QHP OP CAR RHAB W/ECG: CPT | Performed by: INTERNAL MEDICINE

## 2025-03-20 ENCOUNTER — APPOINTMENT (OUTPATIENT)
Dept: PRIMARY CARE | Facility: CLINIC | Age: 60
End: 2025-03-20
Payer: COMMERCIAL

## 2025-03-24 ENCOUNTER — CLINICAL SUPPORT (OUTPATIENT)
Dept: CARDIAC REHAB | Facility: HOSPITAL | Age: 60
End: 2025-03-24
Payer: COMMERCIAL

## 2025-03-24 DIAGNOSIS — Z95.1 S/P CABG X 4: ICD-10-CM

## 2025-03-26 ENCOUNTER — HOSPITAL ENCOUNTER (OUTPATIENT)
Dept: CARDIOLOGY | Facility: HOSPITAL | Age: 60
Discharge: HOME | End: 2025-03-26

## 2025-03-26 ENCOUNTER — CLINICAL SUPPORT (OUTPATIENT)
Dept: CARDIAC REHAB | Facility: HOSPITAL | Age: 60
End: 2025-03-26
Payer: COMMERCIAL

## 2025-03-26 ENCOUNTER — HOSPITAL ENCOUNTER (OUTPATIENT)
Dept: CARDIOLOGY | Facility: HOSPITAL | Age: 60
Discharge: HOME | End: 2025-03-26
Payer: COMMERCIAL

## 2025-03-26 DIAGNOSIS — I25.700 CORONARY ARTERY DISEASE INVOLVING CORONARY BYPASS GRAFT OF NATIVE HEART WITH UNSTABLE ANGINA PECTORIS: ICD-10-CM

## 2025-03-26 DIAGNOSIS — I21.4 NSTEMI (NON-ST ELEVATED MYOCARDIAL INFARCTION) (MULTI): ICD-10-CM

## 2025-03-26 DIAGNOSIS — Z95.1 S/P CABG X 4: ICD-10-CM

## 2025-03-26 DIAGNOSIS — E11.9 TYPE 2 DIABETES MELLITUS WITHOUT COMPLICATION, WITH LONG-TERM CURRENT USE OF INSULIN: ICD-10-CM

## 2025-03-26 DIAGNOSIS — N28.9 RENAL INSUFFICIENCY: ICD-10-CM

## 2025-03-26 DIAGNOSIS — I10 HYPERTENSION, UNSPECIFIED TYPE: ICD-10-CM

## 2025-03-26 DIAGNOSIS — N18.9 CHRONIC KIDNEY DISEASE, UNSPECIFIED CKD STAGE: ICD-10-CM

## 2025-03-26 DIAGNOSIS — Z79.4 TYPE 2 DIABETES MELLITUS WITHOUT COMPLICATION, WITH LONG-TERM CURRENT USE OF INSULIN: ICD-10-CM

## 2025-03-26 PROCEDURE — 93798 PHYS/QHP OP CAR RHAB W/ECG: CPT | Performed by: INTERNAL MEDICINE

## 2025-03-26 RX ORDER — CLOPIDOGREL BISULFATE 75 MG/1
75 TABLET ORAL DAILY
Qty: 90 TABLET | Refills: 1 | Status: SHIPPED | OUTPATIENT
Start: 2025-03-26

## 2025-03-26 RX ORDER — ATORVASTATIN CALCIUM 80 MG/1
80 TABLET, FILM COATED ORAL NIGHTLY
Qty: 90 TABLET | Refills: 1 | Status: SHIPPED | OUTPATIENT
Start: 2025-03-26

## 2025-03-26 RX ORDER — NAPROXEN SODIUM 220 MG/1
81 TABLET, FILM COATED ORAL DAILY
Qty: 90 TABLET | Refills: 1 | Status: SHIPPED | OUTPATIENT
Start: 2025-03-26

## 2025-03-28 ENCOUNTER — CLINICAL SUPPORT (OUTPATIENT)
Dept: CARDIAC REHAB | Facility: HOSPITAL | Age: 60
End: 2025-03-28
Payer: COMMERCIAL

## 2025-03-28 DIAGNOSIS — Z95.1 S/P CABG X 4: ICD-10-CM

## 2025-03-28 LAB — BODY SURFACE AREA: 2.14 M2

## 2025-03-28 PROCEDURE — 93798 PHYS/QHP OP CAR RHAB W/ECG: CPT | Performed by: INTERNAL MEDICINE

## 2025-03-31 ENCOUNTER — CLINICAL SUPPORT (OUTPATIENT)
Dept: CARDIAC REHAB | Facility: HOSPITAL | Age: 60
End: 2025-03-31
Payer: COMMERCIAL

## 2025-03-31 DIAGNOSIS — Z95.1 S/P CABG X 4: ICD-10-CM

## 2025-04-02 ENCOUNTER — CLINICAL SUPPORT (OUTPATIENT)
Dept: CARDIAC REHAB | Facility: HOSPITAL | Age: 60
End: 2025-04-02
Payer: COMMERCIAL

## 2025-04-02 DIAGNOSIS — Z95.1 S/P CABG X 4: ICD-10-CM

## 2025-04-02 PROCEDURE — 93798 PHYS/QHP OP CAR RHAB W/ECG: CPT | Performed by: INTERNAL MEDICINE

## 2025-04-04 ENCOUNTER — APPOINTMENT (OUTPATIENT)
Dept: CARDIOLOGY | Facility: CLINIC | Age: 60
End: 2025-04-04
Payer: COMMERCIAL

## 2025-04-04 ENCOUNTER — CLINICAL SUPPORT (OUTPATIENT)
Dept: CARDIAC REHAB | Facility: HOSPITAL | Age: 60
End: 2025-04-04
Payer: COMMERCIAL

## 2025-04-04 DIAGNOSIS — Z95.1 S/P CABG X 4: ICD-10-CM

## 2025-04-04 PROCEDURE — 93798 PHYS/QHP OP CAR RHAB W/ECG: CPT | Performed by: INTERNAL MEDICINE

## 2025-04-07 ENCOUNTER — CLINICAL SUPPORT (OUTPATIENT)
Dept: CARDIAC REHAB | Facility: HOSPITAL | Age: 60
End: 2025-04-07
Payer: COMMERCIAL

## 2025-04-07 DIAGNOSIS — Z95.1 S/P CABG X 4: ICD-10-CM

## 2025-04-09 ENCOUNTER — CLINICAL SUPPORT (OUTPATIENT)
Dept: CARDIAC REHAB | Facility: HOSPITAL | Age: 60
End: 2025-04-09
Payer: COMMERCIAL

## 2025-04-09 DIAGNOSIS — Z95.1 S/P CABG X 4: ICD-10-CM

## 2025-04-09 PROCEDURE — 93798 PHYS/QHP OP CAR RHAB W/ECG: CPT | Performed by: INTERNAL MEDICINE

## 2025-04-11 ENCOUNTER — CLINICAL SUPPORT (OUTPATIENT)
Dept: CARDIAC REHAB | Facility: HOSPITAL | Age: 60
End: 2025-04-11
Payer: COMMERCIAL

## 2025-04-11 DIAGNOSIS — Z95.1 S/P CABG X 4: ICD-10-CM

## 2025-04-14 ENCOUNTER — CLINICAL SUPPORT (OUTPATIENT)
Dept: CARDIAC REHAB | Facility: HOSPITAL | Age: 60
End: 2025-04-14
Payer: COMMERCIAL

## 2025-04-14 DIAGNOSIS — Z95.1 S/P CABG X 4: ICD-10-CM

## 2025-04-14 PROCEDURE — 93798 PHYS/QHP OP CAR RHAB W/ECG: CPT | Performed by: INTERNAL MEDICINE

## 2025-04-16 ENCOUNTER — CLINICAL SUPPORT (OUTPATIENT)
Dept: CARDIAC REHAB | Facility: HOSPITAL | Age: 60
End: 2025-04-16
Payer: COMMERCIAL

## 2025-04-16 DIAGNOSIS — Z95.1 S/P CABG X 4: ICD-10-CM

## 2025-04-16 PROCEDURE — 93798 PHYS/QHP OP CAR RHAB W/ECG: CPT | Performed by: INTERNAL MEDICINE

## 2025-04-18 ENCOUNTER — CLINICAL SUPPORT (OUTPATIENT)
Dept: CARDIAC REHAB | Facility: HOSPITAL | Age: 60
End: 2025-04-18
Payer: COMMERCIAL

## 2025-04-18 DIAGNOSIS — Z95.1 S/P CABG X 4: ICD-10-CM

## 2025-04-18 PROCEDURE — 93798 PHYS/QHP OP CAR RHAB W/ECG: CPT | Performed by: INTERNAL MEDICINE

## 2025-04-21 ENCOUNTER — CLINICAL SUPPORT (OUTPATIENT)
Dept: CARDIAC REHAB | Facility: HOSPITAL | Age: 60
End: 2025-04-21
Payer: COMMERCIAL

## 2025-04-21 DIAGNOSIS — Z95.1 S/P CABG X 4: ICD-10-CM

## 2025-04-23 ENCOUNTER — CLINICAL SUPPORT (OUTPATIENT)
Dept: CARDIAC REHAB | Facility: HOSPITAL | Age: 60
End: 2025-04-23
Payer: COMMERCIAL

## 2025-04-23 DIAGNOSIS — Z95.1 S/P CABG X 4: ICD-10-CM

## 2025-04-23 PROCEDURE — 93798 PHYS/QHP OP CAR RHAB W/ECG: CPT | Performed by: INTERNAL MEDICINE

## 2025-05-27 ENCOUNTER — PATIENT MESSAGE (OUTPATIENT)
Dept: PRIMARY CARE | Facility: CLINIC | Age: 60
End: 2025-05-27
Payer: COMMERCIAL

## 2025-05-27 DIAGNOSIS — E11.9 TYPE 2 DIABETES MELLITUS WITHOUT COMPLICATION, WITHOUT LONG-TERM CURRENT USE OF INSULIN: ICD-10-CM

## 2025-05-27 PROCEDURE — RXMED WILLOW AMBULATORY MEDICATION CHARGE

## 2025-05-27 NOTE — PROGRESS NOTES
Subjective   Reason for Visit: Nader Zazueta is an 60 y.o. male here for a Medicare Wellness visit.               HPI    Patient Care Team:  Mustapha Singer DO as PCP - General  Mustapha Singer DO as PCP - Employee [employer] PCP  Parviz Haley PA-C as Physician Assistant (Cardiology)     Review of Systems    Objective   Vitals:  There were no vitals taken for this visit.      Physical Exam    Assessment & Plan

## 2025-05-28 ENCOUNTER — PHARMACY VISIT (OUTPATIENT)
Dept: PHARMACY | Facility: CLINIC | Age: 60
End: 2025-05-28
Payer: COMMERCIAL

## 2025-06-06 ENCOUNTER — APPOINTMENT (OUTPATIENT)
Dept: CARDIAC REHAB | Facility: HOSPITAL | Age: 60
End: 2025-06-06
Payer: COMMERCIAL

## 2025-06-09 ENCOUNTER — APPOINTMENT (OUTPATIENT)
Dept: CARDIAC REHAB | Facility: HOSPITAL | Age: 60
End: 2025-06-09
Payer: COMMERCIAL

## 2025-06-10 PROCEDURE — RXMED WILLOW AMBULATORY MEDICATION CHARGE

## 2025-06-11 ENCOUNTER — APPOINTMENT (OUTPATIENT)
Dept: CARDIAC REHAB | Facility: HOSPITAL | Age: 60
End: 2025-06-11
Payer: COMMERCIAL

## 2025-06-11 ENCOUNTER — PHARMACY VISIT (OUTPATIENT)
Dept: PHARMACY | Facility: CLINIC | Age: 60
End: 2025-06-11
Payer: COMMERCIAL

## 2025-06-13 ENCOUNTER — APPOINTMENT (OUTPATIENT)
Dept: CARDIAC REHAB | Facility: HOSPITAL | Age: 60
End: 2025-06-13
Payer: COMMERCIAL

## 2025-06-16 ENCOUNTER — APPOINTMENT (OUTPATIENT)
Dept: CARDIAC REHAB | Facility: HOSPITAL | Age: 60
End: 2025-06-16
Payer: COMMERCIAL

## 2025-06-18 ENCOUNTER — APPOINTMENT (OUTPATIENT)
Dept: CARDIAC REHAB | Facility: HOSPITAL | Age: 60
End: 2025-06-18
Payer: COMMERCIAL

## 2025-06-20 ENCOUNTER — APPOINTMENT (OUTPATIENT)
Dept: CARDIAC REHAB | Facility: HOSPITAL | Age: 60
End: 2025-06-20
Payer: COMMERCIAL

## 2025-06-23 ENCOUNTER — APPOINTMENT (OUTPATIENT)
Dept: CARDIAC REHAB | Facility: HOSPITAL | Age: 60
End: 2025-06-23
Payer: COMMERCIAL

## 2025-06-25 ENCOUNTER — APPOINTMENT (OUTPATIENT)
Dept: CARDIAC REHAB | Facility: HOSPITAL | Age: 60
End: 2025-06-25
Payer: COMMERCIAL

## 2025-06-27 ENCOUNTER — APPOINTMENT (OUTPATIENT)
Dept: CARDIAC REHAB | Facility: HOSPITAL | Age: 60
End: 2025-06-27
Payer: COMMERCIAL

## 2025-06-30 ENCOUNTER — APPOINTMENT (OUTPATIENT)
Dept: CARDIAC REHAB | Facility: HOSPITAL | Age: 60
End: 2025-06-30
Payer: COMMERCIAL

## 2025-06-30 PROCEDURE — RXMED WILLOW AMBULATORY MEDICATION CHARGE

## 2025-07-01 ENCOUNTER — PHARMACY VISIT (OUTPATIENT)
Dept: PHARMACY | Facility: CLINIC | Age: 60
End: 2025-07-01
Payer: COMMERCIAL

## 2025-07-02 ENCOUNTER — APPOINTMENT (OUTPATIENT)
Dept: CARDIAC REHAB | Facility: HOSPITAL | Age: 60
End: 2025-07-02
Payer: COMMERCIAL

## 2025-07-07 ENCOUNTER — APPOINTMENT (OUTPATIENT)
Dept: CARDIAC REHAB | Facility: HOSPITAL | Age: 60
End: 2025-07-07
Payer: COMMERCIAL

## 2025-07-09 ENCOUNTER — APPOINTMENT (OUTPATIENT)
Dept: CARDIAC REHAB | Facility: HOSPITAL | Age: 60
End: 2025-07-09
Payer: COMMERCIAL

## 2025-07-11 ENCOUNTER — APPOINTMENT (OUTPATIENT)
Dept: CARDIAC REHAB | Facility: HOSPITAL | Age: 60
End: 2025-07-11
Payer: COMMERCIAL

## 2025-07-14 ENCOUNTER — APPOINTMENT (OUTPATIENT)
Dept: CARDIAC REHAB | Facility: HOSPITAL | Age: 60
End: 2025-07-14
Payer: COMMERCIAL

## 2025-07-16 ENCOUNTER — APPOINTMENT (OUTPATIENT)
Dept: CARDIAC REHAB | Facility: HOSPITAL | Age: 60
End: 2025-07-16
Payer: COMMERCIAL

## 2025-07-18 ENCOUNTER — APPOINTMENT (OUTPATIENT)
Dept: CARDIAC REHAB | Facility: HOSPITAL | Age: 60
End: 2025-07-18
Payer: COMMERCIAL

## 2025-07-21 ENCOUNTER — APPOINTMENT (OUTPATIENT)
Dept: CARDIAC REHAB | Facility: HOSPITAL | Age: 60
End: 2025-07-21
Payer: COMMERCIAL

## (undated) DEVICE — PADS, STOCKERT MOUNTING F/LOW LEVEL SENSOR II

## (undated) DEVICE — SUTURE, NUROLON, 0, 18 IN, CT1, DETACHABLE, MULTIPACK, BLACK

## (undated) DEVICE — COLLECTION UNIT, DRAINAGE, THORACIC, SINGLE TUBE, DRY SUCTION, ATS COMPATIBLE, OASIS 3600, LF

## (undated) DEVICE — INSERT, CLAMP, SURGICAL, SOFT/TRACTION, STEALTH, 5 MM

## (undated) DEVICE — KNIFE, MICRO, 15 DEG BLADE AND TIP, DISP

## (undated) DEVICE — SUTURE, PROLENE, 6-0, 30 IN, C-1, CV-11, BLUE

## (undated) DEVICE — SOLUTION, INJECTION, USP, SODIUM CHLORIDE 0.9%, .9, NACL, 1000 ML, BAG

## (undated) DEVICE — Device

## (undated) DEVICE — CANNULA, VESSEL, ONE WAY FLOW VALVE, BLUNT TIP, 3 MM X 6.4 CM

## (undated) DEVICE — SPONGE, HEMOSTATIC, CELLULOSE, SURGICEL, NU-KNIT, 3 X 4 IN

## (undated) DEVICE — BAG, DECANTER

## (undated) DEVICE — SUTURE, VICRYL 2-0, TAPER POINT, CT-1 UNDYED 27 INCH

## (undated) DEVICE — DRAPE, INCISE, ANTIMICROBIAL, IOBAN 2, STERI DRAPE, 23 X 33 IN, DISPOSABLE, STERILE

## (undated) DEVICE — FILTER, IV, BLOOD, MICROAGGREGATE, 40 MIC, RBC TRANSFUSION

## (undated) DEVICE — TR BAND, RADIAL COMPRESSION, STANDARD, 24CM

## (undated) DEVICE — CATHETER KIT, RADIAL ARTLINE, 20G X 1 3/4

## (undated) DEVICE — BLOWER MISTER KIT, CLEARVIEW, MALLEABLE SHAFT, W/TUBING, 16.5 CM

## (undated) DEVICE — DRESSING, MEDIPORE W/PAD, 3-1/2X13-3/4 IN

## (undated) DEVICE — DRESSING, ISLAND, TELFA, 4 X 5 IN

## (undated) DEVICE — RETRACTOR, SUTURE, HOLDING, INSERT, OCTOBASE, DISPOSABLE

## (undated) DEVICE — PACING CABLE, EXTENSION, 12 FT BEIGE, DISPOSABLE

## (undated) DEVICE — BATTERY, VARISPEED

## (undated) DEVICE — SYRINGE, 20 CC, LUER LOCK

## (undated) DEVICE — CASSETTE, BLOOD, PLEGIC SET

## (undated) DEVICE — SUTURE, PROLENE, 5-0, 36 IN, RB1, DA, BLUE

## (undated) DEVICE — DEVICE, ENDOSCOPIC VESSEL HARVESTING, SAPHENA VENAPAX

## (undated) DEVICE — TIP, ELECTROSURGICAL, 4IN BLADE, MODIFIED, EXTENDED

## (undated) DEVICE — CATHETER KIT, CENTRAL VENOUS, MULTI-LUMEN, 7 FR, 16 CM

## (undated) DEVICE — KIT, STANDARD, LEFT HEART, WITH MANIFOLD

## (undated) DEVICE — DEPRESSOR, TONGUE, 6 IN, WOOD, STERILE, LF

## (undated) DEVICE — TUBE SET, PNEUMOLAR HEATED, SMOKE EVACU, HIGH-FLOW

## (undated) DEVICE — SPONGE, LAP, XRAY DECT, 18IN X 18IN, W/LOOP, STERILE

## (undated) DEVICE — CATHETER, EXPO, MODEL-D, 6FR FR4

## (undated) DEVICE — ELECTRODE, MULTIFUNCTION, QUICK-COMBO, EDGE SYSTEM, 2 FT

## (undated) DEVICE — SUTURE, ETHIBOND, XTRA, 30 IN, 0, CT-1, GREEN

## (undated) DEVICE — INTRODUCER, GLIDESHEATH SLENDER A-KIT, 6FR 10CM

## (undated) DEVICE — SUTURE, VICRYL, 4-0, 18 IN, PS2, UNDYED

## (undated) DEVICE — APPLICATOR, CHLORAPREP, W/ORANGE TINT, 26ML

## (undated) DEVICE — HANDLE COVER, LIGHT, RIGID, DISP, LF

## (undated) DEVICE — CANNULA, AORTIC, ROOT, STANDARD, FLANGE, RADIOPAQUE TIP, W/FLOW-GUARD, 9 FR X 14 CM

## (undated) DEVICE — CANNULA, EOPA 20F W/O GUIDEWIRE

## (undated) DEVICE — ADAPTER, CARDIOPLEGIA, VENTING, Y, 19.1 CM

## (undated) DEVICE — SUTURE, VICRYL, 0, 18 IN, CT-1, UNDYED

## (undated) DEVICE — CONNECTOR, 1/2 X 1/2, STRAIGHT, STERILE

## (undated) DEVICE — BLADE, SAW STERNUM, STERILE

## (undated) DEVICE — DRAPE, INSTRUMENT, W/POUCH, STERI DRAPE, 7 X 11 IN, DISPOSABLE, STERILE

## (undated) DEVICE — CANNULA, VENOUS 2 STAGE 32/40

## (undated) DEVICE — CATHETER, EXPO, MODEL-D, 6FR FL3.5

## (undated) DEVICE — TRAY, SURESTEP, URINE METER, 14FR, SILICONE

## (undated) DEVICE — CLIP, LIGATING, W/ADHESIVE PAD, MEDIUM, TITANIUM

## (undated) DEVICE — CATHETER, THORACIC 28FR RIGHT ANGLE

## (undated) DEVICE — GUIDEWIRE, INQWIRE, 3MM J, .035 X 210CM, FIXED

## (undated) DEVICE — CANNULA, MULTIPLE PERFUSION SET, 15"

## (undated) DEVICE — SPONGE GAUZE, XRAY SC+RFID, 8X4 16 PLY, STERILE

## (undated) DEVICE — SHUNT, SENSOR

## (undated) DEVICE — TUBING, CLEAR N-COND, 5MM X 10, LF

## (undated) DEVICE — SENSOR, FLOTRAC, 84IN 213CM

## (undated) DEVICE — ELECTROSURGICAL, CLEANER, LECTROBRASIVE

## (undated) DEVICE — SUTURE, PROLENE, 3-0, 36 IN, SH, DA, BLUE

## (undated) DEVICE — GEL, ECOVUE, ULTRASOUND, 20GRAM, STERILE

## (undated) DEVICE — TUBING PACK, OXYGENATOR, ADULT

## (undated) DEVICE — SOLUTION, INJECTION, USP, PLASMALYTE A, PH 7.4, TYPE 1, 1000 ML, BAG

## (undated) DEVICE — BANDAGE, ELASTIC, ACE, ACE, DOUBLE LENGTH, 6 X 550 IN, LF

## (undated) DEVICE — CATHETER, THORACIC, STRAIGHT, ADULT, 28 FR, PVC

## (undated) DEVICE — SENSOR, OXYGEN, CEREBRAL, SOMASENSOR, ADULT

## (undated) DEVICE — SUTURE, PROLENE 4-0, TAPER POINT, SH-1 BLUE 30 INCH

## (undated) DEVICE — SUTURE, PROLENE, 7-0, 30 IN, BV1, DA, BLUE

## (undated) DEVICE — CONNECTOR, 3/8 X 1/4, STRAIGHT, STERILE

## (undated) DEVICE — CLIP, LIGATING, HORIZON, LARGE, TITANIUM

## (undated) DEVICE — OXYGENATOR FX 25, W/HR, ARTERIAL FILTER

## (undated) DEVICE — DRAPE, SHEET, CARDIOVASCULAR, ANTIMICROBIAL, W/ANESTHESIA SCREEN, IOBAN 2, STERI DRAPE, 107 X 133 IN, DISPOSABLE, FABRIC, BLUE, STERILE

## (undated) DEVICE — CLIP, LIGATING, W/ADHESIVE, WIDE SLOT, SMALL, TITANIUM

## (undated) DEVICE — SUTURE, PROLENE, 4-0, 36 IN, RB1, DA, BLUE

## (undated) DEVICE — PUNCH, AORTIC 4MM, BULLET TIP

## (undated) DEVICE — KIT, EXTENSION LINE, PRESSURE, RETROGRADE

## (undated) DEVICE — SUTURE, STEEL, 7, 18 IN, CCS, SILVER

## (undated) DEVICE — SPONGE, HEMOSTATIC, GELATIN, SURGIFOAM, 8 X 12.5 CM X 10 MM